# Patient Record
Sex: MALE | Race: WHITE | Employment: OTHER | URBAN - METROPOLITAN AREA
[De-identification: names, ages, dates, MRNs, and addresses within clinical notes are randomized per-mention and may not be internally consistent; named-entity substitution may affect disease eponyms.]

---

## 2017-02-17 ENCOUNTER — MYC MEDICAL ADVICE (OUTPATIENT)
Dept: PEDIATRICS | Facility: CLINIC | Age: 67
End: 2017-02-17

## 2017-02-17 NOTE — TELEPHONE ENCOUNTER
Per up-to-date: Simvastatin serum concentration may be increased when taken with grapefruit juice. Management: Avoid combination.  Patient going to [place] and wants to stop simvastatin to enjoy grapefruit. Ok to stop and restart when he gets back?  Please advise.    Irene Mcfarland RN  Message handled by Nurse Triage.

## 2017-02-21 ENCOUNTER — OFFICE VISIT (OUTPATIENT)
Dept: ORTHOPEDICS | Facility: CLINIC | Age: 67
End: 2017-02-21
Payer: COMMERCIAL

## 2017-02-21 ENCOUNTER — RADIANT APPOINTMENT (OUTPATIENT)
Dept: GENERAL RADIOLOGY | Facility: CLINIC | Age: 67
End: 2017-02-21
Attending: FAMILY MEDICINE
Payer: COMMERCIAL

## 2017-02-21 VITALS
BODY MASS INDEX: 33.05 KG/M2 | SYSTOLIC BLOOD PRESSURE: 144 MMHG | WEIGHT: 244 LBS | HEIGHT: 72 IN | DIASTOLIC BLOOD PRESSURE: 92 MMHG

## 2017-02-21 DIAGNOSIS — M25.561 ARTHRALGIA OF RIGHT LOWER LEG: ICD-10-CM

## 2017-02-21 DIAGNOSIS — M17.11 PRIMARY OSTEOARTHRITIS OF RIGHT KNEE: ICD-10-CM

## 2017-02-21 DIAGNOSIS — M25.561 ARTHRALGIA OF RIGHT LOWER LEG: Primary | ICD-10-CM

## 2017-02-21 PROCEDURE — 99214 OFFICE O/P EST MOD 30 MIN: CPT | Mod: 25 | Performed by: FAMILY MEDICINE

## 2017-02-21 PROCEDURE — 73562 X-RAY EXAM OF KNEE 3: CPT | Mod: RT | Performed by: FAMILY MEDICINE

## 2017-02-21 PROCEDURE — 20610 DRAIN/INJ JOINT/BURSA W/O US: CPT | Mod: RT | Performed by: FAMILY MEDICINE

## 2017-02-21 RX ORDER — METHYLPREDNISOLONE ACETATE 40 MG/ML
80 INJECTION, SUSPENSION INTRA-ARTICULAR; INTRALESIONAL; INTRAMUSCULAR; SOFT TISSUE ONCE
Qty: 2 ML | Refills: 0 | OUTPATIENT
Start: 2017-02-21 | End: 2017-02-21

## 2017-02-21 RX ORDER — LIDOCAINE HYDROCHLORIDE 10 MG/ML
3 INJECTION, SOLUTION INFILTRATION; PERINEURAL ONCE
Qty: 3 ML | Refills: 0 | OUTPATIENT
Start: 2017-02-21 | End: 2017-02-21

## 2017-02-21 NOTE — Clinical Note
Here is an update on your patient that was seen at [facility] in [place].   Please let us know if you have any questions.

## 2017-02-21 NOTE — MR AVS SNAPSHOT
After Visit Summary   2/21/2017    Dustin Lan    MRN: [id number]           Patient Information     Date Of Birth          1950        Visit Information        Provider Department      2/21/2017 2:20 PM Niranjan Villela MD [facility]        Today's Diagnoses     Arthralgia of right lower leg    -  1    Primary osteoarthritis of right knee          Care Instructions    Assessment:  1. Arthralgia of right lower leg    2. Primary osteoarthritis of right knee        Plan:  Rehab: Physical Therapy: [facility] - [phone]  Steroid injection of the right knee was performed today in clinic  Icing for the next 1-2 days may be helpful for pain. Injection may take 10-14 days to see the full effect.    Call if symptoms aren't improving, would proceed with surgical consult at that time         Follow-ups after your visit        Additional Services     ABI PT, HAND, AND CHIROPRACTIC REFERRAL       **This order will print in the [facility] Scheduling Office**    Physical Therapy, Hand Therapy and Chiropractic Care are available through:    *[facility]  *[facility]  *[facility]    Call one number to schedule at any of the above locations: [phone].    Your provider has referred you to: Physical Therapy at [facility] or [facility]    Indication/Reason for Referral: Knee Pain  Onset of Illness:   Therapy Orders: Evaluate and Treat  Special Programs: None  Special Request: None    Linh Melton      Additional Comments for the Therapist or Chiropractor: [place]    Please be aware that coverage of these services is subject to the terms and limitations of your health insurance plan.  Call member services at your health plan with any benefit or coverage questions.      Please bring the following to your appointment:    *Your personal calendar for scheduling future appointments  *Comfortable  clothing                  Follow-up notes from your care team     Return if symptoms worsen or fail to improve.      Who to contact     If you have questions or need follow up information about today's clinic visit or your schedule please contact [facility] directly at [phone].  Normal or non-critical lab and imaging results will be communicated to you by MyChart, letter or phone within 4 business days after the clinic has received the results. If you do not hear from us within 7 days, please contact the clinic through [employer] or phone. If you have a critical or abnormal lab result, we will notify you by phone as soon as possible.  Submit refill requests through POS on CLOUD or call your pharmacy and they will forward the refill request to us. Please allow 3 business days for your refill to be completed.          Additional Information About Your Visit        MyChart Information     POS on CLOUD gives you secure access to your electronic health record. If you see a primary care provider, you can also send messages to your care team and make appointments. If you have questions, please call your primary care clinic.  If you do not have a primary care provider, please call [phone] and they will assist you.        Care EveryWhere ID     This is your Care EveryWhere ID. This could be used by other organizations to access your [place] medical records  [id number]        Your Vitals Were     Height BMI (Body Mass Index)                6' (1.829 m) 33.09 kg/m2           Blood Pressure from Last 3 Encounters:   02/21/17 (!) 144/92   11/08/16 138/80   04/14/16 132/86    Weight from Last 3 Encounters:   02/21/17 244 lb (110.7 kg)   11/08/16 243 lb 7 oz (110.4 kg)   04/14/16 245 lb 1.6 oz (111.2 kg)              We Performed the Following     DRAIN/INJECT LARGE JOINT/BURSA     ABI PT, HAND, AND CHIROPRACTIC REFERRAL     METHYLPREDNISOLONE 40 MG INJ          Today's Medication  Changes          These changes are accurate as of: 2/21/17  4:19 PM.  If you have any questions, ask your nurse or doctor.               Start taking these medicines.        Dose/Directions    lidocaine 1 % injection   Used for:  Primary osteoarthritis of right knee   Started by:  Niranjan Villela MD        Dose:  3 mL   3 mLs by INTRA-ARTICULAR route once for 1 dose   Quantity:  3 mL   Refills:  0       methylPREDNISolone acetate 40 MG/ML injection   Commonly known as:  DEPO-MEDROL   Used for:  Primary osteoarthritis of right knee   Started by:  Niranjan Villela MD        Dose:  80 mg   2 mLs (80 mg) by INTRA-ARTICULAR route once for 1 dose   Quantity:  2 mL   Refills:  0            Where to get your medicines      Some of these will need a paper prescription and others can be bought over the counter.  Ask your nurse if you have questions.     You don't need a prescription for these medications     lidocaine 1 % injection    methylPREDNISolone acetate 40 MG/ML injection                Primary Care Provider Office Phone # Fax #    Alessandro Blanco [phone]       [address] [place] [address]        Thank you!     Thank you for choosing [facility]  for your care. Our goal is always to provide you with excellent care. Hearing back from our patients is one way we can continue to improve our services. Please take a few minutes to complete the written survey that you may receive in the mail after your visit with us. Thank you!             Your Updated Medication List - Protect others around you: Learn how to safely use, store and throw away your medicines at www.disposemymeds.org.          This list is accurate as of: 2/21/17  4:19 PM.  Always use your most recent med list.                   Brand Name Dispense Instructions for use    albuterol 108 (90 BASE) MCG/ACT Inhaler    PROAIR HFA/PROVENTIL  HFA/VENTOLIN HFA    1 Inhaler    Inhale 2 puffs into the lungs every 6 hours as needed for shortness of breath / dyspnea or wheezing       fluticasone-salmeterol 250-50 MCG/DOSE diskus inhaler    ADVAIR DISKUS    3 Inhaler    Inhale 1 puff into the lungs every 12 hours       glucosamine chondroitin 500 complex Caps          ibuprofen 200 MG tablet    ADVIL/MOTRIN    100 tablet    Take 1 tablet (200 mg) by mouth 2 times daily       lidocaine 1 % injection     3 mL    3 mLs by INTRA-ARTICULAR route once for 1 dose       methylPREDNISolone acetate 40 MG/ML injection    DEPO-MEDROL    2 mL    2 mLs (80 mg) by INTRA-ARTICULAR route once for 1 dose       Multi-vitamin Tabs tablet   Generic drug:  multivitamin, therapeutic with minerals      1 po qd       simvastatin 40 MG tablet    ZOCOR    90 tablet    Take 1 tablet (40 mg) by mouth At Bedtime

## 2017-02-21 NOTE — PATIENT INSTRUCTIONS
Assessment:  1. Arthralgia of right lower leg    2. Primary osteoarthritis of right knee        Plan:  Rehab: Physical Therapy: [facility] - [phone]  Steroid injection of the right knee was performed today in clinic  Icing for the next 1-2 days may be helpful for pain. Injection may take 10-14 days to see the full effect.    Call if symptoms aren't improving, would proceed with surgical consult at that time

## 2017-02-21 NOTE — PROGRESS NOTES
[facility]   Clinic Visit s Feb 21, 2017    PCP: Alessandro Blanco      Dustin is a 66 year old male who is seen as self referral for   Chief Complaint   Patient presents with     Musculoskeletal Problem     R knee pain       Injury: acute right knee pain started last night. He did exercise yesterday but didn't do anything unusual at the gym.     Location of Pain: right knee- posterior lateral  Duration of Pain: 1 day(s)  Rating of Pain at worst: 6/10  Rating of Pain Currently: 5/10  Pain is worse with: extending the right knee, walking, stairs  Pain is better with: keeping knee flexed  Treatment so far consists of:  Aleve, icing, knee brace  Associated symptoms: swelling, pinching or catching in the knee, unable to fully extend the knee  Prior History of related problems: right knee menisectomy 2008.    Seen here for LEFT knee djd 2 years ago, seen by DR Fitch, ortho surgeon, over 3 years ago for RIGHT knee.    Social History: retired     Past Medical History   Diagnosis Date     Elevated blood pressure reading without diagnosis of hypertension      Personal history of colonic polyps 2/2007     tubular adenoma, ileocecal valve, repeat 2010     Plantar fascial fibromatosis      Pure hypercholesterolemia        Patient Active Problem List    Diagnosis Date Noted     Advance Care Planning 08/25/2011     Priority: Medium     Advance Care Planning 3/22/2016: Patient returned call and states he was out of town on vacation and unable to return call. Informed of inability to include Health Care Goals in current ACD. Informed of choices of creating a new document if he would like them included or leaving as is. Health Care Goals and letter letter mailed to patient. He verbalized understanding. GREGORIA Gotti RN  Advance Care Planning 3/8/2016: Receipt of ACP document:  Received: Health Care Directive which was witnessed or notarized on 2/21/16. Health Care Goals pages 1-6 are attached but not  documented in ACD that they are attached.  Document not previously scanned. Validation form completed indicating short form Advance Directive is valid. Vallidation form sent to be scanned. Left message for patient to return call. (consent to communicate on file)Unable to attach goals due to lack of witnessing/notarizing. Code Status needs to be updated to reflect choices in most recent ACP document. Orders placed and sent to provider for review.  Confirmed/documented designated decision maker(s).  Added by Barbara Gotti  Advance Care Planning 2016: ACP Facilitation Session:    Dustin Lan presented for ACP Facilitation session at a group session. He was accompanied by spouse. Sergio Partida information provided and resources reviewed. He currently wishes to give additional consideration to ACP  He currently has the following questions or concerns about Advance Care Planning: none.  Confirmed/documented legally designated decision maker(s). Follow-up meeting: not needed/applicable. Added by Montse Lan RN Advance Care Planning Liaison with Sergio Partida  Discussed advance care planning with patient; information given to patient to review. 2011            Impaired fasting glucose 2010     Priority: Medium     HYPERLIPIDEMIA LDL GOAL <130 02/10/2010     Priority: Medium     History of colonic polyps 2007     Priority: Medium     tubular adenoma, ileocecal valve, repeat   Problem list name updated by automated process. Provider to review       Mild persistent asthma 04/10/2006     Priority: Medium     Contact dermatitis and other eczema, due to unspecified cause 04/10/2006     Priority: Medium       Family History   Problem Relation Age of Onset     C.A.D. Father      MI  66     CEREBROVASCULAR DISEASE Mother      Lipids Sister        Social History     Social History     Marital status:      Spouse name: N/A     Number of children: N/A     Years of education: N/A      Social History Main Topics     Smoking status: Never Smoker     Smokeless tobacco: Never Used     Alcohol use 0.0 - 1.8 oz/week     0 - 3 Standard drinks or equivalent per week       Past Surgical History   Procedure Laterality Date     Hc colonoscopy thru stoma, diagnostic  2/2007     ileocecal valve polyp, tubular adenoma, repeat in 3 yrs     Arthroscopy knee rt/lt  12/2008     right       Review of Systems   Musculoskeletal: Positive for joint pain.   All other systems reviewed and are negative.    Physical Exam   Musculoskeletal:        Right knee: Lateral joint line tenderness noted.        Left knee: Medial joint line tenderness noted.     BP (!) 144/92  Ht 6' (1.829 m)  Wt 244 lb (110.7 kg)  BMI 33.09 kg/m2  Constitutional:well-developed, well-nourished, and in no distress.   Cardiovascular: Intact distal pulses.    Neurological: alert. Gait Abnormal:   Antalgic gait  limping significantly  Skin: Skin is warm and dry.   Psychiatric: Mood and affect normal.   Respiratory: unlabored, speaks in full sentences  Lymph: no LAD, no lymphangitis    Left Knee Exam   Swelling: Mild  Effusion: No    Tenderness   The patient is experiencing tenderness in the medial joint line.    Range of Motion   Extension: Normal  Flexion:     120    Tests   Drawer:       Anterior - Negative     Posterior - n/t  Varus:  Negative  Valgus: Negative  Patellar Apprehension: No    Right Knee Exam   Swelling: Mild  Effusion: Yes    Tenderness   The patient is experiencing tenderness in the lateral joint line.    Range of Motion   Extension: 10  Flexion:     90    Tests   McMurrays:  Medial - Positive      Lateral - Negative  Drawer:       Anterior - Negative    Posterior - Negative  Varus:  Negative  Valgus: Negative  Patellar Apprehension: No    Comments:  Crepitus with range of motion          X-ray images Ordered and independently reviewed by me in the office today with the patient. X-ray shows:   Recent Results (from the past 48  hour(s))   XR Knee Right 1/2 Views    Narrative    Advanced arthritis in both knees, with near complete obliteration of the   medial joint space compartments bilaterally. The LEFT knee degeneration   has progressed significantly since 2014 where the RIGHT knee is minimally   advanced   XR Knee Bilateral 1/2 Views    Narrative    Advanced arthritis in both knees, with near complete obliteration of the   medial joint space compartments bilaterally. The LEFT knee degeneration   has progressed significantly since 2014 where the RIGHT knee is minimally   advanced.      ASSESSMENT/PLAN    ICD-10-CM    1. Arthralgia of right lower leg M25.561 XR Knee Bilateral 1/2 Views     XR Knee Right 1/2 Views   2. Primary osteoarthritis of right knee M17.11 DRAIN/INJECT LARGE JOINT/BURSA     METHYLPREDNISOLONE 40 MG INJ     methylPREDNISolone acetate (DEPO-MEDROL) 40 MG/ML injection     lidocaine 1 % injection     Acute flare of RIGHT knee djd, not previously evaluated here before.    Fairly advanced djd, nearing end stage. PT recommended due to loss of ROM, may be necessary prior to knee replacement as well.     The benefits, risks, indications for and alternatives to the procedure were discussed with the patient, including bleeding, infection, hyperglycemia, localized lipodystrophy and hypopigmentation. He elected to proceed, and informed consent was signed.    PROCEDURE:  JOINT INJECTION.         After a discussion of risks, benefits and side effects of procedure, informed patient consent was obtained, and form signed by patient and physician.       The right  knee was prepped with Chloroprep.    INJECTION:  Using 3 cc of 1% lidocaine mixed                           with 80 mg of DepoMedrol, the right knee joint was successfully injected                           without complication using a 22G needle with the patient lying supine and the injection administered using the superolateral or lateral patellar approach.  He tolerated  the procedure well with minimal discomfort. Bandaid applied. Instructed to monitor for increased warmth, redness, pain or swelling which might indicate an infection.

## 2017-02-28 ENCOUNTER — THERAPY VISIT (OUTPATIENT)
Dept: PHYSICAL THERAPY | Facility: CLINIC | Age: 67
End: 2017-02-28
Payer: COMMERCIAL

## 2017-02-28 DIAGNOSIS — M25.561 CHRONIC PAIN OF BOTH KNEES: Primary | ICD-10-CM

## 2017-02-28 DIAGNOSIS — G89.29 CHRONIC PAIN OF BOTH KNEES: Primary | ICD-10-CM

## 2017-02-28 DIAGNOSIS — M25.562 CHRONIC PAIN OF BOTH KNEES: Primary | ICD-10-CM

## 2017-02-28 PROCEDURE — 97161 PT EVAL LOW COMPLEX 20 MIN: CPT | Mod: GP | Performed by: PHYSICAL THERAPIST

## 2017-02-28 PROCEDURE — 97110 THERAPEUTIC EXERCISES: CPT | Mod: GP | Performed by: PHYSICAL THERAPIST

## 2017-02-28 ASSESSMENT — ACTIVITIES OF DAILY LIVING (ADL)
LIMPING: I HAVE THE SYMPTOM BUT IT DOES NOT AFFECT MY ACTIVITY
STAND: ACTIVITY IS MINIMALLY DIFFICULT
RISE FROM A CHAIR: ACTIVITY IS MINIMALLY DIFFICULT
KNEEL ON THE FRONT OF YOUR KNEE: ACTIVITY IS MINIMALLY DIFFICULT
KNEE_ACTIVITY_OF_DAILY_LIVING_SUM: 55
GO UP STAIRS: ACTIVITY IS MINIMALLY DIFFICULT
WEAKNESS: THE SYMPTOM AFFECTS MY ACTIVITY SLIGHTLY
KNEE_ACTIVITY_OF_DAILY_LIVING_SCORE: 78.57
GIVING WAY, BUCKLING OR SHIFTING OF KNEE: I HAVE THE SYMPTOM BUT IT DOES NOT AFFECT MY ACTIVITY
SQUAT: ACTIVITY IS SOMEWHAT DIFFICULT
GO DOWN STAIRS: ACTIVITY IS MINIMALLY DIFFICULT
SIT WITH YOUR KNEE BENT: ACTIVITY IS NOT DIFFICULT
STIFFNESS: I DO NOT HAVE THE SYMPTOM
HOW_WOULD_YOU_RATE_THE_OVERALL_FUNCTION_OF_YOUR_KNEE_DURING_YOUR_USUAL_DAILY_ACTIVITIES?: NEARLY NORMAL
AS_A_RESULT_OF_YOUR_KNEE_INJURY,_HOW_WOULD_YOU_RATE_YOUR_CURRENT_LEVEL_OF_DAILY_ACTIVITY?: NEARLY NORMAL
RAW_SCORE: 55
HOW_WOULD_YOU_RATE_THE_CURRENT_FUNCTION_OF_YOUR_KNEE_DURING_YOUR_USUAL_DAILY_ACTIVITIES_ON_A_SCALE_FROM_0_TO_100_WITH_100_BEING_YOUR_LEVEL_OF_KNEE_FUNCTION_PRIOR_TO_YOUR_INJURY_AND_0_BEING_THE_INABILITY_TO_PERFORM_ANY_OF_YOUR_USUAL_DAILY_ACTIVITIES?: 70
PAIN: THE SYMPTOM AFFECTS MY ACTIVITY SLIGHTLY
WALK: ACTIVITY IS NOT DIFFICULT
SWELLING: THE SYMPTOM AFFECTS MY ACTIVITY SLIGHTLY

## 2017-02-28 NOTE — PROGRESS NOTES
[facility] Initial Evaluation    Subjective:    Dustin Lan is a 66 year old male with a right knee condition.      This is a chronic condition  Pt has a long history of B knee issues, OA, and meniscectomy B several years ago.  Pt recently had a flare up of his R knee, over the past several months it has been progressively worsening, insidious onset starting roughly 1/1/2017.  Pt visited MD who gave him an injection which helped initially.  Pt is planning on scheduling a R TKA, but is hoping that PT will keep him out of surgery if possible.  .    Patient reports pain:  Lateral and posterior.    Pain is described as aching and is intermittent and reported as 5/10.  Associated symptoms:  Loss of motion/stiffness and loss of strength.   Symptoms are exacerbated by activity, weight bearing, standing, walking, ascending stairs and descending stairs and relieved by rest.  Since onset symptoms are gradually worsening.    Previous treatment includes physical therapy.    General health as reported by patient is good.  Pertinent medical history includes:  Asthma and overweight.      Current medications:  Anti-inflammatory.  Current occupation is Retired  .                                  Objective:    Standing Alignment:              Knee:  Genu varus R, genu varus L, genu recuvatum L and genu recurvatum R      Gait:    Gait Type:  Antalgic         Flexibility/Screens:       Lower Extremity:  Decreased left lower extremity flexibility:Adductors; Hip Flexors; Quadriceps; Hamstrings and Gastroc    Decreased right lower extremity flexibility:  Adductors; Hip Flexors; Quadriceps; Hamstrings and Gastroc                                                 Hip Evaluation    Hip Strength:    Flexion:   Left: 5-/5   Pain:  Right: 5-/5   Pain:                    Extension:  Left: 5-/5  Pain:Right: 5-/5    Pain:    Abduction:  Left: 4+/5     Pain:Right: 4+/5    Pain:                           Knee  Evaluation:  ROM:    AROM    Hyperextension:  Left:  0    Right: 0  Extension:  Left: -8    Right:  -14  Flexion: Left: 107    Right: 103        Strength:     Extension:  Left: 5-/5   Pain:      Right: 5-/5   Pain:  Flexion:  Left: 4+/5   Pain:      Right: 4+/5   Pain:    Quad Set Left: Good    Pain:   Quad Set Right: Good    Pain:  Ligament Testing:  Normal                Special Tests:   Left knee positive for the following special tests:  Patellar Compression  Right knee positive for the following tests:  Patellar Compression  Palpation:    Left knee tenderness present at:  Medial Joint Line and Lateral Joint Line  Right knee tenderness present at:  Medial Joint Line and Lateral Joint Line  Edema:  Not Assessed    Mobility Testing:      Patellofemoral Medial:  Left: hypomobile    Right: hypomobile  Patellofemoral Lateral:  Left: hypomobile    Right: hypomobile  Patellofemoral Superior:  Left: hypomobile    Right: hypomobile  Patellofemoral Inferior:  Left: hypomobile    Right: hypomobile        General     ROS    Assessment/Plan:      Patient is a 66 year old male with both sides knee complaints.    Patient has the following significant findings with corresponding treatment plan.                Diagnosis 1:  B knee OA, pain      Pain -  hot/cold therapy, manual therapy, self management, education and home program  Decreased ROM/flexibility - manual therapy and therapeutic exercise  Decreased joint mobility - manual therapy and therapeutic exercise  Decreased strength - therapeutic exercise and therapeutic activities  Impaired gait - gait training  Impaired muscle performance - neuro re-education  Decreased function - therapeutic activities    Therapy Evaluation Codes:   1) History comprised of:   Personal factors that impact the plan of care:      None.    Comorbidity factors that impact the plan of care are:      Asthma and Overweight.     Medications impacting care: Anti-inflammatory.  2) Examination of Body  Systems comprised of:   Body structures and functions that impact the plan of care:      Knee.   Activity limitations that impact the plan of care are:      Bending, Jumping, Lifting, Squatting/kneeling and Stairs.  3) Clinical presentation characteristics are:   Stable/Uncomplicated.  4) Decision-Making    Low complexity using standardized patient assessment instrument and/or measureable assessment of functional outcome.  Cumulative Therapy Evaluation is: Low complexity.    Previous and current functional limitations:  (See Goal Flow Sheet for this information)    Short term and Long term goals: (See Goal Flow Sheet for this information)     Communication ability:  Patient appears to be able to clearly communicate and understand verbal and written communication and follow directions correctly.  Treatment Explanation - The following has been discussed with the patient:   RX ordered/plan of care  Anticipated outcomes  Possible risks and side effects  This patient would benefit from PT intervention to resume normal activities.   Rehab potential is good.    Frequency:  1 X week, once daily  Duration:  for 6 weeks  Discharge Plan:  Achieve all LTG.  Independent in home treatment program.  Reach maximal therapeutic benefit.    Please refer to the daily flowsheet for treatment today, total treatment time and time spent performing 1:1 timed codes.

## 2017-03-22 ENCOUNTER — THERAPY VISIT (OUTPATIENT)
Dept: PHYSICAL THERAPY | Facility: CLINIC | Age: 67
End: 2017-03-22
Payer: COMMERCIAL

## 2017-03-22 DIAGNOSIS — G89.29 CHRONIC PAIN OF BOTH KNEES: ICD-10-CM

## 2017-03-22 DIAGNOSIS — M25.562 CHRONIC PAIN OF BOTH KNEES: ICD-10-CM

## 2017-03-22 DIAGNOSIS — M25.561 CHRONIC PAIN OF BOTH KNEES: ICD-10-CM

## 2017-03-22 PROCEDURE — 97110 THERAPEUTIC EXERCISES: CPT | Mod: GP | Performed by: PHYSICAL THERAPIST

## 2017-05-01 ENCOUNTER — OFFICE VISIT (OUTPATIENT)
Dept: PEDIATRICS | Facility: CLINIC | Age: 67
End: 2017-05-01
Payer: COMMERCIAL

## 2017-05-01 VITALS
HEART RATE: 80 BPM | HEIGHT: 72 IN | OXYGEN SATURATION: 98 % | WEIGHT: 243.06 LBS | BODY MASS INDEX: 32.92 KG/M2 | TEMPERATURE: 97.6 F | SYSTOLIC BLOOD PRESSURE: 132 MMHG | RESPIRATION RATE: 16 BRPM | DIASTOLIC BLOOD PRESSURE: 88 MMHG

## 2017-05-01 DIAGNOSIS — R06.02 SOB (SHORTNESS OF BREATH): Primary | ICD-10-CM

## 2017-05-01 LAB — NT-PROBNP SERPL-MCNC: 35 PG/ML (ref 0–125)

## 2017-05-01 PROCEDURE — 93000 ELECTROCARDIOGRAM COMPLETE: CPT | Performed by: INTERNAL MEDICINE

## 2017-05-01 PROCEDURE — 99214 OFFICE O/P EST MOD 30 MIN: CPT | Performed by: INTERNAL MEDICINE

## 2017-05-01 PROCEDURE — 36415 COLL VENOUS BLD VENIPUNCTURE: CPT | Performed by: INTERNAL MEDICINE

## 2017-05-01 PROCEDURE — 80048 BASIC METABOLIC PNL TOTAL CA: CPT | Performed by: INTERNAL MEDICINE

## 2017-05-01 PROCEDURE — 83880 ASSAY OF NATRIURETIC PEPTIDE: CPT | Performed by: INTERNAL MEDICINE

## 2017-05-01 NOTE — MR AVS SNAPSHOT
"              After Visit Summary   5/1/2017    Dustin Lan    MRN: [id number]           Patient Information     Date Of Birth          1950        Visit Information        Provider Department      5/1/2017 9:40 AM Alessandro Blanco MD [facility]        Today's Diagnoses     SOB (shortness of breath)    -  1      Care Instructions    For next 1 month, stay on advair twice daily.  THen may back down to daily.    Lab work downstairs today.  Directions:  As you walk through the first floor, you'll see (on the right) first the pharmacy, then some bathrooms, then the \"Lab and Imaging\" area. Give them your name at the window there and wait for them to call you.     Follow up if symptoms recur, in the absence of cats (and prolonged standing).    Alessandro Blanco MD  Internal Medicine and Pediatrics           Follow-ups after your visit        Who to contact     If you have questions or need follow up information about today's clinic visit or your schedule please contact [facility] directly at [phone].  Normal or non-critical lab and imaging results will be communicated to you by MyChart, letter or phone within 4 business days after the clinic has received the results. If you do not hear from us within 7 days, please contact the clinic through [employer] or phone. If you have a critical or abnormal lab result, we will notify you by phone as soon as possible.  Submit refill requests through [employer] or call your pharmacy and they will forward the refill request to us. Please allow 3 business days for your refill to be completed.          Additional Information About Your Visit        [employer] Information     [employer] gives you secure access to your electronic health record. If you see a primary care provider, you can also send messages to your care team and make appointments. If you have questions, please call your primary care clinic.  If you do not have a primary care provider, please call " [phone] and they will assist you.        Care EveryWhere ID     This is your Care EveryWhere ID. This could be used by other organizations to access your [facility] medical records  [id number]        Your Vitals Were     Pulse Temperature Respirations Height Pulse Oximetry BMI (Body Mass Index)    80 97.6  F (36.4  C) (Oral) 16 6' (1.829 m) 98% 32.97 kg/m2       Blood Pressure from Last 3 Encounters:   05/01/17 132/88   02/21/17 (!) 144/92   11/08/16 138/80    Weight from Last 3 Encounters:   05/01/17 243 lb 1 oz (110.3 kg)   02/21/17 244 lb (110.7 kg)   11/08/16 243 lb 7 oz (110.4 kg)              We Performed the Following     Basic metabolic panel  (Ca, Cl, CO2, Creat, Gluc, K, Na, BUN)     BNP-N terminal pro     EKG 12-lead complete w/read - Clinics        Primary Care Provider Office Phone # Fax #    Alessandro Blanco [phone]       [address] [place] [address]        Thank you!     Thank you for choosing [facility]  for your care. Our goal is always to provide you with excellent care. Hearing back from our patients is one way we can continue to improve our services. Please take a few minutes to complete the written survey that you may receive in the mail after your visit with us. Thank you!             Your Updated Medication List - Protect others around you: Learn how to safely use, store and throw away your medicines at www.disposemymeds.org.          This list is accurate as of: 5/1/17 10:35 AM.  Always use your most recent med list.                   Brand Name Dispense Instructions for use    albuterol 108 (90 BASE) MCG/ACT Inhaler    PROAIR HFA/PROVENTIL HFA/VENTOLIN HFA    1 Inhaler    Inhale 2 puffs into the lungs every 6 hours as needed for shortness of breath / dyspnea or wheezing       fluticasone-salmeterol 250-50 MCG/DOSE diskus inhaler    ADVAIR DISKUS    3 Inhaler    Inhale 1 puff into the lungs every 12 hours        glucosamine chondroitin 500 complex Caps          ibuprofen 200 MG tablet    ADVIL/MOTRIN    100 tablet    Take 1 tablet (200 mg) by mouth 2 times daily       Multi-vitamin Tabs tablet   Generic drug:  multivitamin, therapeutic with minerals      1 po qd       simvastatin 40 MG tablet    ZOCOR    90 tablet    Take 1 tablet (40 mg) by mouth At Bedtime

## 2017-05-01 NOTE — PATIENT INSTRUCTIONS
"For next 1 month, stay on advair twice daily.  THen may back down to daily.    Lab work downstairs today.  Directions:  As you walk through the first floor, you'll see (on the right) first the pharmacy, then some bathrooms, then the \"Lab and Imaging\" area. Give them your name at the window there and wait for them to call you.     Follow up if symptoms recur, in the absence of cats (and prolonged standing).    Alessandro Blanco MD  Internal Medicine and Pediatrics     "

## 2017-05-01 NOTE — NURSING NOTE
Chief Complaint   Patient presents with     Consult     High Blood Pressure Reading       Initial /66 (BP Location: Right arm, Patient Position: Chair, Cuff Size: Adult Large)  Pulse 80  Temp 97.6  F (36.4  C) (Oral)  Resp 16  Ht 6' (1.829 m)  Wt 243 lb 1 oz (110.3 kg)  SpO2 98%  BMI 32.97 kg/m2 Estimated body mass index is 32.97 kg/(m^2) as calculated from the following:    Height as of this encounter: 6' (1.829 m).    Weight as of this encounter: 243 lb 1 oz (110.3 kg).  Medication Reconciliation: stacie Kennedy CMA (AAMA) 5/1/2017 9:41 AM

## 2017-05-01 NOTE — PROGRESS NOTES
"  SUBJECTIVE:                                                    Dustin Lan is a 66 year old male who presents to clinic today for the following health issues:      Patient states had high blood pressure reading 150/104 on Saturday, did have some chest pressure and tightness at that time too. Patient states overall not feeling well for the past couple of days. Per patient, has been taking OTC allergy medications. Patient also states did have some bilateral leg swelling which has resolved since stopping OTC allergy medication.     Has \"not been breathing right\" for a few weeks.  Sister with cat, and patient has cat allergy.  Took a 4 hour tablet (allergy med).   Also tried a 24 hour allegra tablet.  Has noted that exposure to cat caused him to have bilateral calf \"hardness\".  He stopped the allegra and calves \"got back to normal.\"    Uses advair as well.  His shortness of breath has been worse, so increased to twice daily.      Current symptoms:  Leg hardness and swelling is gone.  Breathing is currently \"shallow\".  No wheezing, no coughing, but does feel tightness.      Problem list and histories reviewed & adjusted, as indicated.  Additional history: as documented    Patient Active Problem List   Diagnosis     Mild persistent asthma     Contact dermatitis and other eczema, due to unspecified cause     History of colonic polyps     HYPERLIPIDEMIA LDL GOAL <130     Impaired fasting glucose     Advance Care Planning     Chronic pain of both knees     Past Surgical History:   Procedure Laterality Date     ARTHROSCOPY KNEE RT/LT  12/2008    right     HC COLONOSCOPY THRU STOMA, DIAGNOSTIC  2/2007    ileocecal valve polyp, tubular adenoma, repeat in 3 yrs       Social History   Substance Use Topics     Smoking status: Never Smoker     Smokeless tobacco: Never Used     Alcohol use 0.0 - 1.8 oz/week     0 - 3 Standard drinks or equivalent per week     Family History   Problem Relation Age of Onset     C.A.D. Father  "     MI  66     CEREBROVASCULAR DISEASE Mother      Lipids Sister          Current Outpatient Prescriptions   Medication Sig Dispense Refill     fluticasone-salmeterol (ADVAIR DISKUS) 250-50 MCG/DOSE diskus inhaler Inhale 1 puff into the lungs every 12 hours 3 Inhaler 3     simvastatin (ZOCOR) 40 MG tablet Take 1 tablet (40 mg) by mouth At Bedtime 90 tablet 3     GLUCOSAMINE CHONDR 500 COMPLEX PO CAPS        MULTI-VITAMIN OR TABS 1 po qd       ibuprofen (ADVIL,MOTRIN) 200 MG tablet Take 1 tablet (200 mg) by mouth 2 times daily 100 tablet      albuterol (PROAIR HFA, PROVENTIL HFA, VENTOLIN HFA) 108 (90 BASE) MCG/ACT inhaler Inhale 2 puffs into the lungs every 6 hours as needed for shortness of breath / dyspnea or wheezing 1 Inhaler 2     Allergies   Allergen Reactions     Cats      No Known Allergies      BP Readings from Last 3 Encounters:   17 132/88   17 (!) 144/92   16 138/80    Wt Readings from Last 3 Encounters:   17 243 lb 1 oz (110.3 kg)   17 244 lb (110.7 kg)   16 243 lb 7 oz (110.4 kg)                  Labs reviewed in EPIC    Reviewed and updated as needed this visit by clinical staff       Reviewed and updated as needed this visit by Provider         ROS:  C: NEGATIVE for fever, chills, change in weight  E/M: NEGATIVE for ear, mouth and throat problems  R: NEGATIVE for significant cough or SOB  CV: NEGATIVE for chest pain, palpitations or peripheral edema    OBJECTIVE:                                                    /88  Pulse 80  Temp 97.6  F (36.4  C) (Oral)  Resp 16  Ht 6' (1.829 m)  Wt 243 lb 1 oz (110.3 kg)  SpO2 98%  BMI 32.97 kg/m2  Body mass index is 32.97 kg/(m^2).   GENERAL: healthy, alert, well nourished, well hydrated, no distress  HENT: ear canals- normal; TMs- normal; Nose- normal; Mouth- no ulcers, no lesions  NECK: no tenderness, no adenopathy, no asymmetry, no masses, no stiffness; thyroid- normal to palpation  RESP: lungs clear  to auscultation - no rales, no rhonchi, no wheezes  CV: regular rates and rhythm, normal S1 S2, no S3 or S4 and no murmur, no click or rub -  ABDOMEN: soft, no tenderness, no  hepatosplenomegaly, no masses, normal bowel sounds    Diagnostic test results:  Diagnostic Test Results:  EKG - negative     ASSESSMENT/PLAN:                                                    1. SOB (shortness of breath)  Symptoms are likely from poorly controlled asthma, owing to recent cat expsosure.  I do not believe that the allegra caused edema, or shortness of breath, but rather his prolonged standing at [employer], and cat exposure did. Will obtain BNP to ensure no ongoing cardiac issues or evidence of CHF.No edema today.   - EKG 12-lead complete w/read - Clinics  - BNP-N terminal pro  - Basic metabolic panel  (Ca, Cl, CO2, Creat, Gluc, K, Na, BUN)      See Patient Instructions    Alessandro Blanco MD  [facility] [place]

## 2017-05-02 LAB
ANION GAP SERPL CALCULATED.3IONS-SCNC: 7 MMOL/L (ref 3–14)
BUN SERPL-MCNC: 18 MG/DL (ref 7–30)
CALCIUM SERPL-MCNC: 8.1 MG/DL (ref 8.5–10.1)
CHLORIDE SERPL-SCNC: 102 MMOL/L (ref 94–109)
CO2 SERPL-SCNC: 27 MMOL/L (ref 20–32)
CREAT SERPL-MCNC: 0.93 MG/DL (ref 0.66–1.25)
GFR SERPL CREATININE-BSD FRML MDRD: 81 ML/MIN/1.7M2
GLUCOSE SERPL-MCNC: 81 MG/DL (ref 70–99)
POTASSIUM SERPL-SCNC: 4 MMOL/L (ref 3.4–5.3)
SODIUM SERPL-SCNC: 136 MMOL/L (ref 133–144)

## 2017-05-02 ASSESSMENT — ASTHMA QUESTIONNAIRES: ACT_TOTALSCORE: 21

## 2017-05-10 PROBLEM — G89.29 CHRONIC PAIN OF BOTH KNEES: Status: RESOLVED | Noted: 2017-02-28 | Resolved: 2017-05-10

## 2017-05-10 PROBLEM — M25.562 CHRONIC PAIN OF BOTH KNEES: Status: RESOLVED | Noted: 2017-02-28 | Resolved: 2017-05-10

## 2017-05-10 PROBLEM — M25.561 CHRONIC PAIN OF BOTH KNEES: Status: RESOLVED | Noted: 2017-02-28 | Resolved: 2017-05-10

## 2017-05-10 NOTE — PROGRESS NOTES
Patient seen two times for evaluation and treatment.  Patient did not return for further treatment and current status is unknown.  Due to short treatment duration no objective changes were made.  Please see initial evaluation for further information.

## 2017-06-21 ENCOUNTER — MYC MEDICAL ADVICE (OUTPATIENT)
Dept: PEDIATRICS | Facility: CLINIC | Age: 67
End: 2017-06-21

## 2017-06-21 ENCOUNTER — TRANSFERRED RECORDS (OUTPATIENT)
Dept: HEALTH INFORMATION MANAGEMENT | Facility: CLINIC | Age: 67
End: 2017-06-21

## 2017-06-21 NOTE — TELEPHONE ENCOUNTER
Patient c/o sinus pressure, teeth ache and low grade fever, eye mattering as well-appointment advised for sinusitis.  Advised home remedies in the meantime.  Irene Mcfarland RN  Message handled by Nurse Triage.

## 2017-07-13 DIAGNOSIS — E78.5 HYPERLIPIDEMIA LDL GOAL <130: ICD-10-CM

## 2017-07-13 RX ORDER — SIMVASTATIN 40 MG
40 TABLET ORAL AT BEDTIME
Qty: 90 TABLET | Refills: 0 | Status: SHIPPED | OUTPATIENT
Start: 2017-07-13 | End: 2017-11-17

## 2017-07-13 NOTE — TELEPHONE ENCOUNTER
Prescription approved per [facility] Refill Protocol.    Mitzy COLE RN, BSN, PHN  [place] [facility] RN

## 2017-07-13 NOTE — TELEPHONE ENCOUNTER
simvastatin (ZOCOR) 40 MG tablet     Last Written Prescription Date: 10/22/2015  Last Fill Quantity: 90, # refills: 3  Last Office Visit with G, P or [facility] prescribing provider: 5/1/2017       Lab Results   Component Value Date    CHOL 196 11/08/2016     Lab Results   Component Value Date    HDL 57 11/08/2016     Lab Results   Component Value Date     11/08/2016     Lab Results   Component Value Date    TRIG 163 11/08/2016     Lab Results   Component Value Date    CHOLHDLRATIO 3.0 10/22/2015

## 2017-11-17 ENCOUNTER — OFFICE VISIT (OUTPATIENT)
Dept: PEDIATRICS | Facility: CLINIC | Age: 67
End: 2017-11-17
Payer: COMMERCIAL

## 2017-11-17 VITALS
OXYGEN SATURATION: 98 % | DIASTOLIC BLOOD PRESSURE: 76 MMHG | TEMPERATURE: 97.9 F | HEART RATE: 73 BPM | HEIGHT: 72 IN | BODY MASS INDEX: 32.13 KG/M2 | SYSTOLIC BLOOD PRESSURE: 136 MMHG | WEIGHT: 237.2 LBS

## 2017-11-17 DIAGNOSIS — E78.5 HYPERLIPIDEMIA LDL GOAL <130: ICD-10-CM

## 2017-11-17 DIAGNOSIS — Z23 NEED FOR VACCINATION: ICD-10-CM

## 2017-11-17 DIAGNOSIS — Z23 NEED FOR PROPHYLACTIC VACCINATION AND INOCULATION AGAINST INFLUENZA: ICD-10-CM

## 2017-11-17 DIAGNOSIS — Z00.00 ENCOUNTER FOR ROUTINE ADULT HEALTH EXAMINATION WITHOUT ABNORMAL FINDINGS: Primary | ICD-10-CM

## 2017-11-17 DIAGNOSIS — J45.30 MILD PERSISTENT ASTHMA WITHOUT COMPLICATION: ICD-10-CM

## 2017-11-17 PROCEDURE — 99397 PER PM REEVAL EST PAT 65+ YR: CPT | Mod: 25 | Performed by: INTERNAL MEDICINE

## 2017-11-17 PROCEDURE — 80053 COMPREHEN METABOLIC PANEL: CPT | Performed by: INTERNAL MEDICINE

## 2017-11-17 PROCEDURE — 90662 IIV NO PRSV INCREASED AG IM: CPT | Performed by: INTERNAL MEDICINE

## 2017-11-17 PROCEDURE — 80061 LIPID PANEL: CPT | Performed by: INTERNAL MEDICINE

## 2017-11-17 PROCEDURE — 90472 IMMUNIZATION ADMIN EACH ADD: CPT | Performed by: INTERNAL MEDICINE

## 2017-11-17 PROCEDURE — 90471 IMMUNIZATION ADMIN: CPT | Performed by: INTERNAL MEDICINE

## 2017-11-17 PROCEDURE — 36415 COLL VENOUS BLD VENIPUNCTURE: CPT | Performed by: INTERNAL MEDICINE

## 2017-11-17 PROCEDURE — 90670 PCV13 VACCINE IM: CPT | Performed by: INTERNAL MEDICINE

## 2017-11-17 RX ORDER — SIMVASTATIN 40 MG
40 TABLET ORAL AT BEDTIME
Qty: 90 TABLET | Refills: 3 | Status: SHIPPED | OUTPATIENT
Start: 2017-11-17 | End: 2018-04-13

## 2017-11-17 ASSESSMENT — ANXIETY QUESTIONNAIRES
IF YOU CHECKED OFF ANY PROBLEMS ON THIS QUESTIONNAIRE, HOW DIFFICULT HAVE THESE PROBLEMS MADE IT FOR YOU TO DO YOUR WORK, TAKE CARE OF THINGS AT HOME, OR GET ALONG WITH OTHER PEOPLE: NOT DIFFICULT AT ALL
3. WORRYING TOO MUCH ABOUT DIFFERENT THINGS: NOT AT ALL
6. BECOMING EASILY ANNOYED OR IRRITABLE: NOT AT ALL
1. FEELING NERVOUS, ANXIOUS, OR ON EDGE: NOT AT ALL
5. BEING SO RESTLESS THAT IT IS HARD TO SIT STILL: NOT AT ALL
GAD7 TOTAL SCORE: 0
2. NOT BEING ABLE TO STOP OR CONTROL WORRYING: NOT AT ALL
7. FEELING AFRAID AS IF SOMETHING AWFUL MIGHT HAPPEN: NOT AT ALL

## 2017-11-17 ASSESSMENT — PATIENT HEALTH QUESTIONNAIRE - PHQ9
5. POOR APPETITE OR OVEREATING: NOT AT ALL
SUM OF ALL RESPONSES TO PHQ QUESTIONS 1-9: 1

## 2017-11-17 NOTE — NURSING NOTE
Chief Complaint   Patient presents with     Wellness Visit       Initial /76 (BP Location: Right arm, Patient Position: Chair, Cuff Size: Adult Regular)  Pulse 73  Temp 97.9  F (36.6  C) (Oral)  Ht 6' (1.829 m)  Wt 237 lb 3.2 oz (107.6 kg)  SpO2 98%  BMI 32.17 kg/m2 Estimated body mass index is 32.17 kg/(m^2) as calculated from the following:    Height as of this encounter: 6' (1.829 m).    Weight as of this encounter: 237 lb 3.2 oz (107.6 kg).  Medication Reconciliation: complete   Bruna Norris MA

## 2017-11-17 NOTE — LETTER
My Asthma Action Plan  Name: Dustin Lan   YOB: 1950  Date: 11/17/2017   My doctor: Alessandro Blanco MD   My clinic: [facility]        My Control Medicine: Fluticasone + salmeterol (Advair) -  Diskus 250/50 mcg Inhaler  My Rescue Medicine: Albuterol (Proair/Ventolin/Proventil) inhaler 108(90)  My Oral Steroid Medicine: None My Asthma Severity: mild persistent  Avoid your asthma triggers: patient is aware of triggers                GREEN ZONE   Good Control    I feel good    No cough or wheeze    Can work, sleep and play without asthma symptoms       Take your asthma control medicine every day.     1. If exercise triggers your asthma, take your rescue medication    15 minutes before exercise or sports, and    During exercise if you have asthma symptoms  2. Spacer to use with inhaler: If you have a spacer, make sure to use it with your inhaler             YELLOW ZONE Getting Worse  I have ANY of these:    I do not feel good    Cough or wheeze    Chest feels tight    Wake up at night   1. Keep taking your Green Zone medications  2. Start taking your rescue medicine:    every 20 minutes for up to 1 hour. Then every 4 hours for 24-48 hours.  3. If you stay in the Yellow Zone for more than 12-24 hours, contact your doctor.  4. If you do not return to the Green Zone in 12-24 hours or you get worse, start taking your oral steroid medicine if prescribed by your provider.           RED ZONE Medical Alert - Get Help  I have ANY of these:    I feel awful    Medicine is not helping    Breathing getting harder    Trouble walking or talking    Nose opens wide to breathe       1. Take your rescue medicine NOW  2. If your provider has prescribed an oral steroid medicine, start taking it NOW  3. Call your doctor NOW  4. If you are still in the Red Zone after 20 minutes and you have not reached your doctor:    Take your rescue medicine again and    Call 911 or go to the emergency room right away    See  your regular doctor within 2 weeks of an Emergency Room or Urgent Care visit for follow-up treatment.        Electronically signed by: Bruna Norris, November 17, 2017    Annual Reminders:  Meet with Asthma Educator,  Flu Shot in the Fall, consider Pneumonia Vaccination for patients with asthma (aged 19 and older).    Pharmacy:    [id number] - [place], [place] - [address]  AT [facility] - [place], [place] - [address]                    Asthma Triggers  How To Control Things That Make Your Asthma Worse    Triggers are things that make your asthma worse.  Look at the list below to help you find your triggers and what you can do about them.  You can help prevent asthma flare-ups by staying away from your triggers.      Trigger                                                          What you can do   Cigarette Smoke  Tobacco smoke can make asthma worse. Do not allow smoking in your home, car or around you.  Be sure no one smokes at a child s day care or school.  If you smoke, ask your health care provider for ways to help you quit.  Ask family members to quit too.  Ask your health care provider for a referral to Quit Plan to help you quit smoking, or call 0-207-057-PLAN.     Colds, Flu, Bronchitis  These are common triggers of asthma. Wash your hands often.  Don t touch your eyes, nose or mouth.  Get a flu shot every year.     Dust Mites  These are tiny bugs that live in cloth or carpet. They are too small to see. Wash sheets and blankets in hot water every week.   Encase pillows and mattress in dust mite proof covers.  Avoid having carpet if you can. If you have carpet, vacuum weekly.   Use a dust mask and HEPA vacuum.   Pollen and Outdoor Mold  Some people are allergic to trees, grass, or weed pollen, or molds. Try to keep your windows closed.  Limit time out doors when pollen count is high.   Ask you health care provider about  taking medicine during allergy season.     Animal Dander  Some people are allergic to skin flakes, urine or saliva from pets with fur or feathers. Keep pets with fur or feathers out of your home.    If you can t keep the pet outdoors, then keep the pet out of your bedroom.  Keep the bedroom door closed.  Keep pets off cloth furniture and away from stuffed toys.     Mice, Rats, and Cockroaches  Some people are allergic to the waste from these pests.   Cover food and garbage.  Clean up spills and food crumbs.  Store grease in the refrigerator.   Keep food out of the bedroom.   Indoor Mold  This can be a trigger if your home has high moisture. Fix leaking faucets, pipes, or other sources of water.   Clean moldy surfaces.  Dehumidify basement if it is damp and smelly.   Smoke, Strong Odors, and Sprays  These can reduce air quality. Stay away from strong odors and sprays, such as perfume, powder, hair spray, paints, smoke incense, paint, cleaning products, candles and new carpet.   Exercise or Sports  Some people with asthma have this trigger. Be active!  Ask your doctor about taking medicine before sports or exercise to prevent symptoms.    Warm up for 5-10 minutes before and after sports or exercise.     Other Triggers of Asthma  Cold air:  Cover your nose and mouth with a scarf.  Sometimes laughing or crying can be a trigger.  Some medicines and food can trigger asthma.

## 2017-11-17 NOTE — PROGRESS NOTES
Injectable Influenza Immunization Documentation    1.  Is the person to be vaccinated sick today?  Yes but not fever    2. Does the person to be vaccinated have an allergy to a component   of the vaccine?   No  Egg Allergy Algorithm Link    3. Has the person to be vaccinated ever had a serious reaction   to influenza vaccine in the past?   No    4. Has the person to be vaccinated ever had Guillain-Barré syndrome?   No    Form completed by Bruna Norris MA           Answers for HPI/ROS submitted by the patient on 11/14/2017   Annual Exam:  Getting at least 3 servings of Calcium per day:: Yes  Bi-annual eye exam:: Yes  Dental care twice a year:: Yes  Sleep apnea or symptoms of sleep apnea:: None  Diet:: Regular (no restrictions)  Frequency of exercise:: 2-3 days/week  Taking medications regularly:: Yes  Medication side effects:: Not applicable  Additional concerns today:: YES  PHQ-2 Score: 0  Duration of exercise:: 45-60 minutes

## 2017-11-17 NOTE — PATIENT INSTRUCTIONS
"Lab work downstairs today.  Directions:  As you walk through the first floor, you'll see (on the right) first the pharmacy, then some bathrooms, then the \"Lab and Imaging\" area. Give them your name at the window there and wait for them to call you.     2 shots today.    We could consider an MRI of neck if symptoms worsen; for now, consider trying a nightly before bed dose of Aleve (naproxen) 220 mg.    Colonoscopy next year.    Alessandro Blanco MD  Internal Medicine and Pediatrics   "

## 2017-11-17 NOTE — PROGRESS NOTES
SUBJECTIVE:   Dustin Lan is a 67 year old male who presents for Preventive Visit    Are you in the first 12 months of your Medicare coverage?     Physical   Annual:     Getting at least 3 servings of Calcium per day::  Yes    Bi-annual eye exam::  Yes    Dental care twice a year::  Yes    Sleep apnea or symptoms of sleep apnea::  None    Diet::  Regular (no restrictions)    Frequency of exercise::  2-3 days/week    Duration of exercise::  45-60 minutes    Taking medications regularly::  Yes    Medication side effects::  Not applicable    Additional concerns today::  YES      COGNITIVE SCREEN  1) Repeat 3 items (Banana, Sunrise, Chair)    2) Clock draw: NORMAL  3) 3 item recall: Recalls 3 objects  Results: 3 items recalled: COGNITIVE IMPAIRMENT LESS LIKELY    Mini-CogTM Copyright LISETTE Hernandez. Licensed by the author for use in [facility] [employer] Dep[employer]; reprinted with permission ([email or web address]). All rights reserved.          Reviewed and updated as needed this visit by clinical staff         Reviewed and updated as needed this visit by Provider      Social History   Substance Use Topics     Smoking status: Never Smoker     Smokeless tobacco: Never Used     Alcohol use 0.0 - 1.8 oz/week     0 - 3 Standard drinks or equivalent per week       The patient does not drink >3 drinks per day nor >7 drinks per week.          Other concerns: lost of voice, chest congestion, eye concern, foot concern and neck concern.      Today's PHQ-2 Score: PHQ-2 ( 1999 Pfizer) 11/14/2017   Q1: Little interest or pleasure in doing things 0   Q2: Feeling down, depressed or hopeless 0   PHQ-2 Score 0   Q1: Little interest or pleasure in doing things Not at all   Q2: Feeling down, depressed or hopeless Not at all   PHQ-2 Score 0       Do you feel safe in your environment - Yes    Has been ahving some bilateral hand tingling still, while asleep only.  Sleeps on side.  If on back, will find that his neck bends forward hands will turn numb.  2  weeks ago, worsened, but then would recur from time to time.      Uses an arm bike at health club; gets tingling with a lot of time.     Current providers sharing in care for this patient include:   Patient Care Team:  Alessandro Blanco MD as PCP - General (Internal Medicine)      Hearing impairment: No    Ability to successfully perform activities of daily living: Yes, no assistance needed     Fall risk:  Fallen 2 or more times in the past year?: No  Any fall with injury in the past year?: No      Home safety:  none identified      The following health maintenance items are reviewed in Epic and correct as of today:Health Maintenance   Topic Date Due     JUAN FRANCISCO QUESTIONNAIRE 1 YEAR  06/01/1970     PHQ-9 Q1YR  06/01/1970     FALL RISK ASSESSMENT  06/01/2017     PNEUMOCOCCAL (1 of 2 - PCV13) 06/01/2017     AORTIC ANEURYSM SCREENING (SYSTEM ASSIGNED)  06/01/2017     INFLUENZA VACCINE (SYSTEM ASSIGNED)  09/01/2017     ASTHMA CONTROL TEST Q6 MOS  11/01/2017     ASTHMA ACTION PLAN Q1 YR  11/08/2017     TETANUS Q10 YR  04/01/2018     COLONOSCOPY Q5 YR  11/12/2018     ADVANCE DIRECTIVE PLANNING Q5 YRS  03/01/2021     LIPID MONITORING Q5 YEARS  11/08/2021     HEPATITIS C SCREENING  Completed     Labs reviewed in EPIC  BP Readings from Last 3 Encounters:   11/17/17 136/76   05/01/17 132/88   02/21/17 (!) 144/92    Wt Readings from Last 3 Encounters:   11/17/17 237 lb 3.2 oz (107.6 kg)   05/01/17 243 lb 1 oz (110.3 kg)   02/21/17 244 lb (110.7 kg)                  Patient Active Problem List   Diagnosis     Mild persistent asthma     Contact dermatitis and other eczema, due to unspecified cause     History of colonic polyps     HYPERLIPIDEMIA LDL GOAL <130     Impaired fasting glucose     Advance Care Planning     Past Surgical History:   Procedure Laterality Date     ARTHROSCOPY KNEE RT/LT  12/2008    right     HC COLONOSCOPY THRU STOMA, DIAGNOSTIC  2/2007    ileocecal valve polyp, tubular adenoma, repeat in 3 yrs       Social  History   Substance Use Topics     Smoking status: Never Smoker     Smokeless tobacco: Never Used     Alcohol use 0.0 - 1.8 oz/week     Family History   Problem Relation Age of Onset     CEREBROVASCULAR DISEASE Mother      C.A.D. Father      MI  66     Lipids Sister          Current Outpatient Prescriptions   Medication Sig Dispense Refill     simvastatin (ZOCOR) 40 MG tablet Take 1 tablet (40 mg) by mouth At Bedtime 90 tablet 3     Naproxen Sodium (ALEVE PO)        fluticasone-salmeterol (ADVAIR DISKUS) 250-50 MCG/DOSE diskus inhaler Inhale 1 puff into the lungs every 12 hours 3 Inhaler 3     ibuprofen (ADVIL,MOTRIN) 200 MG tablet Take 1 tablet (200 mg) by mouth 2 times daily 100 tablet      GLUCOSAMINE CHONDR 500 COMPLEX PO CAPS        MULTI-VITAMIN OR TABS 1 po qd       [DISCONTINUED] simvastatin (ZOCOR) 40 MG tablet Take 1 tablet (40 mg) by mouth At Bedtime 90 tablet 0     albuterol (PROAIR HFA, PROVENTIL HFA, VENTOLIN HFA) 108 (90 BASE) MCG/ACT inhaler Inhale 2 puffs into the lungs every 6 hours as needed for shortness of breath / dyspnea or wheezing (Patient not taking: Reported on 2017) 1 Inhaler 2     Allergies   Allergen Reactions     Cats      No Known Allergies            Review of Systems  C: NEGATIVE for fever, chills, change in weight  I: NEGATIVE for worrisome rashes, moles or lesions  E: NEGATIVE for vision changes or irritation  E/M: NEGATIVE for ear, mouth and throat problems  R: NEGATIVE for significant cough or SOB  B: NEGATIVE for masses, tenderness or discharge  CV: NEGATIVE for chest pain, palpitations or peripheral edema  GI: NEGATIVE for nausea, abdominal pain, heartburn, or change in bowel habits  : NEGATIVE for frequency, dysuria, or hematuria  M: NEGATIVE for significant arthralgias or myalgia  N: NEGATIVE for weakness, dizziness or paresthesias  E: NEGATIVE for temperature intolerance, skin/hair changes  H: NEGATIVE for bleeding problems  P: NEGATIVE for changes in mood  or affect    OBJECTIVE:   There were no vitals taken for this visit. Estimated body mass index is 32.97 kg/(m^2) as calculated from the following:    Height as of 5/1/17: 6' (1.829 m).    Weight as of 5/1/17: 243 lb 1 oz (110.3 kg).  Physical Exam  GENERAL: healthy, alert and no distress  EYES: Eyes grossly normal to inspection, PERRL and conjunctivae and sclerae normal  HENT: ear canals and TM's normal, nose and mouth without ulcers or lesions  NECK: no adenopathy, no asymmetry, masses, or scars and thyroid normal to palpation  RESP: lungs clear to auscultation - no rales, rhonchi or wheezes  CV: regular rate and rhythm, normal S1 S2, no S3 or S4, no murmur, click or rub, no peripheral edema and peripheral pulses strong  ABDOMEN: soft, nontender, no hepatosplenomegaly, no masses and bowel sounds normal  MS: no gross musculoskeletal defects noted, no edema  SKIN: no suspicious lesions or rashes  NEURO: Normal strength and tone, mentation intact and speech normal  PSYCH: mentation appears normal, affect normal/bright    ASSESSMENT / PLAN:   1. Mild persistent asthma  Symptoms well controlled on daily advair.  No changes.  Asthma Control Test and Asthma Action Plan up to date.     2. Encounter for routine adult health examination without abnormal findings  Discussed diet, exercise, testicular self exam, blood pressure, cholesterol, and need for cancer surveillance at appropriate ages.   - Pneumococcal vaccine 13 valent PCV13 IM (Prevnar) [59707]  - ADMIN: Vaccine, Initial (65494)  - Lipid panel reflex to direct LDL Fasting  - Comprehensive metabolic panel    3. Hyperlipidemia LDL goal <130    - simvastatin (ZOCOR) 40 MG tablet; Take 1 tablet (40 mg) by mouth At Bedtime  Dispense: 90 tablet; Refill: 3    4. Need for prophylactic vaccination and inoculation against influenza    - FLU VACCINE, INCREASED ANTIGEN, PRESV FREE, AGE 65+ [45299]  - ADMIN INFLUENZA (For MEDICARE Patients ONLY) []  - Pneumococcal vaccine 13  valent PCV13 IM (Prevnar) [[id number]]    5. Need for vaccination    - ADMIN PNEUMOVAX VACCINE (For MEDICARE Patients ONLY) []    6.  Neck pain and hand numbness:  Occurs with sleep, likely positional; has been stable for years; call if wrosening and we can consider an MRI.       COUNSELING:  Reviewed preventive health counseling, as reflected in patient instructions       Regular exercise       Healthy diet/nutrition       Vision screening       Hearing screening       Colon cancer screening       Prostate cancer screening        Estimated body mass index is 32.97 kg/(m^2) as calculated from the following:    Height as of 5/1/17: 6' (1.829 m).    Weight as of 5/1/17: 243 lb 1 oz (110.3 kg).  Weight management plan: Patient was referred to their PCP to discuss a diet and exercise plan.   reports that he has never smoked. He has never used smokeless tobacco.        Appropriate preventive services were discussed with this patient, including applicable screening as appropriate for cardiovascular disease, diabetes, osteopenia/osteoporosis, and glaucoma.  As appropriate for age/gender, discussed screening for colorectal cancer, prostate cancer, breast cancer, and cervical cancer. Checklist reviewing preventive services available has been given to the patient.    Reviewed patients plan of care and provided an AVS. The Basic Care Plan (routine screening as documented in Health Maintenance) for Dustin meets the Care Plan requirement. This Care Plan has been established and reviewed with the Patient.    Counseling Resources:  ATP IV Guidelines  Pooled Cohorts Equation Calculator  Breast Cancer Risk Calculator  FRAX Risk Assessment  ICSI Preventive Guidelines  Dietary Guidelines for Americans, 2010  USDA's MyPlate  ASA Prophylaxis  Lung CA Screening    Alessandro Blanco MD  [facility] for HPI/ROS submitted by the patient on 11/14/2017   PHQ-2 Score: 0

## 2017-11-17 NOTE — MR AVS SNAPSHOT
"              After Visit Summary   11/17/2017    Dustin Lan    MRN: [id number]           Patient Information     Date Of Birth          1950        Visit Information        Provider Department      11/17/2017 8:20 AM [facility]        Today's Diagnoses     Encounter for routine adult health examination without abnormal findings    -  1    Mild persistent asthma        Hyperlipidemia LDL goal <130          Care Instructions    Lab work downstairs today.  Directions:  As you walk through the first floor, you'll see (on the right) first the pharmacy, then some bathrooms, then the \"Lab and Imaging\" area. Give them your name at the window there and wait for them to call you.     2 shots today.    We could consider an MRI of neck if symptoms worsen; for now, consider trying a nightly before bed dose of Aleve (naproxen) 220 mg.    Colonoscopy next year.    Alessandro Blanco MD  Internal Medicine and Pediatrics           Follow-ups after your visit        Who to contact     If you have questions or need follow up information about today's clinic visit or your schedule please contact [facility] directly at [phone].  Normal or non-critical lab and imaging results will be communicated to you by [employer], letter or phone within 4 business days after the clinic has received the results. If you do not hear from us within 7 days, please contact the clinic through [employer] or phone. If you have a critical or abnormal lab result, we will notify you by phone as soon as possible.  Submit refill requests through [employer] or call your pharmacy and they will forward the refill request to us. Please allow 3 business days for your refill to be completed.          Additional Information About Your Visit        [employer] Information     [employer] gives you secure access to your electronic health record. If you see a primary care provider, you can also send messages to your care team and make " appointments. If you have questions, please call your primary care clinic.  If you do not have a primary care provider, please call [phone] and they will assist you.        Care EveryWhere ID     This is your Care EveryWhere ID. This could be used by other organizations to access your [place] medical records  [id number]        Your Vitals Were     Pulse Temperature Height Pulse Oximetry BMI (Body Mass Index)       73 97.9  F (36.6  C) (Oral) 6' (1.829 m) 98% 32.17 kg/m2        Blood Pressure from Last 3 Encounters:   11/17/17 136/76   05/01/17 132/88   02/21/17 (!) 144/92    Weight from Last 3 Encounters:   11/17/17 237 lb 3.2 oz (107.6 kg)   05/01/17 243 lb 1 oz (110.3 kg)   02/21/17 244 lb (110.7 kg)              We Performed the Following     ADMIN: Vaccine, Initial (47580)     Comprehensive metabolic panel     Lipid panel reflex to direct LDL Fasting     Pneumococcal vaccine 13 valent PCV13 IM (Prevnar) [22334]          Where to get your medicines      These medications were sent to [facility] - [place], [place] - [address] [facility] [address]  [address] [facility] [address], [place] [address]     Phone:  [phone]     simvastatin 40 MG tablet          Primary Care Provider Office Phone # Fax #    Alessandro Blanco [phone]       [address] [place] [address]        Equal Access to Services     [facility]: Hadii leigha ku hadasho Soomaali, waaxda luqadaha, qaybta kaalmada joaquinyasofía, alice johnson . So [facility] [phone].    ATENCIÓN: Si habla adriana, tiene a forbes disposición servicios gratuitos de asistencia lingüística. Llame al [phone].    We comply with applicable federal civil rights laws and [place] laws. We do not discriminate on the basis of race, color, national origin, age, disability, sex, sexual orientation, or gender identity.            Thank you!     Thank you for choosing [facility] [place]  for your care.  Our goal is always to provide you with excellent care. Hearing back from our patients is one way we can continue to improve our services. Please take a few minutes to complete the written survey that you may receive in the mail after your visit with us. Thank you!             Your Updated Medication List - Protect others around you: Learn how to safely use, store and throw away your medicines at www.disposemymeds.org.          This list is accurate as of: 11/17/17  8:58 AM.  Always use your most recent med list.                   Brand Name Dispense Instructions for use Diagnosis    albuterol 108 (90 BASE) MCG/ACT Inhaler    PROAIR HFA/PROVENTIL HFA/VENTOLIN HFA    1 Inhaler    Inhale 2 puffs into the lungs every 6 hours as needed for shortness of breath / dyspnea or wheezing    Asthma, mild persistent, uncomplicated       fluticasone-salmeterol 250-50 MCG/DOSE diskus inhaler    ADVAIR DISKUS    3 Inhaler    Inhale 1 puff into the lungs every 12 hours    Mild persistent asthma without complication       glucosamine chondroitin 500 complex Caps           ibuprofen 200 MG tablet    ADVIL/MOTRIN    100 tablet    Take 1 tablet (200 mg) by mouth 2 times daily        Multi-vitamin Tabs tablet   Generic drug:  multivitamin, therapeutic with minerals      1 po qd        simvastatin 40 MG tablet    ZOCOR    90 tablet    Take 1 tablet (40 mg) by mouth At Bedtime    Hyperlipidemia LDL goal <130

## 2017-11-18 LAB
ALBUMIN SERPL-MCNC: 4 G/DL (ref 3.4–5)
ALP SERPL-CCNC: 84 U/L (ref 40–150)
ALT SERPL W P-5'-P-CCNC: 29 U/L (ref 0–70)
ANION GAP SERPL CALCULATED.3IONS-SCNC: 7 MMOL/L (ref 3–14)
AST SERPL W P-5'-P-CCNC: 25 U/L (ref 0–45)
BILIRUB SERPL-MCNC: 0.6 MG/DL (ref 0.2–1.3)
BUN SERPL-MCNC: 20 MG/DL (ref 7–30)
CALCIUM SERPL-MCNC: 8.8 MG/DL (ref 8.5–10.1)
CHLORIDE SERPL-SCNC: 107 MMOL/L (ref 94–109)
CHOLEST SERPL-MCNC: 177 MG/DL
CO2 SERPL-SCNC: 28 MMOL/L (ref 20–32)
CREAT SERPL-MCNC: 0.93 MG/DL (ref 0.66–1.25)
GFR SERPL CREATININE-BSD FRML MDRD: 81 ML/MIN/1.7M2
GLUCOSE SERPL-MCNC: 96 MG/DL (ref 70–99)
HDLC SERPL-MCNC: 76 MG/DL
LDLC SERPL CALC-MCNC: 77 MG/DL
NONHDLC SERPL-MCNC: 101 MG/DL
POTASSIUM SERPL-SCNC: 4.5 MMOL/L (ref 3.4–5.3)
PROT SERPL-MCNC: 6.5 G/DL (ref 6.8–8.8)
SODIUM SERPL-SCNC: 142 MMOL/L (ref 133–144)
TRIGL SERPL-MCNC: 122 MG/DL

## 2017-11-18 ASSESSMENT — ANXIETY QUESTIONNAIRES: GAD7 TOTAL SCORE: 0

## 2017-11-18 ASSESSMENT — ASTHMA QUESTIONNAIRES: ACT_TOTALSCORE: 25

## 2018-02-24 ENCOUNTER — OFFICE VISIT (OUTPATIENT)
Dept: URGENT CARE | Facility: URGENT CARE | Age: 68
End: 2018-02-24
Payer: COMMERCIAL

## 2018-02-24 VITALS
HEART RATE: 72 BPM | WEIGHT: 239 LBS | BODY MASS INDEX: 32.41 KG/M2 | TEMPERATURE: 97.7 F | DIASTOLIC BLOOD PRESSURE: 90 MMHG | RESPIRATION RATE: 12 BRPM | SYSTOLIC BLOOD PRESSURE: 144 MMHG | OXYGEN SATURATION: 98 %

## 2018-02-24 DIAGNOSIS — R03.0 ELEVATED BLOOD PRESSURE READING WITHOUT DIAGNOSIS OF HYPERTENSION: Primary | ICD-10-CM

## 2018-02-24 PROCEDURE — 99213 OFFICE O/P EST LOW 20 MIN: CPT | Performed by: PHYSICIAN ASSISTANT

## 2018-02-24 NOTE — MR AVS SNAPSHOT
After Visit Summary   2/24/2018    Dustin Lan    MRN: [id number]           Patient Information     Date Of Birth          1950        Visit Information        Provider Department      2/24/2018 10:55 AM Jordin Melvin PA-C [facility]        Today's Diagnoses     Elevated blood pressure reading without diagnosis of hypertension    -  1       Follow-ups after your visit        Your next 10 appointments already scheduled     Mar 12, 2018  2:00 PM CDT   Ech Stress Test with RHSTRESS   [facility]    [address]  [facility] [address]   [phone]           1. Please bring or wear a comfortable two-piece outfit and walking shoes. 2. Stop eating 3 hours before the test. You may drink water or juice. 3. Stop all caffeine 12 hours before the test. This includes coffee, tea, soda pop, chocolate and certain medicines (such as Anacin and Excederin). Also avoid decaf coffee and tea, as these contain small amounts of caffeine. 4. No alcohol, smoking or use of other tobacco products for 12 hours before the test. 5. Refer to your provider instructions to see if you need to stop any medications (such as beta-blockers or nitrates) for this test. 6. For patients with diabetes: - If you take insulin, call your diabetes care team. Ask if you should take a   dose the morning of your test. - If you take diabetes medicine by mouth, dont take it on the morning of your test. Bring it with you to take after the test. (If you have questions, call your diabetes care team) 7. When you arrive, please tell us if: - You have diabetes. - You have taken Viagra, Cialis or Levitra in the past 48 hours. 8. For any questions that cannot be answered, please contact the ordering physician              Future tests that were ordered for you today     Open Future Orders        Priority Expected Expires Ordered    Exercise Stress Echocardiogram  Routine  2/27/2019 2/27/2018            Who to contact     If you have questions or need follow up information about today's clinic visit or your schedule please contact [facility] directly at [phone].  Normal or non-critical lab and imaging results will be communicated to you by MyChart, letter or phone within 4 business days after the clinic has received the results. If you do not hear from us within 7 days, please contact the clinic through [employer] or phone. If you have a critical or abnormal lab result, we will notify you by phone as soon as possible.  Submit refill requests through [employer] or call your pharmacy and they will forward the refill request to us. Please allow 3 business days for your refill to be completed.          Additional Information About Your Visit        [employer] Information     [employer] gives you secure access to your electronic health record. If you see a primary care provider, you can also send messages to your care team and make appointments. If you have questions, please call your primary care clinic.  If you do not have a primary care provider, please call [phone] and they will assist you.        Care EveryWhere ID     This is your Care EveryWhere ID. This could be used by other organizations to access your [place] medical records  [id number]        Your Vitals Were     Pulse Temperature Respirations Pulse Oximetry BMI (Body Mass Index)       72 97.7  F (36.5  C) (Oral) 12 98% 32.41 kg/m2        Blood Pressure from Last 3 Encounters:   02/27/18 134/84   02/24/18 144/90   11/17/17 136/76    Weight from Last 3 Encounters:   02/27/18 239 lb (108.4 kg)   02/24/18 239 lb (108.4 kg)   11/17/17 237 lb 3.2 oz (107.6 kg)              Today, you had the following     No orders found for display       Primary Care Provider Office Phone # Fax #    Alessandro Blanco [phone] [address] [place] [address]        Equal Access to Services      ABRAHAM CORTES : Hadii aad ku joon Lynn, waaxda luqadaha, qaybta kaalmada adeclay, alice eulalio anniejuan nuñez litzyclara johnson . So [facility] [phone].    ATENCIÓN: Si habla español, tiene a forbes disposición servicios gratuitos de asistencia lingüística. Llame al [phone].    We comply with applicable federal civil rights laws and [place] laws. We do not discriminate on the basis of race, color, national origin, age, disability, sex, sexual orientation, or gender identity.            Thank you!     Thank you for choosing [facility]  for your care. Our goal is always to provide you with excellent care. Hearing back from our patients is one way we can continue to improve our services. Please take a few minutes to complete the written survey that you may receive in the mail after your visit with us. Thank you!             Your Updated Medication List - Protect others around you: Learn how to safely use, store and throw away your medicines at www.disposemymeds.org.          This list is accurate as of 2/24/18 11:59 PM.  Always use your most recent med list.                   Brand Name Dispense Instructions for use Diagnosis    ALEVE PO      Take by mouth 2 times daily (with meals)        fluticasone-salmeterol 250-50 MCG/DOSE diskus inhaler    ADVAIR DISKUS    3 Inhaler    Inhale 1 puff into the lungs every 12 hours    Mild persistent asthma without complication       glucosamine chondroitin 500 complex Caps           Multi-vitamin Tabs tablet   Generic drug:  multivitamin, therapeutic with minerals      1 po qd        simvastatin 40 MG tablet    ZOCOR    90 tablet    Take 1 tablet (40 mg) by mouth At Bedtime    Hyperlipidemia LDL goal <130

## 2018-02-24 NOTE — PROGRESS NOTES
"SUBJECTIVE:   Dustin Lan is a 67 year old male presenting with a chief complaint of racing heart.   Chief Complaint   Patient presents with     Urgent Care     Hypertension     Pt has not felt great for the past 3 weeks.  Felt like passing out x 1 about 3 weeks ago while driving driving down to [place].  Felt dehydrated and has felt crummy of and on recently.  Not feeling like himself.  Went to give blood today and his BP was higher than normal so he wanted to have it checked     Onset of symptoms was this morning, while driving to donate blood.  Felt like his heart was racing, but didn't feel, anxious.  Pt has donated blood several times in the past.  When the donation center employee took his JUSTIN it was high.   Nurse told him he should be seen.     Upon arrival patient feels comfortable.  BP and pulse are both WNL.  Pt does not feel palpitations.       Patient denies shortness of breath, chest pain, Dyspnea on exertion.    Pt states he really hasn't felt himself \"10% of the time\" for the last 3 weeks.      Review of Systems   Constitutional: Negative.    HENT: Negative.    Eyes: Negative.    Respiratory: Negative.    Cardiovascular: Positive for palpitations. Negative for chest pain, orthopnea, claudication, leg swelling and PND.   Gastrointestinal: Negative.    Genitourinary: Negative.    Musculoskeletal: Negative.    Skin: Negative.    Psychiatric/Behavioral: Negative.          Past Medical History:   Diagnosis Date     Elevated blood pressure reading without diagnosis of hypertension      Personal history of colonic polyps 2/2007    tubular adenoma, ileocecal valve, repeat 2010     Plantar fascial fibromatosis      Pure hypercholesterolemia      Current Outpatient Prescriptions   Medication Sig Dispense Refill     simvastatin (ZOCOR) 40 MG tablet Take 1 tablet (40 mg) by mouth At Bedtime 90 tablet 3     Naproxen Sodium (ALEVE PO)        fluticasone-salmeterol (ADVAIR DISKUS) 250-50 MCG/DOSE diskus inhaler " Inhale 1 puff into the lungs every 12 hours 3 Inhaler 3     ibuprofen (ADVIL,MOTRIN) 200 MG tablet Take 1 tablet (200 mg) by mouth 2 times daily 100 tablet      GLUCOSAMINE CHONDR 500 COMPLEX PO CAPS        MULTI-VITAMIN OR TABS 1 po qd       albuterol (PROAIR HFA, PROVENTIL HFA, VENTOLIN HFA) 108 (90 BASE) MCG/ACT inhaler Inhale 2 puffs into the lungs every 6 hours as needed for shortness of breath / dyspnea or wheezing (Patient not taking: Reported on 11/17/2017) 1 Inhaler 2     Social History   Substance Use Topics     Smoking status: Never Smoker     Smokeless tobacco: Never Used     Alcohol use 0.0 - 1.8 oz/week       OBJECTIVE  BP (!) 144/94 (BP Location: Right arm, Patient Position: Sitting, Cuff Size: Adult Regular)  Pulse 72  Temp 97.7  F (36.5  C) (Oral)  Resp 12  Wt 239 lb (108.4 kg)  SpO2 98%  BMI 32.41 kg/m2    Physical Exam   Constitutional: He is oriented to person, place, and time and well-developed, well-nourished, and in no distress. No distress.   HENT:   Head: Normocephalic and atraumatic.   Right Ear: External ear normal.   Left Ear: External ear normal.   Mouth/Throat: Oropharynx is clear and moist.   Eyes: Conjunctivae are normal. Right eye exhibits no discharge. Left eye exhibits no discharge. No scleral icterus.   Neck: Normal range of motion. Neck supple.   Cardiovascular: Normal rate, regular rhythm and normal heart sounds.    Pulmonary/Chest: Effort normal and breath sounds normal. No respiratory distress. He has no wheezes.   Abdominal: Soft. Bowel sounds are normal.   Neurological: He is alert and oriented to person, place, and time. No cranial nerve deficit. Gait normal.   Skin: Skin is warm and dry. He is not diaphoretic.   Psychiatric: Mood, memory, affect and judgment normal.       ASSESSMENT:  (R03.0) Elevated blood pressure reading without diagnosis of hypertension  (primary encounter diagnosis)  Comment: No red flag symptoms  Plan: Follow up with pcp this week  Red flags  and emergent follow up discussed, and understood by patient

## 2018-02-24 NOTE — NURSING NOTE
Chief Complaint   Patient presents with     Urgent Care     Hypertension     Pt has not felt great for the past 3 weeks.  Felt like passing out x 1 about 3 weeks ago while driving driving down to [place].  Felt dehydrated and has felt crummy of and on recently.  Not feeling like himself.  Went to give blood today and his BP was higher than normal so he wanted to have it checked     Initial BP (!) 144/94 (BP Location: Right arm, Patient Position: Sitting, Cuff Size: Adult Regular)  Pulse 72  Temp 97.7  F (36.5  C) (Oral)  Resp 12  Wt 239 lb (108.4 kg)  SpO2 98%  BMI 32.41 kg/m2 Estimated body mass index is 32.41 kg/(m^2) as calculated from the following:    Height as of 11/17/17: 6' (1.829 m).    Weight as of this encounter: 239 lb (108.4 kg)..  BP completed using cuff size: regular  Viridiana Mccloud R.N.

## 2018-02-27 ENCOUNTER — OFFICE VISIT (OUTPATIENT)
Dept: PEDIATRICS | Facility: CLINIC | Age: 68
End: 2018-02-27
Payer: COMMERCIAL

## 2018-02-27 VITALS
OXYGEN SATURATION: 96 % | TEMPERATURE: 98 F | HEIGHT: 72 IN | SYSTOLIC BLOOD PRESSURE: 134 MMHG | BODY MASS INDEX: 32.37 KG/M2 | WEIGHT: 239 LBS | DIASTOLIC BLOOD PRESSURE: 84 MMHG | HEART RATE: 78 BPM

## 2018-02-27 DIAGNOSIS — R00.2 PALPITATIONS: Primary | ICD-10-CM

## 2018-02-27 PROCEDURE — 99214 OFFICE O/P EST MOD 30 MIN: CPT | Performed by: INTERNAL MEDICINE

## 2018-02-27 PROCEDURE — 36415 COLL VENOUS BLD VENIPUNCTURE: CPT | Performed by: INTERNAL MEDICINE

## 2018-02-27 PROCEDURE — 80053 COMPREHEN METABOLIC PANEL: CPT | Performed by: INTERNAL MEDICINE

## 2018-02-27 PROCEDURE — 84443 ASSAY THYROID STIM HORMONE: CPT | Performed by: INTERNAL MEDICINE

## 2018-02-27 PROCEDURE — 93000 ELECTROCARDIOGRAM COMPLETE: CPT | Performed by: INTERNAL MEDICINE

## 2018-02-27 PROCEDURE — 84484 ASSAY OF TROPONIN QUANT: CPT | Performed by: INTERNAL MEDICINE

## 2018-02-27 NOTE — MR AVS SNAPSHOT
"              After Visit Summary   2/27/2018    Dustin Lan    MRN: [id number]           Patient Information     Date Of Birth          1950        Visit Information        Provider Department      2/27/2018 10:00 AM [facility]        Today's Diagnoses     Palpitations    -  1      Care Instructions    They will call you for a stress echocardiogram.      Your electrocardiogram looks good today.    Begin an over-the-counter baby aspirin.    No blood pressure meds necessary now.      Lab work downstairs today.  Directions:  As you walk through the first floor, you'll see (on the right) first the pharmacy, then some bathrooms, then the \"Lab and Imaging\" area. Give them your name at the window there and wait for them to call you.           Follow-ups after your visit        Follow-up notes from your care team     Return in about 6 months (around 8/27/2018) for Physical Exam.      Future tests that were ordered for you today     Open Future Orders        Priority Expected Expires Ordered    Exercise Stress Echocardiogram Routine  2/27/2019 2/27/2018            Who to contact     If you have questions or need follow up information about today's clinic visit or your schedule please contact [facility] directly at [phone].  Normal or non-critical lab and imaging results will be communicated to you by MyChart, letter or phone within 4 business days after the clinic has received the results. If you do not hear from us within 7 days, please contact the clinic through MyChart or phone. If you have a critical or abnormal lab result, we will notify you by phone as soon as possible.  Submit refill requests through [employer] or call your pharmacy and they will forward the refill request to us. Please allow 3 business days for your refill to be completed.          Additional Information About Your Visit        MyChart Information     [employer] gives you secure access to your " electronic health record. If you see a primary care provider, you can also send messages to your care team and make appointments. If you have questions, please call your primary care clinic.  If you do not have a primary care provider, please call [phone] and they will assist you.        Care EveryWhere ID     This is your Care EveryWhere ID. This could be used by other organizations to access your [facility] medical records  [id number]        Your Vitals Were     Pulse Temperature Height Pulse Oximetry BMI (Body Mass Index)       78 98  F (36.7  C) (Oral) 6' (1.829 m) 96% 32.41 kg/m2        Blood Pressure from Last 3 Encounters:   02/27/18 134/84   02/24/18 144/90   11/17/17 136/76    Weight from Last 3 Encounters:   02/27/18 239 lb (108.4 kg)   02/24/18 239 lb (108.4 kg)   11/17/17 237 lb 3.2 oz (107.6 kg)              We Performed the Following     Comprehensive metabolic panel     EKG 12-lead complete w/read - Clinics     Troponin I     TSH with free T4 reflex        Primary Care Provider Office Phone # Fax #    Alessandro Blanco [phone] [address] [place] [address]        Equal Access to Services     [facility]: Hadii aad ku hadasho Soomaali, waaxda luqadaha, qaybta kaalmada adeegyada, waxay idiin haydln joaquin vazquez lascarlett . So [facility] [phone].    ATENCIÓN: Si habla español, tiene a forbes disposición servicios gratuitos de asistencia lingüística. Llame al [phone].    We comply with applicable federal civil rights laws and [place] laws. We do not discriminate on the basis of race, color, national origin, age, disability, sex, sexual orientation, or gender identity.            Thank you!     Thank you for choosing [facility]  for your care. Our goal is always to provide you with excellent care. Hearing back from our patients is one way we can continue to improve our services. Please take a few minutes to complete the written survey that you may  receive in the mail after your visit with us. Thank you!             Your Updated Medication List - Protect others around you: Learn how to safely use, store and throw away your medicines at www.disposemymeds.org.          This list is accurate as of 2/27/18 10:36 AM.  Always use your most recent med list.                   Brand Name Dispense Instructions for use Diagnosis    ALEVE PO      Take by mouth 2 times daily (with meals)        fluticasone-salmeterol 250-50 MCG/DOSE diskus inhaler    ADVAIR DISKUS    3 Inhaler    Inhale 1 puff into the lungs every 12 hours    Mild persistent asthma without complication       glucosamine chondroitin 500 complex Caps           Multi-vitamin Tabs tablet   Generic drug:  multivitamin, therapeutic with minerals      1 po qd        simvastatin 40 MG tablet    ZOCOR    90 tablet    Take 1 tablet (40 mg) by mouth At Bedtime    Hyperlipidemia LDL goal <130

## 2018-02-27 NOTE — PROGRESS NOTES
"  SUBJECTIVE:   Dustin Lan is a 67 year old male who presents to clinic today for the following health issues:      ED/UC Followup:    Facility:  [facility]  Date of visit: 18  Reason for visit: Hypertension, \"didn't feel right\". Took a road trip a few weeks ago and felt nausea, dizziness and thought it was dehydration. Also, high BP reading when he tried to donate blood. Denies chest pain, but has a pressure in upper abdomen.   Current Status: Feeling the same       Mentioned above upper abdomen pressure started after eating lunch and felt like it got stuck. Happened a few weeks ago as well. Noticeable increase in belching as well.     Was on his way to give blood about 3 days ago; felt like heart was racing on the way there, and his blood pressure was 186/88.  Went to urgent care because of anxiety about this.  (Dad  age 67 of MI).     In early part of this month, went on a road trip; stopped in [place]; drank some coffee and ate a polish sausage--felt like it got stuck in his throat; then felt blurred vision and pulled off the road; broke out in a sweat, then felt better.  Felt like perhaps as dehydrated or like had some food stuck.    While in [place] prior to this, felt \"chronically dehydrated.\"  Did once while in [place] feel his heart skip a beat and \"reset.\"      Problem list and histories reviewed & adjusted, as indicated.  Additional history: as documented    Patient Active Problem List   Diagnosis     Contact dermatitis and other eczema, due to unspecified cause     History of colonic polyps     HYPERLIPIDEMIA LDL GOAL <130     Impaired fasting glucose     Advance Care Planning     Mild persistent asthma without complication     Past Surgical History:   Procedure Laterality Date     ARTHROSCOPY KNEE RT/LT  2008    right     HC COLONOSCOPY THRU STOMA, DIAGNOSTIC  2007    ileocecal valve polyp, tubular adenoma, repeat in 3 yrs       Social History   Substance Use Topics     Smoking " status: Never Smoker     Smokeless tobacco: Never Used     Alcohol use 0.0 - 1.8 oz/week     Family History   Problem Relation Age of Onset     CEREBROVASCULAR DISEASE Mother      C.A.D. Father      MI  66     Lipids Sister          Current Outpatient Prescriptions   Medication Sig Dispense Refill     simvastatin (ZOCOR) 40 MG tablet Take 1 tablet (40 mg) by mouth At Bedtime 90 tablet 3     Naproxen Sodium (ALEVE PO) Take by mouth 2 times daily (with meals)        fluticasone-salmeterol (ADVAIR DISKUS) 250-50 MCG/DOSE diskus inhaler Inhale 1 puff into the lungs every 12 hours 3 Inhaler 3     GLUCOSAMINE CHONDR 500 COMPLEX PO CAPS        MULTI-VITAMIN OR TABS 1 po qd       Allergies   Allergen Reactions     Cats      No Known Allergies      BP Readings from Last 3 Encounters:   18 134/84   18 144/90   17 136/76    Wt Readings from Last 3 Encounters:   18 239 lb (108.4 kg)   18 239 lb (108.4 kg)   17 237 lb 3.2 oz (107.6 kg)                  Labs reviewed in EPIC    Reviewed and updated as needed this visit by clinical staff       Reviewed and updated as needed this visit by Provider         ROS:  CONSTITUTIONAL: NEGATIVE for fever, chills, change in weight  ENT/MOUTH: NEGATIVE for ear, mouth and throat problems  RESP: NEGATIVE for significant cough or SOB  CV: NEGATIVE for chest pain, palpitations or peripheral edema    OBJECTIVE:                                                    /84  Pulse 78  Temp 98  F (36.7  C) (Oral)  Ht 6' (1.829 m)  Wt 239 lb (108.4 kg)  SpO2 96%  BMI 32.41 kg/m2  Body mass index is 32.41 kg/(m^2).   GENERAL: healthy, alert, well nourished, well hydrated, no distress  HENT: ear canals- normal; TMs- normal; Nose- normal; Mouth- no ulcers, no lesions  NECK: no tenderness, no adenopathy, no asymmetry, no masses, no stiffness; thyroid- normal to palpation  RESP: lungs clear to auscultation - no rales, no rhonchi, no wheezes  CV: regular  rates and rhythm, normal S1 S2, no S3 or S4 and no murmur, no click or rub -  ABDOMEN: soft, no tenderness, no  hepatosplenomegaly, no masses, normal bowel sounds    Diagnostic test results:  Diagnostic Test Results:  none      ASSESSMENT/PLAN:                                                    1. Palpitations  Symptoms while driving may have been vasovagal presyncope or palpitations, from food stuck in throat/caffeine/carbonation, resulting in low heart rate and dizziness.  electrocardiogram reassuring today.  Will proceed with stress echocardiogram.  Discussed cardiac disease prevenstion with blood pressure, cholesterol, blood sugar control.  Will begin baby aspirin.   - EKG 12-lead complete w/read - Clinics  - Exercise Stress Echocardiogram; Future    2.  Hypertension:  Currently within normal limits.  No treatment needed.    See Patient Instructions    Alessandro Blanco MD  [facility]

## 2018-02-27 NOTE — PATIENT INSTRUCTIONS
"They will call you for a stress echocardiogram.      Your electrocardiogram looks good today.    Begin an over-the-counter baby aspirin.    No blood pressure meds necessary now.      Lab work downstairs today.  Directions:  As you walk through the first floor, you'll see (on the right) first the pharmacy, then some bathrooms, then the \"Lab and Imaging\" area. Give them your name at the window there and wait for them to call you.   "

## 2018-02-28 LAB
ALBUMIN SERPL-MCNC: 3.9 G/DL (ref 3.4–5)
ALP SERPL-CCNC: 78 U/L (ref 40–150)
ALT SERPL W P-5'-P-CCNC: 18 U/L (ref 0–70)
ANION GAP SERPL CALCULATED.3IONS-SCNC: 5 MMOL/L (ref 3–14)
AST SERPL W P-5'-P-CCNC: 20 U/L (ref 0–45)
BILIRUB SERPL-MCNC: 0.5 MG/DL (ref 0.2–1.3)
BUN SERPL-MCNC: 18 MG/DL (ref 7–30)
CALCIUM SERPL-MCNC: 8.8 MG/DL (ref 8.5–10.1)
CHLORIDE SERPL-SCNC: 106 MMOL/L (ref 94–109)
CO2 SERPL-SCNC: 29 MMOL/L (ref 20–32)
CREAT SERPL-MCNC: 0.89 MG/DL (ref 0.66–1.25)
GFR SERPL CREATININE-BSD FRML MDRD: 85 ML/MIN/1.7M2
GLUCOSE SERPL-MCNC: 78 MG/DL (ref 70–99)
POTASSIUM SERPL-SCNC: 4.5 MMOL/L (ref 3.4–5.3)
PROT SERPL-MCNC: 6.4 G/DL (ref 6.8–8.8)
SODIUM SERPL-SCNC: 140 MMOL/L (ref 133–144)
TROPONIN I SERPL-MCNC: <0.015 UG/L (ref 0–0.04)
TSH SERPL DL<=0.005 MIU/L-ACNC: 1.32 MU/L (ref 0.4–4)

## 2018-02-28 ASSESSMENT — ENCOUNTER SYMPTOMS
ORTHOPNEA: 0
PALPITATIONS: 1
EYES NEGATIVE: 1
GASTROINTESTINAL NEGATIVE: 1
PSYCHIATRIC NEGATIVE: 1
RESPIRATORY NEGATIVE: 1
MUSCULOSKELETAL NEGATIVE: 1
CLAUDICATION: 0
CONSTITUTIONAL NEGATIVE: 1
PND: 0

## 2018-03-12 ENCOUNTER — HOSPITAL ENCOUNTER (OUTPATIENT)
Dept: CARDIOLOGY | Facility: CLINIC | Age: 68
Discharge: HOME | End: 2018-03-12
Attending: INTERNAL MEDICINE | Admitting: INTERNAL MEDICINE
Payer: MEDICARE

## 2018-03-12 DIAGNOSIS — R00.2 PALPITATIONS: ICD-10-CM

## 2018-03-12 PROCEDURE — 93350 STRESS TTE ONLY: CPT | Mod: 26 | Performed by: INTERNAL MEDICINE

## 2018-03-12 PROCEDURE — 93017 CV STRESS TEST TRACING ONLY: CPT

## 2018-03-12 PROCEDURE — 93018 CV STRESS TEST I&R ONLY: CPT | Performed by: INTERNAL MEDICINE

## 2018-03-12 PROCEDURE — 93321 DOPPLER ECHO F-UP/LMTD STD: CPT | Mod: 26 | Performed by: INTERNAL MEDICINE

## 2018-03-12 PROCEDURE — 93016 CV STRESS TEST SUPVJ ONLY: CPT | Performed by: INTERNAL MEDICINE

## 2018-03-12 PROCEDURE — 25500064 ZZH RX 255 OP 636: Performed by: INTERNAL MEDICINE

## 2018-03-12 PROCEDURE — 93325 DOPPLER ECHO COLOR FLOW MAPG: CPT | Mod: 26 | Performed by: INTERNAL MEDICINE

## 2018-03-12 RX ADMIN — SULFUR HEXAFLUORIDE 5 ML: KIT at 14:32

## 2018-03-12 NOTE — PROGRESS NOTES
Stress echo completed. Contrast lumason used for image enhancement. Bouts of SVT brought up to Dr. Carson prior to Pt leaving. Dr. Carson advised for Pt to set up EP consult. Pt opted to call tomorrow and was given phone numbers to schedule.

## 2018-03-13 ENCOUNTER — TELEPHONE (OUTPATIENT)
Dept: CARDIOLOGY | Facility: CLINIC | Age: 68
End: 2018-03-13

## 2018-03-13 NOTE — TELEPHONE ENCOUNTER
Call from patient, he is concerned about recommendation to see cardiologist. Reviewed with patient that SVT is not dangerous but can require some control if it becomes symptomatic. Patient has reached out to his PCP and will call back if he decides to f/u recommendation.

## 2018-03-19 ENCOUNTER — MYC MEDICAL ADVICE (OUTPATIENT)
Dept: PEDIATRICS | Facility: CLINIC | Age: 68
End: 2018-03-19

## 2018-03-19 DIAGNOSIS — R00.2 PALPITATIONS: Primary | ICD-10-CM

## 2018-03-22 ENCOUNTER — HOSPITAL ENCOUNTER (OUTPATIENT)
Dept: CARDIOLOGY | Facility: CLINIC | Age: 68
Discharge: HOME | End: 2018-03-22
Attending: INTERNAL MEDICINE | Admitting: INTERNAL MEDICINE
Payer: MEDICARE

## 2018-03-22 DIAGNOSIS — R00.2 PALPITATIONS: ICD-10-CM

## 2018-03-22 PROCEDURE — 93272 ECG/REVIEW INTERPRET ONLY: CPT | Performed by: INTERNAL MEDICINE

## 2018-03-22 PROCEDURE — 93270 REMOTE 30 DAY ECG REV/REPORT: CPT

## 2018-03-22 NOTE — LETTER
"               [facility]  [address] [address]                  [phone]   2018    Dustin Lan  [address]  [place] [address]      Dustin,     It looks like your cardiac monitor showed an episode of \"SVT\", where your heart raced fast for 25 beats.     Given your previous symptoms, this could be significant enough to consider seeing a cardiologist; I think I'd like you to follow up with our cardiologist here in [place]; you can call us at  to set up an appointment within about a month or so.  I would have you abstain from caffeine and significant alcohol as well.     Please let me know if you have any further concerns, otherwise we will plan on seeing you back at your next scheduled appointment.     Alessandro Blanco MD   Internal Medicine and Pediatrics       Results for orders placed or performed during the hospital encounter of 18   Cardiac Event Monitor - Peds/Adult    [facility]  [address]  [place] [facility] [place] [address]  [phone]  3/22/2018      Patient:  Dustin Lan  Chart: [id number]  :  1950  Age:  67 year old  Sex:  male       Procedure:  Event Monitor Placed: Please see scanned document for result once interpretation is completed.        Technician performing hook-up:  Keena Rawls       "

## 2018-04-07 DIAGNOSIS — J45.30 MILD PERSISTENT ASTHMA WITHOUT COMPLICATION: ICD-10-CM

## 2018-04-08 NOTE — TELEPHONE ENCOUNTER
"Requested Prescriptions   Pending Prescriptions Disp Refills     ADVAIR DISKUS 250-50 MCG/DOSE diskus inhaler [Pharmacy Med Name: ADVAIR DISKUS 250/50MCG]  Last Written Prescription Date:  11/8/2016  Last Fill Quantity: 3 inhaler,  # refills: 3   Last office visit: 2/27/2018 with prescribing provider:  Alessandro Blanco   Future Office Visit:      3     Sig: INHALE ONE PUFF INTO THE LUNGS EVERY 12 HOURS    Inhaled Steroids Protocol Passed    4/7/2018  8:34 PM       Passed - Patient is age 12 or older       Passed - Asthma control assessment score within normal limits in last 6 months    Please review ACT score.   ACT Total Scores 11/8/2016 5/1/2017 11/17/2017   ACT TOTAL SCORE - - -   ASTHMA ER VISITS - - -   ASTHMA HOSPITALIZATIONS - - -   ACT TOTAL SCORE (Goal Greater than or Equal to 20) 25 21 25   In the past 12 months, how many times did you visit the emergency room for your asthma without being admitted to the hospital? 0 0 0   In the past 12 months, how many times were you hospitalized overnight because of your asthma? 0 0 0              Passed - Recent (6 mo) or future (30 days) visit within the authorizing provider's specialty    Patient had office visit in the last 6 months or has a visit in the next 30 days with authorizing provider or within the authorizing provider's specialty.  See \"Patient Info\" tab in inbasket, or \"Choose Columns\" in Meds & Orders section of the refill encounter.              "

## 2018-04-09 DIAGNOSIS — I47.10 PAROXYSMAL SUPRAVENTRICULAR TACHYCARDIA (H): Primary | ICD-10-CM

## 2018-04-10 NOTE — TELEPHONE ENCOUNTER
Prescription approved per [facility] Refill Protocol.    Mitzy COLE RN, BSN, PHN  [place] [facility] RN

## 2018-04-13 ENCOUNTER — OFFICE VISIT (OUTPATIENT)
Dept: CARDIOLOGY | Facility: CLINIC | Age: 68
End: 2018-04-13
Attending: INTERNAL MEDICINE
Payer: COMMERCIAL

## 2018-04-13 VITALS
DIASTOLIC BLOOD PRESSURE: 82 MMHG | HEART RATE: 80 BPM | SYSTOLIC BLOOD PRESSURE: 144 MMHG | OXYGEN SATURATION: 96 % | WEIGHT: 241.6 LBS | BODY MASS INDEX: 32.72 KG/M2 | HEIGHT: 72 IN

## 2018-04-13 DIAGNOSIS — I10 BENIGN ESSENTIAL HYPERTENSION: ICD-10-CM

## 2018-04-13 DIAGNOSIS — I47.10 PAROXYSMAL SUPRAVENTRICULAR TACHYCARDIA (H): Primary | ICD-10-CM

## 2018-04-13 PROCEDURE — 99204 OFFICE O/P NEW MOD 45 MIN: CPT | Performed by: INTERNAL MEDICINE

## 2018-04-13 PROCEDURE — 93000 ELECTROCARDIOGRAM COMPLETE: CPT | Performed by: INTERNAL MEDICINE

## 2018-04-13 RX ORDER — ASPIRIN 81 MG/1
81 TABLET, CHEWABLE ORAL DAILY
Qty: 36 TABLET | Refills: 3 | Status: ON HOLD | COMMUNITY
Start: 2018-04-13 | End: 2019-05-09

## 2018-04-13 RX ORDER — DILTIAZEM HYDROCHLORIDE 120 MG/1
120 CAPSULE, EXTENDED RELEASE ORAL
Qty: 30 CAPSULE | Refills: 3 | Status: SHIPPED | OUTPATIENT
Start: 2018-04-13 | End: 2018-05-14

## 2018-04-13 NOTE — PROGRESS NOTES
Dictation reference number - [id number]    REFERRING PROVIDER:  Alessandro Blanco MD  [address] [place] [address] [place] [address]    PRIMARY CARE PROVIDER:  Alessandro Blanco  [address] [place] [address] [place] [address]            REVIEW OF SYSTEMS:  A comprehensive 10-point review of systems was completed and the pertinent positives are documented in the history of present illness.    Skin:  Negative     Eyes:  Positive for glasses  ENT:  Negative    Respiratory:  Negative    Cardiovascular:    Positive for;palpitations;dizziness  Gastroenterology: Negative    Genitourinary:  Negative    Musculoskeletal:  Positive for arthritis (knees only)  Neurologic:  Negative    Psychiatric:  Negative    Heme/Lymph/Imm:  Negative    Endocrine:  Negative      CURRENT MEDICATIONS:  Current Outpatient Prescriptions   Medication Sig Dispense Refill     SIMVASTATIN PO Take 20 mg by mouth At Bedtime       diltiazem (CARDIZEM SR) 120 MG CP12 12 hr SR capsule Take 120 capsules by mouth daily (with breakfast) 30 capsule 3     aspirin 81 MG chewable tablet Take 1 tablet (81 mg) by mouth daily 36 tablet 3     ADVAIR DISKUS 250-50 MCG/DOSE diskus inhaler INHALE ONE PUFF INTO THE LUNGS EVERY 12 HOURS 180 Inhaler 0     Naproxen Sodium (ALEVE PO) Take by mouth 2 times daily (with meals)        GLUCOSAMINE CHONDR 500 COMPLEX PO CAPS        MULTI-VITAMIN OR TABS 1 po qd           ALLERGIES:  Allergies   Allergen Reactions     Cats      No Known Allergies        PAST MEDICAL HISTORY:    Past Medical History:   Diagnosis Date     Elevated blood pressure reading without diagnosis of hypertension      Personal history of colonic polyps 2/2007    tubular adenoma, ileocecal valve, repeat 2010     Plantar fascial fibromatosis      Pure hypercholesterolemia        PAST SURGICAL HISTORY:    Past Surgical History:   Procedure Laterality Date     ARTHROSCOPY KNEE RT/LT  12/2008    right     HC COLONOSCOPY THRU STOMA, DIAGNOSTIC  2/2007    ileocecal valve polyp,  tubular adenoma, repeat in 3 yrs       FAMILY HISTORY:    Family History   Problem Relation Age of Onset     CEREBROVASCULAR DISEASE Mother 75      of a stroke     C.A.D. Father 67      suddenly when using the  at age 67. He was a smoker. On autopsy, told to have multiple heart attacks and scar tissue but he had never veronika a clinical heart attack.     Lipids Sister        SOCIAL HISTORY:    Social History     Social History     Marital status:      Spouse name: N/A     Number of children: 2     Years of education: N/A     Occupational History     Retired lumbar sales      Social History Main Topics     Smoking status: Never Smoker     Smokeless tobacco: Never Used     Alcohol use 0.0 - 1.8 oz/week      Comment: 5 - 7 beers a week     Drug use: No     Sexual activity: Yes     Partners: Female     Other Topics Concern     Parent/Sibling W/ Cabg, Mi Or Angioplasty Before 65f 55m? No     Social History Narrative       PHYSICAL EXAM:    Vitals: /82  Pulse 80  Ht 1.829 m (6')  Wt 109.6 kg (241 lb 9.6 oz)  SpO2 96%  BMI 32.77 kg/m2  Wt Readings from Last 5 Encounters:   18 109.6 kg (241 lb 9.6 oz)   18 108.4 kg (239 lb)   18 108.4 kg (239 lb)   17 107.6 kg (237 lb 3.2 oz)   17 110.3 kg (243 lb 1 oz)       Constitutional: Comfortable at rest. Cooperative, alert and oriented, well developed, well nourished.  Eyes: Pupils equal and round, conjunctivae and lids unremarkable, sclera white, no xanthalasma,   ENT: Satisfactory dentition.  No cyanosis or pallor.  Neck: Carotid pulses are full and equal bilaterally, no carotid bruit, no thyromegaly     Respiratory: Normal respiratory effort with symmetrical chest wall movements and no use of accessory muscles. Good air entry with normal breath sounds and no rales or wheeze.  Cardiovascular: Normal jugular venous pulse and pressure.  Normal carotid pulse character and volume.  No carotid bruit.  Apical impulse is  undisplaced and normal to palpation without parasternal heave or thrill.  Heart sounds are normal and regular. No audible murmur. No S3, S4 or friction rub.    GI: Soft, nontender, no hepatosplenomegaly, no masses, active bowel sounds.    Skin: No rash, erythema, ecchymosis.  Musculoskeletal: Normal muscle tone and strength. Normal gait. No spinal deformities.  Neuropsychiatric: Oriented to time place and person.  Affect normal.  No gross motor deficits.  Extremities: No edema. No clubbing or deformities.        Encounter Diagnoses   Name Primary?     Paroxysmal supraventricular tachycardia (H) Yes     Benign essential hypertension        Orders Placed This Encounter   Procedures     Follow-Up with Cardiologist     EKG 12-lead complete w/read - Clinics (to be scheduled)     EKG 12-lead complete w/read - Clinics       CC  REFERRING PROVIDER:  Alessandro Blanco MD  [address] [place] [address]

## 2018-04-13 NOTE — MR AVS SNAPSHOT
After Visit Summary   4/13/2018    Dustin Lan    MRN: [id number]           Patient Information     Date Of Birth          1950        Visit Information        Provider Department      4/13/2018 8:45 AM Iesha Kohler MD [facility]        Today's Diagnoses     Paroxysmal supraventricular tachycardia (H)    -  1    Benign essential hypertension          Care Instructions    1.  Start diltiazem  mg.  Take 1 tablet daily.    2.  Consider daily aspirin 81 mg.    3.  No additional testing.  Follow-up in my clinic in 3-4 weeks.    If you have any questions or concerns, please call my nurse Opal Phillips at [phone].            Follow-ups after your visit        Additional Services     Follow-Up with Cardiologist                 Future tests that were ordered for you today     Open Future Orders        Priority Expected Expires Ordered    EKG 12-lead complete w/read - Clinics (to be scheduled) Routine 5/13/2018 4/13/2019 4/13/2018    Follow-Up with Cardiologist Routine 5/13/2018 4/13/2019 4/13/2018            Who to contact     If you have questions or need follow up information about today's clinic visit or your schedule please contact [facility] directly at [phone].  Normal or non-critical lab and imaging results will be communicated to you by Float: [place], letter or phone within 4 business days after the clinic has received the results. If you do not hear from us within 7 days, please contact the clinic through Float: [place] or phone. If you have a critical or abnormal lab result, we will notify you by phone as soon as possible.  Submit refill requests through Partnered or call your pharmacy and they will forward the refill request to us. Please allow 3 business days for your refill to be completed.          Additional Information About Your Visit        Partnered Information     Partnered gives you secure access to  your electronic health record. If you see a primary care provider, you can also send messages to your care team and make appointments. If you have questions, please call your primary care clinic.  If you do not have a primary care provider, please call [phone] and they will assist you.        Care EveryWhere ID     This is your Care EveryWhere ID. This could be used by other organizations to access your [place] medical records  [id number]        Your Vitals Were     Pulse Height Pulse Oximetry BMI (Body Mass Index)          80 1.829 m (6') 96% 32.77 kg/m2         Blood Pressure from Last 3 Encounters:   04/13/18 144/82   02/27/18 134/84   02/24/18 144/90    Weight from Last 3 Encounters:   04/13/18 109.6 kg (241 lb 9.6 oz)   02/27/18 108.4 kg (239 lb)   02/24/18 108.4 kg (239 lb)              We Performed the Following     EKG 12-lead complete w/read - Clinics          Today's Medication Changes          These changes are accurate as of 4/13/18  9:51 AM.  If you have any questions, ask your nurse or doctor.               Start taking these medicines.        Dose/Directions    aspirin 81 MG chewable tablet   Used for:  Paroxysmal supraventricular tachycardia (H), Benign essential hypertension   Started by:  Iesha Kohler MD        Dose:  81 mg   Take 1 tablet (81 mg) by mouth daily   Quantity:  36 tablet   Refills:  3       diltiazem 120 MG Cp12 12 hr SR capsule   Commonly known as:  CARDIZEM SR   Used for:  Paroxysmal supraventricular tachycardia (H), Benign essential hypertension   Started by:  Iesha Kohler MD        Dose:  120 capsule   Take 120 capsules by mouth daily (with breakfast)   Quantity:  30 capsule   Refills:  3         These medicines have changed or have updated prescriptions.        Dose/Directions    SIMVASTATIN PO   This may have changed:  Another medication with the same name was removed. Continue taking this medication, and follow the directions you see here.   Changed  by:  Iesha Kohler MD        Dose:  20 mg   Take 20 mg by mouth At Bedtime   Refills:  0            Where to get your medicines      These medications were sent to [id number] - [place] - [address]  AT [facility] [address] [facility]  [address] [place] [address]     Phone:  [phone]     diltiazem 120 MG Cp12 12 hr SR capsule         Some of these will need a paper prescription and others can be bought over the counter.  Ask your nurse if you have questions.     You don't need a prescription for these medications     aspirin 81 MG chewable tablet                Primary Care Provider Office Phone # Fax #    Alessandro Blanco [phone] [address] [place] [address]        Equal Access to Services     [facility]: Hadii leigha bruce hadasho Soomaali, waaxda luqadaha, qaybta kaalmada adeegyada, alice tsai hayrom johnson . So [facility] [phone].    ATENCIÓN: Si habla español, tiene a forbes disposición servicios gratuitos de asistencia lingüística. Ll[facility] [phone].    We comply with applicable federal civil rights laws and [place] laws. We do not discriminate on the basis of race, color, national origin, age, disability, sex, sexual orientation, or gender identity.            Thank you!     Thank you for choosing [facility]  for your care. Our goal is always to provide you with excellent care. Hearing back from our patients is one way we can continue to improve our services. Please take a few minutes to complete the written survey that you may receive in the mail after your visit with us. Thank you!             Your Updated Medication List - Protect others around you: Learn how to safely use, store and throw away your medicines at www.disposemymeds.org.          This list is accurate as of 4/13/18  9:51 AM.  Always use your most recent med list.                   Brand Name Dispense  Instructions for use Diagnosis    ADVAIR DISKUS 250-50 MCG/DOSE diskus inhaler   Generic drug:  fluticasone-salmeterol     180 Inhaler    INHALE ONE PUFF INTO THE LUNGS EVERY 12 HOURS    Mild persistent asthma without complication       ALEVE PO      Take by mouth 2 times daily (with meals)        aspirin 81 MG chewable tablet     36 tablet    Take 1 tablet (81 mg) by mouth daily    Paroxysmal supraventricular tachycardia (H), Benign essential hypertension       diltiazem 120 MG Cp12 12 hr SR capsule    CARDIZEM SR    30 capsule    Take 120 capsules by mouth daily (with breakfast)    Paroxysmal supraventricular tachycardia (H), Benign essential hypertension       glucosamine chondroitin 500 complex Caps           Multi-vitamin Tabs tablet   Generic drug:  multivitamin, therapeutic with minerals      1 po qd        SIMVASTATIN PO      Take 20 mg by mouth At Bedtime

## 2018-04-13 NOTE — LETTER
4/13/2018    Alessandro Blanco MD  [address] [place] [address] [place] [address]    RE: Dustin Lan       Dear Colleague,    I had the pleasure of seeing Dustin Lan in the [facility].    Dictation reference number - [id number]    REFERRING PROVIDER:  Alessandro Blanco MD  [address] [place] [address] [place] [address]    PRIMARY CARE PROVIDER:  Alessandro Blanco  [address] [place] [address] [place] [address]            REVIEW OF SYSTEMS:  A comprehensive 10-point review of systems was completed and the pertinent positives are documented in the history of present illness.    Skin:  Negative     Eyes:  Positive for glasses  ENT:  Negative    Respiratory:  Negative    Cardiovascular:    Positive for;palpitations;dizziness  Gastroenterology: Negative    Genitourinary:  Negative    Musculoskeletal:  Positive for arthritis (knees only)  Neurologic:  Negative    Psychiatric:  Negative    Heme/Lymph/Imm:  Negative    Endocrine:  Negative      CURRENT MEDICATIONS:  Current Outpatient Prescriptions   Medication Sig Dispense Refill     SIMVASTATIN PO Take 20 mg by mouth At Bedtime       diltiazem (CARDIZEM SR) 120 MG CP12 12 hr SR capsule Take 120 capsules by mouth daily (with breakfast) 30 capsule 3     aspirin 81 MG chewable tablet Take 1 tablet (81 mg) by mouth daily 36 tablet 3     ADVAIR DISKUS 250-50 MCG/DOSE diskus inhaler INHALE ONE PUFF INTO THE LUNGS EVERY 12 HOURS 180 Inhaler 0     Naproxen Sodium (ALEVE PO) Take by mouth 2 times daily (with meals)        GLUCOSAMINE CHONDR 500 COMPLEX PO CAPS        MULTI-VITAMIN OR TABS 1 po qd           ALLERGIES:  Allergies   Allergen Reactions     Cats      No Known Allergies        PAST MEDICAL HISTORY:    Past Medical History:   Diagnosis Date     Elevated blood pressure reading without diagnosis of hypertension      Personal history of colonic polyps 2/2007    tubular adenoma, ileocecal valve, repeat 2010     Plantar fascial fibromatosis      Pure  hypercholesterolemia        PAST SURGICAL HISTORY:    Past Surgical History:   Procedure Laterality Date     ARTHROSCOPY KNEE RT/LT  2008    right     HC COLONOSCOPY THRU STOMA, DIAGNOSTIC  2007    ileocecal valve polyp, tubular adenoma, repeat in 3 yrs       FAMILY HISTORY:    Family History   Problem Relation Age of Onset     CEREBROVASCULAR DISEASE Mother 75      of a stroke     C.A.D. Father 67      suddenly when using the  at age 67. He was a smoker. On autopsy, told to have multiple heart attacks and scar tissue but he had never veronika a clinical heart attack.     Lipids Sister        SOCIAL HISTORY:    Social History     Social History     Marital status:      Spouse name: N/A     Number of children: 2     Years of education: N/A     Occupational History     Retired lumbar sales      Social History Main Topics     Smoking status: Never Smoker     Smokeless tobacco: Never Used     Alcohol use 0.0 - 1.8 oz/week      Comment: 5 - 7 beers a week     Drug use: No     Sexual activity: Yes     Partners: Female     Other Topics Concern     Parent/Sibling W/ Cabg, Mi Or Angioplasty Before 65f 55m? No     Social History Narrative       PHYSICAL EXAM:    Vitals: /82  Pulse 80  Ht 1.829 m (6')  Wt 109.6 kg (241 lb 9.6 oz)  SpO2 96%  BMI 32.77 kg/m2  Wt Readings from Last 5 Encounters:   18 109.6 kg (241 lb 9.6 oz)   18 108.4 kg (239 lb)   18 108.4 kg (239 lb)   17 107.6 kg (237 lb 3.2 oz)   17 110.3 kg (243 lb 1 oz)       Constitutional: Comfortable at rest. Cooperative, alert and oriented, well developed, well nourished.  Eyes: Pupils equal and round, conjunctivae and lids unremarkable, sclera white, no xanthalasma,   ENT: Satisfactory dentition.  No cyanosis or pallor.  Neck: Carotid pulses are full and equal bilaterally, no carotid bruit, no thyromegaly     Respiratory: Normal respiratory effort with symmetrical chest wall movements and no use of  accessory muscles. Good air entry with normal breath sounds and no rales or wheeze.  Cardiovascular: Normal jugular venous pulse and pressure.  Normal carotid pulse character and volume.  No carotid bruit.  Apical impulse is undisplaced and normal to palpation without parasternal heave or thrill.  Heart sounds are normal and regular. No audible murmur. No S3, S4 or friction rub.    GI: Soft, nontender, no hepatosplenomegaly, no masses, active bowel sounds.    Skin: No rash, erythema, ecchymosis.  Musculoskeletal: Normal muscle tone and strength. Normal gait. No spinal deformities.  Neuropsychiatric: Oriented to time place and person.  Affect normal.  No gross motor deficits.  Extremities: No edema. No clubbing or deformities.        Encounter Diagnoses   Name Primary?     Paroxysmal supraventricular tachycardia (H) Yes     Benign essential hypertension        Orders Placed This Encounter   Procedures     Follow-Up with Cardiologist     EKG 12-lead complete w/read - Clinics (to be scheduled)     EKG 12-lead complete w/read - Clinics       CC  REFERRING PROVIDER:  Alessandro Blanco MD  [address] [place] [address] [place] [address]                  Thank you for allowing me to participate in the care of your patient.      Sincerely,     Iesha Kohler MD     [facility]    cc:   Alessandro Blanco MD  [address] [place] [address] [place] [address]

## 2018-04-13 NOTE — LETTER
4/13/2018      Alessandro Blanco MD  [address] [place] [address]      RE: Dustin HUA Riky       Dear Colleague,    I had the pleasure of seeing Dustin Lan in the [facility].    Service Date: 04/13/2018      LOCATION:  [facility], [facility].   PRIMARY CARE AND REFERRING PROVIDER:  [place]rystal [place], [place] [address].      REASON FOR VISIT:   1.  Palpitations and paroxysmal SVT on recent heart monitor.      HISTORY OF PRESENT ILLNESS:    Mr. Lan is new to Cardiology.  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  He is  and lives with his wife.  Medical history is significant for mild hyperlipidemia for which he takes simvastatin 20 mg daily.        I am seeing him because of an episode of prolonged palpitations and dizziness which occurred while he was on the road driving to [place] on Super [place] weekend.  He states that he had unusually drunk 2 cups of coffee that day, not much oral intake and was feeling dehydrated and he had a large sausage sandwich at a stop in a gas station.  As he was driving, he suddenly started developing a prodrome of feeling lightheaded and things starting to go black associated with some palpitations.  He pulled over, and in a few minutes the symptoms resolved.  His wife drove the rest of the 60 miles to [place].  The rest of the day he felt quite tired but did not have any other symptoms, and the next morning he was back to baseline.  To have this evaluated,  Riky obtained a 7-day Zio monitor and on this he had a 25-beat run of narrow complex tachycardia on 03/17, during which was essentially asymptomatic.  Following this episode, he has started taking primary prevention aspirin.      The patient is very healthy at baseline.  He is not known to have a prior diagnosis of hypertension, but his blood pressure readings have been  borderline, not diabetic.      LABS - Total cholesterol 177, HDL 76, LDL 77, triglycerides 122, sodium 140, potassium 4.5, BUN 18, creatinine 0.8 with a GFR of 85, TSH 1.3.      ECG done today shows normal sinus rhythm at 75 BPM with normal cardiac intervals ( milliseconds, QTc 384 milliseconds).      EXERCISE ECHOCARDIOGRAM STRESS TEST showed normal biventricular size and systolic function, no inducible ischemia, no significant valve disease, but he did have a 10-beat run of SVT during which he was asymptomatic.  The aortic root was mildly dilated at 4.1 cm.  Peak heart rate achieved was 141 BPM and METs 9.0.  He did have resting hypertension with a mildly hypertensive response to exercise.      CURRENT MEDICATIONS:   1.  Simvastatin 20 mg daily.   2.  Aspirin 81 mg daily.   3.  Naprosyn sodium as needed for arthritic pains.   4.  Daily multivitamin.      ALLERGIES:  No known medication allergies.      Please see my attached note in Epic for a comprehensive review of systems, past medical, surgical, social and family history, detailed vital signs and physical exam.      PHYSICAL EXAMINATION:   VITAL SIGNS:  /82, pulse 80 per minute and regular.  Height 1.829 meters (6 feet), weight 109.6 kg (241 pounds), respiratory rate 16 per minute, sats 96% on room air.  BMI 32.7 kg/m2.   CARDIAC:  The patient's physical examination is unremarkable.  Specifically, normal heart sounds, no bruit, no murmur.   LUNGS:  Clear.   ABDOMEN:  Soft, nontender.   EXTREMITIES:  No lower extremity edema.      DIAGNOSES:   1.  Paroxysmal supraventricular tachycardia, brief episodes on heart monitor.   2.  New diagnosis of hypertension.   3.  Hyperlipidemia, optimally treated.      ASSESSMENT AND PLAN:   1.  Reassured patient that his heart monitor only showed a 25-beat run of tachycardia during which he was asymptomatic.  This is a very common finding and may well be incidental.  His episode of palpitations and dizziness with  driving sounds like a vasovagal presyncopal episode in the context of excess caffeine intake that was unusual for him and dehydration.  His stress test is very reassuring.   2.  Given that he has gradually progressive hypertension as well as a hypertensive response to exercise on a stress test, I would favor starting him on Cardizem 120 mg once daily.  This will address both the hypertension as well as any PSVT that he has.     3.  No additional testing.  Follow up in my clinic in 3-4 weeks.      It was my pleasure to visit with this gentleman.  I look forward to seeing him again.      cc:   Alessandro Blanco MD    [facility]    [address] [place]  [address]         LEE KOHLER MD          D: 2018   T: 2018   MT: TYLOR      Name:     LIYA GUTIERREZ   MRN:      [id number]        Account:      [id number]   :      1950           Service Date: 2018      Document: [id number]        Outpatient Encounter Prescriptions as of 2018   Medication Sig Dispense Refill     ADVAIR DISKUS 250-50 MCG/DOSE diskus inhaler INHALE ONE PUFF INTO THE LUNGS EVERY 12 HOURS 180 Inhaler 0     aspirin 81 MG chewable tablet Take 1 tablet (81 mg) by mouth daily 36 tablet 3     diltiazem (CARDIZEM SR) 120 MG CP12 12 hr SR capsule Take 120 capsules by mouth daily (with breakfast) 30 capsule 3     GLUCOSAMINE CHONDR 500 COMPLEX PO CAPS        MULTI-VITAMIN OR TABS 1 po qd       Naproxen Sodium (ALEVE PO) Take by mouth 2 times daily (with meals)        SIMVASTATIN PO Take 20 mg by mouth At Bedtime       [DISCONTINUED] simvastatin (ZOCOR) 40 MG tablet Take 1 tablet (40 mg) by mouth At Bedtime (Patient not taking: Reported on 2018) 90 tablet 3     No facility-administered encounter medications on file as of 2018.      Again, thank you for allowing me to participate in the care of your patient.      Sincerely,    Lee Kohler MD     [facility]  Care

## 2018-04-13 NOTE — PATIENT INSTRUCTIONS
1.  Start diltiazem  mg.  Take 1 tablet daily.    2.  Consider daily aspirin 81 mg.    3.  No additional testing.  Follow-up in my clinic in 3-4 weeks.    If you have any questions or concerns, please call my nurse Opal Phillips at [phone].

## 2018-04-14 NOTE — PROGRESS NOTES
Service Date: 04/13/2018      LOCATION:  [facility], [facility].   PRIMARY CARE AND REFERRING PROVIDER:  [place]. [place] [address].      REASON FOR VISIT:   1.  Palpitations and paroxysmal SVT on recent heart monitor.      HISTORY OF PRESENT ILLNESS:    Mr. Lan is new to Cardiology.  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  He is  and lives with his wife.  Medical history is significant for mild hyperlipidemia for which he takes simvastatin 20 mg daily.        I am seeing him because of an episode of prolonged palpitations and dizziness which occurred while he was on the road driving to [place] on [place] weekend.  He states that he had unusually drunk 2 cups of coffee that day, not much oral intake and was feeling dehydrated and he had a large sausage sandwich at a stop in a gas station.  As he was driving, he suddenly started developing a prodrome of feeling lightheaded and things starting to go black associated with some palpitations.  He pulled over, and in a few minutes the symptoms resolved.  His wife drove the rest of the 60 miles to [place].  The rest of the day he felt quite tired but did not have any other symptoms, and the next morning he was back to baseline.  To have this evaluated,  Riky obtained a 7-day Zio monitor and on this he had a 25-beat run of narrow complex tachycardia on 03/17, during which was essentially asymptomatic.  Following this episode, he has started taking primary prevention aspirin.      The patient is very healthy at baseline.  He is not known to have a prior diagnosis of hypertension, but his blood pressure readings have been borderline, not diabetic.      LABS - Total cholesterol 177, HDL 76, LDL 77, triglycerides 122, sodium 140, potassium 4.5, BUN 18, creatinine 0.8 with a GFR of 85, TSH 1.3.      ECG done today shows normal sinus rhythm at 75 BPM with  normal cardiac intervals ( milliseconds, QTc 384 milliseconds).      EXERCISE ECHOCARDIOGRAM STRESS TEST showed normal biventricular size and systolic function, no inducible ischemia, no significant valve disease, but he did have a 10-beat run of SVT during which he was asymptomatic.  The aortic root was mildly dilated at 4.1 cm.  Peak heart rate achieved was 141 BPM and METs 9.0.  He did have resting hypertension with a mildly hypertensive response to exercise.      CURRENT MEDICATIONS:   1.  Simvastatin 20 mg daily.   2.  Aspirin 81 mg daily.   3.  Naprosyn sodium as needed for arthritic pains.   4.  Daily multivitamin.      ALLERGIES:  No known medication allergies.      Please see my attached note in Epic for a comprehensive review of systems, past medical, surgical, social and family history, detailed vital signs and physical exam.      PHYSICAL EXAMINATION:   VITAL SIGNS:  /82, pulse 80 per minute and regular.  Height 1.829 meters (6 feet), weight 109.6 kg (241 pounds), respiratory rate 16 per minute, sats 96% on room air.  BMI 32.7 kg/m2.   CARDIAC:  The patient's physical examination is unremarkable.  Specifically, normal heart sounds, no bruit, no murmur.   LUNGS:  Clear.   ABDOMEN:  Soft, nontender.   EXTREMITIES:  No lower extremity edema.      DIAGNOSES:   1.  Paroxysmal supraventricular tachycardia, brief episodes on heart monitor.   2.  New diagnosis of hypertension.   3.  Hyperlipidemia, optimally treated.      ASSESSMENT AND PLAN:   1.  Reassured patient that his heart monitor only showed a 25-beat run of tachycardia during which he was asymptomatic.  This is a very common finding and may well be incidental.  His episode of palpitations and dizziness with driving sounds like a vasovagal presyncopal episode in the context of excess caffeine intake that was unusual for him and dehydration.  His stress test is very reassuring.   2.  Given that he has gradually progressive hypertension as  well as a hypertensive response to exercise on a stress test, I would favor starting him on Cardizem 120 mg once daily.  This will address both the hypertension as well as any PSVT that he has.     3.  No additional testing.  Follow up in my clinic in 3-4 weeks.      It was my pleasure to visit with this gentleman.  I look forward to seeing him again.      cc:   Alessandro Blanco MD    [address] ROSSY BURKETT MD             D: 2018   T: 2018   MT: TYLOR      Name:     LIYA GUTIERREZ   MRN:      [id number]        Account:      [id number]   :      1950           Service Date: 2018      Document: [id number]

## 2018-05-14 ENCOUNTER — OFFICE VISIT (OUTPATIENT)
Dept: CARDIOLOGY | Facility: CLINIC | Age: 68
End: 2018-05-14
Attending: INTERNAL MEDICINE
Payer: COMMERCIAL

## 2018-05-14 VITALS
WEIGHT: 242 LBS | OXYGEN SATURATION: 97 % | HEART RATE: 65 BPM | SYSTOLIC BLOOD PRESSURE: 132 MMHG | HEIGHT: 72 IN | DIASTOLIC BLOOD PRESSURE: 84 MMHG | BODY MASS INDEX: 32.78 KG/M2

## 2018-05-14 DIAGNOSIS — I10 BENIGN ESSENTIAL HYPERTENSION: ICD-10-CM

## 2018-05-14 DIAGNOSIS — I47.10 PAROXYSMAL SUPRAVENTRICULAR TACHYCARDIA (H): Primary | ICD-10-CM

## 2018-05-14 PROCEDURE — 99213 OFFICE O/P EST LOW 20 MIN: CPT | Performed by: INTERNAL MEDICINE

## 2018-05-14 RX ORDER — DILTIAZEM HYDROCHLORIDE 120 MG/1
120 CAPSULE, EXTENDED RELEASE ORAL DAILY
Qty: 100 CAPSULE | Refills: 3 | Status: SHIPPED | OUTPATIENT
Start: 2018-05-14 | End: 2018-11-19

## 2018-05-14 NOTE — LETTER
5/14/2018    Alessandro Blanco MD  [address] [place] [address]    RE: Dustin Lan       Dear Colleague,    I had the pleasure of seeing Dustin Lan in the [facility].    Service Date: 05/14/2018      PRIMARY CARE AND REFERRING PROVIDER:  Alessandro Blanco MD      REASON FOR VISIT:  Followup of hypertension and palpitations following initiation of diltiazem.      HISTORY OF PRESENT ILLNESS:  Mr. Lan is known to me from his initial visit on 04/13/2018 for a single episode of prolonged palpitations and dizziness, which occurred on Super Bowl weekend in the context of excess caffeine and sodium intake.  His 7 day Ziopatch had shown a 25 beat run of narrow-complex tachycardia, during which he was asymptomatic.  His exercise stress echo did not show any ischemia, but he had a 10 beat run of SVT in recovery, during which he was asymptomatic, and a mildly hypertensive response to exercise.      When I saw him, his BP was 144/82.  I started him on low-dose diltiazem 120 mg daily to address both the PSVT and mild hypertension.  He has been taking this without any side effects, particularly no lower extremity edema.  BP today is 132/84 mmHg.  Pulse is 65 BPM.  He has not had any more palpitations.  He is quite active.      CURRENT MEDICATIONS:   1.  Diltiazem  mg daily.   2.  Glucosamine chondroitin.     3.  Daily multivitamin.   4.  Simvastatin 20 mg daily.   5.  Aspirin 81 mg daily.   6.  Advair Diskus inhaler.      ALLERGIES:  No known medication allergies.      PHYSICAL EXAMINATION:   VITAL SIGNS:  /84, pulse 65 per minute and regular, height 1.829 m (6 feet), weight 109.8 kg (242 pounds), respiratory rate 14 per minute, sats 97% on room air, BMI 32.8 kg/m2.     CONSTITUTIONAL:  The patient is tall, well-built, asymptomatic at rest.  I did not do a detailed physical examination.  Specifically, there is no lower extremity edema after starting diltiazem.       DIAGNOSES:   1.  Mild hypertension in the context of daily NSAID use for joint pain.   2.  Asymptomatic paroxysmal SVT on Holter monitor.      ASSESSMENT/PLAN:   1.  I renewed the prescription for diltiazem.   2.  The patient is to take as many as 12 ibuprofen every day and has more recently switched to Aleve for his degenerative joint disease of the knee.  This is probably what is driving his mild hypertension.  I cautioned him against *** as well as due to renal side effects.   3.  Follow up with Cardiology as needed.      cc:   [place] [address]         LEE KOHLER MD             D: 2018   T: 2018   MT: kaley      Name:     LIYA GUTIERREZ   MRN:      [id number]        Account:      [id number]   :      1950           Service Date: 2018      Document: [id number]       Thank you for allowing me to participate in the care of your patient.    Sincerely,     Lee Kohler MD     [facility]

## 2018-05-14 NOTE — PROGRESS NOTES
Service Date: 05/14/2018      PRIMARY CARE AND REFERRING PROVIDER:  Alessandro Blanco MD      REASON FOR VISIT:  Followup of hypertension and palpitations following initiation of diltiazem.      HISTORY OF PRESENT ILLNESS:    Mr. Lan is known to me from his initial visit on 04/13/2018 for a single episode of prolonged palpitations and dizziness, which occurred on Super Bowl weekend in the context of excess caffeine and sodium intake.  His 7 day Ziopatch had shown a 25 beat run of narrow-complex tachycardia, during which he was asymptomatic.  His exercise stress echo did not show any ischemia, but he had a 10 beat run of SVT in recovery, during which he was asymptomatic, and a mildly hypertensive response to exercise.      When I saw him, his BP was 144/82.  I started him on low-dose diltiazem 120 mg daily to address both the PSVT and mild hypertension.  He has been taking this without any side effects, particularly no lower extremity edema.  BP today is 132/84 mmHg.  Pulse is 65 BPM.  He has not had any more palpitations.  He is quite active.      CURRENT MEDICATIONS:   1.  Diltiazem  mg daily.   2.  Glucosamine chondroitin.     3.  Daily multivitamin.   4.  Simvastatin 20 mg daily.   5.  Aspirin 81 mg daily.   6.  Advair Diskus inhaler.      ALLERGIES:  No known medication allergies.      PHYSICAL EXAMINATION:   VITAL SIGNS:  /84, pulse 65 per minute and regular, height 1.829 m (6 feet), weight 109.8 kg (242 pounds), respiratory rate 14 per minute, sats 97% on room air, BMI 32.8 kg/m2.     The patient is tall, well-built, asymptomatic at rest.  I did not do a detailed physical examination.  Specifically, there is no lower extremity edema after starting diltiazem.      DIAGNOSES:   1.  Mild hypertension in the context of daily NSAID use for joint pain.   2.  Asymptomatic paroxysmal SVT on Holter monitor.      ASSESSMENT/PLAN:   1.  I renewed the prescription for diltiazem.   2.  The patient used to take as  many as 12 ibuprofen every day and has more recently switched to Aleve for his degenerative joint disease of the knee.  This is probably what is driving his mild hypertension.  I cautioned him against daily NSAID use.   3.  Follow up with Cardiology as needed.      cc:   Alessandro Blanco MD   [address] [address]         [facility] ROSSY BURKETT MD             D: 2018   T: 2018   MT: kaley      Name:     LIYA GUTIERREZ   MRN:      [id number]        Account:      [id number]   :      1950           Service Date: 2018      Document: [id number]

## 2018-05-14 NOTE — LETTER
5/14/2018      Alessandro Blanco MD  [address] [place] [address]      RE: Dustin Lan       Dear Colleague,    I had the pleasure of seeing Dustin Lan in the [facility].    Service Date: 05/14/2018      PRIMARY CARE AND REFERRING PROVIDER:  Alessandro Blanco MD      REASON FOR VISIT:  Followup of hypertension and palpitations following initiation of diltiazem.      HISTORY OF PRESENT ILLNESS:    Mr. Lan is known to me from his initial visit on 04/13/2018 for a single episode of prolonged palpitations and dizziness, which occurred on Super Bowl weekend in the context of excess caffeine and sodium intake.  His 7 day Ziopatch had shown a 25 beat run of narrow-complex tachycardia, during which he was asymptomatic.  His exercise stress echo did not show any ischemia, but he had a 10 beat run of SVT in recovery, during which he was asymptomatic, and a mildly hypertensive response to exercise.      When I saw him, his BP was 144/82.  I started him on low-dose diltiazem 120 mg daily to address both the PSVT and mild hypertension.  He has been taking this without any side effects, particularly no lower extremity edema.  BP today is 132/84 mmHg.  Pulse is 65 BPM.  He has not had any more palpitations.  He is quite active.      CURRENT MEDICATIONS:   1.  Diltiazem  mg daily.   2.  Glucosamine chondroitin.     3.  Daily multivitamin.   4.  Simvastatin 20 mg daily.   5.  Aspirin 81 mg daily.   6.  Advair Diskus inhaler.      ALLERGIES:  No known medication allergies.      PHYSICAL EXAMINATION:   VITAL SIGNS:  /84, pulse 65 per minute and regular, height 1.829 m (6 feet), weight 109.8 kg (242 pounds), respiratory rate 14 per minute, sats 97% on room air, BMI 32.8 kg/m2.     The patient is tall, well-built, asymptomatic at rest.  I did not do a detailed physical examination.  Specifically, there is no lower extremity edema after starting diltiazem.      DIAGNOSES:    1.  Mild hypertension in the context of daily NSAID use for joint pain.   2.  Asymptomatic paroxysmal SVT on Holter monitor.      ASSESSMENT/PLAN:   1.  I renewed the prescription for diltiazem.   2.  The patient used to take as many as 12 ibuprofen every day and has more recently switched to Aleve for his degenerative joint disease of the knee.  This is probably what is driving his mild hypertension.  I cautioned him against daily NSAID use.   3.  Follow up with Cardiology as needed.      cc:   Alessandro Blanco MD   [address] [address]         LEE KOHLER MD             D: 2018   T: 2018   MT: kaley      Name:     LIYA GUTIERREZ   MRN:      [id number]        Account:      [id number]   :      1950           Service Date: 2018      Document: [id number]        Outpatient Encounter Prescriptions as of 2018   Medication Sig Dispense Refill     ADVAIR DISKUS 250-50 MCG/DOSE diskus inhaler INHALE ONE PUFF INTO THE LUNGS EVERY 12 HOURS 180 Inhaler 0     diltiazem (TIAZAC) 120 MG 24 hr ER beaded capsule Take 1 capsule (120 mg) by mouth daily 100 capsule 3     GLUCOSAMINE CHONDR 500 COMPLEX PO CAPS        MULTI-VITAMIN OR TABS 1 po qd       Naproxen Sodium (ALEVE PO) Take by mouth 2 times daily (with meals)        SIMVASTATIN PO Take 20 mg by mouth At Bedtime       aspirin 81 MG chewable tablet Take 1 tablet (81 mg) by mouth daily 36 tablet 3     [DISCONTINUED] diltiazem (CARDIZEM SR) 120 MG CP12 12 hr SR capsule Take 120 capsules by mouth daily (with breakfast) 30 capsule 3     No facility-administered encounter medications on file as of 2018.        Again, thank you for allowing me to participate in the care of your patient.      Sincerely,    Lee Kohler MD     [facility]

## 2018-05-14 NOTE — MR AVS SNAPSHOT
After Visit Summary   5/14/2018    Dustin Lan    MRN: [id number]           Patient Information     Date Of Birth          1950        Visit Information        Provider Department      5/14/2018 11:45 AM Iesha Kohler MD [facility]        Today's Diagnoses     Paroxysmal supraventricular tachycardia (H)    -  1    Benign essential hypertension          Care Instructions    Prescription for Cardizem renewed. Take one a day in the morning.    Follow up with me, as needed.    If you have any questions or concerns, please call my nurse Opal Phillips at [phone].                Follow-ups after your visit        Who to contact     If you have questions or need follow up information about today's clinic visit or your schedule please contact [facility] directly at [phone].  Normal or non-critical lab and imaging results will be communicated to you by [employer], letter or phone within 4 business days after the clinic has received the results. If you do not hear from us within 7 days, please contact the clinic through [employer] or phone. If you have a critical or abnormal lab result, we will notify you by phone as soon as possible.  Submit refill requests through [employer] or call your pharmacy and they will forward the refill request to us. Please allow 3 business days for your refill to be completed.          Additional Information About Your Visit        MyChart Information     [employer] gives you secure access to your electronic health record. If you see a primary care provider, you can also send messages to your care team and make appointments. If you have questions, please call your primary care clinic.  If you do not have a primary care provider, please call [phone] and they will assist you.        Care EveryWhere ID     This is your Care EveryWhere ID. This could be used by other organizations to access  "your [place] medical records  [id number]        Your Vitals Were     Pulse Height Pulse Oximetry BMI (Body Mass Index)          65 1.829 m (6' 0.01\") 97% 32.81 kg/m2         Blood Pressure from Last 3 Encounters:   05/14/18 132/84   04/13/18 144/82   02/27/18 134/84    Weight from Last 3 Encounters:   05/14/18 109.8 kg (242 lb)   04/13/18 109.6 kg (241 lb 9.6 oz)   02/27/18 108.4 kg (239 lb)              We Performed the Following     Follow-Up with Cardiologist          Today's Medication Changes          These changes are accurate as of 5/14/18 12:25 PM.  If you have any questions, ask your nurse or doctor.               Start taking these medicines.        Dose/Directions    diltiazem 120 MG 24 hr ER beaded capsule   Commonly known as:  TIAZAC   Used for:  Paroxysmal supraventricular tachycardia (H)   Started by:  Iesha Kohler MD        Dose:  120 mg   Take 1 capsule (120 mg) by mouth daily   Quantity:  100 capsule   Refills:  3         Stop taking these medicines if you haven't already. Please contact your care team if you have questions.     diltiazem 120 MG Cp12 12 hr SR capsule   Commonly known as:  CARDIZEM SR   Stopped by:  Iesha Kohler MD                Where to get your medicines      These medications were sent to [facility] - [place], [place] - [address]  [address] [place] [address]     Phone:  [phone]     diltiazem 120 MG 24 hr ER beaded capsule                Primary Care Provider Office Phone # Fax #    Alessandro Blanco [phone] [address] [place] [address]        Equal Access to Services     [facility] AH: Marissa Lynn, cuca ortega, qacadeta kaalmada luba, alice cao. So [facility] [phone].    ATENCIÓN: Si habla español, tiene a forbes disposición servicios gratuitos de asistencia lingüística. Llame al [phone].    We comply " with applicable federal civil rights laws and [place] laws. We do not discriminate on the basis of race, color, national origin, age, disability, sex, sexual orientation, or gender identity.            Thank you!     Thank you for choosing [facility]  for your care. Our goal is always to provide you with excellent care. Hearing back from our patients is one way we can continue to improve our services. Please take a few minutes to complete the written survey that you may receive in the mail after your visit with us. Thank you!             Your Updated Medication List - Protect others around you: Learn how to safely use, store and throw away your medicines at www.disposemymeds.org.          This list is accurate as of 5/14/18 12:25 PM.  Always use your most recent med list.                   Brand Name Dispense Instructions for use Diagnosis    ADVAIR DISKUS 250-50 MCG/DOSE diskus inhaler   Generic drug:  fluticasone-salmeterol     180 Inhaler    INHALE ONE PUFF INTO THE LUNGS EVERY 12 HOURS    Mild persistent asthma without complication       ALEVE PO      Take by mouth 2 times daily (with meals)        aspirin 81 MG chewable tablet     36 tablet    Take 1 tablet (81 mg) by mouth daily    Paroxysmal supraventricular tachycardia (H), Benign essential hypertension       diltiazem 120 MG 24 hr ER beaded capsule    TIAZAC    100 capsule    Take 1 capsule (120 mg) by mouth daily    Paroxysmal supraventricular tachycardia (H)       glucosamine chondroitin 500 complex Caps           Multi-vitamin Tabs tablet   Generic drug:  multivitamin, therapeutic with minerals      1 po qd        SIMVASTATIN PO      Take 20 mg by mouth At Bedtime

## 2018-05-14 NOTE — PATIENT INSTRUCTIONS
Prescription for Cardizem renewed. Take one a day in the morning.    Follow up with me, as needed.    If you have any questions or concerns, please call my nurse Opal Phillips at [phone].

## 2018-09-06 ENCOUNTER — TELEPHONE (OUTPATIENT)
Dept: PEDIATRICS | Facility: CLINIC | Age: 68
End: 2018-09-06

## 2018-09-06 NOTE — TELEPHONE ENCOUNTER
Panel Management Review      Patient has the following on his problem list:     Asthma review     ACT Total Scores 11/17/2017   ACT TOTAL SCORE -   ASTHMA ER VISITS -   ASTHMA HOSPITALIZATIONS -   ACT TOTAL SCORE (Goal Greater than or Equal to 20) 25   In the past 12 months, how many times did you visit the emergency room for your asthma without being admitted to the hospital? 0   In the past 12 months, how many times were you hospitalized overnight because of your asthma? 0      1. Is Asthma diagnosis on the Problem List? Yes    2. Is Asthma listed on Health Maintenance? Yes    3. Patient is due for:  ACT      Composite cancer screening  Chart review shows that this patient is due/due soon for the following None  Summary:    Patient is due/failing the following:   ACT    Action needed:   Patient needs to do ACT.    Type of outreach:    Copy of ACT mailed to patient, will reach out in 5 days.    Questions for provider review:    None                                                                                                                                    Lianne Logan MA   September 6, 2018,  12:15 PM

## 2018-09-10 NOTE — TELEPHONE ENCOUNTER
Pt completed ACT, routing refill request to provider.     Lianne Logan MA   September 10, 2018,  3:13 PM

## 2018-09-11 ASSESSMENT — ASTHMA QUESTIONNAIRES: ACT_TOTALSCORE: 25

## 2018-09-28 ENCOUNTER — OFFICE VISIT (OUTPATIENT)
Dept: PEDIATRICS | Facility: CLINIC | Age: 68
End: 2018-09-28
Payer: COMMERCIAL

## 2018-09-28 VITALS
SYSTOLIC BLOOD PRESSURE: 140 MMHG | TEMPERATURE: 97.4 F | WEIGHT: 238.9 LBS | OXYGEN SATURATION: 97 % | BODY MASS INDEX: 32.36 KG/M2 | HEART RATE: 94 BPM | DIASTOLIC BLOOD PRESSURE: 88 MMHG | HEIGHT: 72 IN

## 2018-09-28 DIAGNOSIS — J45.41 MODERATE PERSISTENT ASTHMA WITH EXACERBATION: ICD-10-CM

## 2018-09-28 DIAGNOSIS — J01.11 ACUTE RECURRENT FRONTAL SINUSITIS: ICD-10-CM

## 2018-09-28 DIAGNOSIS — J01.90 ACUTE SINUSITIS WITH SYMPTOMS > 10 DAYS: Primary | ICD-10-CM

## 2018-09-28 PROCEDURE — 99214 OFFICE O/P EST MOD 30 MIN: CPT | Performed by: PHYSICIAN ASSISTANT

## 2018-09-28 RX ORDER — PREDNISONE 20 MG/1
60 TABLET ORAL DAILY
Qty: 15 TABLET | Refills: 0 | Status: SHIPPED | OUTPATIENT
Start: 2018-09-28 | End: 2018-11-19

## 2018-09-28 NOTE — PROGRESS NOTES
SUBJECTIVE:   Dustin Lan is a 68 year old male who presents to clinic today for the following health issues:    Acute Illness   Acute illness concerns: sinus  Onset: x4 day    Fever: YES- 99    Chills/Sweats: YES- both    Headache (location?): YES- frontal    Sinus Pressure:YES- post-nasal drainage and facial pain    Conjunctivitis:  no    Ear Pain: no    Rhinorrhea: YES- green    Congestion: YES- nasal and chest    Sore Throat: YES- better     Cough: YES-productive of green sputum    Wheeze: YES    Decreased Appetite: no    Nausea: no    Vomiting: no    Diarrhea:  no    Dysuria/Freq.: no    Fatigue/Achiness: YES- achy     Sick/Strep Exposure: YES     Therapies Tried and outcome: dayquil, Nyquil, theraflu   History of asthma. Uses advair daily.   Leaving for [place] in three days.     ROS:  ROS otherwise negative    OBJECTIVE:                                                    /88 (BP Location: Right arm, Cuff Size: Adult Large)  Pulse 94  Temp 97.4  F (36.3  C) (Tympanic)  Ht 6' (1.829 m)  Wt 238 lb 14.4 oz (108.4 kg)  SpO2 97%  BMI 32.4 kg/m2  Body mass index is 32.4 kg/(m^2).   GENERAL:  Alert, no distress  HENT: ear canals- normal; TMs- normal; Nose- normal; Mouth- no ulcers, no lesions; right frontal sinus pain to palpation  NECK: no tenderness, no adenopathy  RESP: diminished breath sounds with rhonchi and wheezing  CV: regular rates and rhythm, normal S1 S2, no S3 or S4 and no murmur, no click or rub    Diagnostic test results:  No results found for this or any previous visit (from the past 24 hour(s)).     ASSESSMENT/PLAN:                                                    (J01.11) Acute recurrent frontal sinusitis  Comment: begin antibiotics in 2-3 days if symptoms persist.   Plan: amoxicillin-clavulanate (AUGMENTIN) 875-125 MG         per tablet            (J45.41) Moderate persistent asthma with exacerbation  Comment: increase advair to twice daily--if symptoms persist in 3 days, begin  oral prednisone.  Plan: predniSONE (DELTASONE) 20 MG tablet            Yvette Serrano PA-C  [facility]

## 2018-09-28 NOTE — PATIENT INSTRUCTIONS
Increase fluid intake  Rest  Increase advair to twice daily   Start prednisone (in AM with food) on Sun if symptoms stay the same  Begin antibiotics if symptoms persist (Sun/Mon)

## 2018-09-28 NOTE — MR AVS SNAPSHOT
After Visit Summary   9/28/2018    Dustin Lan    MRN: [id number]           Patient Information     Date Of Birth          1950        Visit Information        Provider Department      9/28/2018 9:30 AM Yvette Serrano PA-C [facility]        Today's Diagnoses     Acute sinusitis with symptoms > 10 days    -  1    Acute recurrent frontal sinusitis        Moderate persistent asthma with exacerbation          Care Instructions    Increase fluid intake  Rest  Increase advair to twice daily   Start prednisone (in AM with food) on Sun if symptoms stay the same  Begin antibiotics if symptoms persist (Sun/Mon)              Follow-ups after your visit        Who to contact     If you have questions or need follow up information about today's clinic visit or your schedule please contact [facility] directly at [phone].  Normal or non-critical lab and imaging results will be communicated to you by MyChart, letter or phone within 4 business days after the clinic has received the results. If you do not hear from us within 7 days, please contact the clinic through MyChart or phone. If you have a critical or abnormal lab result, we will notify you by phone as soon as possible.  Submit refill requests through [employer] or call your pharmacy and they will forward the refill request to us. Please allow 3 business days for your refill to be completed.          Additional Information About Your Visit        MyChart Information     [employer] gives you secure access to your electronic health record. If you see a primary care provider, you can also send messages to your care team and make appointments. If you have questions, please call your primary care clinic.  If you do not have a primary care provider, please call [phone] and they will assist you.        Care EveryWhere ID     This is your Care EveryWhere ID. This could be used by other organizations to access your  [facility] medical records  [id number]        Your Vitals Were     Pulse Temperature Height Pulse Oximetry BMI (Body Mass Index)       94 97.4  F (36.3  C) (Tympanic) 6' (1.829 m) 97% 32.4 kg/m2        Blood Pressure from Last 3 Encounters:   09/28/18 140/88   05/14/18 132/84   04/13/18 144/82    Weight from Last 3 Encounters:   09/28/18 238 lb 14.4 oz (108.4 kg)   05/14/18 242 lb (109.8 kg)   04/13/18 241 lb 9.6 oz (109.6 kg)              Today, you had the following     No orders found for display         Today's Medication Changes          These changes are accurate as of 9/28/18  9:47 AM.  If you have any questions, ask your nurse or doctor.               Start taking these medicines.        Dose/Directions    amoxicillin-clavulanate 875-125 MG per tablet   Commonly known as:  AUGMENTIN   Used for:  Acute recurrent frontal sinusitis   Started by:  Yvette Serrano PA-C        Dose:  1 tablet   Take 1 tablet by mouth 2 times daily   Quantity:  20 tablet   Refills:  0       predniSONE 20 MG tablet   Commonly known as:  DELTASONE   Used for:  Moderate persistent asthma with exacerbation   Started by:  Yvette Serrano PA-C        Dose:  60 mg   Take 3 tablets (60 mg) by mouth daily   Quantity:  15 tablet   Refills:  0            Where to get your medicines      These medications were sent to [id number] - [place] - [address] [facility]  AT [address] [place] [address]     Phone:  [phone]     amoxicillin-clavulanate 875-125 MG per tablet    predniSONE 20 MG tablet                Primary Care Provider Office Phone # Fax #    Alessandro Blanco [phone] [address] [place] [address]        Equal Access to Services     [facility] AH: Marissa Lynn, wajim luqadaha, qacadeta kaalice mendiola. [facility] [phone].    ATENCIÓN: Nadia ann  español, tiene a forbes disposición servicios gratuitos de asistencia lingüística. Sergio oglesby [phone].    We comply with applicable federal civil rights laws and [place] laws. We do not discriminate on the basis of race, color, national origin, age, disability, sex, sexual orientation, or gender identity.            Thank you!     Thank you for choosing [facility] [place]  for your care. Our goal is always to provide you with excellent care. Hearing back from our patients is one way we can continue to improve our services. Please take a few minutes to complete the written survey that you may receive in the mail after your visit with us. Thank you!             Your Updated Medication List - Protect others around you: Learn how to safely use, store and throw away your medicines at www.disposemymeds.org.          This list is accurate as of 9/28/18  9:47 AM.  Always use your most recent med list.                   Brand Name Dispense Instructions for use Diagnosis    ALEVE PO      Take by mouth 2 times daily (with meals)        amoxicillin-clavulanate 875-125 MG per tablet    AUGMENTIN    20 tablet    Take 1 tablet by mouth 2 times daily    Acute recurrent frontal sinusitis       aspirin 81 MG chewable tablet     36 tablet    Take 1 tablet (81 mg) by mouth daily    Paroxysmal supraventricular tachycardia (H), Benign essential hypertension       diltiazem 120 MG 24 hr ER beaded capsule    TIAZAC    100 capsule    Take 1 capsule (120 mg) by mouth daily    Paroxysmal supraventricular tachycardia (H)       fluticasone-salmeterol 250-50 MCG/DOSE diskus inhaler    ADVAIR DISKUS    3 Inhaler    Inhale 1 puff into the lungs 2 times daily    Mild persistent asthma without complication       glucosamine chondroitin 500 complex Caps           Multi-vitamin Tabs tablet   Generic drug:  multivitamin, therapeutic with minerals      1 po qd        predniSONE 20 MG tablet    DELTASONE    15 tablet    Take 3 tablets (60 mg) by  mouth daily    Moderate persistent asthma with exacerbation       SIMVASTATIN PO      Take 20 mg by mouth At Bedtime

## 2018-10-29 ENCOUNTER — MYC MEDICAL ADVICE (OUTPATIENT)
Dept: ORTHOPEDICS | Facility: CLINIC | Age: 68
End: 2018-10-29

## 2018-10-29 NOTE — TELEPHONE ENCOUNTER
Patient left voicemail asking to schedule an appointment with Dr. Villela.     Phone call to patient. He is considering knee replacement and has previously seen Dr. Fitch. He is not sure if he should schedule with Dr. Villela or Dr. Fitch. Suggested Dr. Ficth since he is an orthopedic surgeon and patient in agreement.   Appointment scheduled.     GREGORIA Gotti RN

## 2018-11-01 ENCOUNTER — OFFICE VISIT (OUTPATIENT)
Dept: ORTHOPEDICS | Facility: CLINIC | Age: 68
End: 2018-11-01
Payer: COMMERCIAL

## 2018-11-01 VITALS — SYSTOLIC BLOOD PRESSURE: 164 MMHG | DIASTOLIC BLOOD PRESSURE: 92 MMHG | BODY MASS INDEX: 32.28 KG/M2 | WEIGHT: 238 LBS

## 2018-11-01 DIAGNOSIS — M17.11 PRIMARY OSTEOARTHRITIS OF RIGHT KNEE: Primary | ICD-10-CM

## 2018-11-01 DIAGNOSIS — M17.12 PRIMARY OSTEOARTHRITIS OF ONE KNEE, LEFT: ICD-10-CM

## 2018-11-01 PROCEDURE — 20610 DRAIN/INJ JOINT/BURSA W/O US: CPT | Mod: 50 | Performed by: ORTHOPAEDIC SURGERY

## 2018-11-01 PROCEDURE — 99203 OFFICE O/P NEW LOW 30 MIN: CPT | Mod: 25 | Performed by: ORTHOPAEDIC SURGERY

## 2018-11-01 RX ADMIN — LIDOCAINE HYDROCHLORIDE 3 ML: 10 INJECTION, SOLUTION INFILTRATION; PERINEURAL at 11:03

## 2018-11-01 RX ADMIN — TESTOSTERONE CYPIONATE 1 ML: 200 INJECTION INTRAMUSCULAR at 11:03

## 2018-11-01 RX ADMIN — METHYLPREDNISOLONE ACETATE 40 MG: 40 INJECTION, SUSPENSION INTRA-ARTICULAR; INTRALESIONAL; INTRAMUSCULAR; SOFT TISSUE at 11:03

## 2018-11-01 NOTE — LETTER
"    11/1/2018         RE: Dustin Lan  [address] [place] [id number]        Dear Colleague,    Thank you for referring your patient, Dustin Lan, to the [facility]TH[facility]. Please see a copy of my visit note below.    HISTORY OF PRESENT ILLNESS:    Dustin Lan is a 68 year old male who is seen as self referral for bilateral knee pain. Patient has previously seen Dr. Villela regarding his knee pain, last office visit on 2/21/17.  Patient reports knee pain for 4-5 years, patient notes he had right knee arthroscopy in 2008 and the knee never felt the same.  Patient notes that the left knee is more painful than your right knee.     Present symptoms: Left knee: lateral joint line pain. Patient notes swelling in the left knee. Patient denies catching and locking, he does report snapping in the knee.  Patient denies buckling in the knees.  Patient notes weakness of the legs bilaterally due to knee pain. Current pain: 0/10, Pain with walking and activities: 7/10.  Right knee: genralized right knee pain. Pain feels \"binding\".  Constant swelling and stiffness. Limited ROM in the right knee, unable to flex greater than 90 degrees. Current pain level: 0/10, Worse pain: 1/10  Treatments tried to this point: Physical Therapy,  home exercises, cortisone injection: 2/21/18  Orthopedic PMH: right knee arthroscopy 2008, Laminectomy L4-5 1988, left knee arthroscopy 1994    Past Medical History:   Diagnosis Date     Elevated blood pressure reading without diagnosis of hypertension      Personal history of colonic polyps 2/2007    tubular adenoma, ileocecal valve, repeat 2010     Plantar fascial fibromatosis      Pure hypercholesterolemia        Past Surgical History:   Procedure Laterality Date     ARTHROSCOPY KNEE RT/LT  12/2008    right     HC COLONOSCOPY THRU STOMA, DIAGNOSTIC  2/2007    ileocecal valve polyp, tubular adenoma, repeat in 3 yrs       Family History   Problem Relation Age of " Onset     Cerebrovascular Disease Mother 75      of a stroke     C.A.D. Father 67      suddenly when using the  at age 67. He was a smoker. On autopsy, told to have multiple heart attacks and scar tissue but he had never veronika a clinical heart attack.     Lipids Sister        Social History     Social History     Marital status:      Spouse name: N/A     Number of children: 2     Years of education: N/A     Occupational History     Retired lumbar sales      Social History Main Topics     Smoking status: Never Smoker     Smokeless tobacco: Never Used     Alcohol use 0.0 - 1.8 oz/week      Comment: 5 - 7 beers a week     Drug use: No     Sexual activity: Yes     Partners: Female     Other Topics Concern     Parent/Sibling W/ Cabg, Mi Or Angioplasty Before 65f 55m? No     Social History Narrative       Current Outpatient Prescriptions   Medication Sig Dispense Refill     diltiazem (TIAZAC) 120 MG 24 hr ER beaded capsule Take 1 capsule (120 mg) by mouth daily 100 capsule 3     fluticasone-salmeterol (ADVAIR DISKUS) 250-50 MCG/DOSE diskus inhaler Inhale 1 puff into the lungs 2 times daily 3 Inhaler 1     GLUCOSAMINE CHONDR 500 COMPLEX PO CAPS        MULTI-VITAMIN OR TABS 1 po qd       Naproxen Sodium (ALEVE PO) Take by mouth 2 times daily (with meals)        SIMVASTATIN PO Take 20 mg by mouth At Bedtime       amoxicillin-clavulanate (AUGMENTIN) 875-125 MG per tablet Take 1 tablet by mouth 2 times daily (Patient not taking: Reported on 2018) 20 tablet 0     aspirin 81 MG chewable tablet Take 1 tablet (81 mg) by mouth daily (Patient not taking: Reported on 2018) 36 tablet 3     predniSONE (DELTASONE) 20 MG tablet Take 3 tablets (60 mg) by mouth daily (Patient not taking: Reported on 2018) 15 tablet 0       Allergies   Allergen Reactions     Cats      No Known Allergies        REVIEW OF SYSTEMS:  CONSTITUTIONAL:  NEGATIVE for fever, chills, change in weight  INTEGUMENTARY/SKIN:   NEGATIVE for worrisome rashes, moles or lesions  EYES:  NEGATIVE for vision changes or irritation  ENT/MOUTH:  NEGATIVE for ear, mouth and throat problems  RESP:  NEGATIVE for significant cough or SOB  BREAST:  NEGATIVE for masses, tenderness or discharge  CV:  NEGATIVE for chest pain, palpitations or peripheral edema  GI:  NEGATIVE for nausea, abdominal pain, heartburn, or change in bowel habits  :  Negative   MUSCULOSKELETAL:  See HPI above  NEURO:  NEGATIVE for weakness, dizziness or paresthesias  ENDOCRINE:  NEGATIVE for temperature intolerance, skin/hair changes  HEME/ALLERGY/IMMUNE:  NEGATIVE for bleeding problems  PSYCHIATRIC:  NEGATIVE for changes in mood or affect      PHYSICAL EXAM:  BP (!) 164/92 (BP Location: Right arm, Patient Position: Chair, Cuff Size: Adult Large)  Wt 238 lb (108 kg)  BMI 32.28 kg/m2  Body mass index is 32.28 kg/(m^2).   GENERAL APPEARANCE: healthy, alert and no distress   SKIN: no suspicious lesions or rashes  NEURO: Normal strength and tone, mentation intact and speech normal  VASCULAR: Good pulses, and capillary refill   LYMPH: no lymphadenopathy   PSYCH:  mentation appears normal and affect normal/bright    MSK:  A&OX3, NAD  Neck supple, no lymphadenopathy  The patient ambulates without an antalgic gait.  The patient is able to get on and off the exam table without difficulty.      Examination of the spine reveals a normal lordosis to the cervical and lumbar spine, and a normal kyphosis to the thoracic spine.  There is no clinical evidence of scoliosis.    The pelvis is clinically level.  Trendelenberg is negative.  The patient is non-tender to palpation over the greater trochanteric bursal region or piriformis fossa.  With the hip flexed to 90 degrees, internal and external rotation is 30/60 respectively,  with no pain .      KNEE: Examination of the bilateral knee reveals the lower extremity to have a Normal anatomic alignment.  There is no erythema, ecchymosis nor edema.   There is minimal effusion present clinically.  There is no significant warmth.  Range of motion is 0 to 120 degrees, without crepitus.  There is mild tenderness to palpation at the medial joint line.  Bre's is negative without crepitus. The knee is ligamentously stable. Patello-femoral grind test is positive.      The calves and thighs are symmetric, without atrophy and non-tender to palpation. Brock's sign is negative, bilaterally.   CMS is intact to the toes.        ASSESSMENT / PLAN: Primary osteoarthritis bilateral knees.  I offered him corticosteroid injections, which he accepted.    Large Joint Injection/Arthocentesis  Date/Time: 11/1/2018 11:03 AM  Performed by: NICOLAS CHOUDHURY  Authorized by: NICOLAS CHOUDHURY     Indications:  Pain and osteoarthritis  Needle Size:  25 G  Guidance: landmark guided    Approach:  Anterolateral  Location:  Knee  Laterality:  Bilateral  Site:  Bilateral knee  Medications (Right):  40 mg methylPREDNISolone acetate 40 MG/ML; 1 mL dexamethasone 120 MG/30ML; 3 mL lidocaine 1 %  Medications (Left):  40 mg methylPREDNISolone acetate 40 MG/ML; 1 mL dexamethasone 120 MG/30ML; 3 mL lidocaine 1 %  Outcome:  Tolerated well, no immediate complications  Procedure discussed: discussed risks, benefits, and alternatives    Consent Given by:  Patient  Timeout: timeout called immediately prior to procedure    Prep: patient was prepped and draped in usual sterile fashion              Imaging Interpretation:     2/21/17 XR reviewed.   Advanced arthritis in both knees, with near complete obliteration of the medial joint space compartments bilaterally. The LEFT knee degeneration has progressed significantly since 2014 where the RIGHT knee is minimally advanced.     Yadiel Choudhury MD  Department of Orthopedic Surgery        Again, thank you for allowing me to participate in the care of your patient.        Sincerely,        Nicolas Choudhury MD

## 2018-11-01 NOTE — PROGRESS NOTES
"HISTORY OF PRESENT ILLNESS:    Dustin Lan is a 68 year old male who is seen as self referral for bilateral knee pain. Patient has previously seen Dr. Villela regarding his knee pain, last office visit on 17.  Patient reports knee pain for 4-5 years, patient notes he had right knee arthroscopy in  and the knee never felt the same.  Patient notes that the left knee is more painful than your right knee.     Present symptoms: Left knee: lateral joint line pain. Patient notes swelling in the left knee. Patient denies catching and locking, he does report snapping in the knee.  Patient denies buckling in the knees.  Patient notes weakness of the legs bilaterally due to knee pain. Current pain: 0/10, Pain with walking and activities: 7/10.  Right knee: genralized right knee pain. Pain feels \"binding\".  Constant swelling and stiffness. Limited ROM in the right knee, unable to flex greater than 90 degrees. Current pain level: 0/10, Worse pain: 1/10  Treatments tried to this point: Physical Therapy,  home exercises, cortisone injection: 18  Orthopedic PMH: right knee arthroscopy , Laminectomy L4-5 , left knee arthroscopy     Past Medical History:   Diagnosis Date     Elevated blood pressure reading without diagnosis of hypertension      Personal history of colonic polyps 2007    tubular adenoma, ileocecal valve, repeat      Plantar fascial fibromatosis      Pure hypercholesterolemia        Past Surgical History:   Procedure Laterality Date     ARTHROSCOPY KNEE RT/LT  2008    right     HC COLONOSCOPY THRU STOMA, DIAGNOSTIC  2007    ileocecal valve polyp, tubular adenoma, repeat in 3 yrs       Family History   Problem Relation Age of Onset     Cerebrovascular Disease Mother 75      of a stroke     C.A.D. Father 67      suddenly when using the  at age 67. He was a smoker. On autopsy, told to have multiple heart attacks and scar tissue but he had never veronika a clinical " heart attack.     Lipids Sister        Social History     Social History     Marital status:      Spouse name: N/A     Number of children: 2     Years of education: N/A     Occupational History     Retired lumbar sales      Social History Main Topics     Smoking status: Never Smoker     Smokeless tobacco: Never Used     Alcohol use 0.0 - 1.8 oz/week      Comment: 5 - 7 beers a week     Drug use: No     Sexual activity: Yes     Partners: Female     Other Topics Concern     Parent/Sibling W/ Cabg, Mi Or Angioplasty Before 65f 55m? No     Social History Narrative       Current Outpatient Prescriptions   Medication Sig Dispense Refill     diltiazem (TIAZAC) 120 MG 24 hr ER beaded capsule Take 1 capsule (120 mg) by mouth daily 100 capsule 3     fluticasone-salmeterol (ADVAIR DISKUS) 250-50 MCG/DOSE diskus inhaler Inhale 1 puff into the lungs 2 times daily 3 Inhaler 1     GLUCOSAMINE CHONDR 500 COMPLEX PO CAPS        MULTI-VITAMIN OR TABS 1 po qd       Naproxen Sodium (ALEVE PO) Take by mouth 2 times daily (with meals)        SIMVASTATIN PO Take 20 mg by mouth At Bedtime       amoxicillin-clavulanate (AUGMENTIN) 875-125 MG per tablet Take 1 tablet by mouth 2 times daily (Patient not taking: Reported on 11/1/2018) 20 tablet 0     aspirin 81 MG chewable tablet Take 1 tablet (81 mg) by mouth daily (Patient not taking: Reported on 9/28/2018) 36 tablet 3     predniSONE (DELTASONE) 20 MG tablet Take 3 tablets (60 mg) by mouth daily (Patient not taking: Reported on 11/1/2018) 15 tablet 0       Allergies   Allergen Reactions     Cats      No Known Allergies        REVIEW OF SYSTEMS:  CONSTITUTIONAL:  NEGATIVE for fever, chills, change in weight  INTEGUMENTARY/SKIN:  NEGATIVE for worrisome rashes, moles or lesions  EYES:  NEGATIVE for vision changes or irritation  ENT/MOUTH:  NEGATIVE for ear, mouth and throat problems  RESP:  NEGATIVE for significant cough or SOB  BREAST:  NEGATIVE for masses, tenderness or discharge  CV:   NEGATIVE for chest pain, palpitations or peripheral edema  GI:  NEGATIVE for nausea, abdominal pain, heartburn, or change in bowel habits  :  Negative   MUSCULOSKELETAL:  See HPI above  NEURO:  NEGATIVE for weakness, dizziness or paresthesias  ENDOCRINE:  NEGATIVE for temperature intolerance, skin/hair changes  HEME/ALLERGY/IMMUNE:  NEGATIVE for bleeding problems  PSYCHIATRIC:  NEGATIVE for changes in mood or affect      PHYSICAL EXAM:  BP (!) 164/92 (BP Location: Right arm, Patient Position: Chair, Cuff Size: Adult Large)  Wt 238 lb (108 kg)  BMI 32.28 kg/m2  Body mass index is 32.28 kg/(m^2).   GENERAL APPEARANCE: healthy, alert and no distress   SKIN: no suspicious lesions or rashes  NEURO: Normal strength and tone, mentation intact and speech normal  VASCULAR: Good pulses, and capillary refill   LYMPH: no lymphadenopathy   PSYCH:  mentation appears normal and affect normal/bright    MSK:  A&OX3, NAD  Neck supple, no lymphadenopathy  The patient ambulates without an antalgic gait.  The patient is able to get on and off the exam table without difficulty.      Examination of the spine reveals a normal lordosis to the cervical and lumbar spine, and a normal kyphosis to the thoracic spine.  There is no clinical evidence of scoliosis.    The pelvis is clinically level.  Trendelenberg is negative.  The patient is non-tender to palpation over the greater trochanteric bursal region or piriformis fossa.  With the hip flexed to 90 degrees, internal and external rotation is 30/60 respectively,  with no pain .      KNEE: Examination of the bilateral knee reveals the lower extremity to have a Normal anatomic alignment.  There is no erythema, ecchymosis nor edema.  There is minimal effusion present clinically.  There is no significant warmth.  Range of motion is 0 to 120 degrees, without crepitus.  There is mild tenderness to palpation at the medial joint line.  Bre's is negative without crepitus. The knee is  ligamentously stable. Patello-femoral grind test is positive.      The calves and thighs are symmetric, without atrophy and non-tender to palpation. Brock's sign is negative, bilaterally.   CMS is intact to the toes.        ASSESSMENT / PLAN: Primary osteoarthritis bilateral knees.  I offered him corticosteroid injections, which he accepted.    Large Joint Injection/Arthocentesis  Date/Time: 11/1/2018 11:03 AM  Performed by: RAJENDRA CHOUDHURY  Authorized by: RAJENDRA CHOUDHURY     Indications:  Pain and osteoarthritis  Needle Size:  25 G  Guidance: landmark guided    Approach:  Anterolateral  Location:  Knee  Laterality:  Bilateral  Site:  Bilateral knee  Medications (Right):  40 mg methylPREDNISolone acetate 40 MG/ML; 1 mL dexamethasone 120 MG/30ML; 3 mL lidocaine 1 %  Medications (Left):  40 mg methylPREDNISolone acetate 40 MG/ML; 1 mL dexamethasone 120 MG/30ML; 3 mL lidocaine 1 %  Outcome:  Tolerated well, no immediate complications  Procedure discussed: discussed risks, benefits, and alternatives    Consent Given by:  Patient  Timeout: timeout called immediately prior to procedure    Prep: patient was prepped and draped in usual sterile fashion              Imaging Interpretation:     2/21/17 XR reviewed.   Advanced arthritis in both knees, with near complete obliteration of the medial joint space compartments bilaterally. The LEFT knee degeneration has progressed significantly since 2014 where the RIGHT knee is minimally advanced.     Yadiel Choudhury MD  Department of Orthopedic Surgery

## 2018-11-01 NOTE — MR AVS SNAPSHOT
After Visit Summary   11/1/2018    Dustin Lan    MRN: [id number]           Patient Information     Date Of Birth          1950        Visit Information        Provider Department      11/1/2018 10:30 AM Nicolas Fitch MD [facility]        Today's Diagnoses     Primary osteoarthritis of right knee    -  1    Primary osteoarthritis of one knee, left           Follow-ups after your visit        Who to contact     If you have questions or need follow up information about today's clinic visit or your schedule please contact [facility] directly at [phone].  Normal or non-critical lab and imaging results will be communicated to you by Sentrigohart, letter or phone within 4 business days after the clinic has received the results. If you do not hear from us within 7 days, please contact the clinic through [employer] or phone. If you have a critical or abnormal lab result, we will notify you by phone as soon as possible.  Submit refill requests through [employer] or call your pharmacy and they will forward the refill request to us. Please allow 3 business days for your refill to be completed.          Additional Information About Your Visit        MyChart Information     [employer] gives you secure access to your electronic health record. If you see a primary care provider, you can also send messages to your care team and make appointments. If you have questions, please call your primary care clinic.  If you do not have a primary care provider, please call [phone] and they will assist you.        Care EveryWhere ID     This is your Care EveryWhere ID. This could be used by other organizations to access your [facility] medical records  [id number]        Your Vitals Were     BMI (Body Mass Index)                   32.28 kg/m2            Blood Pressure from Last 3 Encounters:   11/01/18 (!) 164/92   09/28/18 140/88   05/14/18 132/84    Weight from Last  3 Encounters:   11/01/18 238 lb (108 kg)   09/28/18 238 lb 14.4 oz (108.4 kg)   05/14/18 242 lb (109.8 kg)              We Performed the Following     Large Joint Injection/Arthocentesis        Primary Care Provider Office Phone # Fax #    Alessandro Blanco [phone] [address] [place] [address]        Equal Access to Services     [facility]: Hadii aad ku hadasho Soomaali, waaxda luqadaha, qaybta kaalmada adeegyada, waxay idiin hayaan adeeg george lajaden . So [place] [phone].    ATENCIÓN: Si seth wright, tiene a forbes disposición servicios gratuitos de asistencia lingüística. Llame al [phone].    We comply with applicable federal civil rights laws and [place] laws. We do not discriminate on the basis of race, color, national origin, age, disability, sex, sexual orientation, or gender identity.            Thank you!     Thank you for choosing [facility]  for your care. Our goal is always to provide you with excellent care. Hearing back from our patients is one way we can continue to improve our services. Please take a few minutes to complete the written survey that you may receive in the mail after your visit with us. Thank you!             Your Updated Medication List - Protect others around you: Learn how to safely use, store and throw away your medicines at www.disposemymeds.org.          This list is accurate as of 11/1/18 11:59 PM.  Always use your most recent med list.                   Brand Name Dispense Instructions for use Diagnosis    ALEVE PO      Take by mouth 2 times daily (with meals)        amoxicillin-clavulanate 875-125 MG per tablet    AUGMENTIN    20 tablet    Take 1 tablet by mouth 2 times daily    Acute recurrent frontal sinusitis       aspirin 81 MG chewable tablet     36 tablet    Take 1 tablet (81 mg) by mouth daily    Paroxysmal supraventricular tachycardia (H), Benign essential hypertension       diltiazem 120 MG 24  hr ER beaded capsule    TIAZAC    100 capsule    Take 1 capsule (120 mg) by mouth daily    Paroxysmal supraventricular tachycardia (H)       fluticasone-salmeterol 250-50 MCG/DOSE diskus inhaler    ADVAIR DISKUS    3 Inhaler    Inhale 1 puff into the lungs 2 times daily    Mild persistent asthma without complication       glucosamine chondroitin 500 complex Caps           Multi-vitamin Tabs tablet   Generic drug:  multivitamin, therapeutic with minerals      1 po qd        predniSONE 20 MG tablet    DELTASONE    15 tablet    Take 3 tablets (60 mg) by mouth daily    Moderate persistent asthma with exacerbation       SIMVASTATIN PO      Take 20 mg by mouth At Bedtime

## 2018-11-02 ENCOUNTER — TELEPHONE (OUTPATIENT)
Dept: PEDIATRICS | Facility: CLINIC | Age: 68
End: 2018-11-02

## 2018-11-02 RX ORDER — LIDOCAINE HYDROCHLORIDE 10 MG/ML
3 INJECTION, SOLUTION INFILTRATION; PERINEURAL
Status: DISCONTINUED | OUTPATIENT
Start: 2018-11-01 | End: 2021-07-15

## 2018-11-02 RX ORDER — TESTOSTERONE CYPIONATE 200 MG/ML
1 INJECTION INTRAMUSCULAR
Status: DISCONTINUED | OUTPATIENT
Start: 2018-11-01 | End: 2021-07-15

## 2018-11-02 RX ORDER — METHYLPREDNISOLONE ACETATE 40 MG/ML
40 INJECTION, SUSPENSION INTRA-ARTICULAR; INTRALESIONAL; INTRAMUSCULAR; SOFT TISSUE
Status: DISCONTINUED | OUTPATIENT
Start: 2018-11-01 | End: 2021-07-15

## 2018-11-02 NOTE — TELEPHONE ENCOUNTER
"Pt states that he had two steroid injections in his knees yesterday.  Yesterday, his BP was elevated at his appt, which he relates it to \"white coat syndrome.\"  Today, he is experiencing a flushed feeling in his face and a mild headache.  He state that he went to [facility] and bought a BP machine.  His BP this am is 158/96.  Pt denies chest pain, pressure, tightness, heaviness, SOB/difficulty breathing, blurred vision, nausea/vomiting, drossiness, confusion,numbness/tingling in hands or feet, coughing up blood, nosebleeds, weakness, and dizziness/lightheadedness.  Informed pt to schedule appt for a BP f/u.  Informed pt that above symptoms could also possibly be related to the steroid injections.  Pt has an appt on 11/19/18 for a physical.  Pt will keep this appt with the understanding that if his symptoms worsen, or any of the above symptoms present, he be seen in the ER.  Pt is in agreement with plan.    Ines Alanis RN    "

## 2018-11-13 ENCOUNTER — MYC MEDICAL ADVICE (OUTPATIENT)
Dept: PEDIATRICS | Facility: CLINIC | Age: 68
End: 2018-11-13

## 2018-11-13 DIAGNOSIS — M25.562 ACUTE PAIN OF LEFT KNEE: Primary | ICD-10-CM

## 2018-11-13 NOTE — TELEPHONE ENCOUNTER
Patient is scheduled for 11/19.  Sent a list of items he wants to discuss at the appointment, please review.    Next 5 appointments (look out 90 days)     Nov 19, 2018  7:40 AM CST   PHYSICAL with Alessandro Blanco MD   [facility]    [address] [place] [id number] [phone]                Ok for physical therapy referral?  Pended, please sign if ok.    Irene Mcfarland RN  Message handled by Nurse Triage.

## 2018-11-19 ENCOUNTER — OFFICE VISIT (OUTPATIENT)
Dept: PEDIATRICS | Facility: CLINIC | Age: 68
End: 2018-11-19
Payer: COMMERCIAL

## 2018-11-19 VITALS
HEIGHT: 72 IN | OXYGEN SATURATION: 96 % | WEIGHT: 235 LBS | TEMPERATURE: 97.8 F | HEART RATE: 56 BPM | DIASTOLIC BLOOD PRESSURE: 84 MMHG | BODY MASS INDEX: 31.83 KG/M2 | SYSTOLIC BLOOD PRESSURE: 134 MMHG

## 2018-11-19 DIAGNOSIS — Z12.11 SCREEN FOR COLON CANCER: ICD-10-CM

## 2018-11-19 DIAGNOSIS — R73.01 IMPAIRED FASTING GLUCOSE: ICD-10-CM

## 2018-11-19 DIAGNOSIS — Z23 NEED FOR PROPHYLACTIC VACCINATION AGAINST STREPTOCOCCUS PNEUMONIAE (PNEUMOCOCCUS): ICD-10-CM

## 2018-11-19 DIAGNOSIS — I10 ESSENTIAL HYPERTENSION: ICD-10-CM

## 2018-11-19 DIAGNOSIS — E78.5 HYPERLIPIDEMIA LDL GOAL <130: Primary | ICD-10-CM

## 2018-11-19 DIAGNOSIS — Z00.00 MEDICARE ANNUAL WELLNESS VISIT, SUBSEQUENT: ICD-10-CM

## 2018-11-19 LAB
ALBUMIN SERPL-MCNC: 3.9 G/DL (ref 3.4–5)
ALP SERPL-CCNC: 80 U/L (ref 40–150)
ALT SERPL W P-5'-P-CCNC: 24 U/L (ref 0–70)
ANION GAP SERPL CALCULATED.3IONS-SCNC: 7 MMOL/L (ref 3–14)
AST SERPL W P-5'-P-CCNC: 26 U/L (ref 0–45)
BILIRUB SERPL-MCNC: 0.6 MG/DL (ref 0.2–1.3)
BUN SERPL-MCNC: 14 MG/DL (ref 7–30)
CALCIUM SERPL-MCNC: 9.1 MG/DL (ref 8.5–10.1)
CHLORIDE SERPL-SCNC: 105 MMOL/L (ref 94–109)
CHOLEST SERPL-MCNC: 186 MG/DL
CO2 SERPL-SCNC: 29 MMOL/L (ref 20–32)
CREAT SERPL-MCNC: 0.94 MG/DL (ref 0.66–1.25)
GFR SERPL CREATININE-BSD FRML MDRD: 80 ML/MIN/1.7M2
GLUCOSE SERPL-MCNC: 95 MG/DL (ref 70–99)
HDLC SERPL-MCNC: 64 MG/DL
LDLC SERPL CALC-MCNC: 103 MG/DL
NONHDLC SERPL-MCNC: 122 MG/DL
POTASSIUM SERPL-SCNC: 3.7 MMOL/L (ref 3.4–5.3)
PROT SERPL-MCNC: 6.8 G/DL (ref 6.8–8.8)
SODIUM SERPL-SCNC: 141 MMOL/L (ref 133–144)
TRIGL SERPL-MCNC: 96 MG/DL

## 2018-11-19 PROCEDURE — 80053 COMPREHEN METABOLIC PANEL: CPT | Performed by: INTERNAL MEDICINE

## 2018-11-19 PROCEDURE — 99213 OFFICE O/P EST LOW 20 MIN: CPT | Mod: 25 | Performed by: INTERNAL MEDICINE

## 2018-11-19 PROCEDURE — 80061 LIPID PANEL: CPT | Performed by: INTERNAL MEDICINE

## 2018-11-19 PROCEDURE — 36415 COLL VENOUS BLD VENIPUNCTURE: CPT | Performed by: INTERNAL MEDICINE

## 2018-11-19 PROCEDURE — 99397 PER PM REEVAL EST PAT 65+ YR: CPT | Performed by: INTERNAL MEDICINE

## 2018-11-19 RX ORDER — DILTIAZEM HYDROCHLORIDE 240 MG/1
240 CAPSULE, EXTENDED RELEASE ORAL DAILY
Qty: 30 CAPSULE | Refills: 1 | Status: SHIPPED | OUTPATIENT
Start: 2018-11-19 | End: 2019-01-25

## 2018-11-19 ASSESSMENT — ANXIETY QUESTIONNAIRES
6. BECOMING EASILY ANNOYED OR IRRITABLE: NOT AT ALL
5. BEING SO RESTLESS THAT IT IS HARD TO SIT STILL: NOT AT ALL
GAD7 TOTAL SCORE: 0
3. WORRYING TOO MUCH ABOUT DIFFERENT THINGS: NOT AT ALL
1. FEELING NERVOUS, ANXIOUS, OR ON EDGE: NOT AT ALL
7. FEELING AFRAID AS IF SOMETHING AWFUL MIGHT HAPPEN: NOT AT ALL
2. NOT BEING ABLE TO STOP OR CONTROL WORRYING: NOT AT ALL

## 2018-11-19 ASSESSMENT — ENCOUNTER SYMPTOMS
SORE THROAT: 0
PALPITATIONS: 0
NAUSEA: 0
SHORTNESS OF BREATH: 0
MYALGIAS: 1
HEADACHES: 0
EYE PAIN: 0
FEVER: 0
ARTHRALGIAS: 1
WEAKNESS: 0
FREQUENCY: 0
COUGH: 0
HEMATURIA: 0
JOINT SWELLING: 1
DYSURIA: 0
PARESTHESIAS: 0
DIARRHEA: 0
DIZZINESS: 0
ABDOMINAL PAIN: 0
HEMATOCHEZIA: 0
HEARTBURN: 0
CHILLS: 0
CONSTIPATION: 0
NERVOUS/ANXIOUS: 1

## 2018-11-19 ASSESSMENT — PATIENT HEALTH QUESTIONNAIRE - PHQ9
5. POOR APPETITE OR OVEREATING: NOT AT ALL
SUM OF ALL RESPONSES TO PHQ QUESTIONS 1-9: 0

## 2018-11-19 ASSESSMENT — ACTIVITIES OF DAILY LIVING (ADL): CURRENT_FUNCTION: NO ASSISTANCE NEEDED

## 2018-11-19 NOTE — MR AVS SNAPSHOT
"              After Visit Summary   11/19/2018    Dustin Lan    MRN: [id number]           Patient Information     Date Of Birth          1950        Visit Information        Provider Department      11/19/2018 7:40 AM Alessandro Blanco MD [facility] [place]        Today's Diagnoses     Hyperlipidemia LDL goal <130    -  1    Screen for colon cancer        Need for prophylactic vaccination against Streptococcus pneumoniae (pneumococcus)        Medicare annual wellness visit, subsequent        Impaired fasting glucose        Essential hypertension          Care Instructions      Get your shingles vaccine (\"Shingrix\") at our pharmacy downstairs (or at another pharmacy), sometime this year--even if you've already received the old \"Zostavax\" shingles vaccine.  The \"Shingrix\" has better immunity and lasts longer, and is cheaper if you get it directly at a pharmacy.  You should not need an appointment.     You will need a second Shingrix anywhere from 2-6 months later.    Lab work downstairs today.  Directions:  As you walk through the first floor, you'll see (on the right) first the pharmacy, then some bathrooms, then the \"Lab and Imaging\" area. Give them your name at the window there and wait for them to call you.     Increase your dilitazem to 240 mg daily.    Make a nurse only blood pressure recheck in 1 month -2 months.    Alessandro Blanco MD  Internal Medicine and Pediatrics      Preventive Health Recommendations:     See your health care provider every year to    Review health changes.     Discuss preventive care.      Review your medicines if your doctor has prescribed any.    Talk with your health care provider about whether you should have a test to screen for prostate cancer (PSA).    Every 3 years, have a diabetes test (fasting glucose). If you are at risk for diabetes, you should have this test more often.    Every 5 years, have a cholesterol test. Have this test more often if you are at risk for high " cholesterol or heart disease.     Every 10 years, have a colonoscopy. Or, have a yearly FIT test (stool test). These exams will check for colon cancer.    Talk to with your health care provider about screening for Abdominal Aortic Aneurysm if you have a family history of AAA or have a history of smoking.  Shots:     Get a flu shot each year.     Get a tetanus shot every 10 years.     Talk to your doctor about your pneumonia vaccines. There are now two you should receive - Pneumovax (PPSV 23) and Prevnar (PCV 13).    Talk to your pharmacist about a shingles vaccine.     Talk to your doctor about the hepatitis B vaccine.  Nutrition:     Eat at least 5 servings of fruits and vegetables each day.     Eat whole-grain bread, whole-wheat pasta and brown rice instead of white grains and rice.     Get adequate Calcium and Vitamin D.   Lifestyle    Exercise for at least 150 minutes a week (30 minutes a day, 5 days a week). This will help you control your weight and prevent disease.     Limit alcohol to one drink per day.     No smoking.     Wear sunscreen to prevent skin cancer.     See your dentist every six months for an exam and cleaning.     See your eye doctor every 1 to 2 years to screen for conditions such as glaucoma, macular degeneration and cataracts.    Personalized Prevention Plan  You are due for the preventive services outlined below.  Your care team is available to assist you in scheduling these services.  If you have already completed any of these items, please share that information with your care team to update in your medical record.    Health Maintenance Due   Topic Date Due     AORTIC ANEURYSM SCREENING (SYSTEM ASSIGNED)  06/01/2017     Colonoscopy - every 5 years  11/12/2018     Asthma Action Plan - yearly  11/17/2018     FALL RISK ASSESSMENT  11/17/2018     Pneumococcal Vaccine (2 of 2 - PPSV23) 11/17/2018     JUAN FRANCISCO QUESTIONNAIRE 1 YEAR  11/17/2018     Depression Assessment - yearly  11/17/2018              Follow-ups after your visit        Follow-up notes from your care team     Return in about 4 weeks (around 12/17/2018) for nurse only BP recheck. .      Who to contact     If you have questions or need follow up information about today's clinic visit or your schedule please contact [facility] directly at [phone].  Normal or non-critical lab and imaging results will be communicated to you by MyChart, letter or phone within 4 business days after the clinic has received the results. If you do not hear from us within 7 days, please contact the clinic through Multi[employer] or phone. If you have a critical or abnormal lab result, we will notify you by phone as soon as possible.  Submit refill requests through [employer] or call your pharmacy and they will forward the refill request to us. Please allow 3 business days for your refill to be completed.          Additional Information About Your Visit        MyChart Information     [employer] gives you secure access to your electronic health record. If you see a primary care provider, you can also send messages to your care team and make appointments. If you have questions, please call your primary care clinic.  If you do not have a primary care provider, please call [phone] and they will assist you.        Care EveryWhere ID     This is your Care EveryWhere ID. This could be used by other organizations to access your [place] medical records  [id number]        Your Vitals Were     Pulse Temperature Height Pulse Oximetry BMI (Body Mass Index)       56 97.8  F (36.6  C) (Oral) 6' (1.829 m) 96% 31.87 kg/m2        Blood Pressure from Last 3 Encounters:   11/19/18 134/84   11/01/18 (!) 164/92   09/28/18 140/88    Weight from Last 3 Encounters:   11/19/18 235 lb (106.6 kg)   11/01/18 238 lb (108 kg)   09/28/18 238 lb 14.4 oz (108.4 kg)              We Performed the Following     Comprehensive metabolic panel     Lipid panel reflex to direct LDL Fasting      OFFICE/OUTPT VISIT,EST,LEVL III          Today's Medication Changes          These changes are accurate as of 11/19/18  8:03 AM.  If you have any questions, ask your nurse or doctor.               These medicines have changed or have updated prescriptions.        Dose/Directions    diltiazem 240 MG 24 hr ER beaded capsule   Commonly known as:  TIAZAC   This may have changed:    - medication strength  - how much to take   Used for:  Essential hypertension   Changed by:  Alessandro Blanco MD        Dose:  240 mg   Take 1 capsule (240 mg) by mouth daily   Quantity:  30 capsule   Refills:  1            Where to get your medicines      These medications were sent to [facility] [place] - [place] - [address]   [address], [place] [address]     Phone:  [phone]     diltiazem 240 MG 24 hr ER beaded capsule                Primary Care Provider Office Phone # Fax #    Alessandro Blanco [phone] [address] [place] [address]        Equal Access to Services     [facility]: Hadii aad ku hadasho Soomaali, waaxda luqadaha, qaybta kaalmada adeegyada, waxay idiin hayaan adeeg kharaclara la'rom . So [facility] [phone].    ATENCIÓN: Si habla español, tiene a forbes disposición servicios gratuitos de asistencia lingüística. LlKindred Hospital Lima [phone].    We comply with applicable federal civil rights laws and [place] laws. We do not discriminate on the basis of race, color, national origin, age, disability, sex, sexual orientation, or gender identity.            Thank you!     Thank you for choosing [facility]]  for your care. Our goal is always to provide you with excellent care. Hearing back from our patients is one way we can continue to improve our services. Please take a few minutes to complete the written survey that you may receive in the mail after your visit with us. Thank you!             Your Updated Medication List - Protect others  around you: Learn how to safely use, store and throw away your medicines at www.disposemymeds.org.          This list is accurate as of 11/19/18  8:03 AM.  Always use your most recent med list.                   Brand Name Dispense Instructions for use Diagnosis    ALEVE PO      Take by mouth 2 times daily (with meals)        aspirin 81 MG chewable tablet     36 tablet    Take 1 tablet (81 mg) by mouth daily    Paroxysmal supraventricular tachycardia (H), Benign essential hypertension       diltiazem 240 MG 24 hr ER beaded capsule    TIAZAC    30 capsule    Take 1 capsule (240 mg) by mouth daily    Essential hypertension       fluticasone-salmeterol 250-50 MCG/DOSE diskus inhaler    ADVAIR DISKUS    3 Inhaler    Inhale 1 puff into the lungs 2 times daily    Mild persistent asthma without complication       glucosamine chondroitin 500 complex Caps           Multi-vitamin Tabs tablet   Generic drug:  multivitamin, therapeutic with minerals      1 po qd        SIMVASTATIN PO      Take 20 mg by mouth At Bedtime

## 2018-11-19 NOTE — PROGRESS NOTES
"SUBJECTIVE:   Dustin Lan is a 68 year old male who presents for Preventive Visit.    Are you in the first 12 months of your Medicare coverage?  No    Annual Wellness Visit     In general, how would you rate your overall health?  Good    Frequency of exercise:  2-3 days/week    Duration of exercise:  30-45 minutes    Do you usually eat at least 4 servings of fruit and vegetables a day, include whole grains    & fiber and avoid regularly eating high fat or \"junk\" foods?  No    Taking medications regularly:  Yes    Medication side effects:  None    Ability to successfully perform activities of daily living:  No assistance needed    Home Safety:  No safety concerns identified    Hearing Impairment:  No hearing concerns    In the past 6 months, have you been bothered by leaking of urine?  No    In general, how would you rate your overall mental or emotional health?  Excellent    PHQ-2 Total Score: 0    Additional concerns today:  No        Fall risk:  Fallen 2 or more times in the past year?: No  Any fall with injury in the past year?: No      COGNITIVE SCREEN  1) Repeat 3 items (Leader, Season, Table)    2) Clock draw: NORMAL  3) 3 item recall: Recalls 3 objects  Results: 3 items recalled: COGNITIVE IMPAIRMENT LESS LIKELY    Mini-CogTM Copyright S Mary. Licensed by the author for use in [facility]; reprinted with permission ([email or web address]). All rights reserved.          Reviewed and updated as needed this visit by clinical staff    Recent sinusitis requiring antibiotic and prednisone.    Some knee issues with recent walking. Recent knee injections, and now with physical therapy.      blood pressure found to be elevated after knee injections for steroids. Now checking blood pressure at home:  [id number] recently.    Tobacco  Allergies  Meds  Problems  Med Hx  Surg Hx  Fam Hx  Soc Hx          Reviewed and updated as needed this visit by Provider  Allergies  Meds  Problems      "   Social History   Substance Use Topics     Smoking status: Never Smoker     Smokeless tobacco: Never Used     Alcohol use 0.0 - 1.8 oz/week      Comment: 5 - 7 beers a week     Pt would like to discuss blood pressure. Also discuss steps toward knee replacement, left worse than right. PT.    Do you feel safe in your environment - Yes    Do you have a Health Care Directive?: Yes: Advance Directive has been received and scanned.    Current providers sharing in care for this patient include:   Patient Care Team:  Alessandro Blanco MD as PCP - General (Internal Medicine)    The following health maintenance items are reviewed in Epic and correct as of today:  Health Maintenance   Topic Date Due     AORTIC ANEURYSM SCREENING (SYSTEM ASSIGNED)  06/01/2017     COLONOSCOPY Q5 YR  11/12/2018     ASTHMA ACTION PLAN Q1 YR  11/17/2018     FALL RISK ASSESSMENT  11/17/2018     PNEUMOCOCCAL (2 of 2 - PPSV23) 11/17/2018     JUAN FRANCISCO QUESTIONNAIRE 1 YEAR  11/17/2018     PHQ-9 Q1YR  11/17/2018     ASTHMA CONTROL TEST Q6 MOS  03/06/2019     ADVANCE DIRECTIVE PLANNING Q5 YRS  03/01/2021     LIPID MONITORING Q5 YEARS  11/17/2022     TETANUS Q10 YR  09/14/2028     INFLUENZA VACCINE  Completed     HEPATITIS C SCREENING  Completed     Labs reviewed in EPIC  BP Readings from Last 3 Encounters:   11/19/18 134/84   11/01/18 (!) 164/92   09/28/18 140/88    Wt Readings from Last 3 Encounters:   11/19/18 235 lb (106.6 kg)   11/01/18 238 lb (108 kg)   09/28/18 238 lb 14.4 oz (108.4 kg)                  Patient Active Problem List   Diagnosis     Contact dermatitis and other eczema, due to unspecified cause     History of colonic polyps     HYPERLIPIDEMIA LDL GOAL <130     Impaired fasting glucose     Mild persistent asthma without complication     Paroxysmal supraventricular tachycardia (H)     Past Surgical History:   Procedure Laterality Date     ARTHROSCOPY KNEE RT/LT  12/2008    right     HC COLONOSCOPY THRU STOMA, DIAGNOSTIC  2/2007    ileocecal  valve polyp, tubular adenoma, repeat in 3 yrs       Social History   Substance Use Topics     Smoking status: Never Smoker     Smokeless tobacco: Never Used     Alcohol use 0.0 - 1.8 oz/week      Comment: 5 - 7 beers a week     Family History   Problem Relation Age of Onset     Cerebrovascular Disease Mother 75      of a stroke     C.A.D. Father 67      suddenly when using the  at age 67. He was a smoker. On autopsy, told to have multiple heart attacks and scar tissue but he had never veronika a clinical heart attack.     Lipids Sister          Current Outpatient Prescriptions   Medication Sig Dispense Refill     aspirin 81 MG chewable tablet Take 1 tablet (81 mg) by mouth daily 36 tablet 3     diltiazem (TIAZAC) 240 MG 24 hr ER beaded capsule Take 1 capsule (240 mg) by mouth daily 30 capsule 1     fluticasone-salmeterol (ADVAIR DISKUS) 250-50 MCG/DOSE diskus inhaler Inhale 1 puff into the lungs 2 times daily 3 Inhaler 1     GLUCOSAMINE CHONDR 500 COMPLEX PO CAPS        MULTI-VITAMIN OR TABS 1 po qd       Naproxen Sodium (ALEVE PO) Take by mouth 2 times daily (with meals)        SIMVASTATIN PO Take 20 mg by mouth At Bedtime       [DISCONTINUED] diltiazem (TIAZAC) 120 MG 24 hr ER beaded capsule Take 1 capsule (120 mg) by mouth daily 100 capsule 3     Allergies   Allergen Reactions     Cats            Review of Systems   Constitutional: Negative for chills and fever.   HENT: Negative for congestion, ear pain, hearing loss and sore throat.    Eyes: Negative for pain and visual disturbance.   Respiratory: Negative for cough and shortness of breath.    Cardiovascular: Negative for chest pain, palpitations and peripheral edema.   Gastrointestinal: Negative for abdominal pain, constipation, diarrhea, heartburn, hematochezia and nausea.   Genitourinary: Negative for discharge, dysuria, frequency, genital sores, hematuria, impotence and urgency.   Musculoskeletal: Positive for arthralgias, joint swelling and  myalgias.   Skin: Negative for rash.   Neurological: Negative for dizziness, weakness, headaches and paresthesias.   Psychiatric/Behavioral: Negative for mood changes. The patient is nervous/anxious.          OBJECTIVE:   /84 (BP Location: Right arm, Cuff Size: Adult Large)  Pulse 56  Temp 97.8  F (36.6  C) (Oral)  Ht 6' (1.829 m)  Wt 235 lb (106.6 kg)  SpO2 96%  BMI 31.87 kg/m2 Estimated body mass index is 31.87 kg/(m^2) as calculated from the following:    Height as of this encounter: 6' (1.829 m).    Weight as of this encounter: 235 lb (106.6 kg).  Physical Exam  GENERAL: healthy, alert and no distress  EYES: Eyes grossly normal to inspection, PERRL and conjunctivae and sclerae normal  HENT: ear canals and TM's normal, nose and mouth without ulcers or lesions  NECK: no adenopathy, no asymmetry, masses, or scars and thyroid normal to palpation  RESP: lungs clear to auscultation - no rales, rhonchi or wheezes  CV: regular rate and rhythm, normal S1 S2, no S3 or S4, no murmur, click or rub, no peripheral edema and peripheral pulses strong  ABDOMEN: soft, nontender, no hepatosplenomegaly, no masses and bowel sounds normal   (male): normal male genitalia without lesions or urethral discharge, no hernia  MS: no gross musculoskeletal defects noted, no edema  SKIN: no suspicious lesions or rashes  NEURO: Normal strength and tone, mentation intact and speech normal  PSYCH: mentation appears normal, affect normal/bright    Diagnostic Test Results:  none     ASSESSMENT / PLAN:   1. Screen for colon cancer      2. Need for prophylactic vaccination against Streptococcus pneumoniae (pneumococcus)      3. Medicare annual wellness visit, subsequent  Discussed diet, exercise, testicular self exam, blood pressure, cholesterol, and need for cancer surveillance at appropriate ages.     4. Hyperlipidemia LDL goal <130  Recheck today; continue statin.   - Lipid panel reflex to direct LDL Fasting    5. Impaired fasting  glucose      6. Essential hypertension  Increase calcium channel blocker to 240; follow up in 1 month for nurse visit; if still up would increase to 360 or add additional agent (HCTZ).   - Comprehensive metabolic panel  - diltiazem (TIAZAC) 240 MG 24 hr ER beaded capsule; Take 1 capsule (240 mg) by mouth daily  Dispense: 30 capsule; Refill: 1  - OFFICE/OUTPT VISIT,FATAB FLANAGAN III        COUNSELING:  Reviewed preventive health counseling, as reflected in patient instructions       Regular exercise       Healthy diet/nutrition       Vision screening       Hearing screening       Colon cancer screening       Prostate cancer screening    BP Readings from Last 1 Encounters:   11/19/18 134/84     Estimated body mass index is 31.87 kg/(m^2) as calculated from the following:    Height as of this encounter: 6' (1.829 m).    Weight as of this encounter: 235 lb (106.6 kg).      Weight management plan: Patient was referred to their PCP to discuss a diet and exercise plan.     reports that he has never smoked. He has never used smokeless tobacco.      Appropriate preventive services were discussed with this patient, including applicable screening as appropriate for cardiovascular disease, diabetes, osteopenia/osteoporosis, and glaucoma.  As appropriate for age/gender, discussed screening for colorectal cancer, prostate cancer, breast cancer, and cervical cancer. Checklist reviewing preventive services available has been given to the patient.    Reviewed patients plan of care and provided an AVS. The Basic Care Plan (routine screening as documented in Health Maintenance) for Dustin meets the Care Plan requirement. This Care Plan has been established and reviewed with the Patient.    Counseling Resources:  ATP IV Guidelines  Pooled Cohorts Equation Calculator  Breast Cancer Risk Calculator  FRAX Risk Assessment  ICSI Preventive Guidelines  Dietary Guidelines for Americans, 2010  USDA's MyPlate  ASA Prophylaxis  Lung CA  Screening    Alessandro Blanco MD  [facility] [place]

## 2018-11-19 NOTE — PATIENT INSTRUCTIONS
"  Get your shingles vaccine (\"Shingrix\") at our pharmacy downstairs (or at another pharmacy), sometime this year--even if you've already received the old \"Zostavax\" shingles vaccine.  The \"Shingrix\" has better immunity and lasts longer, and is cheaper if you get it directly at a pharmacy.  You should not need an appointment.     You will need a second Shingrix anywhere from 2-6 months later.    Lab work downstairs today.  Directions:  As you walk through the first floor, you'll see (on the right) first the pharmacy, then some bathrooms, then the \"Lab and Imaging\" area. Give them your name at the window there and wait for them to call you.     Increase your dilitazem to 240 mg daily.    Make a nurse only blood pressure recheck in 1 month -2 months.    Alessandro Blanco MD  Internal Medicine and Pediatrics      Preventive Health Recommendations:     See your health care provider every year to    Review health changes.     Discuss preventive care.      Review your medicines if your doctor has prescribed any.    Talk with your health care provider about whether you should have a test to screen for prostate cancer (PSA).    Every 3 years, have a diabetes test (fasting glucose). If you are at risk for diabetes, you should have this test more often.    Every 5 years, have a cholesterol test. Have this test more often if you are at risk for high cholesterol or heart disease.     Every 10 years, have a colonoscopy. Or, have a yearly FIT test (stool test). These exams will check for colon cancer.    Talk to with your health care provider about screening for Abdominal Aortic Aneurysm if you have a family history of AAA or have a history of smoking.  Shots:     Get a flu shot each year.     Get a tetanus shot every 10 years.     Talk to your doctor about your pneumonia vaccines. There are now two you should receive - Pneumovax (PPSV 23) and Prevnar (PCV 13).    Talk to your pharmacist about a shingles vaccine.     Talk to your doctor " about the hepatitis B vaccine.  Nutrition:     Eat at least 5 servings of fruits and vegetables each day.     Eat whole-grain bread, whole-wheat pasta and brown rice instead of white grains and rice.     Get adequate Calcium and Vitamin D.   Lifestyle    Exercise for at least 150 minutes a week (30 minutes a day, 5 days a week). This will help you control your weight and prevent disease.     Limit alcohol to one drink per day.     No smoking.     Wear sunscreen to prevent skin cancer.     See your dentist every six months for an exam and cleaning.     See your eye doctor every 1 to 2 years to screen for conditions such as glaucoma, macular degeneration and cataracts.    Personalized Prevention Plan  You are due for the preventive services outlined below.  Your care team is available to assist you in scheduling these services.  If you have already completed any of these items, please share that information with your care team to update in your medical record.    Health Maintenance Due   Topic Date Due     AORTIC ANEURYSM SCREENING (SYSTEM ASSIGNED)  06/01/2017     Colonoscopy - every 5 years  11/12/2018     Asthma Action Plan - yearly  11/17/2018     FALL RISK ASSESSMENT  11/17/2018     Pneumococcal Vaccine (2 of 2 - PPSV23) 11/17/2018     JUAN FRANCISCO QUESTIONNAIRE 1 YEAR  11/17/2018     Depression Assessment - yearly  11/17/2018

## 2018-11-19 NOTE — LETTER
My Asthma Action Plan  Name: Dustin Lan   YOB: 1950  Date: 11/19/2018   My doctor: Alessandro Blanco MD   My clinic: [facility]        My Control Medicine: Fluticasone + salmeterol (Advair) -  Diskus 250/50 mcg    My Rescue Medicine: None   My Asthma Severity: mild persistent  Avoid your asthma triggers:                GREEN ZONE   Good Control    I feel good    No cough or wheeze    Can work, sleep and play without asthma symptoms       Take your asthma control medicine every day.     1. If exercise triggers your asthma, take your rescue medication    15 minutes before exercise or sports, and    During exercise if you have asthma symptoms  2. Spacer to use with inhaler: If you have a spacer, make sure to use it with your inhaler             YELLOW ZONE Getting Worse  I have ANY of these:    I do not feel good    Cough or wheeze    Chest feels tight    Wake up at night   1. Keep taking your Green Zone medications  2. Start taking your rescue medicine:    every 20 minutes for up to 1 hour. Then every 4 hours for 24-48 hours.  3. If you stay in the Yellow Zone for more than 12-24 hours, contact your doctor.  4. If you do not return to the Green Zone in 12-24 hours or you get worse, start taking your oral steroid medicine if prescribed by your provider.           RED ZONE Medical Alert - Get Help  I have ANY of these:    I feel awful    Medicine is not helping    Breathing getting harder    Trouble walking or talking    Nose opens wide to breathe       1. Take your rescue medicine NOW  2. If your provider has prescribed an oral steroid medicine, start taking it NOW  3. Call your doctor NOW  4. If you are still in the Red Zone after 20 minutes and you have not reached your doctor:    Take your rescue medicine again and    Call 911 or go to the emergency room right away    See your regular doctor within 2 weeks of an Emergency Room or Urgent Care visit for follow-up treatment.          Annual  Reminders:  Meet with Asthma Educator,  Flu Shot in the Fall, consider Pneumonia Vaccination for patients with asthma (aged 19 and older).    Pharmacy:    [id number] - [place], [place] - [address]  AT [facility] - [place], [place] - [address]  [facility] - [place], [place] - [address]                      Asthma Triggers  How To Control Things That Make Your Asthma Worse    Triggers are things that make your asthma worse.  Look at the list below to help you find your triggers and what you can do about them.  You can help prevent asthma flare-ups by staying away from your triggers.      Trigger                                                          What you can do   Cigarette Smoke  Tobacco smoke can make asthma worse. Do not allow smoking in your home, car or around you.  Be sure no one smokes at a child s day care or school.  If you smoke, ask your health care provider for ways to help you quit.  Ask family members to quit too.  Ask your health care provider for a referral to Quit Plan to help you quit smoking, or call 4-016-009-PLAN.     Colds, Flu, Bronchitis  These are common triggers of asthma. Wash your hands often.  Don t touch your eyes, nose or mouth.  Get a flu shot every year.     Dust Mites  These are tiny bugs that live in cloth or carpet. They are too small to see. Wash sheets and blankets in hot water every week.   Encase pillows and mattress in dust mite proof covers.  Avoid having carpet if you can. If you have carpet, vacuum weekly.   Use a dust mask and HEPA vacuum.   Pollen and Outdoor Mold  Some people are allergic to trees, grass, or weed pollen, or molds. Try to keep your windows closed.  Limit time out doors when pollen count is high.   Ask you health care provider about taking medicine during allergy season.     Animal Dander  Some people are allergic to skin flakes, urine  or saliva from pets with fur or feathers. Keep pets with fur or feathers out of your home.    If you can t keep the pet outdoors, then keep the pet out of your bedroom.  Keep the bedroom door closed.  Keep pets off cloth furniture and away from stuffed toys.     Mice, Rats, and Cockroaches  Some people are allergic to the waste from these pests.   Cover food and garbage.  Clean up spills and food crumbs.  Store grease in the refrigerator.   Keep food out of the bedroom.   Indoor Mold  This can be a trigger if your home has high moisture. Fix leaking faucets, pipes, or other sources of water.   Clean moldy surfaces.  Dehumidify basement if it is damp and smelly.   Smoke, Strong Odors, and Sprays  These can reduce air quality. Stay away from strong odors and sprays, such as perfume, powder, hair spray, paints, smoke incense, paint, cleaning products, candles and new carpet.   Exercise or Sports  Some people with asthma have this trigger. Be active!  Ask your doctor about taking medicine before sports or exercise to prevent symptoms.    Warm up for 5-10 minutes before and after sports or exercise.     Other Triggers of Asthma  Cold air:  Cover your nose and mouth with a scarf.  Sometimes laughing or crying can be a trigger.  Some medicines and food can trigger asthma.

## 2018-11-20 ENCOUNTER — THERAPY VISIT (OUTPATIENT)
Dept: PHYSICAL THERAPY | Facility: CLINIC | Age: 68
End: 2018-11-20
Attending: INTERNAL MEDICINE
Payer: COMMERCIAL

## 2018-11-20 DIAGNOSIS — M25.562 ACUTE PAIN OF LEFT KNEE: ICD-10-CM

## 2018-11-20 PROCEDURE — 97110 THERAPEUTIC EXERCISES: CPT | Mod: GP | Performed by: PHYSICAL THERAPIST

## 2018-11-20 PROCEDURE — 97162 PT EVAL MOD COMPLEX 30 MIN: CPT | Mod: GP | Performed by: PHYSICAL THERAPIST

## 2018-11-20 ASSESSMENT — ACTIVITIES OF DAILY LIVING (ADL)
KNEE_ACTIVITY_OF_DAILY_LIVING_SCORE: 60
HOW_WOULD_YOU_RATE_THE_CURRENT_FUNCTION_OF_YOUR_KNEE_DURING_YOUR_USUAL_DAILY_ACTIVITIES_ON_A_SCALE_FROM_0_TO_100_WITH_100_BEING_YOUR_LEVEL_OF_KNEE_FUNCTION_PRIOR_TO_YOUR_INJURY_AND_0_BEING_THE_INABILITY_TO_PERFORM_ANY_OF_YOUR_USUAL_DAILY_ACTIVITIES?: 50
RISE FROM A CHAIR: ACTIVITY IS SOMEWHAT DIFFICULT
GO UP STAIRS: ACTIVITY IS MINIMALLY DIFFICULT
AS_A_RESULT_OF_YOUR_KNEE_INJURY,_HOW_WOULD_YOU_RATE_YOUR_CURRENT_LEVEL_OF_DAILY_ACTIVITY?: NEARLY NORMAL
SQUAT: ACTIVITY IS FAIRLY DIFFICULT
STIFFNESS: THE SYMPTOM AFFECTS MY ACTIVITY MODERATELY
WALK: ACTIVITY IS MINIMALLY DIFFICULT
KNEE_ACTIVITY_OF_DAILY_LIVING_SUM: 42
STAND: ACTIVITY IS SOMEWHAT DIFFICULT
KNEEL ON THE FRONT OF YOUR KNEE: ACTIVITY IS SOMEWHAT DIFFICULT
PAIN: THE SYMPTOM AFFECTS MY ACTIVITY MODERATELY
RAW_SCORE: 42
WEAKNESS: THE SYMPTOM AFFECTS MY ACTIVITY SLIGHTLY
LIMPING: I HAVE THE SYMPTOM BUT IT DOES NOT AFFECT MY ACTIVITY
SIT WITH YOUR KNEE BENT: ACTIVITY IS MINIMALLY DIFFICULT
GO DOWN STAIRS: ACTIVITY IS SOMEWHAT DIFFICULT
SWELLING: THE SYMPTOM AFFECTS MY ACTIVITY MODERATELY
HOW_WOULD_YOU_RATE_THE_OVERALL_FUNCTION_OF_YOUR_KNEE_DURING_YOUR_USUAL_DAILY_ACTIVITIES?: ABNORMAL
GIVING WAY, BUCKLING OR SHIFTING OF KNEE: THE SYMPTOM AFFECTS MY ACTIVITY SLIGHTLY

## 2018-11-20 ASSESSMENT — ANXIETY QUESTIONNAIRES: GAD7 TOTAL SCORE: 0

## 2018-11-20 NOTE — PROGRESS NOTES
[facility] Initial Evaluation  Subjective:  Patient is a 68 year old male presenting with rehab left knee hpi.   Dustin Lan is a 68 year old male with a left knee condition.      This is a chronic condition  Pt had B knee pain in Feb 2017, but came into PT and did really well.  However, after golf and a trip this summer, he noticed increased pain in his L>R knee, especially it would stiffen up if he didn't keep moving it.  Pt did a  trip in October of 2018, was averaging 10-12,000 steps in a day and felt really sore afterwards.    Pt had injections on 11/1/2018, helped some with the R knee, but not much with L knee.    Pt works out at the gym, does hamstring curls, leg press and some upper body.  .    Patient reports pain:  Anterior and medial.  Radiates to:  Ankle.  Pain is described as aching and is intermittent and reported as 6/10 and 1/10.  Associated symptoms:  Loss of strength, loss of motion/stiffness and buckling/giving out (clicking). Pain is worse in the P.M. and worse in the A.M..  Symptoms are exacerbated by ascending stairs, descending stairs, bending/squatting and kneeling and relieved by rest.  Since onset symptoms are gradually worsening.  Special tests:  X-ray.      General health as reported by patient is excellent.  Pertinent medical history includes:  Asthma and high blood pressure.      Current medications:  High blood pressure medication and pain medication.  Current occupation is retired.        Barriers include:  None as reported by the patient.    Red flags:  None as reported by the patient.                        Objective:    Gait:    Gait Type:  Antalgic     Deviations:  Knee:  Knee extension decr L and knee extension decr R                                                 Hip Evaluation    Hip Strength:      Extension:  Left: 4+/5  Pain:Right: 4+/5    Pain:    Abduction:  Left: 4+/5     Pain:Right: 4+/5    Pain:                           Knee  Evaluation:  ROM:            Strength:     Extension:  Left: 4+/5   Pain:      Right: 4+/5   Pain:  Flexion:  Left: 5-/5   Pain:      Right: 5-/5   Pain:    Quad Set Left: Good    Pain:   Quad Set Right: Good    Pain:  Ligament Testing:  Normal                Special Tests: Normal      Palpation:  Normal      Edema:    Circumference:      Joint Line:  Left:  Mild edema   Right:  Mild edema            General     ROS    Assessment/Plan:    Patient is a 68 year old male with both sides knee complaints.    Patient has the following significant findings with corresponding treatment plan.                Diagnosis 1:  L>R knee pain      Pain -  hot/cold therapy, manual therapy, self management, education and home program  Decreased ROM/flexibility - manual therapy and therapeutic exercise  Decreased strength - therapeutic exercise and therapeutic activities  Impaired gait - gait training  Impaired muscle performance - neuro re-education  Decreased function - therapeutic activities    Therapy Evaluation Codes:   1) History comprised of:   Personal factors that impact the plan of care:      Age, Past/current experiences and Time since onset of symptoms.    Comorbidity factors that impact the plan of care are:      Asthma and High blood pressure.     Medications impacting care: High blood pressure and Pain.  2) Examination of Body Systems comprised of:   Body structures and functions that impact the plan of care:      Knee.   Activity limitations that impact the plan of care are:      Lifting, Running, Sitting, Sports, Squatting/kneeling, Stairs and Walking.  3) Clinical presentation characteristics are:   Evolving/Changing.  4) Decision-Making    Moderate complexity using standardized patient assessment instrument and/or measureable assessment of functional outcome.  Cumulative Therapy Evaluation is: Moderate complexity.    Previous and current functional limitations:  (See Goal Flow Sheet for this information)    Short  term and Long term goals: (See Goal Flow Sheet for this information)     Communication ability:  Patient appears to be able to clearly communicate and understand verbal and written communication and follow directions correctly.  Treatment Explanation - The following has been discussed with the patient:   RX ordered/plan of care  Anticipated outcomes  Possible risks and side effects  This patient would benefit from PT intervention to resume normal activities.   Rehab potential is good.    Frequency:  1 X week, once daily  Duration:  for 6 weeks  Discharge Plan:  Achieve all LTG.  Independent in home treatment program.  Reach maximal therapeutic benefit.    Please refer to the daily flowsheet for treatment today, total treatment time and time spent performing 1:1 timed codes.

## 2018-12-05 ENCOUNTER — TRANSFERRED RECORDS (OUTPATIENT)
Dept: HEALTH INFORMATION MANAGEMENT | Facility: CLINIC | Age: 68
End: 2018-12-05

## 2019-01-07 ENCOUNTER — ALLIED HEALTH/NURSE VISIT (OUTPATIENT)
Dept: NURSING | Facility: CLINIC | Age: 69
End: 2019-01-07
Payer: COMMERCIAL

## 2019-01-07 VITALS — DIASTOLIC BLOOD PRESSURE: 86 MMHG | OXYGEN SATURATION: 98 % | HEART RATE: 73 BPM | SYSTOLIC BLOOD PRESSURE: 122 MMHG

## 2019-01-07 DIAGNOSIS — I47.10 PAROXYSMAL SUPRAVENTRICULAR TACHYCARDIA (H): Primary | ICD-10-CM

## 2019-01-07 PROCEDURE — 99207 ZZC NO CHARGE NURSE ONLY: CPT

## 2019-01-25 ENCOUNTER — MYC MEDICAL ADVICE (OUTPATIENT)
Dept: PEDIATRICS | Facility: CLINIC | Age: 69
End: 2019-01-25

## 2019-01-25 DIAGNOSIS — I10 ESSENTIAL HYPERTENSION: ICD-10-CM

## 2019-01-25 RX ORDER — DILTIAZEM HYDROCHLORIDE 240 MG/1
240 CAPSULE, EXTENDED RELEASE ORAL DAILY
Qty: 90 CAPSULE | Refills: 2 | Status: SHIPPED | OUTPATIENT
Start: 2019-01-25 | End: 2019-11-04

## 2019-01-25 RX ORDER — SIMVASTATIN 20 MG
20 TABLET ORAL AT BEDTIME
Qty: 90 TABLET | Refills: 3 | Status: SHIPPED | OUTPATIENT
Start: 2019-01-25 | End: 2019-12-13

## 2019-01-25 NOTE — TELEPHONE ENCOUNTER
BP Readings from Last 3 Encounters:   01/07/19 122/86   11/19/18 134/84   11/01/18 (!) 164/92     Potassium   Date Value Ref Range Status   11/19/2018 3.7 3.4 - 5.3 mmol/L Final     Creatinine   Date Value Ref Range Status   11/19/2018 0.94 0.66 - 1.25 mg/dL Final     Requested Prescriptions   Pending Prescriptions Disp Refills     diltiazem ER (TIAZAC) 240 MG 24 hr ER beaded capsule 90 capsule 1     Sig: Take 1 capsule (240 mg) by mouth daily    There is no refill protocol information for this order        simvastatin (ZOCOR) 20 MG tablet 90 tablet 3     Sig: Take 1 tablet (20 mg) by mouth At Bedtime    There is no refill protocol information for this order        Recent Labs   Lab Test 11/19/18  0811 11/17/17  0911  10/22/15  0833 09/29/14  0846   CHOL 186 177   < > 193 155   HDL 64 76   < > 65 54   * 77   < > 107 78   TRIG 96 122   < > 104 115   CHOLHDLRATIO  --   --   --  3.0 2.9    < > = values in this interval not displayed.     Routing refill request to provider for review/approval because:  Simvastatin was taken off the list by cardiology, but patient has been taking it-ok to fill?    Irene Mcfarland, JAMES  Message handled by Nurse Triage.

## 2019-02-04 ENCOUNTER — TRANSFERRED RECORDS (OUTPATIENT)
Dept: HEALTH INFORMATION MANAGEMENT | Facility: CLINIC | Age: 69
End: 2019-02-04

## 2019-02-19 ENCOUNTER — HOSPITAL ENCOUNTER (OUTPATIENT)
Dept: LAB | Facility: CLINIC | Age: 69
Discharge: HOME | End: 2019-02-19
Attending: ORTHOPAEDIC SURGERY | Admitting: ORTHOPAEDIC SURGERY
Payer: COMMERCIAL

## 2019-02-19 DIAGNOSIS — Z01.818 PRE-OPERATIVE EXAMINATION: Primary | ICD-10-CM

## 2019-02-19 DIAGNOSIS — Z01.818 PRE-OPERATIVE EXAMINATION: ICD-10-CM

## 2019-02-19 LAB — HGB BLD-MCNC: 13 G/DL (ref 13.3–17.7)

## 2019-02-19 PROCEDURE — 36415 COLL VENOUS BLD VENIPUNCTURE: CPT | Performed by: ORTHOPAEDIC SURGERY

## 2019-02-19 PROCEDURE — 85018 HEMOGLOBIN: CPT | Performed by: ORTHOPAEDIC SURGERY

## 2019-02-28 PROBLEM — M25.562 ACUTE PAIN OF LEFT KNEE: Status: RESOLVED | Noted: 2017-02-28 | Resolved: 2019-02-28

## 2019-03-03 ENCOUNTER — MYC MEDICAL ADVICE (OUTPATIENT)
Dept: PEDIATRICS | Facility: CLINIC | Age: 69
End: 2019-03-03

## 2019-03-03 DIAGNOSIS — J45.30 MILD PERSISTENT ASTHMA WITHOUT COMPLICATION: ICD-10-CM

## 2019-03-04 NOTE — TELEPHONE ENCOUNTER
Prescription approved per [facility] Refill Protocol.    Ines Alanis RN

## 2019-04-09 NOTE — PATIENT INSTRUCTIONS
Before Your Surgery      Call your surgeon if there is any change in your health. This includes signs of a cold or flu (such as a sore throat, runny nose, cough, rash or fever).    Do not smoke, drink alcohol or take over the counter medicine (unless your surgeon or primary care doctor tells you to) for the 24 hours before and after surgery.    If you take prescribed drugs: Follow your doctor s orders about which medicines to take and which to stop until after surgery.    Stop the aspirin about 10 days before surgery.   No Naproxen either.       Eating and drinking prior to surgery: follow the instructions from your surgeon    Take a shower or bath the night before surgery. Use the soap your surgeon gave you to gently clean your skin. If you do not have soap from your surgeon, use your regular soap. Do not shave or scrub the surgery site.  Wear clean pajamas and have clean sheets on your bed.

## 2019-04-09 NOTE — PROGRESS NOTES
[facility]  [address]  [address]  [place] [address]  [phone]  Dept: [phone]    PRE-OP EVALUATION:  Today's date: 2019    Dustin Lan (: 1950) presents for pre-operative evaluation assessment as requested by Dr. Rahat Durand.  He requires evaluation and anesthesia risk assessment prior to undergoing surgery/procedure for treatment of left Knee replacment .    Fax number for surgical facility: [facility]  Primary Physician: Alessandro Blanco  Type of Anesthesia Anticipated: General    Patient has a Health Care Directive or Living Will:  YES     Preop Questions 2019   Who is doing your surgery? rahat beal   What are you having done? knee replacement   Date of Surgery/Procedure: 2019   Facility or Hospital where procedure/surgery will be performed: [facility]   1.  Do you have a history of Heart attack, stroke, stent, coronary bypass surgery, or other heart surgery? No   2.  Do you ever have any pain or discomfort in your chest? No   3.  Do you have a history of  Heart Failure? No   4.   Are you troubled by shortness of breath when:  walking on a level surface, or up a slight hill, or at night? No   5.  Do you currently have a cold, bronchitis or other respiratory infection? YES - sinus cold x 10 days   6.  Do you have a cough, shortness of breath, or wheezing? YES - cough   7.  Do you sometimes get pains in the calves of your legs when you walk? No   8. Do you or anyone in your family have previous history of blood clots? No   9.  Do you or does anyone in your family have a serious bleeding problem such as prolonged bleeding following surgeries or cuts? No   10. Have you ever had problems with anemia or been told to take iron pills? No   11. Have you had any abnormal blood loss such as black, tarry or bloody stools? No   12. Have you ever had a blood transfusion? No   13. Have you or any of your relatives ever had problems with  anesthesia? No   14. Do you have sleep apnea, excessive snoring or daytime drowsiness? No   15. Do you have any prosthetic heart valves? No   16. Do you have prosthetic joints? No         HPI:     HPI related to upcoming procedure: left knee TKR.       See problem list for active medical problems.  Problems all longstanding and stable, except as noted/documented.  See ROS for pertinent symptoms related to these conditions.                                                                                                                                                          .  ASTHMA - Patient has a longstanding history of moderate-severe Asthma . Patient has been doing well overall noting NO SYMPTOMS and continues on medication regimen consisting of  advair and as needed albuterol without adverse reactions or side effects.                                                                                                                                               .  HYPERLIPIDEMIA - Patient has a long history of significant Hyperlipidemia requiring medication for treatment with recent good control. Patient reports no problems or side effects with the medication.                                                                                                                                                       .  HYPERTENSION - Patient has longstanding history of HTN , currently denies any symptoms referable to elevated blood pressure. Specifically denies chest pain, palpitations, dyspnea, orthopnea, PND or peripheral edema. Blood pressure readings have been in normal range. Current medication regimen is as listed below. Patient denies any side effects of medication.                                                                                                                                                                                          .    MEDICAL HISTORY:     Patient Active Problem List    Diagnosis  Date Noted     Paroxysmal supraventricular tachycardia (H) 04/09/2018     Priority: Medium     Mild persistent asthma without complication 11/17/2017     Priority: Medium     Impaired fasting glucose 06/02/2010     Priority: Medium     HYPERLIPIDEMIA LDL GOAL <130 02/10/2010     Priority: Medium     History of colonic polyps 02/01/2007     Priority: Medium     tubular adenoma, ileocecal valve, repeat 2010  Problem list name updated by automated process. Provider to review       Contact dermatitis and other eczema, due to unspecified cause 04/10/2006     Priority: Medium      Past Medical History:   Diagnosis Date     Elevated blood pressure reading without diagnosis of hypertension      Personal history of colonic polyps 2/2007    tubular adenoma, ileocecal valve, repeat 2010     Plantar fascial fibromatosis      Pure hypercholesterolemia      Past Surgical History:   Procedure Laterality Date     ARTHROSCOPY KNEE RT/LT  12/2008    right     HC COLONOSCOPY THRU STOMA, DIAGNOSTIC  2/2007    ileocecal valve polyp, tubular adenoma, repeat in 3 yrs     Current Outpatient Medications   Medication Sig Dispense Refill     aspirin 81 MG chewable tablet Take 1 tablet (81 mg) by mouth daily 36 tablet 3     diltiazem ER (TIAZAC) 240 MG 24 hr ER beaded capsule Take 1 capsule (240 mg) by mouth daily 90 capsule 2     fluticasone-salmeterol (ADVAIR DISKUS) 250-50 MCG/DOSE inhaler Inhale 1 puff into the lungs 2 times daily 3 Inhaler 1     GLUCOSAMINE CHONDR 500 COMPLEX PO CAPS        MULTI-VITAMIN OR TABS 1 po qd       Naproxen Sodium (ALEVE PO) Take by mouth 2 times daily (with meals)        simvastatin (ZOCOR) 20 MG tablet Take 1 tablet (20 mg) by mouth At Bedtime 90 tablet 3     OTC products: None, except as noted above    Allergies   Allergen Reactions     Cats       Latex Allergy: NO    Social History     Tobacco Use     Smoking status: Never Smoker     Smokeless tobacco: Never Used   Substance Use Topics     Alcohol use: Yes      Alcohol/week: 0.0 - 1.8 oz     Comment: 5 - 7 beers a week     History   Drug Use No       REVIEW OF SYSTEMS:   CONSTITUTIONAL: NEGATIVE for fever, chills, change in weight  INTEGUMENTARY/SKIN: NEGATIVE for worrisome rashes, moles or lesions  EYES: NEGATIVE for vision changes or irritation  ENT/MOUTH: NEGATIVE for ear, mouth and throat problems  RESP: NEGATIVE for significant cough or SOB  BREAST: NEGATIVE for masses, tenderness or discharge  CV: NEGATIVE for chest pain, palpitations or peripheral edema  GI: NEGATIVE for nausea, abdominal pain, heartburn, or change in bowel habits  : NEGATIVE for frequency, dysuria, or hematuria  MUSCULOSKELETAL: NEGATIVE for significant arthralgias or myalgia  NEURO: NEGATIVE for weakness, dizziness or paresthesias  ENDOCRINE: NEGATIVE for temperature intolerance, skin/hair changes  HEME: NEGATIVE for bleeding problems  PSYCHIATRIC: NEGATIVE for changes in mood or affect    EXAM:   /82 (BP Location: Right arm, Patient Position: Sitting, Cuff Size: Adult Large)   Pulse 80   Temp 97.9  F (36.6  C) (Oral)   Ht 1.829 m (6')   Wt 106 kg (233 lb 9.6 oz)   SpO2 97%   BMI 31.68 kg/m      GENERAL APPEARANCE: healthy, alert and no distress     EYES: EOMI,  PERRL     HENT: ear canals and TM's normal and nose and mouth without ulcers or lesions     NECK: no adenopathy, no asymmetry, masses, or scars and thyroid normal to palpation     RESP: lungs clear to auscultation - no rales, rhonchi or wheezes     CV: regular rates and rhythm, normal S1 S2, no S3 or S4 and no murmur, click or rub     ABDOMEN:  soft, nontender, no HSM or masses and bowel sounds normal     MS: extremities normal- no gross deformities noted, no evidence of inflammation in joints, FROM in all extremities.     SKIN: no suspicious lesions or rashes     NEURO: Normal strength and tone, sensory exam grossly normal, mentation intact and speech normal     PSYCH: mentation appears normal. and affect normal/bright      LYMPHATICS: No cervical adenopathy    DIAGNOSTICS:   EKG: appears normal, NSR, normal axis, normal intervals, no acute ST/T changes c/w ischemia, no LVH by voltage criteria, unchanged from previous tracings    Recent Labs   Lab Test 02/19/19  0915 11/19/18  0811 02/27/18  1040  08/25/11  0835   HGB 13.0*  --   --   --   --    NA  --  141 140   < > 144   POTASSIUM  --  3.7 4.5   < > 3.6   CR  --  0.94 0.89   < > 0.99   A1C  --   --   --   --  5.6    < > = values in this interval not displayed.        IMPRESSION:   Reason for surgery/procedure: knee osteoarthritis.   Diagnosis/reason for consult: preoperative evaluation     The proposed surgical procedure is considered INTERMEDIATE risk.    REVISED CARDIAC RISK INDEX  The patient has the following serious cardiovascular risks for perioperative complications such as (MI, PE, VFib and 3  AV Block):  No serious cardiac risks  INTERPRETATION: 0 risks: Class I (very low risk - 0.4% complication rate)    The patient has the following additional risks for perioperative complications:  No identified additional risks      ICD-10-CM    1. Preop general physical exam Z01.818 EKG 12-lead complete w/read - Clinics   2. Primary osteoarthritis of left knee M17.12    3. Hyperlipidemia LDL goal <130 E78.5    4. Mild persistent asthma without complication J45.30        RECOMMENDATIONS:     (Z01.818) Preop general physical exam  (primary encounter diagnosis)  Comment:   Plan: EKG 12-lead complete w/read - Clinics        Advised to stop all aspirin products and nonsteroidals (like ibuprofen or naproxen) for 10 days prior to procedure.  Advised follow surgical center's instructions re: arrival time and when to stop eating.     (M17.12) Primary osteoarthritis of left knee  Comment:   Plan: Surgical and post-op care per primary surgical service.      (E78.5) Hyperlipidemia LDL goal <130  Comment:   Plan: Continue statin.     (J45.30) Mild persistent asthma without complication  Comment:    Plan: Doing well on advair; contniue.          --Patient is to take all scheduled medications on the day of surgery EXCEPT for modifications listed below.    No aspirin or naproxen.     APPROVAL GIVEN to proceed with proposed procedure, without further diagnostic evaluation       Signed Electronically by: Alessandro Blanco MD    Copy of this evaluation report is provided to requesting physician.    [facility] Preop Guidelines    Revised Cardiac Risk Index

## 2019-04-11 ENCOUNTER — OFFICE VISIT (OUTPATIENT)
Dept: PEDIATRICS | Facility: CLINIC | Age: 69
End: 2019-04-11
Payer: COMMERCIAL

## 2019-04-11 VITALS
WEIGHT: 233.6 LBS | SYSTOLIC BLOOD PRESSURE: 136 MMHG | HEART RATE: 80 BPM | DIASTOLIC BLOOD PRESSURE: 82 MMHG | HEIGHT: 72 IN | BODY MASS INDEX: 31.64 KG/M2 | TEMPERATURE: 97.9 F | OXYGEN SATURATION: 97 %

## 2019-04-11 DIAGNOSIS — M17.12 PRIMARY OSTEOARTHRITIS OF LEFT KNEE: ICD-10-CM

## 2019-04-11 DIAGNOSIS — E78.5 HYPERLIPIDEMIA LDL GOAL <130: ICD-10-CM

## 2019-04-11 DIAGNOSIS — J45.30 MILD PERSISTENT ASTHMA WITHOUT COMPLICATION: ICD-10-CM

## 2019-04-11 DIAGNOSIS — Z01.818 PREOP GENERAL PHYSICAL EXAM: Primary | ICD-10-CM

## 2019-04-11 PROCEDURE — 93000 ELECTROCARDIOGRAM COMPLETE: CPT | Performed by: INTERNAL MEDICINE

## 2019-04-11 PROCEDURE — 99214 OFFICE O/P EST MOD 30 MIN: CPT | Performed by: INTERNAL MEDICINE

## 2019-04-11 ASSESSMENT — MIFFLIN-ST. JEOR: SCORE: 1877.6

## 2019-04-25 ENCOUNTER — HOSPITAL ENCOUNTER (OUTPATIENT)
Dept: LAB | Facility: CLINIC | Age: 69
Discharge: HOME | End: 2019-04-25
Attending: ORTHOPAEDIC SURGERY | Admitting: ORTHOPAEDIC SURGERY
Payer: COMMERCIAL

## 2019-04-25 DIAGNOSIS — Z01.812 PRE-OPERATIVE LABORATORY EXAMINATION: ICD-10-CM

## 2019-04-25 LAB
MRSA DNA SPEC QL NAA+PROBE: NEGATIVE
SPECIMEN SOURCE: NORMAL

## 2019-04-25 PROCEDURE — 87641 MR-STAPH DNA AMP PROBE: CPT | Performed by: ORTHOPAEDIC SURGERY

## 2019-04-25 PROCEDURE — 40000829 ZZHCL STATISTIC STAPH AUREUS SUSCEPT SCREEN PCR: Performed by: ORTHOPAEDIC SURGERY

## 2019-04-25 PROCEDURE — 87640 STAPH A DNA AMP PROBE: CPT | Performed by: ORTHOPAEDIC SURGERY

## 2019-04-25 PROCEDURE — 40000830 ZZHCL STATISTIC STAPH AUREUS METH RESIST SCREEN PCR: Performed by: ORTHOPAEDIC SURGERY

## 2019-04-29 ENCOUNTER — TELEPHONE (OUTPATIENT)
Dept: PEDIATRICS | Facility: CLINIC | Age: 69
End: 2019-04-29

## 2019-04-29 NOTE — TELEPHONE ENCOUNTER
Spoke with the pt.  Pt states that specialist is requesting lab work, prior to surgery.  Pt will  and get specific lab tests that are needed and give us a call back to place the orders.    Ines Alanis RN - Triage  [facility]

## 2019-04-29 NOTE — TELEPHONE ENCOUNTER
Reason for call:  Other   Patient called regarding (reason for call): lab orders  Additional comments: Patient states that surgeons office needs labs for surgery. Is  Unsure which labs. Please place any necessary orders. Then call patient to schedule.     Phone number to reach patient:  Home number on file [phone] (home)    Best Time:  any    Can we leave a detailed message on this number?  YES

## 2019-04-30 ENCOUNTER — TRANSFERRED RECORDS (OUTPATIENT)
Dept: HEALTH INFORMATION MANAGEMENT | Facility: CLINIC | Age: 69
End: 2019-04-30

## 2019-05-01 DIAGNOSIS — Z00.00 ROUTINE GENERAL MEDICAL EXAMINATION AT A HEALTH CARE FACILITY: Primary | ICD-10-CM

## 2019-05-01 LAB
ANION GAP SERPL CALCULATED.3IONS-SCNC: 5 MMOL/L (ref 3–14)
BUN SERPL-MCNC: 15 MG/DL (ref 7–30)
CALCIUM SERPL-MCNC: 8.9 MG/DL (ref 8.5–10.1)
CHLORIDE SERPL-SCNC: 106 MMOL/L (ref 94–109)
CO2 SERPL-SCNC: 30 MMOL/L (ref 20–32)
CREAT SERPL-MCNC: 0.97 MG/DL (ref 0.66–1.25)
GFR SERPL CREATININE-BSD FRML MDRD: 80 ML/MIN/{1.73_M2}
GLUCOSE SERPL-MCNC: 101 MG/DL (ref 70–99)
POTASSIUM SERPL-SCNC: 4.4 MMOL/L (ref 3.4–5.3)
SODIUM SERPL-SCNC: 141 MMOL/L (ref 133–144)

## 2019-05-01 PROCEDURE — 36415 COLL VENOUS BLD VENIPUNCTURE: CPT | Performed by: ORTHOPAEDIC SURGERY

## 2019-05-01 PROCEDURE — 80048 BASIC METABOLIC PNL TOTAL CA: CPT | Performed by: ORTHOPAEDIC SURGERY

## 2019-05-08 ENCOUNTER — ANESTHESIA (OUTPATIENT)
Dept: SURGERY | Facility: CLINIC | Age: 69
End: 2019-05-08
Payer: COMMERCIAL

## 2019-05-08 ENCOUNTER — APPOINTMENT (OUTPATIENT)
Dept: PHYSICAL THERAPY | Facility: CLINIC | Age: 69
End: 2019-05-08
Attending: ORTHOPAEDIC SURGERY
Payer: COMMERCIAL

## 2019-05-08 ENCOUNTER — HOSPITAL ENCOUNTER (OUTPATIENT)
Facility: CLINIC | Age: 69
LOS: 1 days | Discharge: HOME | End: 2019-05-09
Attending: ORTHOPAEDIC SURGERY | Admitting: ORTHOPAEDIC SURGERY
Payer: COMMERCIAL

## 2019-05-08 ENCOUNTER — APPOINTMENT (OUTPATIENT)
Dept: GENERAL RADIOLOGY | Facility: CLINIC | Age: 69
End: 2019-05-08
Attending: ORTHOPAEDIC SURGERY
Payer: COMMERCIAL

## 2019-05-08 ENCOUNTER — ANESTHESIA EVENT (OUTPATIENT)
Dept: SURGERY | Facility: CLINIC | Age: 69
End: 2019-05-08
Payer: COMMERCIAL

## 2019-05-08 DIAGNOSIS — Z96.652 STATUS POST TOTAL KNEE REPLACEMENT, LEFT: Primary | ICD-10-CM

## 2019-05-08 LAB
CREAT SERPL-MCNC: 1.01 MG/DL (ref 0.66–1.25)
GFR SERPL CREATININE-BSD FRML MDRD: 77 ML/MIN/{1.73_M2}
HGB BLD-MCNC: 13.6 G/DL (ref 13.3–17.7)

## 2019-05-08 PROCEDURE — 99207 ZZC CDG-CODE CATEGORY CHANGED: CPT | Performed by: NURSE PRACTITIONER

## 2019-05-08 PROCEDURE — 36415 COLL VENOUS BLD VENIPUNCTURE: CPT | Performed by: PHYSICIAN ASSISTANT

## 2019-05-08 PROCEDURE — 36000063 ZZH SURGERY LEVEL 4 EA 15 ADDTL MIN: Performed by: ORTHOPAEDIC SURGERY

## 2019-05-08 PROCEDURE — 40000170 ZZH STATISTIC PRE-PROCEDURE ASSESSMENT II: Performed by: ORTHOPAEDIC SURGERY

## 2019-05-08 PROCEDURE — 37000008 ZZH ANESTHESIA TECHNICAL FEE, 1ST 30 MIN: Performed by: ORTHOPAEDIC SURGERY

## 2019-05-08 PROCEDURE — 85018 HEMOGLOBIN: CPT | Performed by: PHYSICIAN ASSISTANT

## 2019-05-08 PROCEDURE — 97530 THERAPEUTIC ACTIVITIES: CPT | Mod: GP

## 2019-05-08 PROCEDURE — 25000125 ZZHC RX 250: Performed by: ORTHOPAEDIC SURGERY

## 2019-05-08 PROCEDURE — 25800025 ZZH RX 258: Performed by: ORTHOPAEDIC SURGERY

## 2019-05-08 PROCEDURE — 25000128 H RX IP 250 OP 636: Performed by: NURSE ANESTHETIST, CERTIFIED REGISTERED

## 2019-05-08 PROCEDURE — 25800030 ZZH RX IP 258 OP 636: Performed by: ORTHOPAEDIC SURGERY

## 2019-05-08 PROCEDURE — 25000128 H RX IP 250 OP 636: Performed by: ANESTHESIOLOGY

## 2019-05-08 PROCEDURE — 25000125 ZZHC RX 250: Performed by: ANESTHESIOLOGY

## 2019-05-08 PROCEDURE — 25800030 ZZH RX IP 258 OP 636: Performed by: ANESTHESIOLOGY

## 2019-05-08 PROCEDURE — 40000986 XR KNEE PORT LT 1/2 VW

## 2019-05-08 PROCEDURE — 36000093 ZZH SURGERY LEVEL 4 1ST 30 MIN: Performed by: ORTHOPAEDIC SURGERY

## 2019-05-08 PROCEDURE — 25000125 ZZHC RX 250: Performed by: NURSE ANESTHETIST, CERTIFIED REGISTERED

## 2019-05-08 PROCEDURE — 25000125 ZZHC RX 250: Performed by: PHYSICIAN ASSISTANT

## 2019-05-08 PROCEDURE — C1776 JOINT DEVICE (IMPLANTABLE): HCPCS | Performed by: ORTHOPAEDIC SURGERY

## 2019-05-08 PROCEDURE — 37000009 ZZH ANESTHESIA TECHNICAL FEE, EACH ADDTL 15 MIN: Performed by: ORTHOPAEDIC SURGERY

## 2019-05-08 PROCEDURE — 25000132 ZZH RX MED GY IP 250 OP 250 PS 637: Performed by: ORTHOPAEDIC SURGERY

## 2019-05-08 PROCEDURE — 25000132 ZZH RX MED GY IP 250 OP 250 PS 637: Performed by: PHYSICIAN ASSISTANT

## 2019-05-08 PROCEDURE — 71000013 ZZH RECOVERY PHASE 1 LEVEL 1 EA ADDTL HR: Performed by: ORTHOPAEDIC SURGERY

## 2019-05-08 PROCEDURE — 27810169 ZZH OR IMPLANT GENERAL: Performed by: ORTHOPAEDIC SURGERY

## 2019-05-08 PROCEDURE — 25000132 ZZH RX MED GY IP 250 OP 250 PS 637: Performed by: NURSE PRACTITIONER

## 2019-05-08 PROCEDURE — 25000128 H RX IP 250 OP 636: Performed by: ORTHOPAEDIC SURGERY

## 2019-05-08 PROCEDURE — 97161 PT EVAL LOW COMPLEX 20 MIN: CPT | Mod: GP

## 2019-05-08 PROCEDURE — 99218 ZZC INITIAL OBSERVATION CARE,LEVL I: CPT | Performed by: NURSE PRACTITIONER

## 2019-05-08 PROCEDURE — 82565 ASSAY OF CREATININE: CPT | Performed by: PHYSICIAN ASSISTANT

## 2019-05-08 PROCEDURE — 27210794 ZZH OR GENERAL SUPPLY STERILE: Performed by: ORTHOPAEDIC SURGERY

## 2019-05-08 PROCEDURE — 71000012 ZZH RECOVERY PHASE 1 LEVEL 1 FIRST HR: Performed by: ORTHOPAEDIC SURGERY

## 2019-05-08 PROCEDURE — 25000128 H RX IP 250 OP 636: Performed by: PHYSICIAN ASSISTANT

## 2019-05-08 PROCEDURE — 97116 GAIT TRAINING THERAPY: CPT | Mod: GP,59

## 2019-05-08 PROCEDURE — 25800030 ZZH RX IP 258 OP 636: Performed by: PHYSICIAN ASSISTANT

## 2019-05-08 DEVICE — IMP PEG DISTAL FEMORAL STRK 5575-X-000: Type: IMPLANTABLE DEVICE | Site: KNEE | Status: FUNCTIONAL

## 2019-05-08 DEVICE — IMP BONE CEMENT SIMPLEX W/GENTAMICIN 6195-1-001: Type: IMPLANTABLE DEVICE | Site: KNEE | Status: FUNCTIONAL

## 2019-05-08 DEVICE — IMPLANTABLE DEVICE: Type: IMPLANTABLE DEVICE | Site: KNEE | Status: FUNCTIONAL

## 2019-05-08 DEVICE — IMP INSERT TIBIAL HOWM TRI SIZE 6 11MM 5532-G-611: Type: IMPLANTABLE DEVICE | Site: KNEE | Status: FUNCTIONAL

## 2019-05-08 DEVICE — IMP COMP FEM STRK TRIATHLN PS LT 6 5515-F-601: Type: IMPLANTABLE DEVICE | Site: KNEE | Status: FUNCTIONAL

## 2019-05-08 DEVICE — BONE CEMENT RADIOPAQUE SIMPLEX HV FULL DOSE 6194-1-001: Type: IMPLANTABLE DEVICE | Site: KNEE | Status: FUNCTIONAL

## 2019-05-08 RX ORDER — FENTANYL CITRATE 50 UG/ML
50-100 INJECTION, SOLUTION INTRAMUSCULAR; INTRAVENOUS
Status: COMPLETED | OUTPATIENT
Start: 2019-05-08 | End: 2019-05-08

## 2019-05-08 RX ORDER — NALOXONE HYDROCHLORIDE 0.4 MG/ML
.1-.4 INJECTION, SOLUTION INTRAMUSCULAR; INTRAVENOUS; SUBCUTANEOUS
Status: DISCONTINUED | OUTPATIENT
Start: 2019-05-08 | End: 2019-05-09 | Stop reason: HOSPADM

## 2019-05-08 RX ORDER — ACETAMINOPHEN 325 MG/1
975 TABLET ORAL EVERY 8 HOURS
Status: DISCONTINUED | OUTPATIENT
Start: 2019-05-08 | End: 2019-05-09 | Stop reason: HOSPADM

## 2019-05-08 RX ORDER — ACETAMINOPHEN 500 MG
1000 TABLET ORAL ONCE
Status: COMPLETED | OUTPATIENT
Start: 2019-05-08 | End: 2019-05-08

## 2019-05-08 RX ORDER — PROPOFOL 10 MG/ML
INJECTION, EMULSION INTRAVENOUS CONTINUOUS PRN
Status: DISCONTINUED | OUTPATIENT
Start: 2019-05-08 | End: 2019-05-08

## 2019-05-08 RX ORDER — PROCHLORPERAZINE MALEATE 5 MG
5 TABLET ORAL EVERY 6 HOURS PRN
Status: DISCONTINUED | OUTPATIENT
Start: 2019-05-08 | End: 2019-05-09 | Stop reason: HOSPADM

## 2019-05-08 RX ORDER — KETOROLAC TROMETHAMINE 30 MG/ML
INJECTION, SOLUTION INTRAMUSCULAR; INTRAVENOUS PRN
Status: DISCONTINUED | OUTPATIENT
Start: 2019-05-08 | End: 2019-05-08 | Stop reason: HOSPADM

## 2019-05-08 RX ORDER — HYDROMORPHONE HYDROCHLORIDE 1 MG/ML
.3-.5 INJECTION, SOLUTION INTRAMUSCULAR; INTRAVENOUS; SUBCUTANEOUS EVERY 5 MIN PRN
Status: DISCONTINUED | OUTPATIENT
Start: 2019-05-08 | End: 2019-05-08 | Stop reason: HOSPADM

## 2019-05-08 RX ORDER — AMOXICILLIN 250 MG
2 CAPSULE ORAL 2 TIMES DAILY
Status: DISCONTINUED | OUTPATIENT
Start: 2019-05-08 | End: 2019-05-09 | Stop reason: HOSPADM

## 2019-05-08 RX ORDER — VANCOMYCIN HYDROCHLORIDE 1 G/20ML
INJECTION, POWDER, LYOPHILIZED, FOR SOLUTION INTRAVENOUS PRN
Status: DISCONTINUED | OUTPATIENT
Start: 2019-05-08 | End: 2019-05-08 | Stop reason: HOSPADM

## 2019-05-08 RX ORDER — ACETAMINOPHEN 325 MG/1
650 TABLET ORAL EVERY 4 HOURS PRN
Status: DISCONTINUED | OUTPATIENT
Start: 2019-05-11 | End: 2019-05-09 | Stop reason: HOSPADM

## 2019-05-08 RX ORDER — LIDOCAINE 40 MG/G
CREAM TOPICAL
Status: DISCONTINUED | OUTPATIENT
Start: 2019-05-08 | End: 2019-05-09 | Stop reason: HOSPADM

## 2019-05-08 RX ORDER — KETOROLAC TROMETHAMINE 15 MG/ML
15 INJECTION, SOLUTION INTRAMUSCULAR; INTRAVENOUS EVERY 6 HOURS
Status: COMPLETED | OUTPATIENT
Start: 2019-05-08 | End: 2019-05-09

## 2019-05-08 RX ORDER — DEXTROSE MONOHYDRATE, SODIUM CHLORIDE, AND POTASSIUM CHLORIDE 50; 1.49; 4.5 G/1000ML; G/1000ML; G/1000ML
INJECTION, SOLUTION INTRAVENOUS CONTINUOUS
Status: DISCONTINUED | OUTPATIENT
Start: 2019-05-08 | End: 2019-05-09 | Stop reason: HOSPADM

## 2019-05-08 RX ORDER — SIMVASTATIN 20 MG
20 TABLET ORAL AT BEDTIME
Status: DISCONTINUED | OUTPATIENT
Start: 2019-05-08 | End: 2019-05-09 | Stop reason: HOSPADM

## 2019-05-08 RX ORDER — NAPROXEN SODIUM 220 MG
220 TABLET ORAL 2 TIMES DAILY
Status: ON HOLD | COMMUNITY
End: 2019-05-09

## 2019-05-08 RX ORDER — ONDANSETRON 2 MG/ML
4 INJECTION INTRAMUSCULAR; INTRAVENOUS EVERY 6 HOURS PRN
Status: DISCONTINUED | OUTPATIENT
Start: 2019-05-08 | End: 2019-05-09 | Stop reason: HOSPADM

## 2019-05-08 RX ORDER — ONDANSETRON 2 MG/ML
INJECTION INTRAMUSCULAR; INTRAVENOUS PRN
Status: DISCONTINUED | OUTPATIENT
Start: 2019-05-08 | End: 2019-05-08

## 2019-05-08 RX ORDER — BUPIVACAINE HYDROCHLORIDE 7.5 MG/ML
INJECTION, SOLUTION INTRASPINAL PRN
Status: DISCONTINUED | OUTPATIENT
Start: 2019-05-08 | End: 2019-05-08

## 2019-05-08 RX ORDER — MULTIVITAMIN,THERAPEUTIC
1 TABLET ORAL DAILY
COMMUNITY

## 2019-05-08 RX ORDER — CELECOXIB 200 MG/1
400 CAPSULE ORAL ONCE
Status: COMPLETED | OUTPATIENT
Start: 2019-05-08 | End: 2019-05-08

## 2019-05-08 RX ORDER — AMOXICILLIN 250 MG
1 CAPSULE ORAL 2 TIMES DAILY
Status: DISCONTINUED | OUTPATIENT
Start: 2019-05-08 | End: 2019-05-09 | Stop reason: HOSPADM

## 2019-05-08 RX ORDER — LIDOCAINE HYDROCHLORIDE 20 MG/ML
INJECTION, SOLUTION INFILTRATION; PERINEURAL PRN
Status: DISCONTINUED | OUTPATIENT
Start: 2019-05-08 | End: 2019-05-08

## 2019-05-08 RX ORDER — SODIUM PHOSPHATE,MONO-DIBASIC 19G-7G/118
1 ENEMA (ML) RECTAL 2 TIMES DAILY
COMMUNITY

## 2019-05-08 RX ORDER — HYDROXYZINE HYDROCHLORIDE 10 MG/1
10 TABLET, FILM COATED ORAL EVERY 6 HOURS PRN
Status: DISCONTINUED | OUTPATIENT
Start: 2019-05-08 | End: 2019-05-09 | Stop reason: HOSPADM

## 2019-05-08 RX ORDER — MEPERIDINE HYDROCHLORIDE 25 MG/ML
12.5 INJECTION INTRAMUSCULAR; INTRAVENOUS; SUBCUTANEOUS EVERY 5 MIN PRN
Status: DISCONTINUED | OUTPATIENT
Start: 2019-05-08 | End: 2019-05-08 | Stop reason: HOSPADM

## 2019-05-08 RX ORDER — CEFAZOLIN SODIUM 2 G/100ML
2 INJECTION, SOLUTION INTRAVENOUS
Status: COMPLETED | OUTPATIENT
Start: 2019-05-08 | End: 2019-05-08

## 2019-05-08 RX ORDER — ACETAMINOPHEN 500 MG
500 TABLET ORAL DAILY PRN
Status: ON HOLD | COMMUNITY
End: 2019-05-09

## 2019-05-08 RX ORDER — ONDANSETRON 4 MG/1
4 TABLET, ORALLY DISINTEGRATING ORAL EVERY 30 MIN PRN
Status: DISCONTINUED | OUTPATIENT
Start: 2019-05-08 | End: 2019-05-08 | Stop reason: HOSPADM

## 2019-05-08 RX ORDER — METHYLPREDNISOLONE ACETATE 80 MG/ML
INJECTION, SUSPENSION INTRA-ARTICULAR; INTRALESIONAL; INTRAMUSCULAR; SOFT TISSUE PRN
Status: DISCONTINUED | OUTPATIENT
Start: 2019-05-08 | End: 2019-05-08 | Stop reason: HOSPADM

## 2019-05-08 RX ORDER — CEFAZOLIN SODIUM 1 G/3ML
1 INJECTION, POWDER, FOR SOLUTION INTRAMUSCULAR; INTRAVENOUS SEE ADMIN INSTRUCTIONS
Status: DISCONTINUED | OUTPATIENT
Start: 2019-05-08 | End: 2019-05-08 | Stop reason: HOSPADM

## 2019-05-08 RX ORDER — FENTANYL CITRATE 50 UG/ML
INJECTION, SOLUTION INTRAMUSCULAR; INTRAVENOUS PRN
Status: DISCONTINUED | OUTPATIENT
Start: 2019-05-08 | End: 2019-05-08

## 2019-05-08 RX ORDER — DILTIAZEM HYDROCHLORIDE 240 MG/1
240 CAPSULE, EXTENDED RELEASE ORAL EVERY EVENING
Status: DISCONTINUED | OUTPATIENT
Start: 2019-05-08 | End: 2019-05-09 | Stop reason: HOSPADM

## 2019-05-08 RX ORDER — ONDANSETRON 2 MG/ML
4 INJECTION INTRAMUSCULAR; INTRAVENOUS EVERY 30 MIN PRN
Status: DISCONTINUED | OUTPATIENT
Start: 2019-05-08 | End: 2019-05-08 | Stop reason: HOSPADM

## 2019-05-08 RX ORDER — SODIUM CHLORIDE, SODIUM LACTATE, POTASSIUM CHLORIDE, CALCIUM CHLORIDE 600; 310; 30; 20 MG/100ML; MG/100ML; MG/100ML; MG/100ML
INJECTION, SOLUTION INTRAVENOUS CONTINUOUS
Status: DISCONTINUED | OUTPATIENT
Start: 2019-05-08 | End: 2019-05-08 | Stop reason: HOSPADM

## 2019-05-08 RX ORDER — NALOXONE HYDROCHLORIDE 0.4 MG/ML
.1-.4 INJECTION, SOLUTION INTRAMUSCULAR; INTRAVENOUS; SUBCUTANEOUS
Status: DISCONTINUED | OUTPATIENT
Start: 2019-05-08 | End: 2019-05-08

## 2019-05-08 RX ORDER — FENTANYL CITRATE 50 UG/ML
25-50 INJECTION, SOLUTION INTRAMUSCULAR; INTRAVENOUS
Status: DISCONTINUED | OUTPATIENT
Start: 2019-05-08 | End: 2019-05-08 | Stop reason: HOSPADM

## 2019-05-08 RX ORDER — HYDROMORPHONE HYDROCHLORIDE 1 MG/ML
.3-.5 INJECTION, SOLUTION INTRAMUSCULAR; INTRAVENOUS; SUBCUTANEOUS
Status: DISCONTINUED | OUTPATIENT
Start: 2019-05-08 | End: 2019-05-09 | Stop reason: HOSPADM

## 2019-05-08 RX ORDER — OXYCODONE HYDROCHLORIDE 5 MG/1
5-10 TABLET ORAL
Status: DISCONTINUED | OUTPATIENT
Start: 2019-05-08 | End: 2019-05-09 | Stop reason: HOSPADM

## 2019-05-08 RX ORDER — MAGNESIUM HYDROXIDE 1200 MG/15ML
LIQUID ORAL PRN
Status: DISCONTINUED | OUTPATIENT
Start: 2019-05-08 | End: 2019-05-08 | Stop reason: HOSPADM

## 2019-05-08 RX ORDER — CEFAZOLIN SODIUM 2 G/100ML
2 INJECTION, SOLUTION INTRAVENOUS EVERY 8 HOURS
Status: COMPLETED | OUTPATIENT
Start: 2019-05-08 | End: 2019-05-09

## 2019-05-08 RX ORDER — ALBUTEROL SULFATE 0.83 MG/ML
2.5 SOLUTION RESPIRATORY (INHALATION) EVERY 4 HOURS PRN
Status: DISCONTINUED | OUTPATIENT
Start: 2019-05-08 | End: 2019-05-08 | Stop reason: HOSPADM

## 2019-05-08 RX ORDER — EPHEDRINE SULFATE 50 MG/ML
INJECTION, SOLUTION INTRAMUSCULAR; INTRAVENOUS; SUBCUTANEOUS PRN
Status: DISCONTINUED | OUTPATIENT
Start: 2019-05-08 | End: 2019-05-08

## 2019-05-08 RX ORDER — BUPIVACAINE HYDROCHLORIDE 5 MG/ML
INJECTION, SOLUTION PERINEURAL PRN
Status: DISCONTINUED | OUTPATIENT
Start: 2019-05-08 | End: 2019-05-08 | Stop reason: HOSPADM

## 2019-05-08 RX ORDER — TEMAZEPAM 7.5 MG/1
7.5 CAPSULE ORAL
Status: DISCONTINUED | OUTPATIENT
Start: 2019-05-09 | End: 2019-05-09 | Stop reason: HOSPADM

## 2019-05-08 RX ORDER — PREGABALIN 150 MG/1
150 CAPSULE ORAL DAILY
Status: COMPLETED | OUTPATIENT
Start: 2019-05-08 | End: 2019-05-08

## 2019-05-08 RX ORDER — ONDANSETRON 4 MG/1
4 TABLET, ORALLY DISINTEGRATING ORAL EVERY 6 HOURS PRN
Status: DISCONTINUED | OUTPATIENT
Start: 2019-05-08 | End: 2019-05-09 | Stop reason: HOSPADM

## 2019-05-08 RX ADMIN — BUPIVACAINE HYDROCHLORIDE 20 ML GIVEN: 5 INJECTION, SOLUTION EPIDURAL; INTRACAUDAL; PERINEURAL at 07:01

## 2019-05-08 RX ADMIN — ACETAMINOPHEN 975 MG: 325 TABLET, FILM COATED ORAL at 13:02

## 2019-05-08 RX ADMIN — SODIUM CHLORIDE, POTASSIUM CHLORIDE, SODIUM LACTATE AND CALCIUM CHLORIDE: 600; 310; 30; 20 INJECTION, SOLUTION INTRAVENOUS at 09:04

## 2019-05-08 RX ADMIN — KETOROLAC TROMETHAMINE 15 MG: 15 INJECTION, SOLUTION INTRAMUSCULAR; INTRAVENOUS at 23:38

## 2019-05-08 RX ADMIN — ACETAMINOPHEN 975 MG: 325 TABLET, FILM COATED ORAL at 21:08

## 2019-05-08 RX ADMIN — KETOROLAC TROMETHAMINE 15 MG: 15 INJECTION, SOLUTION INTRAMUSCULAR; INTRAVENOUS at 17:57

## 2019-05-08 RX ADMIN — SIMVASTATIN 20 MG: 20 TABLET, FILM COATED ORAL at 21:08

## 2019-05-08 RX ADMIN — ACETAMINOPHEN 1000 MG: 500 TABLET, FILM COATED ORAL at 06:11

## 2019-05-08 RX ADMIN — MIDAZOLAM HYDROCHLORIDE 1.5 MG: 1 INJECTION, SOLUTION INTRAMUSCULAR; INTRAVENOUS at 07:10

## 2019-05-08 RX ADMIN — SODIUM CHLORIDE 1 G: 9 INJECTION, SOLUTION INTRAVENOUS at 10:04

## 2019-05-08 RX ADMIN — POTASSIUM CHLORIDE, DEXTROSE MONOHYDRATE AND SODIUM CHLORIDE: 150; 5; 450 INJECTION, SOLUTION INTRAVENOUS at 13:02

## 2019-05-08 RX ADMIN — LIDOCAINE HYDROCHLORIDE 0.5 ML: 10 INJECTION, SOLUTION EPIDURAL; INFILTRATION; INTRACAUDAL; PERINEURAL at 06:12

## 2019-05-08 RX ADMIN — Medication 10 MG: at 08:11

## 2019-05-08 RX ADMIN — CEFAZOLIN SODIUM 2 G: 2 INJECTION, SOLUTION INTRAVENOUS at 17:57

## 2019-05-08 RX ADMIN — CEFAZOLIN 2 G: 1 INJECTION, POWDER, FOR SOLUTION INTRAMUSCULAR; INTRAVENOUS at 07:42

## 2019-05-08 RX ADMIN — FENTANYL CITRATE 75 MCG: 50 INJECTION, SOLUTION INTRAMUSCULAR; INTRAVENOUS at 07:10

## 2019-05-08 RX ADMIN — SODIUM CHLORIDE, POTASSIUM CHLORIDE, SODIUM LACTATE AND CALCIUM CHLORIDE: 600; 310; 30; 20 INJECTION, SOLUTION INTRAVENOUS at 08:01

## 2019-05-08 RX ADMIN — SENNOSIDES AND DOCUSATE SODIUM 1 TABLET: 8.6; 5 TABLET ORAL at 13:02

## 2019-05-08 RX ADMIN — PREGABALIN 150 MG: 150 CAPSULE ORAL at 06:11

## 2019-05-08 RX ADMIN — OXYCODONE HYDROCHLORIDE 5 MG: 5 TABLET ORAL at 15:07

## 2019-05-08 RX ADMIN — CEFAZOLIN SODIUM 2 G: 2 INJECTION, SOLUTION INTRAVENOUS at 06:12

## 2019-05-08 RX ADMIN — SODIUM CHLORIDE, POTASSIUM CHLORIDE, SODIUM LACTATE AND CALCIUM CHLORIDE: 600; 310; 30; 20 INJECTION, SOLUTION INTRAVENOUS at 06:12

## 2019-05-08 RX ADMIN — OXYCODONE HYDROCHLORIDE 5 MG: 5 TABLET ORAL at 21:12

## 2019-05-08 RX ADMIN — CELECOXIB 400 MG: 200 CAPSULE ORAL at 06:11

## 2019-05-08 RX ADMIN — SENNOSIDES AND DOCUSATE SODIUM 1 TABLET: 8.6; 5 TABLET ORAL at 21:08

## 2019-05-08 RX ADMIN — LIDOCAINE HYDROCHLORIDE 80 MG: 20 INJECTION, SOLUTION INFILTRATION; PERINEURAL at 07:45

## 2019-05-08 RX ADMIN — FENTANYL CITRATE 50 MCG: 50 INJECTION, SOLUTION INTRAMUSCULAR; INTRAVENOUS at 07:35

## 2019-05-08 RX ADMIN — DILTIAZEM HYDROCHLORIDE 240 MG: 240 CAPSULE, EXTENDED RELEASE ORAL at 20:06

## 2019-05-08 RX ADMIN — CEFAZOLIN 1 G: 1 INJECTION, POWDER, FOR SOLUTION INTRAMUSCULAR; INTRAVENOUS at 09:42

## 2019-05-08 RX ADMIN — SODIUM CHLORIDE, POTASSIUM CHLORIDE, SODIUM LACTATE AND CALCIUM CHLORIDE: 600; 310; 30; 20 INJECTION, SOLUTION INTRAVENOUS at 07:30

## 2019-05-08 RX ADMIN — BUPIVACAINE HYDROCHLORIDE IN DEXTROSE 12 MG: 7.5 INJECTION, SOLUTION SUBARACHNOID at 07:40

## 2019-05-08 RX ADMIN — Medication 10 MG: at 07:45

## 2019-05-08 RX ADMIN — Medication 10 MG: at 08:17

## 2019-05-08 RX ADMIN — Medication 10 MG: at 07:51

## 2019-05-08 RX ADMIN — PROPOFOL 100 MCG/KG/MIN: 10 INJECTION, EMULSION INTRAVENOUS at 07:46

## 2019-05-08 RX ADMIN — ONDANSETRON 4 MG: 2 INJECTION INTRAMUSCULAR; INTRAVENOUS at 10:04

## 2019-05-08 RX ADMIN — Medication 0.5 MG: at 11:23

## 2019-05-08 RX ADMIN — Medication 5 MG: at 08:06

## 2019-05-08 RX ADMIN — MIDAZOLAM 2 MG: 1 INJECTION INTRAMUSCULAR; INTRAVENOUS at 07:35

## 2019-05-08 RX ADMIN — SODIUM CHLORIDE 1 G: 9 INJECTION, SOLUTION INTRAVENOUS at 07:50

## 2019-05-08 ASSESSMENT — LIFESTYLE VARIABLES: TOBACCO_USE: 0

## 2019-05-08 ASSESSMENT — MIFFLIN-ST. JEOR: SCORE: 1870.35

## 2019-05-08 ASSESSMENT — ENCOUNTER SYMPTOMS: ORTHOPNEA: 0

## 2019-05-08 NOTE — PLAN OF CARE
Discharge Planner PT   Patient plan for discharge: Home with spouse's assist  Current status: PT eval completed, treatment initiated. Pt is a 68 yr old male admitted for L TKA POD 0. Pt lives with spouse in a split level home with 3 SHARMAINE without rail support. Pt has 8 steps to access bed and bathroom with 1 rail support. Pt owns a std cane and RW at home.   Currently pt is at CGA with supine<>sit, CGA with STS tranfers and gait x 160 ft using RW and CGA. AAROM: 4-85 deg.   Barriers to return to prior living situation: Steps to enter, steps to access bed and bathroom.  Recommendations for discharge: Home with assist of family and OP PT.   Rationale for recommendations: Pt will need family assist with stair management for safety.        Entered by: Katie Guerra 05/08/2019 4:54 PM

## 2019-05-08 NOTE — ANESTHESIA PREPROCEDURE EVALUATION
Anesthesia Pre-Procedure Evaluation    Patient: Dustin Lan   MRN: [id number] : 1950          Preoperative Diagnosis: djd    Procedure(s):  ARTHROPLASTY LEFT KNEE (NATHALIE)^    Past Medical History:   Diagnosis Date     Elevated blood pressure reading without diagnosis of hypertension      Personal history of colonic polyps 2007    tubular adenoma, ileocecal valve, repeat      Plantar fascial fibromatosis      Pure hypercholesterolemia      Past Surgical History:   Procedure Laterality Date     ARTHROSCOPY KNEE RT/LT  2008    right     HC COLONOSCOPY THRU STOMA, DIAGNOSTIC  2007    ileocecal valve polyp, tubular adenoma, repeat in 3 yrs       Anesthesia Evaluation     . Pt has had prior anesthetic.     No history of anesthetic complications          ROS/MED HX    ENT/Pulmonary:     (+)asthma , . .   (-) tobacco use and sleep apnea   Neurologic:       Cardiovascular: Comment: H/o SVT    (+) Dyslipidemia, hypertension----. : . . . :. .      (-) GODINEZ, orthopnea/PND and syncope   METS/Exercise Tolerance:  >4 METS   Hematologic:         Musculoskeletal: Comment: H/o back surgery , L4-5 free frag disk removal   (+) arthritis,  -       GI/Hepatic:        (-) GERD   Renal/Genitourinary:         Endo: Comment: IFG    (+) Obesity, .      Psychiatric:         Infectious Disease:         Malignancy:         Other: Comment: eczema                          Physical Exam      Airway   Mallampati: II  TM distance: >3 FB  Neck ROM: full    Dental   (+) caps    Cardiovascular   Rhythm and rate: regular      Pulmonary    breath sounds clear to auscultation            Lab Results   Component Value Date    HGB 13.6 2019     2019    POTASSIUM 4.4 2019    CHLORIDE 106 2019    CO2 30 2019    BUN 15 2019    CR 1.01 2019     (H) 2019    BRICE 8.9 2019    ALBUMIN 3.9 2018    PROTTOTAL 6.8 2018    ALT 24 2018    AST 26 2018     ALKPHOS 80 11/19/2018    BILITOTAL 0.6 11/19/2018    TSH 1.32 02/27/2018       Preop Vitals  BP Readings from Last 3 Encounters:   05/08/19 119/83   04/11/19 136/82   01/07/19 122/86    Pulse Readings from Last 3 Encounters:   05/08/19 66   04/11/19 80   01/07/19 73      Resp Readings from Last 3 Encounters:   05/08/19 16   02/24/18 12   05/01/17 16    SpO2 Readings from Last 3 Encounters:   05/08/19 96%   04/11/19 97%   01/07/19 98%      Temp Readings from Last 1 Encounters:   05/08/19 35.9  C (96.6  F) (Temporal)    Ht Readings from Last 1 Encounters:   05/08/19 1.829 m (6')      Wt Readings from Last 1 Encounters:   05/08/19 105.2 kg (232 lb)    Estimated body mass index is 31.46 kg/m  as calculated from the following:    Height as of this encounter: 1.829 m (6').    Weight as of this encounter: 105.2 kg (232 lb).     Allergies   Allergen Reactions     Cats      Social History     Tobacco Use     Smoking status: Never Smoker     Smokeless tobacco: Never Used   Substance Use Topics     Alcohol use: Yes     Alcohol/week: 0.0 - 1.8 oz     Comment: 5 - 7 beers a week     Prior to Admission medications    Medication Sig Start Date End Date Taking? Authorizing Provider   acetaminophen (TYLENOL) 500 MG tablet Take 500 mg by mouth daily as needed for mild pain   Yes Reported, Patient   Ascorbic Acid (VITAMIN C PO) Take 1 tablet by mouth every morning   Yes Reported, Patient   aspirin 81 MG chewable tablet Take 1 tablet (81 mg) by mouth daily 4/13/18  Yes Iesha Kohler MD   diltiazem ER (TIAZAC) 240 MG 24 hr ER beaded capsule Take 1 capsule (240 mg) by mouth daily 1/25/19  Yes Alessandro Blanco MD   fluticasone-salmeterol (ADVAIR DISKUS) 250-50 MCG/DOSE inhaler Inhale 1 puff into the lungs 2 times daily  Patient taking differently: Inhale 1 puff into the lungs At Bedtime  3/4/19  Yes Alessandro Blanco MD   glucosamine-chondroitin 500-400 MG CAPS per capsule Take 1 capsule by mouth 2 times daily   Yes Reported, Patient    multivitamin, therapeutic (THERA-VIT) TABS tablet Take 1 tablet by mouth daily   Yes Reported, Patient   naproxen sodium (ANAPROX) 220 MG tablet Take 220 mg by mouth 2 times daily   Yes Reported, Patient   simvastatin (ZOCOR) 20 MG tablet Take 1 tablet (20 mg) by mouth At Bedtime 1/25/19 1/25/20 Yes Alessandro Blanco MD     Current Facility-Administered Medications Ordered in Epic   Medication Dose Route Frequency Last Rate Last Dose     ceFAZolin (ANCEF) 1 g vial to attach to  ml bag for ADULT or 50 ml bag for PEDS  1 g Intravenous See Admin Instructions         lactated ringers infusion   Intravenous Continuous 25 mL/hr at 05/08/19 0612       lidocaine 1 % 0.1-1 mL  0.1-1 mL Other Q1H PRN   0.5 mL at 05/08/19 0612     sodium chloride (PF) 0.9% PF flush 3 mL  3 mL Intracatheter Q8H         tranexamic acid (CYKLOKAPRON) 1 g in sodium chloride 0.9 % 60 mL bolus  1 g Intravenous Once         tranexamic acid (CYKLOKAPRON) 1 g in sodium chloride 0.9 % 60 mL bolus  1 g Intravenous Once         No current [facility]-ordered outpatient medications on file.       lactated ringers 25 mL/hr at 05/08/19 0612     Recent Labs   Lab Test 05/08/19  0606 05/01/19  0816 11/19/18  0811   NA  --  141 141   POTASSIUM  --  4.4 3.7   CHLORIDE  --  106 105   CO2  --  30 29   ANIONGAP  --  5 7   GLC  --  101* 95   BUN  --  15 14   CR 1.01 0.97 0.94   BRICE  --  8.9 9.1     Recent Labs   Lab Test 05/08/19  0606 02/19/19  0915   HGB 13.6 13.0*     No results for input(s): ABO, RH in the last 74502 hours.  Recent Labs   Lab Test 02/27/18  1040   TROPI <0.015     No results for input(s): PH, PCO2, PO2, HCO3 in the last 77367 hours.  No results for input(s): HCG in the last 19384 hours.  No results found for this or any previous visit (from the past 744 hour(s)).    RECENT LABS:       Anesthesia Plan      History & Physical Review  History and physical reviewed and following examination; no interval change.    ASA Status:  2 .        Plan for  Spinal and Periph. Nerve Block for postop pain   PONV prophylaxis:  Ondansetron (or other 5HT-3)       Postoperative Care      Consents  Anesthetic plan, risks, benefits and alternatives discussed with:  Patient..                 Bindu Gillette MD

## 2019-05-08 NOTE — ANESTHESIA POSTPROCEDURE EVALUATION
Patient: Dustin Lan    Procedure(s):  ARTHROPLASTY LEFT KNEE  Inject steroid right knee    Diagnosis:djd  Diagnosis Additional Information: No value filed.    Anesthesia Type:  Spinal, Periph. Nerve Block for postop pain    Note:  Anesthesia Post Evaluation    Patient location during evaluation: PACU  Patient participation: Able to fully participate in evaluation  Level of consciousness: awake  Pain management: adequate  Airway patency: patent  Cardiovascular status: acceptable  Respiratory status: acceptable  Hydration status: acceptable  PONV: controlled     Anesthetic complications: None          Last vitals:  Vitals:    05/08/19 1304 05/08/19 1358 05/08/19 1508   BP: 133/84 122/80 120/77   Pulse:      Resp: 14 14 14   Temp: 36.7  C (98  F) 36.4  C (97.6  F) 36.4  C (97.6  F)   SpO2: 96% 97% 98%         Electronically Signed By: Bindu Gillette MD  May 8, 2019  3:42 PM

## 2019-05-08 NOTE — ANESTHESIA PROCEDURE NOTES
Peripheral nerve/Neuraxial procedure note : Femoral and Adductor canal  Pre-Procedure  Performed by Bindu Gillette MD  Location: pre-op      Pre-Anesthestic Checklist: patient identified, site marked, risks and benefits discussed, informed consent, monitors and equipment checked, pre-op evaluation, at physician/surgeon's request and post-op pain management    Timeout  Correct Patient: Yes   Correct Procedure: Yes   Correct Site: Yes   Correct Laterality: Yes     Site Marked: Yes   .   Procedure Documentation    .    Procedure:  left  Femoral and Adductor canal.  Local skin infiltrated with 5 mL of 1% lidocaine.     Ultrasound used to identify targeted nerve, plexus, or vascular marker and placed a needle adjacent to it., Ultrasound was used to visualize the spread of the anesthetic in close proximity to the above stated nerve. A permanent image is entered into the patient's record.  Patient Prep;mask, sterile gloves, chlorhexidine gluconate and isopropyl alcohol, patient draped.  .  Needle: short bevel Needle Gauge: 21.  Needle Length (millimeters) 90  Insertion Method: Single Shot.       Assessment/Narrative  Paresthesias: No.  .  The placement was negative for: blood aspirated, painful injection and site bleeding.  Bolus given via needle..   Secured via.   Complications: none. Comments:  Real time US used to identify nerve, needle and observe injection of local anesthetic around nerve.  Low pressure. Talking during PNB, no issues.  No nerve swelling.  No complications.  US image documented.

## 2019-05-08 NOTE — PLAN OF CARE
POD 0 from a L knee arthroplasty. A&Ox4. CMS - LLE 4-5/5. Bowel sounds +x4, - flatus, tolerating regular diet. VSS on 1L O2. Ace wrap to L knee CDI. Up with A1; ambulating in hallway. C/o mild pain, decreased with ice & oxycodone. Plan for discharge home tomorrow.

## 2019-05-08 NOTE — PROGRESS NOTES
05/08/19 1600   Quick Adds   Type of Visit Initial PT Evaluation   Living Environment   Lives With spouse   Living Arrangements house   Home Accessibility stairs to enter home;stairs within home   Number of Stairs, Main Entrance 3   Stair Railings, Main Entrance none   Number of Stairs, Within Home, Primary 8   Stair Railings, Within Home, Primary railing on right side (ascending)   Transportation Anticipated family or friend will provide   Living Environment Comment Pt lives with spouse in a split level home with 3 SHARMAINE without rails and bed/bathroom on the top floor with 8 steps to manage.    Self-Care   Usual Activity Tolerance good   Current Activity Tolerance moderate   Regular Exercise No   Equipment Currently Used at Home none   Activity/Exercise/Self-Care Comment Pt owns a std walker, RW and std cane. Pt was ind with ADL's   Functional Level Prior   Ambulation 0-->independent   Transferring 0-->independent   Toileting 0-->independent   Bathing 0-->independent   Communication 0-->understands/communicates without difficulty   Swallowing 0-->swallows foods/liquids without difficulty   Cognition 0 - no cognition issues reported   Fall history within last six months no   Which of the above functional risks had a recent onset or change? none   Prior Functional Level Comment Pt was ind with ambulation.   General Information   Onset of Illness/Injury or Date of Surgery - Date 05/08/19   Referring Physician Miguel Buckley MD   Patient/Family Goals Statement Go home   Pertinent History of Current Problem (include personal factors and/or comorbidities that impact the POC) Pt is a 68 yr old male admitted for L TKA, POD 0   Precautions/Limitations fall precautions   Weight-Bearing Status - LLE weight-bearing as tolerated   Weight-Bearing Status - RLE full weight-bearing   General Observations Cooperative   General Info Comments Activity: up in chair, ambualte with assist.    Cognitive Status Examination    Orientation orientation to person, place and time   Level of Consciousness alert   Follows Commands and Answers Questions 100% of the time   Personal Safety and Judgment intact   Memory intact   Pain Assessment   Patient Currently in Pain Yes, see Vital Sign flowsheet  (1/10 at rest and 3/10 with ambualtion.)   Range of Motion (ROM)   ROM Comment See daily doc for L knee ROM, WNL in all other LE joints   Strength   Strength Comments L knee: NT, otherwise 4/5   Bed Mobility   Bed Mobility Comments Supine<>sit: [facility]   Transfer Skills   Transfer Comments STS at [facility]   Gait   Gait Comments 20 ft using RW and [facility]   Balance   Balance Comments sitting: independent   Sensory Examination   Sensory Perception no deficits were identified   Coordination   Coordination no deficits were identified   Muscle Tone   Muscle Tone no deficits were identified   Modality Interventions   Planned Modality Interventions Cryotherapy   General Therapy Interventions   Planned Therapy Interventions balance training;bed mobility training;gait training;ROM;strengthening;stretching;transfer training;risk factor education;home program guidelines;progressive activity/exercise   Clinical Impression   Criteria for Skilled Therapeutic Intervention yes, treatment indicated   PT Diagnosis impaired gait   Influenced by the following impairments decreased strength, decreased ROM and activity toelrance   Functional limitations due to impairments assistance needed with mobility   Clinical Presentation Stable/Uncomplicated   Clinical Presentation Rationale current fucntional presentation   Clinical Decision Making (Complexity) Low complexity   Therapy Frequency` 2 times/day   Predicted Duration of Therapy Intervention (days/wks) 3   Anticipated Discharge Disposition Home with Assist;Home with Outpatient Therapy   Risk & Benefits of therapy have been explained Yes   Patient, Family & other staff in agreement with plan of care Yes   Clinical Impression Comments  "Pt presents with decreased ROM, strength and activity tolerance limiting fucntional mobility.Pt will benefit from continued skilled PT to acheive PLOF.    [facility] AM-PAC  \"6 Clicks\" V.2 Basic Mobility Inpatient Short Form   1. Turning from your back to your side while in a flat bed without using bedrails? 3 - A Little   2. Moving from lying on your back to sitting on the side of a flat bed without using bedrails? 3 - A Little   3. Moving to and from a bed to a chair (including a wheelchair)? 3 - A Little   4. Standing up from a chair using your arms (e.g., wheelchair, or bedside chair)? 3 - A Little   5. To walk in hospital room? 3 - A Little   6. Climbing 3-5 steps with a railing? 3 - A Little   Basic Mobility Raw Score (Score out of 24.Lower scores equate to lower levels of function) 18   Total Evaluation Time   Total Evaluation Time (Minutes) 15     "

## 2019-05-08 NOTE — ANESTHESIA CARE TRANSFER NOTE
Patient: Dustin Lan    Procedure(s):  ARTHROPLASTY LEFT KNEE  Inject steroid right knee    Diagnosis: djd  Diagnosis Additional Information: No value filed.    Anesthesia Type:   Spinal, Periph. Nerve Block for postop pain     Note:  Airway :Room Air  Patient transferred to:PACU  Handoff Report: Identifed the Patient, Identified the Reponsible Provider, Reviewed the pertinent medical history, Discussed the surgical course, Reviewed Intra-OP anesthesia mangement and issues during anesthesia, Set expectations for post-procedure period and Allowed opportunity for questions and acknowledgement of understanding      Vitals: (Last set prior to Anesthesia Care Transfer)    CRNA VITALS  5/8/2019 1014 - 5/8/2019 1052      5/8/2019             Resp Rate (set):  10                Electronically Signed By: DONY Currie CRNA  May 8, 2019  10:52 AM

## 2019-05-08 NOTE — CONSULTS
[facility]  Consult Note - Hospitalist Service     Date of Admission:  5/8/2019  Consult Requested by: Dr. Brice  Reason for Consult: Medical Management    Assessment & Plan   Dustin Lan is a 68 year old male admitted on 5/8/2019. He is status post left total knee arthroplasty.  Patient has a past medical history of moderate-severe asthma, hyperlipidemia, hypertension and PSVT.    S/P left total knee arthroplasty  Right knee cortisone injection   Patient is postop day 0 from the above surgery with Dr. Brice.  Surgery completed under spinal anesthesia, no surgical complications noted,  cc.  --Defer pain management, disposition, therapies, DVT and antibiotic prophylaxis to primary service    PSVT, hypertension, hyperlipidemia  [PTA regimen diltiazem]  BP fairly well controlled in the outpatient setting on diltiazem.  Patient had an isolated episode of PSVT in 4/2018 on his ZIO Patch.  He underwent a stress echo which did not show any issues with ischemia but did have a 10 beat run of SVT during recovery time no recent issues since initiation of diltiazem.  Continues on statin, no reported side effects of medication. Denies recent CP, exertional symptoms, dizziness, palpitations.   --Restart diltiazem with parameters    Asthma  [PTA regimen Advair and albuterol]  Long-standing history of moderate/severe asthma.  Patient states overall he has been doing well without any significant recent flareups.  He did have an episode of bronchitis approximately 10 days preoperatively.  He uses Advair daily and has as needed albuterol.  --Restart PTA regimen    The patient's care was discussed with the Attending Physician, Dr. Nilse Justice, Bedside Nurse, Patient and Patient's Family.    DONY Lucas CNP  [facility]    At this time, the hospitalist service will sign off, please do not hesitate to re-consult us for further medical issues that arise during the patient's  stay.   ______________________________________________________________________    Chief Complaint   Elective right total knee arthroplasty     History is obtained from the patient    History of Present Illness   Dustin Lan is a 68 year old male who presents for elective right total knee arthroplasty.  Patient has a past medical history of monitor severe asthma, hypertension, hyperlipidemia and isolated episode of PSVT.  Patient is postop day 0, no surgical complications noted surgery completed under spinal anesthesia,  cc.    I evaluated the patient in the postoperative setting, pain is well controlled, denies any nausea/vomiting. He denies any recent fever/chills, urinary symptoms, constipation/diarrhea, skin changes, dizziness/lightheadedness, CP, exertional symptoms, dyspnea.     Review of Systems   The 10 point Review of Systems is negative other than noted in the HPI or here.     Past Medical History    I have reviewed this patient's medical history and updated it with pertinent information if needed.   Past Medical History:   Diagnosis Date     Elevated blood pressure reading without diagnosis of hypertension      Personal history of colonic polyps 2/2007    tubular adenoma, ileocecal valve, repeat 2010     Plantar fascial fibromatosis      Pure hypercholesterolemia        Past Surgical History   I have reviewed this patient's surgical history and updated it with pertinent information if needed.  Past Surgical History:   Procedure Laterality Date     ARTHROSCOPY KNEE RT/LT  12/2008    right     HC COLONOSCOPY THRU STOMA, DIAGNOSTIC  2/2007    ileocecal valve polyp, tubular adenoma, repeat in 3 yrs       Social History   I have reviewed this patient's social history and updated it with pertinent information if needed.  Social History     Tobacco Use     Smoking status: Never Smoker     Smokeless tobacco: Never Used   Substance Use Topics     Alcohol use: Yes     Alcohol/week: 0.0 - 1.8 oz      Comment: 5 - 7 beers a week     Drug use: No       Family History   I have reviewed this patient's family history and updated it with pertinent information if needed.   Family History   Problem Relation Age of Onset     Cerebrovascular Disease Mother 75         of a stroke     C.A.D. Father 67         suddenly when using the  at age 67. He was a smoker. On autopsy, told to have multiple heart attacks and scar tissue but he had never veronika a clinical heart attack.     Lipids Sister        Medications   I have reviewed this patient's current medications    Allergies   Allergies   Allergen Reactions     Cats        Physical Exam   Vital Signs: Temp: 97.5  F (36.4  C) Temp src: Oral BP: (!) 140/92 Pulse: 58 Heart Rate: 62 Resp: 10 SpO2: 93 % O2 Device: None (Room air) Oxygen Delivery: 2 LPM  Weight: 232 lbs 0 oz    Constitutional: awake, alert, cooperative, no apparent distress, and appears stated age  Eyes: pupils equal, round and reactive to light, extra ocular muscles intact, sclera clear, conjunctiva normal  ENT: normocepalic, without obvious abnormality  Respiratory: No increased work of breathing, good air exchange, clear to auscultation bilaterally, no crackles or wheezing  Cardiovascular: Normal apical impulse, regular rate and rhythm, normal S1 and S2, no S3 or S4, and no murmur noted  GI: soft, non-distended and non-tender  Skin: normal skin color, texture, turgor. Left knee incision is covered, dressing is clean dry and intact. CMS intact   Neuropsychiatric: General: normal, calm and normal eye contact  Level of consciousness: alert / normal  Affect: normal    Data   Results for orders placed or performed during the hospital encounter of 19 (from the past 24 hour(s))   Hemoglobin   Result Value Ref Range    Hemoglobin 13.6 13.3 - 17.7 g/dL   Creatinine   Result Value Ref Range    Creatinine 1.01 0.66 - 1.25 mg/dL    GFR Estimate 77 >60 mL/min/[1.73_m2]    GFR Estimate If Black 89 >60  mL/min/[1.73_m2]   XR Knee Port Left 1/2 Views    Narrative    XR KNEE PORT LT 1/2 VW 5/8/2019 11:22 AM    COMPARISON: 2/21/2017    HISTORY: Arthroplasty.      Impression    IMPRESSION: LEFT total knee arthroplasty. Hardware appears intact. No  fractures are seen.    GISELA SAINI MD

## 2019-05-08 NOTE — PROGRESS NOTES
Admission medication history interview status for the 5/8/2019  admission is complete. See EPIC admission navigator for prior to admission medications     Medication history source reliability:Moderate    Medication history interview source(s):Patient    Medication history resources (including written lists, pill bottles, clinic record):WRITTEN LIST    Primary pharmacy: [facility]    Additional medication history information not noted on PTA med list :    BROUGHT OWN SUPPLY:  ADVAIR INHALER    Time spent in this activity: 50 MINUTES    Prior to Admission medications    Medication Sig Last Dose Taking? Auth Provider   acetaminophen (TYLENOL) 500 MG tablet Take 500 mg by mouth daily as needed for mild pain MORE THAN A MONTH at PRN Yes Reported, Patient   Ascorbic Acid (VITAMIN C PO) Take 1 tablet by mouth every morning 4/30/2019 at AM Yes Reported, Patient   aspirin 81 MG chewable tablet Take 1 tablet (81 mg) by mouth daily 4/20/2019 at PM Yes Iesha Kohler MD   diltiazem ER (TIAZAC) 240 MG 24 hr ER beaded capsule Take 1 capsule (240 mg) by mouth daily 5/7/2019 at PM Yes Alessandro Blanco MD   fluticasone-salmeterol (ADVAIR DISKUS) 250-50 MCG/DOSE inhaler Inhale 1 puff into the lungs 2 times daily  Patient taking differently: Inhale 1 puff into the lungs At Bedtime  5/7/2019 at PM Yes Alessandro Blanco MD   glucosamine-chondroitin 500-400 MG CAPS per capsule Take 1 capsule by mouth 2 times daily 4/29/2019 at AM/PM Yes Reported, Patient   multivitamin, therapeutic (THERA-VIT) TABS tablet Take 1 tablet by mouth daily 5/7/2019 at AM Yes Reported, Patient   naproxen sodium (ANAPROX) 220 MG tablet Take 220 mg by mouth 2 times daily 4/29/2019 at 0830 Yes Reported, Patient   simvastatin (ZOCOR) 20 MG tablet Take 1 tablet (20 mg) by mouth At Bedtime 5/7/2019 at PM Yes Alessandro Blanco MD

## 2019-05-08 NOTE — ANESTHESIA PROCEDURE NOTES
Peripheral nerve/Neuraxial procedure note : intrathecal  Pre-Procedure  Performed by Bindu Gillette MD  Location: OR      Pre-Anesthestic Checklist: patient identified, risks and benefits discussed, informed consent and pre-op evaluation    Timeout  Correct Patient: Yes   Correct Procedure: Yes   Correct Site: Yes     Correct Position: Yes     .   Procedure Documentation    .    Procedure:    Intrathecal.  Insertion Site:L3-4  (midline approach)      Patient Prep;mask, sterile gloves, povidone-iodine 7.5% surgical scrub, patient draped.  .  Needle: Catalina-Chavez Spinal Needle (gauge): 24  Spinal/LP Needle Length (inches): 3.5 # of attempts: 1 and # of redirects:  Introducer used .       Assessment/Narrative  Paresthesias: No.  .  .  clear CSF fluid removed . Comments:  X 1 into SAB.  No blood or complications.

## 2019-05-08 NOTE — PROGRESS NOTES
[facility]      OUTPATIENT PHYSICAL THERAPY EVALUATION  PLAN OF TREATMENT FOR OUTPATIENT REHABILITATION  (COMPLETE FOR INITIAL CLAIMS ONLY)  Patient's Last Name, First Name, M.I.  YOB: 1950  Dustin Lan                        Provider's Name  [facility] Medical Record No.  [id number]                               Onset Date:  05/08/19   Start of Care Date:  05/08/19      Type:     _X_PT   ___OT   ___SLP Medical Diagnosis:  total knee replacement                        PT Diagnosis:  impaired gait   Visits from SOC:  1   _________________________________________________________________________________  Plan of Treatment/Functional Goals    Planned Interventions: Cryotherapy,    balance training, bed mobility training, gait training, ROM, strengthening, stretching, transfer training, risk factor education, home program guidelines, progressive activity/exercise,       Goals: See Physical Therapy Goals on Care Plan in [facility] electronic health record.    Therapy Frequency: 2 times/day  Predicted Duration of Therapy Intervention: 3  _________________________________________________________________________________    I CERTIFY THE NEED FOR THESE SERVICES FURNISHED UNDER        THIS PLAN OF TREATMENT AND WHILE UNDER MY CARE .             Physician Signature               Date    X_____________________________________________________                      Certification date from: 05/08/19, Certification date to: 05/10/19    Referring Physician: Miguel Buckley MD            Initial Assessment        See Physical Therapy evaluation dated 05/08/19 in Epic electronic health record.

## 2019-05-08 NOTE — BRIEF OP NOTE
[facility]    Brief Operative Note    Pre-operative diagnosis: BILATERAL knee OA  Post-operative diagnosis same  Procedure: Procedure(s): LEFT TOTAL KNEE ARTHROPLASTY; RIGHT KNEE CORTISONE INJECTION  Surgeon(s) and Role:     * Miguel Buckley MD - Primary     * Isabel Mesa PA-C - Assisting  Anesthesia: spinal   Estimated blood loss: 100 ml  Drains:  None  Specimens: None  Findings:  Advanced OA  Complications: None    Plan: DC home POD1 w/family assist.  DVT prophylaxis w/ASA 325mg QD x6wks.

## 2019-05-09 ENCOUNTER — APPOINTMENT (OUTPATIENT)
Dept: OCCUPATIONAL THERAPY | Facility: CLINIC | Age: 69
End: 2019-05-09
Attending: ORTHOPAEDIC SURGERY
Payer: COMMERCIAL

## 2019-05-09 ENCOUNTER — APPOINTMENT (OUTPATIENT)
Dept: PHYSICAL THERAPY | Facility: CLINIC | Age: 69
End: 2019-05-09
Attending: ORTHOPAEDIC SURGERY
Payer: COMMERCIAL

## 2019-05-09 VITALS
SYSTOLIC BLOOD PRESSURE: 100 MMHG | OXYGEN SATURATION: 93 % | WEIGHT: 232 LBS | BODY MASS INDEX: 31.42 KG/M2 | RESPIRATION RATE: 16 BRPM | DIASTOLIC BLOOD PRESSURE: 62 MMHG | HEART RATE: 58 BPM | HEIGHT: 72 IN | TEMPERATURE: 97.7 F

## 2019-05-09 LAB
GLUCOSE SERPL-MCNC: 99 MG/DL (ref 70–99)
HGB BLD-MCNC: 10.5 G/DL (ref 13.3–17.7)

## 2019-05-09 PROCEDURE — 85018 HEMOGLOBIN: CPT | Performed by: ORTHOPAEDIC SURGERY

## 2019-05-09 PROCEDURE — 97165 OT EVAL LOW COMPLEX 30 MIN: CPT | Mod: GO | Performed by: OCCUPATIONAL THERAPIST

## 2019-05-09 PROCEDURE — 25000128 H RX IP 250 OP 636: Performed by: ORTHOPAEDIC SURGERY

## 2019-05-09 PROCEDURE — 97116 GAIT TRAINING THERAPY: CPT | Mod: GP

## 2019-05-09 PROCEDURE — 25000132 ZZH RX MED GY IP 250 OP 250 PS 637: Performed by: ORTHOPAEDIC SURGERY

## 2019-05-09 PROCEDURE — 97110 THERAPEUTIC EXERCISES: CPT | Mod: GP

## 2019-05-09 PROCEDURE — 36415 COLL VENOUS BLD VENIPUNCTURE: CPT | Performed by: ORTHOPAEDIC SURGERY

## 2019-05-09 PROCEDURE — 97535 SELF CARE MNGMENT TRAINING: CPT | Mod: GO | Performed by: OCCUPATIONAL THERAPIST

## 2019-05-09 PROCEDURE — 82947 ASSAY GLUCOSE BLOOD QUANT: CPT | Performed by: ORTHOPAEDIC SURGERY

## 2019-05-09 RX ORDER — OXYCODONE HYDROCHLORIDE 5 MG/1
TABLET ORAL
Qty: 40 TABLET | Refills: 0 | Status: SHIPPED | OUTPATIENT
Start: 2019-05-09 | End: 2019-12-13

## 2019-05-09 RX ORDER — ACETAMINOPHEN 500 MG
1000 TABLET ORAL EVERY 6 HOURS PRN
Qty: 100 TABLET | Refills: 0 | Status: SHIPPED | OUTPATIENT
Start: 2019-05-09 | End: 2021-06-22

## 2019-05-09 RX ORDER — AMOXICILLIN 250 MG
2 CAPSULE ORAL 2 TIMES DAILY PRN
Qty: 60 TABLET | Refills: 0 | Status: SHIPPED | OUTPATIENT
Start: 2019-05-09 | End: 2021-06-22

## 2019-05-09 RX ORDER — KETOROLAC TROMETHAMINE 10 MG/1
10 TABLET, FILM COATED ORAL EVERY 6 HOURS
Qty: 6 TABLET | Refills: 0 | Status: SHIPPED | OUTPATIENT
Start: 2019-05-09 | End: 2019-05-11

## 2019-05-09 RX ADMIN — KETOROLAC TROMETHAMINE 15 MG: 15 INJECTION, SOLUTION INTRAMUSCULAR; INTRAVENOUS at 12:47

## 2019-05-09 RX ADMIN — SENNOSIDES AND DOCUSATE SODIUM 2 TABLET: 8.6; 5 TABLET ORAL at 08:27

## 2019-05-09 RX ADMIN — ASPIRIN 325 MG: 325 TABLET, DELAYED RELEASE ORAL at 08:28

## 2019-05-09 RX ADMIN — OXYCODONE HYDROCHLORIDE 5 MG: 5 TABLET ORAL at 08:28

## 2019-05-09 RX ADMIN — ACETAMINOPHEN 975 MG: 325 TABLET, FILM COATED ORAL at 05:33

## 2019-05-09 RX ADMIN — KETOROLAC TROMETHAMINE 15 MG: 15 INJECTION, SOLUTION INTRAMUSCULAR; INTRAVENOUS at 05:33

## 2019-05-09 RX ADMIN — ACETAMINOPHEN 975 MG: 325 TABLET, FILM COATED ORAL at 12:47

## 2019-05-09 RX ADMIN — CEFAZOLIN SODIUM 2 G: 2 INJECTION, SOLUTION INTRAVENOUS at 01:52

## 2019-05-09 RX ADMIN — OXYCODONE HYDROCHLORIDE 5 MG: 5 TABLET ORAL at 12:54

## 2019-05-09 NOTE — PLAN OF CARE
Discharge Planner PT   Patient plan for discharge: Home with spouse's assist.   Current status: Supine<>sit: SBA. STS at SBA. Gait x 300 ft using RW and SBA. Pt participated in supine LE there ex with PT's guidance. AAROM: 3-80 deg.  Barriers to return to prior living situation: Steps to manage at home, otherwise none anticipated.   Recommendations for discharge: Home with spouse's assist as needed and OP PT.   Rationale for recommendations: Pt will need spouse's assistance on stairs to maintain safety.        Entered by: Katie Guerra 05/09/2019 1:07 PM

## 2019-05-09 NOTE — PROGRESS NOTES
05/09/19 1300   Quick Adds   Type of Visit Initial Occupational Therapy Evaluation   Living Environment   Lives With spouse   Living Arrangements house   Number of Stairs, Main Entrance 3   Number of Stairs, Within Home, Primary 8   Living Environment Comment Pt lives with spouse in a split level home with 3 SHARMAINE without rails and bed/bathroom on the top floor with 8 steps to manage. Pt. has comfort height toilet, vanity support;walk-in shower   Self-Care   Usual Activity Tolerance good   Current Activity Tolerance moderate   Regular Exercise Yes   Activity/Exercise/Self-Care Comment Pt owns a std walker, RW and std cane. Pt was ind with ADL's   Functional Level   Ambulation 0-->independent   Transferring 0-->independent   Toileting 0-->independent   Bathing 0-->independent   Dressing 0-->independent   Eating 0-->independent   Communication 0-->understands/communicates without difficulty   Swallowing 0-->swallows foods/liquids without difficulty   Cognition 0 - no cognition issues reported   Fall history within last six months no   Which of the above functional risks had a recent onset or change? ambulation;transferring;toileting;bathing;dressing   Prior Functional Level Comment Pt. indep. w/I/ADL's PTA.   General Information   Onset of Illness/Injury or Date of Surgery - Date 05/08/19   Referring Physician Dr. Buckley   Patient/Family Goals Statement Plans to DC home today   Additional Occupational Profile Info/Pertinent History of Current Problem Pt. underwent a L TKA   Precautions/Limitations fall precautions   Weight-Bearing Status - LLE weight-bearing as tolerated   General Observations Pt. reports no pain at rest;spouse present, will be able to assist at home   Cognitive Status Examination   Orientation orientation to person, place and time   Level of Consciousness alert   Follows Commands (Cognition) WNL   Cognitive Comment intact   Visual Perception   Visual Perception Comments wears glasses--no vision  problems noted/reported   Sensory Examination   Sensory Comments no numbness/tingling reported   Pain Assessment   Patient Currently in Pain   (minimal--knee)   Range of Motion (ROM)   ROM Comment BUE WNL   Strength   Strength Comments BUE WNL   Hand Strength   Hand Strength Comments WNL   Mobility   Bed Mobility Comments indep./supervision supine-sit-supine   Transfer Skill: Sit to Stand   Level of [place]: Sit/Stand stand-by assist   Assistive Device for Transfer: Sit/Stand rolling walker   Toilet Transfer   Toilet Transfer Comments standard toilet/vanity support   Tub/Shower Transfer   Tub/Shower Transfer Comments walk-in shower   Balance   Balance Comments good balance/SBA for room mobility using WW   Lower Body Dressing   Level of [place]: Dress Lower Body stand-by assist   Instrumental Activities of Daily Living (IADL)   Previous Responsibilities   (spouse to assist w/ I/ADL's)   Activities of Daily Living Analysis   Impairments Contributing to Impaired Activities of Daily Living balance impaired;flexibility decreased;pain;postural control impaired;strength decreased  (decreased LE strength/ROM)   General Therapy Interventions   Planned Therapy Interventions ADL retraining   Clinical Impression   Criteria for Skilled Therapeutic Interventions Met yes, treatment indicated   OT Diagnosis Decline in ADL performance   Influenced by the following impairments pain, decreased flexibility, impaired balance, decreased LE strength/ROM   Assessment of Occupational Performance 3-5 Performance Deficits   Identified Performance Deficits Currently below baseline w/ dressing,toileting, bathing, fx. transfer/mobility + IADl's   Clinical Decision Making (Complexity) Low complexity   Therapy Frequency   (Eval. + 1 treatment session)   Predicted Duration of Therapy Intervention (days/wks)   (Eval. + 1 treatment session)   Anticipated Equipment Needs at Discharge raised toilet seat;shower chair   Anticipated Discharge  "Disposition Home;Home with Assist   Risks and Benefits of Treatment have been explained. Yes   Patient, Family & other staff in agreement with plan of care Yes   [facility] TM \"6 Clicks\"   2016, Trustees of [facility], under license to [employer].  All rights reserved.   6 Clicks Short Forms Daily Activity Inpatient Short Form   [facility] AM-PAC  \"6 Clicks\" Daily Activity Inpatient Short Form   1. Putting on and taking off regular lower body clothing? 3 - A Little   2. Bathing (including washing, rinsing, drying)? 3 - A Little   3. Toileting, which includes using toilet, bedpan or urinal? 3 - A Little   4. Putting on and taking off regular upper body clothing? 4 - None   5. Taking care of personal grooming such as brushing teeth? 4 - None   6. Eating meals? 4 - None   Daily Activity Raw Score (Score out of 24.Lower scores equate to lower levels of function) 21   Total Evaluation Time   Total Evaluation Time (Minutes) 9     "

## 2019-05-09 NOTE — PLAN OF CARE
POD 1 from a L knee arthroplasty. A&Ox4. CMS - LLE 4-5/5. Bowel sounds +x4, + flatus, tolerating regular diet. VSS/soft BP. Ace wrap to L knee CDI. Up with SBA, GB, W; ambulating in hallway. C/o mild pain, decreased with ice & oxycodone. Voiding adequately. Discharged home with wife assist today at 1520. AVS reviewed with pt and wife and questions encouraged/answered. AVS, belongings, and meds sent home with pt.

## 2019-05-09 NOTE — PLAN OF CARE
Discharge Planner OT     OT:Evaluation/treatment completed. Pt. underwent a L TKA. Pt.resides w/ spouse, who will be able to assist at discharge. Pt. typically indep. w/ I/ADL's. Pt. reports comfort-height toilets, walk-in shower. Pt. is able to borrow a RTS if necessary.  Patient plan for discharge: Home, spouse assist  Current status: Pt. able to demo. bed mobility, supine-sit-supine=indep.;Pt. able to come sit-stand w/ supervision only/indep.;pt demo. room mobility using WW w/SBA, moving well;pt. completed toilet transfer w/ initial CGA, able to demo. W/ supervision w/RTS(has comfort-height toilets at home, also can borrow a RTS if necessary); Ed. In safe shower transfer techs.--able to demo. W/ CGA, spouse to assist at home as needed;Ed. In comp. techs. ror LE dressing--pt. able to john shorts/pants=indep.;demo.flexibility to doff/john/socks w/ LE's up on bed, declined to trial socks-/shoes, declined AE--prefers spouse to assist. Ed. In home/WW safety techs.--verbalized understanding. Pt. doing well, has met goals.  Barriers to return to prior living situation: None  Recommendations for discharge: Home w/assist  Rationale for recommendations: Pt. doing very well, has met goals, safe to discharge home w/assist.  Occupational Therapy Discharge Summary    Reason for therapy discharge:    All goals and outcomes met, no further needs identified.    Progress towards therapy goal(s). See goals on Care Plan in [facility] electronic health record for goal details.  Goals met    Therapy recommendation(s):    No further therapy is recommended.           Entered by: Sulma Boggs 05/09/2019 3:51 PM

## 2019-05-09 NOTE — PLAN OF CARE
A&O. VSS. CMS intact. Left knee CDI. PRN oxy for pain and scheduled medications. Assist 1 with GB and walker. Regular diet. Hall pulled, due to void.

## 2019-05-10 NOTE — PROGRESS NOTES
[facility]      OUTPATIENT PHYSICAL THERAPY EVALUATION  PLAN OF TREATMENT FOR OUTPATIENT REHABILITATION  (COMPLETE FOR INITIAL CLAIMS ONLY)  Patient's Last Name, First Name, M.I.  YOB: 1950  Dustin Lan                        Provider's Name  [facility] Medical Record No.  [id number]                               Onset Date:  05/08/19   Start of Care Date:  05/08/19      Type:     _X_PT   ___OT   ___SLP Medical Diagnosis:  total knee replacement                        PT Diagnosis:  impaired gait   Visits from SOC:  1   _________________________________________________________________________________  Plan of Treatment/Functional Goals    Planned Interventions: Cryotherapy,    balance training, bed mobility training, gait training, ROM, strengthening, stretching, transfer training, risk factor education, home program guidelines, progressive activity/exercise,       Goals: See Physical Therapy Goals on Care Plan in [facility] electronic health record.    Therapy Frequency: 2 times/day  Predicted Duration of Therapy Intervention: 3  _________________________________________________________________________________    I CERTIFY THE NEED FOR THESE SERVICES FURNISHED UNDER        THIS PLAN OF TREATMENT AND WHILE UNDER MY CARE     (Physician co-signature of this document indicates review and certification of the therapy plan).                Certification date from: 05/08/19, Certification date to: 05/10/19    Referring Physician: Miguel Buckley MD            Initial Assessment        See Physical Therapy evaluation dated 05/08/19 in Epic electronic health record.

## 2019-05-13 ENCOUNTER — THERAPY VISIT (OUTPATIENT)
Dept: PHYSICAL THERAPY | Facility: CLINIC | Age: 69
End: 2019-05-13
Payer: COMMERCIAL

## 2019-05-13 DIAGNOSIS — Z47.1 AFTERCARE FOLLOWING JOINT REPLACEMENT: ICD-10-CM

## 2019-05-13 DIAGNOSIS — M25.562 LEFT KNEE PAIN: Primary | ICD-10-CM

## 2019-05-13 PROCEDURE — 97010 HOT OR COLD PACKS THERAPY: CPT | Mod: GP | Performed by: PHYSICAL THERAPIST

## 2019-05-13 PROCEDURE — 97110 THERAPEUTIC EXERCISES: CPT | Mod: GP | Performed by: PHYSICAL THERAPIST

## 2019-05-13 PROCEDURE — 97161 PT EVAL LOW COMPLEX 20 MIN: CPT | Mod: GP | Performed by: PHYSICAL THERAPIST

## 2019-05-13 ASSESSMENT — ACTIVITIES OF DAILY LIVING (ADL)
AS_A_RESULT_OF_YOUR_KNEE_INJURY,_HOW_WOULD_YOU_RATE_YOUR_CURRENT_LEVEL_OF_DAILY_ACTIVITY?: SEVERELY ABNORMAL
GO DOWN STAIRS: ACTIVITY IS VERY DIFFICULT
KNEE_ACTIVITY_OF_DAILY_LIVING_SUM: 9
KNEEL ON THE FRONT OF YOUR KNEE: I AM UNABLE TO DO THE ACTIVITY
GO UP STAIRS: ACTIVITY IS VERY DIFFICULT
KNEE_ACTIVITY_OF_DAILY_LIVING_SCORE: 12.86
HOW_WOULD_YOU_RATE_THE_OVERALL_FUNCTION_OF_YOUR_KNEE_DURING_YOUR_USUAL_DAILY_ACTIVITIES?: ABNORMAL
RAW_SCORE: 9
HOW_WOULD_YOU_RATE_THE_CURRENT_FUNCTION_OF_YOUR_KNEE_DURING_YOUR_USUAL_DAILY_ACTIVITIES_ON_A_SCALE_FROM_0_TO_100_WITH_100_BEING_YOUR_LEVEL_OF_KNEE_FUNCTION_PRIOR_TO_YOUR_INJURY_AND_0_BEING_THE_INABILITY_TO_PERFORM_ANY_OF_YOUR_USUAL_DAILY_ACTIVITIES?: 30
LIMPING: THE SYMPTOM PREVENTS ME FROM ALL DAILY ACTIVITIES
SWELLING: THE SYMPTOM PREVENTS ME FROM ALL DAILY ACTIVITIES
GIVING WAY, BUCKLING OR SHIFTING OF KNEE: THE SYMPTOM AFFECTS MY ACTIVITY MODERATELY
WALK: ACTIVITY IS VERY DIFFICULT
STAND: ACTIVITY IS FAIRLY DIFFICULT
WEAKNESS: THE SYMPTOM PREVENTS ME FROM ALL DAILY ACTIVITIES
STIFFNESS: THE SYMPTOM PREVENTS ME FROM ALL DAILY ACTIVITIES
PAIN: THE SYMPTOM PREVENTS ME FROM ALL DAILY ACTIVITIES
SQUAT: I AM UNABLE TO DO THE ACTIVITY
SIT WITH YOUR KNEE BENT: ACTIVITY IS VERY DIFFICULT
RISE FROM A CHAIR: ACTIVITY IS VERY DIFFICULT

## 2019-05-13 NOTE — PROGRESS NOTES
[facility] Initial Evaluation  Subjective:  The history is provided by the patient. No  was used.   Dustin Lan is a 68 year old male with a left knee condition.  Condition occurred with:  Degenerative joint disease.    This is a new condition  S/P TKR on 5/8/2019.  No complications.  Patient c/o difficulty walking, going up/down stairs, and with transitional movements.  Right knee was injected on 5/8/2019.    Patient reports pain:  Anterior.    Pain is described as aching and is constant and reported as 3/10.  Associated symptoms:  Loss of strength, loss of motion/stiffness and edema. Pain is the same all the time.  Symptoms are exacerbated by ascending stairs, descending stairs, activity, bending/squatting, walking, standing and transfers and relieved by rest, ice and analgesics.  Since onset symptoms are gradually improving.        General health as reported by patient is good.  Pertinent medical history includes:  Asthma, overweight and high blood pressure.  Medical allergies: no.  Other surgeries include:  Orthopedic surgery.  Current medications:  Anti-inflammatory, high blood pressure medication and pain medication.  Current occupation is Retired.        Barriers include:  None as reported by the patient.    Red flags:  None as reported by the patient.                        Objective:    Gait:    Gait Type:  Antalgic   Assistive Devices:  Walker                                                        Knee Evaluation:  ROM:    AROM      Extension: Left: 20    Right:   Flexion: Left: 65   Right:  PROM      Extension: Left: 5   Right:   Flexion: Left: 70   Right:       Strength:     Extension:  Left: 3+/5   Pain:        Flexion:  Left: 4-/5   Pain:        Quad Set Left: Fair    Pain:           Edema:  Edema of the knee: 4/5 Edema into leg and thigh.            General     ROS    Assessment/Plan:    Patient is a 68 year old male with left side knee complaints.     Patient has the following significant findings with corresponding treatment plan.                Diagnosis 1:  S/P TKR  Pain -  hot/cold therapy, self management, education and home program  Decreased ROM/flexibility - therapeutic exercise, therapeutic activity and home program  Decreased strength - therapeutic exercise, therapeutic activities and home program  Edema - cold therapy and self management/home program  Impaired gait - gait training and home program  Impaired muscle performance - neuro re-education and home program  Decreased function - therapeutic activities and home program    Therapy Evaluation Codes:   1) History comprised of:   Personal factors that impact the plan of care:      None.    Comorbidity factors that impact the plan of care are:      Asthma, High blood pressure and Overweight.     Medications impacting care: Anti-inflammatory, High blood pressure and Pain.  2) Examination of Body Systems comprised of:   Body structures and functions that impact the plan of care:      Knee.   Activity limitations that impact the plan of care are:      Bathing, Bending, Cooking, Driving, Dressing, Lifting, Squatting/kneeling, Stairs, Standing, Walking and Transitional movements.  3) Clinical presentation characteristics are:   Stable/Uncomplicated.  4) Decision-Making    Low complexity using standardized patient assessment instrument and/or measureable assessment of functional outcome.  Cumulative Therapy Evaluation is: Low complexity.    Previous and current functional limitations:  (See Goal Flow Sheet for this information)    Short term and Long term goals: (See Goal Flow Sheet for this information)     Communication ability:  Patient appears to be able to clearly communicate and understand verbal and written communication and follow directions correctly.  Treatment Explanation - The following has been discussed with the patient:   RX ordered/plan of care  Anticipated outcomes  Possible risks and side  effects  This patient would benefit from PT intervention to resume normal activities.   Rehab potential is good.    Frequency:  2 X week, once daily  Duration:  for 8 weeks  Discharge Plan:  Achieve all LTG.  Independent in home treatment program.  Reach maximal therapeutic benefit.    Please refer to the daily flowsheet for treatment today, total treatment time and time spent performing 1:1 timed codes.

## 2019-05-13 NOTE — LETTER
[facility]  [address]  [address]  [place] [address]  [phone]    May 14, 2019    Re: Dustin Lan   :   1950  MRN:  [id number]   REFERRING PHYSICIAN:   Miguel Buckley  [facility]  Date of Initial Evaluation:  19  Visits: 1 Rxs Used: 1  Reason for Referral:     Aftercare following joint replacement  Left knee pain    EVALUATION SUMMARY    [facility] Initial Evaluation    Subjective:  The history is provided by the patient. No  was used.   Dustin Lan is a 68 year old male with a left knee condition.  Condition occurred with:  Degenerative joint disease.    This is a new condition  S/P TKR on 2019.  No complications.  Patient c/o difficulty walking, going up/down stairs, and with transitional movements.  Right knee was injected on 2019.    Patient reports pain:  Anterior.    Pain is described as aching and is constant and reported as 3/10.  Associated symptoms:  Loss of strength, loss of motion/stiffness and edema. Pain is the same all the time.  Symptoms are exacerbated by ascending stairs, descending stairs, activity, bending/squatting, walking, standing and transfers and relieved by rest, ice and analgesics.  Since onset symptoms are gradually improving.        General health as reported by patient is good.  Pertinent medical history includes:  Asthma, overweight and high blood pressure.  Medical allergies: no.  Other surgeries include:  Orthopedic surgery.  Current medications:  Anti-inflammatory, high blood pressure medication and pain medication.  Current occupation is Retired.      Barriers include:  None as reported by the patient.  Red flags:  None as reported by the patient.  Objective:  Gait:    Gait Type:  Antalgic   Assistive Devices:  Walker  Knee Evaluation:  ROM:    AROM  Extension: Left: 20    Right:   Flexion: Left: 65   Right:  PROM  Extension: Left: 5    Right:   Flexion: Left: 70   Right:   Strength:   Extension:  Left: 3+/5   Pain:     Re: Dustin Lan   :   1950  MRN:  [id number]        Flexion:  Left: 4-/5   Pain:        Quad Set Left: Fair    Pain:   Edema:  Edema of the knee: 4/5 Edema into leg and thigh.  Assessment/Plan:    Patient is a 68 year old male with left side knee complaints.    Patient has the following significant findings with corresponding treatment plan.                Diagnosis 1:  S/P TKR  Pain -  hot/cold therapy, self management, education and home program  Decreased ROM/flexibility - therapeutic exercise, therapeutic activity and home program  Decreased strength - therapeutic exercise, therapeutic activities and home program  Edema - cold therapy and self management/home program  Impaired gait - gait training and home program  Impaired muscle performance - neuro re-education and home program  Decreased function - therapeutic activities and home program  Therapy Evaluation Codes:   1) History comprised of:   Personal factors that impact the plan of care:      None.    Comorbidity factors that impact the plan of care are:      Asthma, High blood pressure and Overweight.     Medications impacting care: Anti-inflammatory, High blood pressure and Pain.  2) Examination of Body Systems comprised of:   Body structures and functions that impact the plan of care:      Knee.   Activity limitations that impact the plan of care are:      Bathing, Bending, Cooking, Driving, Dressing, Lifting, Squatting/kneeling, Stairs, Standing, Walking and Transitional movements.  3) Clinical presentation characteristics are:   Stable/Uncomplicated.  4) Decision-Making    Low complexity using standardized patient assessment instrument and/or measureable assessment of functional outcome.  Cumulative Therapy Evaluation is: Low complexity.  Previous and current functional limitations:  (See Goal Flow Sheet for this information)    Short term and Long term  goals: (See Goal Flow Sheet for this information)   Communication ability:  Patient appears to be able to clearly communicate and understand verbal and written communication and follow directions correctly.  Treatment Explanation - The following has been discussed with the patient:   RX ordered/plan of care  Anticipated outcomes  Possible risks and side effects  This patient would benefit from PT intervention to resume normal activities.   Rehab potential is good.  Frequency:  2 X week, once daily  Duration:  for 8 weeks  Discharge Plan:  Achieve all LTG.  Independent in home treatment program.  Reach maximal therapeutic benefit.    Re: Dustin Lan   :   1950  MRN:  [id number]     Thank you for your referral.    INQUIRIES  Therapist: Dameon Solomon, PT   [facility] [address]  [address] [place] [address]  Phone: [phone]  Fax: [phone]

## 2019-05-14 NOTE — PROGRESS NOTES
[facility]      OUTPATIENT OCCUPATIONAL THERAPY  EVALUATION  PLAN OF TREATMENT FOR OUTPATIENT REHABILITATION  (COMPLETE FOR INITIAL CLAIMS ONLY)  Patient's Last Name, First Name, M.I.  YOB: 1950  Dustin Lan                          Provider's Name  [facility] Medical Record No.  [id number]                               Onset Date:  05/08/19   Start of Care Date:     5-9-2019   Type:     ___PT   _X_OT   ___SLP Medical Diagnosis:                L TKA           OT Diagnosis:  Decline in ADL performance   Visits from SOC:  1   _________________________________________________________________________________  Plan of Treatment/Functional Goals    Planned Interventions: ADL retraining       Goals: See Occupational Therapy Goals on Care Plan in [facility] electronic health record.    Therapy Frequency: (Eval. + 1 treatment session)  Predicted Duration of Therapy Intervention: (Eval. + 1 treatment session)  _________________________________________________________________________________    I CERTIFY THE NEED FOR THESE SERVICES FURNISHED UNDER        THIS PLAN OF TREATMENT AND WHILE UNDER MY CARE     (Physician co-signature of this document indicates review and certification of the therapy plan).                    ,      Referring Physician: Dr. Buckley            Initial Assessment        See Occupational Therapy evaluation dated   in Epic electronic health record.

## 2019-05-16 ENCOUNTER — THERAPY VISIT (OUTPATIENT)
Dept: PHYSICAL THERAPY | Facility: CLINIC | Age: 69
End: 2019-05-16
Payer: COMMERCIAL

## 2019-05-16 DIAGNOSIS — Z47.1 AFTERCARE FOLLOWING JOINT REPLACEMENT: ICD-10-CM

## 2019-05-16 DIAGNOSIS — M25.562 LEFT KNEE PAIN: ICD-10-CM

## 2019-05-16 PROCEDURE — 97110 THERAPEUTIC EXERCISES: CPT | Mod: GP | Performed by: PHYSICAL THERAPIST

## 2019-05-16 PROCEDURE — 97112 NEUROMUSCULAR REEDUCATION: CPT | Mod: GP | Performed by: PHYSICAL THERAPIST

## 2019-05-16 NOTE — PROGRESS NOTES
[facility]      OUTPATIENT PHYSICAL THERAPY EVALUATION  PLAN OF TREATMENT FOR OUTPATIENT REHABILITATION  (COMPLETE FOR INITIAL CLAIMS ONLY)  Patient's Last Name, First Name, M.I.  YOB: 1950  Dustin Lan                        Provider's Name  [facility] Medical Record No.  [id number]                               Onset Date:  05/08/19   Start of Care Date:  05/08/19      Type:     _X_PT   ___OT   ___SLP Medical Diagnosis:  total knee replacement                        PT Diagnosis:  impaired gait   Visits from SOC:  1   _________________________________________________________________________________  Plan of Treatment/Functional Goals    Planned Interventions: Cryotherapy,    balance training, bed mobility training, gait training, ROM, strengthening, stretching, transfer training, risk factor education, home program guidelines, progressive activity/exercise,       Goals: See Physical Therapy Goals on Care Plan in [employer] electronic health record.    Therapy Frequency: 2 times/day  Predicted Duration of Therapy Intervention: 3  _________________________________________________________________________________    I CERTIFY THE NEED FOR THESE SERVICES FURNISHED UNDER        THIS PLAN OF TREATMENT AND WHILE UNDER MY CARE     (Physician co-signature of this document indicates review and certification of the therapy plan).                Certification date from: 05/08/19, Certification date to: 05/10/19    Referring Physician: Miguel Buckley MD            Initial Assessment        See Physical Therapy evaluation dated 05/08/19 in Epic electronic health record.

## 2019-05-19 NOTE — OP NOTE
Operative Note    Dustin Lan MRN# [id number]   YOB: 1950 Age: 68 year old   Date of Procedure: 5/8/2019    1st Assistant: Isabel Mesa PA-C    PREOPERATIVE DIAGNOSIS: Left knee osteoarthritis, failure to respond to conservative management.     POSTOPERATIVE DIAGNOSIS: Left knee osteoarthritis, failure to respond to conservative management.     PROCEDURE: Left total knee arthroplasty, Brenda Triathalon components, posterior stabilized.  Tourniquet-less technique.     DESCRIPTION OF PROCEDURE: Dustin Lan was brought to the operating room. After satisfactory anesthesia, the left lower extremity was prepped and draped in the usual sterile fashion. The patient received 1 gram of Ancef preoperatively.     Straight anterior incision was made. Dissection was carried down to the extensor mechanism. Medial arthrotomy was made. Advanced OA was noted. Patella was exposed, measured and resected to accept a size 40mm, asymmetric patella. The knee was then flexed up. Intramedullary guide was placed at 5 degrees as per the preoperative plan. The distal femoral cut was made followed by anteroposterior cuts and chamfer cuts. Box cut was made for the femoral component as well. Femur sized to be a size 6. Attention was then directed to the tibia. Tibial cut was made perpendicular to the long axis of the tibia in both AP and lateral planes. The tibia sized to be a size 6. Soft tissue balancing was performed. Trial reduction was performed and with a 11-mm PS insert, there was excellent soft tissue balancing, patellar tracking, alignment as well as motion. Trial components were removed. Tibial baseplate was prepared for size 6 baseplate. All 3 components were cemented in place without difficulty. Excess cement was removed. The real 11-mm PS insert was impacted in place and then the wound was closed in sequential layers including interrupted 0 Vicryl as well as a running 0 Stratafix suture in the extensor  mechanism, interrupted 2-0 Vicryl, running 2-0 Stratafix, and 3-0 subcuticular monocryl. Dermabond Prino dressing was applied. Sterile dressing was applied. The patient left the operating room in satisfactory condition. Patient received 1 gm of tranexamic acid pre-op and at closure, a 3 minute dilute betadine soak was performed prior to closure, and one gram of vancomycin powder was placed deep and superficial prior to closure.    KIMMIE CRANE MD

## 2019-05-20 ENCOUNTER — THERAPY VISIT (OUTPATIENT)
Dept: PHYSICAL THERAPY | Facility: CLINIC | Age: 69
End: 2019-05-20
Payer: COMMERCIAL

## 2019-05-20 DIAGNOSIS — M25.562 LEFT KNEE PAIN: ICD-10-CM

## 2019-05-20 DIAGNOSIS — Z47.1 AFTERCARE FOLLOWING JOINT REPLACEMENT: ICD-10-CM

## 2019-05-20 PROCEDURE — 97010 HOT OR COLD PACKS THERAPY: CPT | Mod: GP | Performed by: PHYSICAL THERAPIST

## 2019-05-20 PROCEDURE — 97112 NEUROMUSCULAR REEDUCATION: CPT | Mod: GP | Performed by: PHYSICAL THERAPIST

## 2019-05-20 PROCEDURE — 97110 THERAPEUTIC EXERCISES: CPT | Mod: GP | Performed by: PHYSICAL THERAPIST

## 2019-05-23 ENCOUNTER — THERAPY VISIT (OUTPATIENT)
Dept: PHYSICAL THERAPY | Facility: CLINIC | Age: 69
End: 2019-05-23
Payer: COMMERCIAL

## 2019-05-23 DIAGNOSIS — Z47.1 AFTERCARE FOLLOWING JOINT REPLACEMENT: ICD-10-CM

## 2019-05-23 DIAGNOSIS — M25.562 LEFT KNEE PAIN: ICD-10-CM

## 2019-05-23 PROCEDURE — 97010 HOT OR COLD PACKS THERAPY: CPT | Mod: GP | Performed by: PHYSICAL THERAPIST

## 2019-05-23 PROCEDURE — 97110 THERAPEUTIC EXERCISES: CPT | Mod: GP | Performed by: PHYSICAL THERAPIST

## 2019-05-23 PROCEDURE — 97112 NEUROMUSCULAR REEDUCATION: CPT | Mod: GP | Performed by: PHYSICAL THERAPIST

## 2019-05-28 ENCOUNTER — THERAPY VISIT (OUTPATIENT)
Dept: PHYSICAL THERAPY | Facility: CLINIC | Age: 69
End: 2019-05-28
Payer: COMMERCIAL

## 2019-05-28 DIAGNOSIS — M25.562 LEFT KNEE PAIN: ICD-10-CM

## 2019-05-28 DIAGNOSIS — Z47.1 AFTERCARE FOLLOWING JOINT REPLACEMENT: ICD-10-CM

## 2019-05-28 PROCEDURE — 97112 NEUROMUSCULAR REEDUCATION: CPT | Mod: GP | Performed by: PHYSICAL THERAPIST

## 2019-05-28 PROCEDURE — 97110 THERAPEUTIC EXERCISES: CPT | Mod: GP | Performed by: PHYSICAL THERAPIST

## 2019-05-29 ENCOUNTER — TRANSFERRED RECORDS (OUTPATIENT)
Dept: HEALTH INFORMATION MANAGEMENT | Facility: CLINIC | Age: 69
End: 2019-05-29

## 2019-05-30 ENCOUNTER — THERAPY VISIT (OUTPATIENT)
Dept: PHYSICAL THERAPY | Facility: CLINIC | Age: 69
End: 2019-05-30
Payer: COMMERCIAL

## 2019-05-30 DIAGNOSIS — Z47.1 AFTERCARE FOLLOWING JOINT REPLACEMENT: ICD-10-CM

## 2019-05-30 DIAGNOSIS — M25.562 LEFT KNEE PAIN: ICD-10-CM

## 2019-05-30 PROCEDURE — 97110 THERAPEUTIC EXERCISES: CPT | Mod: GP | Performed by: PHYSICAL THERAPIST

## 2019-05-30 PROCEDURE — 97010 HOT OR COLD PACKS THERAPY: CPT | Mod: GP | Performed by: PHYSICAL THERAPIST

## 2019-06-03 ENCOUNTER — THERAPY VISIT (OUTPATIENT)
Dept: PHYSICAL THERAPY | Facility: CLINIC | Age: 69
End: 2019-06-03
Payer: COMMERCIAL

## 2019-06-03 DIAGNOSIS — Z47.1 AFTERCARE FOLLOWING JOINT REPLACEMENT: ICD-10-CM

## 2019-06-03 DIAGNOSIS — M25.562 LEFT KNEE PAIN: ICD-10-CM

## 2019-06-03 PROCEDURE — 97010 HOT OR COLD PACKS THERAPY: CPT | Mod: GP | Performed by: PHYSICAL THERAPIST

## 2019-06-03 PROCEDURE — 97112 NEUROMUSCULAR REEDUCATION: CPT | Mod: GP | Performed by: PHYSICAL THERAPIST

## 2019-06-03 PROCEDURE — 97110 THERAPEUTIC EXERCISES: CPT | Mod: GP | Performed by: PHYSICAL THERAPIST

## 2019-06-06 ENCOUNTER — THERAPY VISIT (OUTPATIENT)
Dept: PHYSICAL THERAPY | Facility: CLINIC | Age: 69
End: 2019-06-06
Payer: COMMERCIAL

## 2019-06-06 DIAGNOSIS — Z47.1 AFTERCARE FOLLOWING JOINT REPLACEMENT: ICD-10-CM

## 2019-06-06 DIAGNOSIS — M25.562 LEFT KNEE PAIN: ICD-10-CM

## 2019-06-06 PROCEDURE — 97110 THERAPEUTIC EXERCISES: CPT | Mod: GP | Performed by: PHYSICAL THERAPIST

## 2019-06-06 PROCEDURE — 97112 NEUROMUSCULAR REEDUCATION: CPT | Mod: GP | Performed by: PHYSICAL THERAPIST

## 2019-06-11 ENCOUNTER — THERAPY VISIT (OUTPATIENT)
Dept: PHYSICAL THERAPY | Facility: CLINIC | Age: 69
End: 2019-06-11
Payer: COMMERCIAL

## 2019-06-11 DIAGNOSIS — Z47.1 AFTERCARE FOLLOWING JOINT REPLACEMENT: ICD-10-CM

## 2019-06-11 DIAGNOSIS — M25.562 LEFT KNEE PAIN: ICD-10-CM

## 2019-06-11 PROCEDURE — 97112 NEUROMUSCULAR REEDUCATION: CPT | Mod: GP | Performed by: PHYSICAL THERAPIST

## 2019-06-11 PROCEDURE — 97110 THERAPEUTIC EXERCISES: CPT | Mod: GP | Performed by: PHYSICAL THERAPIST

## 2019-06-14 ENCOUNTER — THERAPY VISIT (OUTPATIENT)
Dept: PHYSICAL THERAPY | Facility: CLINIC | Age: 69
End: 2019-06-14
Payer: COMMERCIAL

## 2019-06-14 DIAGNOSIS — M25.562 LEFT KNEE PAIN: ICD-10-CM

## 2019-06-14 DIAGNOSIS — Z47.1 AFTERCARE FOLLOWING JOINT REPLACEMENT: ICD-10-CM

## 2019-06-14 PROCEDURE — 97112 NEUROMUSCULAR REEDUCATION: CPT | Mod: GP | Performed by: PHYSICAL THERAPIST

## 2019-06-14 PROCEDURE — 97110 THERAPEUTIC EXERCISES: CPT | Mod: GP | Performed by: PHYSICAL THERAPIST

## 2019-06-18 ENCOUNTER — THERAPY VISIT (OUTPATIENT)
Dept: PHYSICAL THERAPY | Facility: CLINIC | Age: 69
End: 2019-06-18
Payer: COMMERCIAL

## 2019-06-18 DIAGNOSIS — Z47.1 AFTERCARE FOLLOWING JOINT REPLACEMENT: ICD-10-CM

## 2019-06-18 DIAGNOSIS — M25.562 LEFT KNEE PAIN: ICD-10-CM

## 2019-06-18 PROCEDURE — 97110 THERAPEUTIC EXERCISES: CPT | Mod: GP | Performed by: PHYSICAL THERAPIST

## 2019-06-18 PROCEDURE — 97112 NEUROMUSCULAR REEDUCATION: CPT | Mod: GP | Performed by: PHYSICAL THERAPIST

## 2019-06-25 ENCOUNTER — THERAPY VISIT (OUTPATIENT)
Dept: PHYSICAL THERAPY | Facility: CLINIC | Age: 69
End: 2019-06-25
Payer: COMMERCIAL

## 2019-06-25 DIAGNOSIS — M25.562 LEFT KNEE PAIN: ICD-10-CM

## 2019-06-25 DIAGNOSIS — Z47.1 AFTERCARE FOLLOWING JOINT REPLACEMENT: ICD-10-CM

## 2019-06-25 PROCEDURE — 97112 NEUROMUSCULAR REEDUCATION: CPT | Mod: GP | Performed by: PHYSICAL THERAPIST

## 2019-06-25 PROCEDURE — 97110 THERAPEUTIC EXERCISES: CPT | Mod: GP | Performed by: PHYSICAL THERAPIST

## 2019-06-25 NOTE — PROGRESS NOTES
Subjective:  HPI                    Objective:  System    Physical Exam    General     ROS    Assessment/Plan:    PROGRESS  REPORT      SUBJECTIVE  Pt was really sore a few days ago, just overdid it a bit a few days in a row.  Pt has since recovred and is feeling normal, but had some concern initially.  Overall pt is improving.  Current pain level is 3/10  .     Changes in function:  Yes (See Goal flowsheet attached for changes in current functional level)  Adverse reaction to treatment or activity: None    OBJECTIVE  L knee PROM flex 105, AROM flex 98.  L quad set fair/good.     ASSESSMENT/PLAN  Updated problem list and treatment plan: Diagnosis 1:  S/p L TKA      Pain -  hot/cold therapy, manual therapy, self management, education and home program  Decreased ROM/flexibility - manual therapy and therapeutic exercise  Decreased strength - therapeutic exercise and therapeutic activities  Impaired gait - gait training  Impaired muscle performance - neuro re-education  Decreased function - therapeutic activities  STG/LTGs have been met or progress has been made towards goals:  Yes (See Goal flow sheet completed today.)  Assessment of Progress: The patient's condition is improving.  Self Management Plans:  Patient has been instructed in a home treatment program.  Patient  has been instructed in self management of symptoms.  I have re-evaluated this patient and find that the nature, scope, duration and intensity of the therapy is appropriate for the medical condition of the patient.  Dustin continues to require the following intervention to meet STG and LTG's:  PT    Recommendations:  This patient would benefit from continued therapy.     Frequency:  1 X week, once daily  Duration:  for 8 weeks    Please refer to the daily flowsheet for treatment today, total treatment time and time spent performing 1:1 timed codes.

## 2019-07-02 ENCOUNTER — TRANSFERRED RECORDS (OUTPATIENT)
Dept: HEALTH INFORMATION MANAGEMENT | Facility: CLINIC | Age: 69
End: 2019-07-02

## 2019-07-03 ENCOUNTER — THERAPY VISIT (OUTPATIENT)
Dept: PHYSICAL THERAPY | Facility: CLINIC | Age: 69
End: 2019-07-03
Payer: COMMERCIAL

## 2019-07-03 DIAGNOSIS — M25.562 LEFT KNEE PAIN: ICD-10-CM

## 2019-07-03 DIAGNOSIS — Z47.1 AFTERCARE FOLLOWING JOINT REPLACEMENT: ICD-10-CM

## 2019-07-03 PROCEDURE — 97110 THERAPEUTIC EXERCISES: CPT | Mod: GP | Performed by: PHYSICAL THERAPIST

## 2019-07-03 PROCEDURE — 97112 NEUROMUSCULAR REEDUCATION: CPT | Mod: GP | Performed by: PHYSICAL THERAPIST

## 2019-07-09 ENCOUNTER — THERAPY VISIT (OUTPATIENT)
Dept: PHYSICAL THERAPY | Facility: CLINIC | Age: 69
End: 2019-07-09
Payer: COMMERCIAL

## 2019-07-09 DIAGNOSIS — Z47.1 AFTERCARE FOLLOWING JOINT REPLACEMENT: ICD-10-CM

## 2019-07-09 DIAGNOSIS — M25.562 LEFT KNEE PAIN: ICD-10-CM

## 2019-07-09 PROCEDURE — 97112 NEUROMUSCULAR REEDUCATION: CPT | Mod: GP | Performed by: PHYSICAL THERAPIST

## 2019-07-09 PROCEDURE — 97110 THERAPEUTIC EXERCISES: CPT | Mod: GP | Performed by: PHYSICAL THERAPIST

## 2019-08-02 ENCOUNTER — THERAPY VISIT (OUTPATIENT)
Dept: PHYSICAL THERAPY | Facility: CLINIC | Age: 69
End: 2019-08-02
Payer: COMMERCIAL

## 2019-08-02 DIAGNOSIS — M25.562 LEFT KNEE PAIN: ICD-10-CM

## 2019-08-02 DIAGNOSIS — Z47.1 AFTERCARE FOLLOWING JOINT REPLACEMENT: ICD-10-CM

## 2019-08-02 PROCEDURE — 97112 NEUROMUSCULAR REEDUCATION: CPT | Mod: GP | Performed by: PHYSICAL THERAPIST

## 2019-08-02 PROCEDURE — 97110 THERAPEUTIC EXERCISES: CPT | Mod: GP | Performed by: PHYSICAL THERAPIST

## 2019-08-02 ASSESSMENT — ACTIVITIES OF DAILY LIVING (ADL)
SIT WITH YOUR KNEE BENT: ACTIVITY IS NOT DIFFICULT
GO DOWN STAIRS: ACTIVITY IS MINIMALLY DIFFICULT
SWELLING: I HAVE THE SYMPTOM BUT IT DOES NOT AFFECT MY ACTIVITY
KNEEL ON THE FRONT OF YOUR KNEE: ACTIVITY IS MINIMALLY DIFFICULT
KNEE_ACTIVITY_OF_DAILY_LIVING_SCORE: 87.14
AS_A_RESULT_OF_YOUR_KNEE_INJURY,_HOW_WOULD_YOU_RATE_YOUR_CURRENT_LEVEL_OF_DAILY_ACTIVITY?: NEARLY NORMAL
STAND: ACTIVITY IS MINIMALLY DIFFICULT
STIFFNESS: I HAVE THE SYMPTOM BUT IT DOES NOT AFFECT MY ACTIVITY
GIVING WAY, BUCKLING OR SHIFTING OF KNEE: I DO NOT HAVE THE SYMPTOM
WEAKNESS: I HAVE THE SYMPTOM BUT IT DOES NOT AFFECT MY ACTIVITY
GO UP STAIRS: ACTIVITY IS MINIMALLY DIFFICULT
HOW_WOULD_YOU_RATE_THE_OVERALL_FUNCTION_OF_YOUR_KNEE_DURING_YOUR_USUAL_DAILY_ACTIVITIES?: NEARLY NORMAL
HOW_WOULD_YOU_RATE_THE_CURRENT_FUNCTION_OF_YOUR_KNEE_DURING_YOUR_USUAL_DAILY_ACTIVITIES_ON_A_SCALE_FROM_0_TO_100_WITH_100_BEING_YOUR_LEVEL_OF_KNEE_FUNCTION_PRIOR_TO_YOUR_INJURY_AND_0_BEING_THE_INABILITY_TO_PERFORM_ANY_OF_YOUR_USUAL_DAILY_ACTIVITIES?: 90
KNEE_ACTIVITY_OF_DAILY_LIVING_SUM: 61
PAIN: I DO NOT HAVE THE SYMPTOM
WALK: ACTIVITY IS NOT DIFFICULT
LIMPING: I HAVE THE SYMPTOM BUT IT DOES NOT AFFECT MY ACTIVITY
SQUAT: ACTIVITY IS MINIMALLY DIFFICULT
RISE FROM A CHAIR: ACTIVITY IS NOT DIFFICULT
RAW_SCORE: 61

## 2019-08-02 NOTE — LETTER
"[facility]  [address]  [address]  [place] [address]  [phone]    2019    Re: Dustin Lan   :   1950  MRN:  [id number]   REFERRING PHYSICIAN:   Miguel Buckley  [facility]  Date of Initial Evaluation:  19  Visits: 15 Rxs Used: 15  Reason for Referral:     Aftercare following joint replacement  Left knee pain  EVALUATION SUMMARY  Discharge Note    Progress reporting period is from last progress note on 19 to Aug 2, 2019.    Dustin failed to follow up and current status is unknown.  Please see information below for last relevant information on current status.  Patient seen for 15 visits.  SUBJECTIVE  Subjective changes noted by patient:  Pt is doing really well, can still overdo it from time to time   .  Current pain level is  .     Previous pain level was   .   Changes in function:  Yes (See Goal flowsheet attached for changes in current functional level)  Adverse reaction to treatment or activity: None  OBJECTIVE  Changes noted in objective findings: L knee PROM flex 120, L knee AROM flex 115.  L quad set good, L hip abd 4+/5.  L ITB tender to palpation.  Pt is able to step up 6\" and down without hand support   ASSESSMENT/PLAN  Diagnosis: L TKR   STG/LTGs have been met or progress has been made towards goals:  Yes, please see goal flowsheet for most current information  Assessment of Progress: current status is unknown.    Last current status:     Self Management Plans:  HEP  I have re-evaluated this patient and find that the nature, scope, duration and intensity of the therapy is appropriate for the medical condition of the patient.  Dustin continues to require the following intervention to meet STG and LTG's:  HEP.  Recommendations:  Discharge with current home program.  Patient to follow up with MD as needed.    Thank you for your referral.    INQUIRIES  Therapist: DENNIS MENDEZ, PT   [facility]  [address]  [address]  [place] [address]  Phone: [phone]  Fax: [phone]

## 2019-08-02 NOTE — PROGRESS NOTES
"Discharge Note    Progress reporting period is from last progress note on 08/02/19 to Aug 2, 2019.    Dustin failed to follow up and current status is unknown.  Please see information below for last relevant information on current status.  Patient seen for 15 visits.    SUBJECTIVE  Subjective changes noted by patient:  Pt is doing really well, can still overdo it from time to time   .  Current pain level is  .     Previous pain level was   .   Changes in function:  Yes (See Goal flowsheet attached for changes in current functional level)  Adverse reaction to treatment or activity: None    OBJECTIVE  Changes noted in objective findings: L knee PROM flex 120, L knee AROM flex 115.  L quad set good, L hip abd 4+/5.  L ITB tender to palpation.  Pt is able to step up 6\" and down without hand support     ASSESSMENT/PLAN  Diagnosis: L TKR   STG/LTGs have been met or progress has been made towards goals:  Yes, please see goal flowsheet for most current information  Assessment of Progress: current status is unknown.    Last current status:     Self Management Plans:  HEP  I have re-evaluated this patient and find that the nature, scope, duration and intensity of the therapy is appropriate for the medical condition of the patient.  Dustin continues to require the following intervention to meet STG and LTG's:  HEP.    Recommendations:  Discharge with current home program.  Patient to follow up with MD as needed.    Please refer to the daily flowsheet for treatment today, total treatment time and time spent performing 1:1 timed codes.        "

## 2019-08-19 ENCOUNTER — TRANSFERRED RECORDS (OUTPATIENT)
Dept: HEALTH INFORMATION MANAGEMENT | Facility: CLINIC | Age: 69
End: 2019-08-19

## 2019-09-23 ENCOUNTER — TRANSFERRED RECORDS (OUTPATIENT)
Dept: HEALTH INFORMATION MANAGEMENT | Facility: CLINIC | Age: 69
End: 2019-09-23

## 2019-10-15 ENCOUNTER — TRANSFERRED RECORDS (OUTPATIENT)
Dept: HEALTH INFORMATION MANAGEMENT | Facility: CLINIC | Age: 69
End: 2019-10-15

## 2019-10-28 ENCOUNTER — HEALTH MAINTENANCE LETTER (OUTPATIENT)
Age: 69
End: 2019-10-28

## 2019-11-04 DIAGNOSIS — I10 ESSENTIAL HYPERTENSION: ICD-10-CM

## 2019-11-04 RX ORDER — DILTIAZEM HYDROCHLORIDE 240 MG/1
240 CAPSULE, EXTENDED RELEASE ORAL DAILY
Qty: 90 CAPSULE | Refills: 0 | Status: SHIPPED | OUTPATIENT
Start: 2019-11-04 | End: 2019-12-13

## 2019-11-04 NOTE — TELEPHONE ENCOUNTER
Prescription approved per [facility] Refill Protocol.  Irene Mcfarland RN  Message handled by Nurse Triage.

## 2019-11-04 NOTE — TELEPHONE ENCOUNTER
"Requested Prescriptions   Pending Prescriptions Disp Refills     diltiazem ER (TIAZAC) 240 MG 24 hr ER beaded capsule [Pharmacy Med Name: DILTIAZEM 24HR  MG CAP]    Last Written Prescription Date:  1/25/2019  Last Fill Quantity: 90,  # refills: 2   Last office visit: 4/11/2019 with prescribing provider:  Alessandro Blanco     Future Office Visit:   Next 5 appointments (look out 90 days)    Dec 13, 2019 10:05 AM CST  Adult Preventative Visit with Alessandro Blanco MD,  EXAM ROOM 41  [facility] [address] [place] [address]  [address]  [facility] [id number] [phone]          90 capsule 2     Sig: TAKE 1 CAPSULE (240 MG) BY MOUTH DAILY       Calcium Channel Blockers Protocol  Passed - 11/4/2019  2:18 AM        Passed - Blood pressure under 140/90 in past 12 months     BP Readings from Last 3 Encounters:   05/09/19 100/62   04/11/19 136/82   01/07/19 122/86                 Passed - Normal ALT in past 12 months     Recent Labs   Lab Test 11/19/18  0811   ALT 24             Passed - Recent (12 mo) or future (30 days) visit within the authorizing provider's specialty     Patient has had an office visit with the authorizing provider or a provider within the authorizing providers department within the previous 12 mos or has a future within next 30 days. See \"Patient Info\" tab in inbasket, or \"Choose Columns\" in Meds & Orders section of the refill encounter.              Passed - Medication is active on med list        Passed - Patient is age 18 or older        Passed - Normal serum creatinine on file in past 12 months     Recent Labs   Lab Test 05/08/19  0606   CR 1.01               "

## 2019-12-13 ENCOUNTER — OFFICE VISIT (OUTPATIENT)
Dept: PEDIATRICS | Facility: CLINIC | Age: 69
End: 2019-12-13
Payer: COMMERCIAL

## 2019-12-13 VITALS
OXYGEN SATURATION: 97 % | DIASTOLIC BLOOD PRESSURE: 82 MMHG | HEIGHT: 72 IN | HEART RATE: 78 BPM | SYSTOLIC BLOOD PRESSURE: 126 MMHG | TEMPERATURE: 97.7 F | WEIGHT: 236.9 LBS | BODY MASS INDEX: 32.09 KG/M2

## 2019-12-13 DIAGNOSIS — Z00.00 ENCOUNTER FOR MEDICARE ANNUAL WELLNESS EXAM: Primary | ICD-10-CM

## 2019-12-13 DIAGNOSIS — I10 ESSENTIAL HYPERTENSION: ICD-10-CM

## 2019-12-13 DIAGNOSIS — E78.00 HYPERCHOLESTEREMIA: ICD-10-CM

## 2019-12-13 DIAGNOSIS — J45.30 MILD PERSISTENT ASTHMA WITHOUT COMPLICATION: ICD-10-CM

## 2019-12-13 LAB
ALBUMIN SERPL-MCNC: 3.8 G/DL (ref 3.4–5)
ALP SERPL-CCNC: 92 U/L (ref 40–150)
ALT SERPL W P-5'-P-CCNC: 25 U/L (ref 0–70)
ANION GAP SERPL CALCULATED.3IONS-SCNC: 3 MMOL/L (ref 3–14)
AST SERPL W P-5'-P-CCNC: 21 U/L (ref 0–45)
BILIRUB SERPL-MCNC: 0.5 MG/DL (ref 0.2–1.3)
BUN SERPL-MCNC: 12 MG/DL (ref 7–30)
CALCIUM SERPL-MCNC: 8.9 MG/DL (ref 8.5–10.1)
CHLORIDE SERPL-SCNC: 108 MMOL/L (ref 94–109)
CHOLEST SERPL-MCNC: 214 MG/DL
CO2 SERPL-SCNC: 28 MMOL/L (ref 20–32)
CREAT SERPL-MCNC: 0.87 MG/DL (ref 0.66–1.25)
GFR SERPL CREATININE-BSD FRML MDRD: 89 ML/MIN/{1.73_M2}
GLUCOSE SERPL-MCNC: 90 MG/DL (ref 70–99)
HDLC SERPL-MCNC: 59 MG/DL
LDLC SERPL CALC-MCNC: 131 MG/DL
NONHDLC SERPL-MCNC: 155 MG/DL
POTASSIUM SERPL-SCNC: 4.1 MMOL/L (ref 3.4–5.3)
PROT SERPL-MCNC: 6.6 G/DL (ref 6.8–8.8)
SODIUM SERPL-SCNC: 139 MMOL/L (ref 133–144)
TRIGL SERPL-MCNC: 119 MG/DL

## 2019-12-13 PROCEDURE — G0009 ADMIN PNEUMOCOCCAL VACCINE: HCPCS | Performed by: INTERNAL MEDICINE

## 2019-12-13 PROCEDURE — 36415 COLL VENOUS BLD VENIPUNCTURE: CPT | Performed by: INTERNAL MEDICINE

## 2019-12-13 PROCEDURE — 90732 PPSV23 VACC 2 YRS+ SUBQ/IM: CPT | Performed by: INTERNAL MEDICINE

## 2019-12-13 PROCEDURE — 80061 LIPID PANEL: CPT | Performed by: INTERNAL MEDICINE

## 2019-12-13 PROCEDURE — 80053 COMPREHEN METABOLIC PANEL: CPT | Performed by: INTERNAL MEDICINE

## 2019-12-13 PROCEDURE — 99397 PER PM REEVAL EST PAT 65+ YR: CPT | Mod: 25 | Performed by: INTERNAL MEDICINE

## 2019-12-13 RX ORDER — DILTIAZEM HYDROCHLORIDE 240 MG/1
240 CAPSULE, EXTENDED RELEASE ORAL DAILY
Qty: 90 CAPSULE | Refills: 3 | Status: SHIPPED | OUTPATIENT
Start: 2019-12-13 | End: 2020-12-18

## 2019-12-13 RX ORDER — ATORVASTATIN CALCIUM 10 MG/1
10 TABLET, FILM COATED ORAL DAILY
Qty: 90 TABLET | Refills: 3 | Status: SHIPPED | OUTPATIENT
Start: 2019-12-13 | End: 2020-11-30

## 2019-12-13 ASSESSMENT — ENCOUNTER SYMPTOMS
PALPITATIONS: 0
WEAKNESS: 0
CHILLS: 0
PARESTHESIAS: 0
NERVOUS/ANXIOUS: 0
MYALGIAS: 1
DIARRHEA: 0
JOINT SWELLING: 1
CONSTIPATION: 0
FREQUENCY: 0
DYSURIA: 0
SHORTNESS OF BREATH: 0
HEMATOCHEZIA: 0
EYE PAIN: 0
HEMATURIA: 0
FEVER: 0
HEARTBURN: 0
SORE THROAT: 0
ABDOMINAL PAIN: 0
NAUSEA: 0
DIZZINESS: 0
HEADACHES: 0
COUGH: 0
ARTHRALGIAS: 1

## 2019-12-13 ASSESSMENT — MIFFLIN-ST. JEOR: SCORE: 1887.57

## 2019-12-13 ASSESSMENT — ACTIVITIES OF DAILY LIVING (ADL): CURRENT_FUNCTION: NO ASSISTANCE NEEDED

## 2019-12-13 NOTE — PROGRESS NOTES
"SUBJECTIVE:   Dustin Lan is a 69 year old male who presents for Preventive Visit.  Are you in the first 12 months of your Medicare coverage?  No    Healthy Habits:     In general, how would you rate your overall health?  Good    Frequency of exercise:  2-3 days/week    Duration of exercise:  30-45 minutes    Do you usually eat at least 4 servings of fruit and vegetables a day, include whole grains    & fiber and avoid regularly eating high fat or \"junk\" foods?  Yes    Taking medications regularly:  Yes    Barriers to taking medications:  None    Medication side effects:  None    Ability to successfully perform activities of daily living:  No assistance needed    Home Safety:  No safety concerns identified    Hearing Impairment:  No hearing concerns    In the past 6 months, have you been bothered by leaking of urine?  No    In general, how would you rate your overall mental or emotional health?  Excellent      PHQ-2 Total Score: 0    Additional concerns today:  No       Do you feel safe in your environment? Yes    Have you ever done Advance Care Planning? (For example, a Health Directive, POLST, or a discussion with a medical provider or your loved ones about your wishes): Yes, advance care planning is on file.     Fall risk  Fallen 2 or more times in the past year?: No  Any fall with injury in the past year?: No      Cognitive Screening   1) Repeat 3 items (Leader, Season, Table)    2) Clock draw: NORMAL  3) 3 item recall: Recalls 3 objects  Results: 3 items recalled: COGNITIVE IMPAIRMENT LESS LIKELY    Mini-CogTM Copyright LISETTE Hernandez. Licensed by the author for use in [facility]; reprinted with permission (pamela@.[facility]). All rights reserved.      Do you have sleep apnea, excessive snoring or daytime drowsiness?: no    Reviewed and updated as needed this visit by clinical staff         Reviewed and updated as needed this visit by Provider        Social History     Tobacco Use     Smoking status: " Never Smoker     Smokeless tobacco: Never Used   Substance Use Topics     Alcohol use: Yes     Alcohol/week: 0.0 - 3.0 standard drinks     Comment: 5 - 7 beers a week     If you drink alcohol do you typically have >3 drinks per day or >7 drinks per week? No    Alcohol Use 12/13/2019   Prescreen: >3 drinks/day or >7 drinks/week? No   Prescreen: >3 drinks/day or >7 drinks/week? -         Current providers sharing in care for this patient include:   Patient Care Team:  Alessandro Blanco MD as PCP - General (Internal Medicine)  Alessandro Blanco MD as Assigned PCP    The following health maintenance items are reviewed in Epic and correct as of today:  Health Maintenance   Topic Date Due     ANNUAL REVIEW OF HM ORDERS  1950     AORTIC ANEURYSM SCREENING (SYSTEM ASSIGNED)  06/01/2017     PNEUMOCOCCAL IMMUNIZATION 65+ LOW/MEDIUM RISK (2 of 2 - PPSV23) 11/17/2018     PHQ-2  01/01/2019     ASTHMA CONTROL TEST  03/06/2019     FALL RISK ASSESSMENT  11/19/2019     MEDICARE ANNUAL WELLNESS VISIT  11/19/2019     ASTHMA ACTION PLAN  11/19/2019     LIPID  11/19/2023     ADVANCE CARE PLANNING  11/19/2023     COLONOSCOPY  12/05/2023     DTAP/TDAP/TD IMMUNIZATION (4 - Td) 09/14/2028     HEPATITIS C SCREENING  Completed     INFLUENZA VACCINE  Completed     ZOSTER IMMUNIZATION  Completed     IPV IMMUNIZATION  Aged Out     MENINGITIS IMMUNIZATION  Aged Out     Lab work is in process  Labs reviewed in EPIC  BP Readings from Last 3 Encounters:   12/13/19 126/82   05/09/19 100/62   04/11/19 136/82    Wt Readings from Last 3 Encounters:   12/13/19 107.5 kg (236 lb 14.4 oz)   05/08/19 105.2 kg (232 lb)   04/11/19 106 kg (233 lb 9.6 oz)                  Patient Active Problem List   Diagnosis     Contact dermatitis and other eczema, due to unspecified cause     History of colonic polyps     HYPERLIPIDEMIA LDL GOAL <130     Impaired fasting glucose     Right knee pain     Mild persistent asthma without complication     Paroxysmal  supraventricular tachycardia (H)     Status post total knee replacement, left     Past Surgical History:   Procedure Laterality Date     ARTHROPLASTY KNEE Left 2019    Procedure: ARTHROPLASTY LEFT KNEE;  Surgeon: Miguel Buckley MD;  Location: [facility] OR     ARTHROSCOPY KNEE RT/LT  2008    right     HC COLONOSCOPY THRU STOMA, DIAGNOSTIC  2007    ileocecal valve polyp, tubular adenoma, repeat in 3 yrs     INJECT STEROID (LOCATION) Right 2019    Procedure: Inject steroid right knee;  Surgeon: Miguel Buckley MD;  Location: [facility] OR       Social History     Tobacco Use     Smoking status: Never Smoker     Smokeless tobacco: Never Used   Substance Use Topics     Alcohol use: Yes     Alcohol/week: 0.0 - 3.0 standard drinks     Comment: 5 - 7 beers a week     Family History   Problem Relation Age of Onset     Cerebrovascular Disease Mother 75         of a stroke     C.A.D. Father 67         suddenly when using the  at age 67. He was a smoker. On autopsy, told to have multiple heart attacks and scar tissue but he had never veronika a clinical heart attack.     Lipids Sister          Current Outpatient Medications   Medication Sig Dispense Refill     acetaminophen (TYLENOL) 500 MG tablet Take 2 tablets (1,000 mg) by mouth every 6 hours as needed for pain 100 tablet 0     Ascorbic Acid (VITAMIN C PO) Take 1 tablet by mouth every morning       diltiazem ER (TIAZAC) 240 MG 24 hr ER beaded capsule TAKE 1 CAPSULE (240 MG) BY MOUTH DAILY 90 capsule 0     fluticasone-salmeterol (ADVAIR DISKUS) 250-50 MCG/DOSE inhaler Inhale 1 puff into the lungs 2 times daily (Patient taking differently: Inhale 1 puff into the lungs At Bedtime ) 3 Inhaler 1     glucosamine-chondroitin 500-400 MG CAPS per capsule Take 1 capsule by mouth 2 times daily       multivitamin, therapeutic (THERA-VIT) TABS tablet Take 1 tablet by mouth daily       simvastatin (ZOCOR) 20 MG tablet Take 1 tablet (20 mg) by mouth At Bedtime 90 tablet 3      senna-docusate (SENOKOT-S/PERICOLACE) 8.6-50 MG tablet Take 2 tablets by mouth 2 times daily as needed for constipation 60 tablet 0     Allergies   Allergen Reactions     Cats          Review of Systems   Constitutional: Negative for chills and fever.   HENT: Negative for congestion, ear pain, hearing loss and sore throat.    Eyes: Negative for pain and visual disturbance.   Respiratory: Negative for cough and shortness of breath.    Cardiovascular: Negative for chest pain, palpitations and peripheral edema.   Gastrointestinal: Negative for abdominal pain, constipation, diarrhea, heartburn, hematochezia and nausea.   Genitourinary: Negative for discharge, dysuria, frequency, genital sores, hematuria, impotence and urgency.   Musculoskeletal: Positive for arthralgias, joint swelling and myalgias.   Skin: Negative for rash.   Neurological: Negative for dizziness, weakness, headaches and paresthesias.   Psychiatric/Behavioral: Negative for mood changes. The patient is not nervous/anxious.      CONSTITUTIONAL: NEGATIVE for fever, chills, change in weight  INTEGUMENTARY/SKIN: NEGATIVE for worrisome rashes, moles or lesions  EYES: NEGATIVE for vision changes or irritation  ENT/MOUTH: NEGATIVE for ear, mouth and throat problems  RESP: NEGATIVE for significant cough or SOB  CV: NEGATIVE for chest pain, palpitations or peripheral edema  GI: NEGATIVE for nausea, abdominal pain, heartburn, or change in bowel habits  : NEGATIVE for frequency, dysuria, or hematuria  MUSCULOSKELETAL: NEGATIVE for significant arthralgias or myalgia  NEURO: NEGATIVE for weakness, dizziness or paresthesias  ENDOCRINE: NEGATIVE for temperature intolerance, skin/hair changes  HEME: NEGATIVE for bleeding problems  PSYCHIATRIC: NEGATIVE for changes in mood or affect    OBJECTIVE:   There were no vitals taken for this visit. Estimated body mass index is 31.46 kg/m  as calculated from the following:    Height as of 5/8/19: 1.829 m (6').    Weight as  of 5/8/19: 105.2 kg (232 lb).  Physical Exam  GENERAL: healthy, alert and no distress  EYES: Eyes grossly normal to inspection, PERRL and conjunctivae and sclerae normal  HENT: ear canals and TM's normal, nose and mouth without ulcers or lesions  NECK: no adenopathy, no asymmetry, masses, or scars and thyroid normal to palpation  RESP: lungs clear to auscultation - no rales, rhonchi or wheezes  CV: regular rate and rhythm, normal S1 S2, no S3 or S4, no murmur, click or rub, no peripheral edema and peripheral pulses strong  ABDOMEN: soft, nontender, no hepatosplenomegaly, no masses and bowel sounds normal   (male): normal male genitalia without lesions or urethral discharge, no hernia  MS: no gross musculoskeletal defects noted, no edema  SKIN: no suspicious lesions or rashes  NEURO: Normal strength and tone, mentation intact and speech normal  PSYCH: mentation appears normal, affect normal/bright    Diagnostic Test Results:  Labs reviewed in Epic    ASSESSMENT / PLAN:   1. Encounter for Medicare annual wellness exam  Discussed diet, exercise, testicular self exam, blood pressure, cholesterol, and need for cancer surveillance at appropriate ages.   - Lipid panel reflex to direct LDL Fasting  - Comprehensive metabolic panel    2. Essential hypertension  Continue calcium channel blocker; no significant atrial tachycardia episodes.   - diltiazem ER (TIAZAC) 240 MG 24 hr ER beaded capsule; Take 1 capsule (240 mg) by mouth daily  Dispense: 90 capsule; Refill: 3    3. Mild persistent asthma without complication  Continue with advair.   - fluticasone-salmeterol (ADVAIR DISKUS) 250-50 MCG/DOSE inhaler; Inhale 1 puff into the lungs At Bedtime  Dispense: 3 Inhaler; Refill: 3    4. Hypercholesteremia    - atorvastatin (LIPITOR) 10 MG tablet; Take 1 tablet (10 mg) by mouth daily  Dispense: 90 tablet; Refill: 3    COUNSELING:  Reviewed preventive health counseling, as reflected in patient instructions       Regular exercise        Healthy diet/nutrition       Vision screening       Hearing screening       Colon cancer screening       Prostate cancer screening    Estimated body mass index is 31.46 kg/m  as calculated from the following:    Height as of 5/8/19: 1.829 m (6').    Weight as of 5/8/19: 105.2 kg (232 lb).         reports that he has never smoked. He has never used smokeless tobacco.      Appropriate preventive services were discussed with this patient, including applicable screening as appropriate for cardiovascular disease, diabetes, osteopenia/osteoporosis, and glaucoma.  As appropriate for age/gender, discussed screening for colorectal cancer, prostate cancer, breast cancer, and cervical cancer. Checklist reviewing preventive services available has been given to the patient.    Reviewed patients plan of care and provided an AVS. The Basic Care Plan (routine screening as documented in Health Maintenance) for Dustin meets the Care Plan requirement. This Care Plan has been established and reviewed with the Patient.    Counseling Resources:  ATP IV Guidelines  Pooled Cohorts Equation Calculator  Breast Cancer Risk Calculator  FRAX Risk Assessment  ICSI Preventive Guidelines  Dietary Guidelines for Americans, 2010  USDA's MyPlate  ASA Prophylaxis  Lung CA Screening    Alessandro Blanco MD  [facility]    Identified Health Risks:

## 2019-12-13 NOTE — PATIENT INSTRUCTIONS
"Pneumovax today.    Colonoscopy in 2023.    Lab work downstairs today.  Directions:  As you walk through the first floor, you'll see (on the right) first the pharmacy, then some bathrooms, then the \"Lab and Imaging\" area. Give them your name at the window there and wait for them to call you.     Patient Education   Personalized Prevention Plan  You are due for the preventive services outlined below.  Your care team is available to assist you in scheduling these services.  If you have already completed any of these items, please share that information with your care team to update in your medical record.  Health Maintenance Due   Topic Date Due     ANNUAL REVIEW OF HM ORDERS  1950     AORTIC ANEURYSM SCREENING (SYSTEM ASSIGNED)  06/01/2017     Pneumococcal Vaccine (2 of 2 - PPSV23) 11/17/2018     Asthma Control Test  03/06/2019     FALL RISK ASSESSMENT  11/19/2019     Annual Wellness Visit  11/19/2019     Asthma Action Plan - yearly  11/19/2019        "

## 2020-01-19 DIAGNOSIS — J45.30 MILD PERSISTENT ASTHMA WITHOUT COMPLICATION: ICD-10-CM

## 2020-01-20 NOTE — TELEPHONE ENCOUNTER
Routing refill request to provider for review/approval because:  ACT is overdue so RN cannot refill per protocol      Carlito Sofia RN, BSN, PHN

## 2020-01-20 NOTE — TELEPHONE ENCOUNTER
"Requested Prescriptions   Pending Prescriptions Disp Refills     ADVAIR DISKUS 250-50 MCG/DOSE inhaler [Pharmacy Med Name: ADVAIR 250-50 DISKUS]  1     Sig: TAKE 1 PUFF BY MOUTH TWICE A DAY       Inhaled Steroids Protocol Failed - 1/20/2020  1:42 PM        Failed - Asthma control assessment score within normal limits in last 6 months     Please review ACT score.           Passed - Patient is age 12 or older        Passed - Medication is active on med list        Passed - Recent (6 mo) or future (30 days) visit within the authorizing provider's specialty     Patient had office visit in the last 6 months or has a visit in the next 30 days with authorizing provider or within the authorizing provider's specialty.  See \"Patient Info\" tab in inbasket, or \"Choose Columns\" in Meds & Orders section of the refill encounter.            ACT Total Scores 5/1/2017 11/17/2017 9/10/2018   ACT TOTAL SCORE - - -   ASTHMA ER VISITS - - -   ASTHMA HOSPITALIZATIONS - - -   ACT TOTAL SCORE (Goal Greater than or Equal to 20) 21 25 25   In the past 12 months, how many times did you visit the emergency room for your asthma without being admitted to the hospital? 0 0 0   In the past 12 months, how many times were you hospitalized overnight because of your asthma? 0 0 0       "

## 2020-01-20 NOTE — TELEPHONE ENCOUNTER
Please call and perform ACT over the phone.  They should have been given a paper Asthma Control Test at their last visit.    If ACT score is below 20, please recommend that patient schedule a visit (If refuses an office visit, may schedule a phone visit.)    Alessandro Blanco MD  Internal Medicine and Pediatrics

## 2020-01-23 ASSESSMENT — ASTHMA QUESTIONNAIRES: ACT_TOTALSCORE: 25

## 2020-02-04 ENCOUNTER — TRANSFERRED RECORDS (OUTPATIENT)
Dept: HEALTH INFORMATION MANAGEMENT | Facility: CLINIC | Age: 70
End: 2020-02-04

## 2020-02-19 DIAGNOSIS — J45.30 MILD PERSISTENT ASTHMA WITHOUT COMPLICATION: ICD-10-CM

## 2020-02-19 NOTE — TELEPHONE ENCOUNTER
Pharmacy comments:  SCRIPT CLARIFICATION:  ADVAIR IS USUALLY DOSED TWICE DAILY AND HE HAS PREVIOUSLY ANTOINETTE ON TWICE DAILY DOSING.  HAS THIS CHANGED OR AN ERROR? CAN YOU CLARIFY PLEASE?

## 2020-11-17 NOTE — PROGRESS NOTES
Dustin Lan is a 68 year old patient who comes in today for a Blood Pressure check.  Initial BP: 1st /86 P 71                   2nd /86 P 73   Blood Pressure Cuff was Adult Large   Disposition: results routed to provider       airway patent/breath sounds equal/clear to auscultation bilaterally

## 2020-11-28 DIAGNOSIS — E78.00 HYPERCHOLESTEREMIA: ICD-10-CM

## 2020-11-30 RX ORDER — ATORVASTATIN CALCIUM 10 MG/1
TABLET, FILM COATED ORAL
Qty: 90 TABLET | Refills: 0 | Status: SHIPPED | OUTPATIENT
Start: 2020-11-30 | End: 2020-12-18

## 2020-11-30 NOTE — TELEPHONE ENCOUNTER
Medication is being filled for 1 time refill only due to:  due for visit and labs     Please call patient and assist in scheduling visit    Kapil GUY RN, BSN

## 2020-11-30 NOTE — TELEPHONE ENCOUNTER
Called and LVm for patient. Rx refilled but needs to schedule visit with PCP.  Levi Quiroz, CMA

## 2020-12-01 ENCOUNTER — MYC REFILL (OUTPATIENT)
Dept: PEDIATRICS | Facility: CLINIC | Age: 70
End: 2020-12-01

## 2020-12-01 DIAGNOSIS — E78.00 HYPERCHOLESTEREMIA: ICD-10-CM

## 2020-12-01 RX ORDER — ATORVASTATIN CALCIUM 10 MG/1
10 TABLET, FILM COATED ORAL DAILY
Qty: 90 TABLET | Refills: 0 | Status: CANCELLED | OUTPATIENT
Start: 2020-12-01

## 2020-12-18 ENCOUNTER — VIRTUAL VISIT (OUTPATIENT)
Dept: PEDIATRICS | Facility: CLINIC | Age: 70
End: 2020-12-18
Payer: COMMERCIAL

## 2020-12-18 DIAGNOSIS — E78.00 HYPERCHOLESTEREMIA: ICD-10-CM

## 2020-12-18 DIAGNOSIS — Z13.220 SCREENING CHOLESTEROL LEVEL: Primary | ICD-10-CM

## 2020-12-18 DIAGNOSIS — I10 ESSENTIAL HYPERTENSION: ICD-10-CM

## 2020-12-18 DIAGNOSIS — J45.30 MILD PERSISTENT ASTHMA WITHOUT COMPLICATION: ICD-10-CM

## 2020-12-18 PROCEDURE — G0438 PPPS, INITIAL VISIT: HCPCS | Mod: 95 | Performed by: INTERNAL MEDICINE

## 2020-12-18 RX ORDER — ATORVASTATIN CALCIUM 10 MG/1
10 TABLET, FILM COATED ORAL DAILY
Qty: 90 TABLET | Refills: 3 | Status: SHIPPED | OUTPATIENT
Start: 2020-12-18 | End: 2022-01-03

## 2020-12-18 RX ORDER — DILTIAZEM HYDROCHLORIDE 240 MG/1
240 CAPSULE, EXTENDED RELEASE ORAL DAILY
Qty: 90 CAPSULE | Refills: 3 | Status: SHIPPED | OUTPATIENT
Start: 2020-12-18 | End: 2022-01-03

## 2020-12-18 ASSESSMENT — ASTHMA QUESTIONNAIRES
QUESTION_4 LAST FOUR WEEKS HOW OFTEN HAVE YOU USED YOUR RESCUE INHALER OR NEBULIZER MEDICATION (SUCH AS ALBUTEROL): NOT AT ALL
QUESTION_5 LAST FOUR WEEKS HOW WOULD YOU RATE YOUR ASTHMA CONTROL: COMPLETELY CONTROLLED
QUESTION_3 LAST FOUR WEEKS HOW OFTEN DID YOUR ASTHMA SYMPTOMS (WHEEZING, COUGHING, SHORTNESS OF BREATH, CHEST TIGHTNESS OR PAIN) WAKE YOU UP AT NIGHT OR EARLIER THAN USUAL IN THE MORNING: NOT AT ALL
QUESTION_2 LAST FOUR WEEKS HOW OFTEN HAVE YOU HAD SHORTNESS OF BREATH: NOT AT ALL
QUESTION_1 LAST FOUR WEEKS HOW MUCH OF THE TIME DID YOUR ASTHMA KEEP YOU FROM GETTING AS MUCH DONE AT WORK, SCHOOL OR AT HOME: NONE OF THE TIME
ACT_TOTALSCORE: 25

## 2020-12-18 NOTE — PATIENT INSTRUCTIONS
It was good talking with you earlier today.  Here's a summary of what we discussed:    1)  Set up fasting labs within a month or so.    2)  On same day, let's have you stop into pharmacy for a blood pressure recheck.    3) No changes in meds for now.    Alessandro Blanco MD  Internal Medicine and Pediatrics

## 2020-12-18 NOTE — PROGRESS NOTES
"  Dustin Lan is a 70 year old male who is being evaluated via a billable video visit.      The patient has been notified of following:     \"This video visit will be conducted via a call between you and your physician/provider. We have found that certain health care needs can be provided without the need for an in-person physical exam.  This service lets us provide the care you need with a video conversation.  If a prescription is necessary we can send it directly to your pharmacy.  If lab work is needed we can place an order for that and you can then stop by our lab to have the test done at a later time.    Video visits are billed at different rates depending on your insurance coverage.  Please reach out to your insurance provider with any questions.    If during the course of the call the physician/provider feels a video visit is not appropriate, you will not be charged for this service.\"    Patient has given verbal consent for Video visit? Yes  How would you like to obtain your AVS? MyChart  If you are dropped from the video visit, the video invite should be resent to: Text to cell phone: [phone]  Will anyone else be joining your video visit? No    Subjective     Dustin Lan is a 70 year old male who presents today via video visit for the following health issues:    HPI     Hyperlipidemia Follow-Up      Are you regularly taking any medication or supplement to lower your cholesterol?   Yes- atorvastatin    Are you having muscle aches or other side effects that you think could be caused by your cholesterol lowering medication?  No    Asthma Follow-Up    Was ACT completed today?    Yes    ACT Total Scores 12/18/2020   ACT TOTAL SCORE -   ASTHMA ER VISITS -   ASTHMA HOSPITALIZATIONS -   ACT TOTAL SCORE (Goal Greater than or Equal to 20) 25   In the past 12 months, how many times did you visit the emergency room for your asthma without being admitted to the hospital? 0   In the past 12 months, how many " "times were you hospitalized overnight because of your asthma? 0     Had some rib pain after doing overhead work.  Soreness has resolved now.  Used incentive spirometry.          How many days per week do you miss taking your asthma controller medication?  I do not have an asthma controller medication    Please describe any recent triggers for your asthma: animal dander    Have you had any Emergency Room Visits, Urgent Care Visits, or Hospital Admissions since your last office visit?  No        Annual Wellness Visit  {  Patient has been advised of split billing requirements and indicates understanding: Yes     Are you in the first 12 months of your Medicare Part B coverage?  No    Physical Health:    In general, how would you rate your overall physical health? good    Outside of work, how many days during the week do you exercise?4-5 days/week    Outside of work, approximately how many minutes a day do you exercise?30-45 minutes    If you drink alcohol do you typically have >3 drinks per day or >7 drinks per week? No    Do you usually eat at least 4 servings of fruit and vegetables a day, include whole grains & fiber and avoid regularly eating high fat or \"junk\" foods? Yes    Do you have any problems taking medications regularly? No    Do you have any side effects from medications? none    Needs assistance for the following daily activities: no assistance needed    Which of the following safety concerns are present in your home?  none identified     Hearing impairment: No    In the past 6 months, have you been bothered by leaking of urine? no    There were no vitals taken for this visit.  Weight: Provided by patient  Height: Provided by patient  BMI: Based on patient-provided information  Blood Pressure: Provided by patient    Mental Health:    In general, how would you rate your overall mental or emotional health? excellent  PHQ-2 Score: 0    Do you feel safe in your environment? Yes    Have you ever done Advance " Care Planning? (For example, a Health Directive, POLST, or a discussion with a medical provider or your loved ones about your wishes)? Yes, advance care planning is on file.    Fall risk:  Fallen 2 or more times in the past year?: No  Any fall with injury in the past year?: No    Cognitive Screenin) Repeat 3 items (Leader, Season, Table)    2) Clock draw: NORMAL  3) 3 item recall: Recalls 3 objects  Results: NORMAL clock, 1-2 items recalled: COGNITIVE IMPAIRMENT LESS LIKELY    Mini-CogTM Copyright S Mary. Licensed by the author for use in [employer]; reprinted with permission (pamela@[facility]). All rights reserved.          Current providers sharing in care for this patient include:   Patient Care Team:  Alessandro Blanco MD as PCP - General (Internal Medicine)  Alessandro Blanco MD as Assigned PCP    Patient has been advised of split billing requirements and indicates understanding: Yes    Video Start Time: 3:00PM        Review of Systems   CONSTITUTIONAL: NEGATIVE for fever, chills, change in weight  INTEGUMENTARY/SKIN: NEGATIVE for worrisome rashes, moles or lesions  EYES: NEGATIVE for vision changes or irritation  ENT/MOUTH: NEGATIVE for ear, mouth and throat problems  RESP: NEGATIVE for significant cough or SOB  BREAST: NEGATIVE for masses, tenderness or discharge  CV: NEGATIVE for chest pain, palpitations or peripheral edema  GI: NEGATIVE for nausea, abdominal pain, heartburn, or change in bowel habits  : NEGATIVE for frequency, dysuria, or hematuria  MUSCULOSKELETAL: NEGATIVE for significant arthralgias or myalgia  NEURO: NEGATIVE for weakness, dizziness or paresthesias  ENDOCRINE: NEGATIVE for temperature intolerance, skin/hair changes  HEME: NEGATIVE for bleeding problems  PSYCHIATRIC: NEGATIVE for changes in mood or affect      Objective    Vitals - Patient Reported  Systolic (Patient Reported): 136  Diastolic (Patient Reported): 87  Weight (Patient Reported): 106.1 kg (234 lb)  Height  "(Patient Reported): 177.8 cm (5' 10\")  BMI (Based on Pt Reported Ht/Wt): 33.58      Vitals:  No vitals were obtained today due to virtual visit.    Physical Exam     GENERAL: Healthy, alert and no distress  EYES: Eyes grossly normal to inspection.  No discharge or erythema, or obvious scleral/conjunctival abnormalities.  RESP: No audible wheeze, cough, or visible cyanosis.  No visible retractions or increased work of breathing.    SKIN: Visible skin clear. No significant rash, abnormal pigmentation or lesions.  NEURO: Cranial nerves grossly intact.  Mentation and speech appropriate for age.  PSYCH: Mentation appears normal, affect normal/bright, judgement and insight intact, normal speech and appearance well-groomed.          Assessment & Plan     Screening cholesterol level  Due for labs.  Refilled for 1 year.   - Lipid panel reflex to direct LDL Fasting; Future  - Comprehensive metabolic panel (BMP + Alb, Alk Phos, ALT, AST, Total. Bili, TP); Future    Hypercholesteremia  Set up fasting labs.   - atorvastatin (LIPITOR) 10 MG tablet; Take 1 tablet (10 mg) by mouth daily    Essential hypertension  At goal.  Stop in pharmacy during lab draw.   - diltiazem ER (TIAZAC) 240 MG 24 hr ER beaded capsule; Take 1 capsule (240 mg) by mouth daily    Mild persistent asthma without complication  Refilled.   - fluticasone-salmeterol (ADVAIR DISKUS) 250-50 MCG/DOSE inhaler; Inhale 1 puff into the lungs 2 times daily        Patient Instructions   It was good talking with you earlier today.  Here's a summary of what we discussed:    1)  Set up fasting labs within a month or so.    2)  On same day, let's have you stop into pharmacy for a blood pressure recheck.    3) No changes in meds for now.    Alessandro Blanco MD  Internal Medicine and Pediatrics         No follow-ups on file.    Alessandro Blanco MD  [facility]      Video-Visit Details    Type of service:  Video Visit    Video End Time:3:20    Originating Location " (pt. Location): Home    Distant Location (provider location):  [facility] [place]     Platform used for Video Visit: [employer]

## 2020-12-19 ASSESSMENT — ASTHMA QUESTIONNAIRES: ACT_TOTALSCORE: 25

## 2021-01-22 DIAGNOSIS — Z13.220 SCREENING CHOLESTEROL LEVEL: ICD-10-CM

## 2021-01-22 LAB
ALBUMIN SERPL-MCNC: 4.1 G/DL (ref 3.4–5)
ALP SERPL-CCNC: 85 U/L (ref 40–150)
ALT SERPL W P-5'-P-CCNC: 25 U/L (ref 0–70)
ANION GAP SERPL CALCULATED.3IONS-SCNC: 4 MMOL/L (ref 3–14)
AST SERPL W P-5'-P-CCNC: 22 U/L (ref 0–45)
BILIRUB SERPL-MCNC: 0.6 MG/DL (ref 0.2–1.3)
BUN SERPL-MCNC: 13 MG/DL (ref 7–30)
CALCIUM SERPL-MCNC: 8.6 MG/DL (ref 8.5–10.1)
CHLORIDE SERPL-SCNC: 108 MMOL/L (ref 94–109)
CHOLEST SERPL-MCNC: 162 MG/DL
CO2 SERPL-SCNC: 29 MMOL/L (ref 20–32)
CREAT SERPL-MCNC: 1 MG/DL (ref 0.66–1.25)
GFR SERPL CREATININE-BSD FRML MDRD: 77 ML/MIN/{1.73_M2}
GLUCOSE SERPL-MCNC: 96 MG/DL (ref 70–99)
HDLC SERPL-MCNC: 58 MG/DL
LDLC SERPL CALC-MCNC: 84 MG/DL
NONHDLC SERPL-MCNC: 104 MG/DL
POTASSIUM SERPL-SCNC: 3.9 MMOL/L (ref 3.4–5.3)
PROT SERPL-MCNC: 6.4 G/DL (ref 6.8–8.8)
SODIUM SERPL-SCNC: 141 MMOL/L (ref 133–144)
TRIGL SERPL-MCNC: 98 MG/DL

## 2021-01-22 PROCEDURE — 80061 LIPID PANEL: CPT | Performed by: INTERNAL MEDICINE

## 2021-01-22 PROCEDURE — 36415 COLL VENOUS BLD VENIPUNCTURE: CPT | Performed by: INTERNAL MEDICINE

## 2021-01-22 PROCEDURE — 80053 COMPREHEN METABOLIC PANEL: CPT | Performed by: INTERNAL MEDICINE

## 2021-02-02 ENCOUNTER — TRANSFERRED RECORDS (OUTPATIENT)
Dept: HEALTH INFORMATION MANAGEMENT | Facility: CLINIC | Age: 71
End: 2021-02-02

## 2021-03-31 ENCOUNTER — ALLIED HEALTH/NURSE VISIT (OUTPATIENT)
Dept: PEDIATRICS | Facility: CLINIC | Age: 71
End: 2021-03-31
Payer: COMMERCIAL

## 2021-03-31 ENCOUNTER — MYC MEDICAL ADVICE (OUTPATIENT)
Dept: PEDIATRICS | Facility: CLINIC | Age: 71
End: 2021-03-31

## 2021-03-31 VITALS — SYSTOLIC BLOOD PRESSURE: 126 MMHG | DIASTOLIC BLOOD PRESSURE: 74 MMHG

## 2021-03-31 DIAGNOSIS — I10 BENIGN ESSENTIAL HYPERTENSION: Primary | ICD-10-CM

## 2021-03-31 PROCEDURE — 99207 PR NO CHARGE NURSE ONLY: CPT

## 2021-03-31 NOTE — PROGRESS NOTES
Dustin Lan is a 70 year old patient who comes in today for a Blood Pressure check.  Initial BP:  BP (!) 142/90 (BP Location: Right arm, Patient Position: Chair, Cuff Size: Adult Large)      Data Unavailable  Disposition: results routed to provider

## 2021-03-31 NOTE — TELEPHONE ENCOUNTER
I spoke to patient; nurse only BP check scheduled for today, 03/31/21.    TINA LEE MA on 3/31/2021 at 10:35 AM

## 2021-03-31 NOTE — TELEPHONE ENCOUNTER
MA/TC-  Please assist pt in scheduling BP check. Thank you! Torri Damian RN on 3/31/2021 at 10:23 AM

## 2021-04-13 ENCOUNTER — TRANSFERRED RECORDS (OUTPATIENT)
Dept: HEALTH INFORMATION MANAGEMENT | Facility: CLINIC | Age: 71
End: 2021-04-13

## 2021-04-15 ENCOUNTER — MEDICAL CORRESPONDENCE (OUTPATIENT)
Dept: HEALTH INFORMATION MANAGEMENT | Facility: CLINIC | Age: 71
End: 2021-04-15

## 2021-06-07 DIAGNOSIS — Z11.59 ENCOUNTER FOR SCREENING FOR OTHER VIRAL DISEASES: ICD-10-CM

## 2021-06-10 ENCOUNTER — MYC MEDICAL ADVICE (OUTPATIENT)
Dept: PEDIATRICS | Facility: CLINIC | Age: 71
End: 2021-06-10

## 2021-06-22 ENCOUNTER — TELEPHONE (OUTPATIENT)
Dept: PEDIATRICS | Facility: CLINIC | Age: 71
End: 2021-06-22

## 2021-06-22 ENCOUNTER — OFFICE VISIT (OUTPATIENT)
Dept: PEDIATRICS | Facility: CLINIC | Age: 71
End: 2021-06-22
Payer: COMMERCIAL

## 2021-06-22 VITALS
SYSTOLIC BLOOD PRESSURE: 128 MMHG | WEIGHT: 235.5 LBS | BODY MASS INDEX: 31.94 KG/M2 | TEMPERATURE: 96.8 F | DIASTOLIC BLOOD PRESSURE: 88 MMHG | HEART RATE: 78 BPM | OXYGEN SATURATION: 97 %

## 2021-06-22 DIAGNOSIS — Z01.818 PREOP GENERAL PHYSICAL EXAM: Primary | ICD-10-CM

## 2021-06-22 DIAGNOSIS — I47.10 SVT (SUPRAVENTRICULAR TACHYCARDIA) (H): ICD-10-CM

## 2021-06-22 DIAGNOSIS — E78.5 HYPERLIPIDEMIA LDL GOAL <130: ICD-10-CM

## 2021-06-22 DIAGNOSIS — M17.11 PRIMARY OSTEOARTHRITIS OF RIGHT KNEE: ICD-10-CM

## 2021-06-22 DIAGNOSIS — J45.30 MILD PERSISTENT ASTHMA WITHOUT COMPLICATION: ICD-10-CM

## 2021-06-22 DIAGNOSIS — Z96.651 STATUS POST TOTAL RIGHT KNEE REPLACEMENT: Primary | ICD-10-CM

## 2021-06-22 LAB
BASOPHILS # BLD AUTO: 0 10E9/L (ref 0–0.2)
BASOPHILS NFR BLD AUTO: 0.5 %
DIFFERENTIAL METHOD BLD: NORMAL
EOSINOPHIL # BLD AUTO: 0.2 10E9/L (ref 0–0.7)
EOSINOPHIL NFR BLD AUTO: 2.1 %
ERYTHROCYTE [DISTWIDTH] IN BLOOD BY AUTOMATED COUNT: 13.1 % (ref 10–15)
HCT VFR BLD AUTO: 45 % (ref 40–53)
HGB BLD-MCNC: 14.7 G/DL (ref 13.3–17.7)
LYMPHOCYTES # BLD AUTO: 2.1 10E9/L (ref 0.8–5.3)
LYMPHOCYTES NFR BLD AUTO: 28.1 %
MCH RBC QN AUTO: 30.8 PG (ref 26.5–33)
MCHC RBC AUTO-ENTMCNC: 32.7 G/DL (ref 31.5–36.5)
MCV RBC AUTO: 94 FL (ref 78–100)
MONOCYTES # BLD AUTO: 0.6 10E9/L (ref 0–1.3)
MONOCYTES NFR BLD AUTO: 8 %
NEUTROPHILS # BLD AUTO: 4.6 10E9/L (ref 1.6–8.3)
NEUTROPHILS NFR BLD AUTO: 61.3 %
PLATELET # BLD AUTO: 178 10E9/L (ref 150–450)
RBC # BLD AUTO: 4.78 10E12/L (ref 4.4–5.9)
WBC # BLD AUTO: 7.5 10E9/L (ref 4–11)

## 2021-06-22 PROCEDURE — 36415 COLL VENOUS BLD VENIPUNCTURE: CPT | Performed by: INTERNAL MEDICINE

## 2021-06-22 PROCEDURE — 93000 ELECTROCARDIOGRAM COMPLETE: CPT | Performed by: INTERNAL MEDICINE

## 2021-06-22 PROCEDURE — 85025 COMPLETE CBC W/AUTO DIFF WBC: CPT | Performed by: INTERNAL MEDICINE

## 2021-06-22 PROCEDURE — 99214 OFFICE O/P EST MOD 30 MIN: CPT | Performed by: INTERNAL MEDICINE

## 2021-06-22 NOTE — H&P (VIEW-ONLY)
[facility]  [address]  [address]  [place] [address]  Phone: [phone]  Fax: [phone]  Primary Provider: Alessandro Lagunas  Pre-op Performing Provider: ALESSANDRO LAGUNAS      PREOPERATIVE EVALUATION:  Today's date: 6/22/2021    Dustin Lan is a 71 year old male who presents for a preoperative evaluation.    Surgical Information:  Surgery/Procedure: R Knee Arthoplasty   Surgery Location: [facility]   Surgeon: Dr. Buckley  Surgery Date: 07/15/21  Time of Surgery: 7:30am  Where patient plans to recover: At home with family  Fax number for surgical facility: Note does not need to be faxed, will be available electronically in Epic.    Type of Anesthesia Anticipated: General    Assessment & Plan     The proposed surgical procedure is considered LOW risk.    Preop general physical exam  Advised to stop all aspirin products and nonsteroidals (like ibuprofen or naproxen) for 10 days prior to procedure.  Advised follow surgical center's instructions re: arrival time and when to stop eating.   - EKG 12-lead complete w/read - Clinics  - CBC with platelets and differential    Hyperlipidemia LDL goal <130  Continue statin.     Mild persistent asthma without complication  Continue with advair.     SVT (supraventricular tachycardia) (H)  Asx.  No symptoms since 2018.  Continue calcium channel blocker.     Primary osteoarthritis of right knee  Surgical and post-op care per primary surgical service.                     RECOMMENDATION:  APPROVAL GIVEN to proceed with proposed procedure, without further diagnostic evaluation.                      Subjective     HPI related to upcoming procedure:     Preop Questions 6/16/2021   1. Have you ever had a heart attack or stroke? No   2. Have you ever had surgery on your heart or blood vessels, such as a stent placement, a coronary artery bypass, or surgery on an artery in your head, neck, heart, or legs? No   3. Do you have chest  pain with activity? No   4. Do you have a history of  heart failure? No   5. Do you currently have a cold, bronchitis or symptoms of other infection? No   6. Do you have a cough, shortness of breath, or wheezing? No   7. Do you or anyone in your family have previous history of blood clots? No   8. Do you or does anyone in your family have a serious bleeding problem such as prolonged bleeding following surgeries or cuts? No   9. Have you ever had problems with anemia or been told to take iron pills? No   10. Have you had any abnormal blood loss such as black, tarry or bloody stools? No   11. Have you ever had a blood transfusion? No   12. Are you willing to have a blood transfusion if it is medically needed before, during, or after your surgery? Yes   13. Have you or any of your relatives ever had problems with anesthesia? No   14. Do you have sleep apnea, excessive snoring or daytime drowsiness? No   15. Do you have any artifical heart valves or other implanted medical devices like a pacemaker, defibrillator, or continuous glucose monitor? No   16. Do you have artificial joints? YES - left knee   17. Are you allergic to latex? No     Health Care Directive:  Patient has a Health Care Directive on file    Preoperative Review of :   reviewed - no record of controlled substances prescribed.      Status of Chronic Conditions:  See problem list for active medical problems.  Problems all longstanding and stable, except as noted/documented.  See ROS for pertinent symptoms related to these conditions.      Review of Systems  Constitutional, neuro, ENT, endocrine, pulmonary, cardiac, gastrointestinal, genitourinary, musculoskeletal, integument and psychiatric systems are negative, except as otherwise noted.    Patient Active Problem List    Diagnosis Date Noted     Status post total knee replacement, left 05/08/2019     Priority: Medium     Paroxysmal supraventricular tachycardia (H) 04/09/2018     Priority: Medium      Mild persistent asthma without complication 11/17/2017     Priority: Medium     Right knee pain 02/28/2017     Priority: Medium     Impaired fasting glucose 06/02/2010     Priority: Medium     HYPERLIPIDEMIA LDL GOAL <130 02/10/2010     Priority: Medium     History of colonic polyps 02/01/2007     Priority: Medium     tubular adenoma, ileocecal valve, repeat 2010  Problem list name updated by automated process. Provider to review       Contact dermatitis and other eczema, due to unspecified cause 04/10/2006     Priority: Medium      Past Medical History:   Diagnosis Date     Elevated blood pressure reading without diagnosis of hypertension      Personal history of colonic polyps 2/2007    tubular adenoma, ileocecal valve, repeat 2010     Plantar fascial fibromatosis      Pure hypercholesterolemia      Past Surgical History:   Procedure Laterality Date     ARTHROPLASTY KNEE Left 5/8/2019    Procedure: ARTHROPLASTY LEFT KNEE;  Surgeon: Miguel Buckley MD;  Location:  OR     ARTHROSCOPY KNEE RT/LT  12/2008    right     HC COLONOSCOPY THRU STOMA, DIAGNOSTIC  2/2007    ileocecal valve polyp, tubular adenoma, repeat in 3 yrs     INJECT STEROID (LOCATION) Right 5/8/2019    Procedure: Inject steroid right knee;  Surgeon: Miguel Buckley MD;  Location:  OR     Current Outpatient Medications   Medication Sig Dispense Refill     Ascorbic Acid (VITAMIN C PO) Take 1 tablet by mouth every morning       atorvastatin (LIPITOR) 10 MG tablet Take 1 tablet (10 mg) by mouth daily 90 tablet 3     diltiazem ER (TIAZAC) 240 MG 24 hr ER beaded capsule Take 1 capsule (240 mg) by mouth daily 90 capsule 3     fluticasone-salmeterol (ADVAIR DISKUS) 250-50 MCG/DOSE inhaler Inhale 1 puff into the lungs 2 times daily 3 Inhaler 3     glucosamine-chondroitin 500-400 MG CAPS per capsule Take 1 capsule by mouth 2 times daily       multivitamin, therapeutic (THERA-VIT) TABS tablet Take 1 tablet by mouth daily       senna-docusate  (SENOKOT-S/PERICOLACE) 8.6-50 MG tablet Take 2 tablets by mouth 2 times daily as needed for constipation 60 tablet 0     acetaminophen (TYLENOL) 500 MG tablet Take 2 tablets (1,000 mg) by mouth every 6 hours as needed for pain 100 tablet 0       Allergies   Allergen Reactions     Cats         Social History     Tobacco Use     Smoking status: Never Smoker     Smokeless tobacco: Never Used   Substance Use Topics     Alcohol use: Yes     Alcohol/week: 0.0 - 3.0 standard drinks     Comment: 5 - 7 beers a week     Family History   Problem Relation Age of Onset     Cerebrovascular Disease Mother 75         of a stroke     C.A.D. Father 67         suddenly when using the  at age 67. He was a smoker. On autopsy, told to have multiple heart attacks and scar tissue but he had never veronika a clinical heart attack.     Lipids Sister      History   Drug Use No         Objective     BP (!) 140/86 (BP Location: Right arm, Patient Position: Sitting, Cuff Size: Adult Large)   Pulse 78   Temp 96.8  F (36  C) (Temporal)   Wt 106.8 kg (235 lb 8 oz)   SpO2 97%   BMI 31.94 kg/m      Physical Exam    GENERAL APPEARANCE: healthy, alert and no distress     EYES: EOMI,  PERRL     HENT: ear canals and TM's normal and nose and mouth without ulcers or lesions     NECK: no adenopathy, no asymmetry, masses, or scars and thyroid normal to palpation     RESP: lungs clear to auscultation - no rales, rhonchi or wheezes     CV: regular rates and rhythm, normal S1 S2, no S3 or S4 and no murmur, click or rub     ABDOMEN:  soft, nontender, no HSM or masses and bowel sounds normal     MS: extremities normal- no gross deformities noted, no evidence of inflammation in joints, FROM in all extremities.     SKIN: no suspicious lesions or rashes     NEURO: Normal strength and tone, sensory exam grossly normal, mentation intact and speech normal     PSYCH: mentation appears normal. and affect normal/bright     LYMPHATICS: No cervical  adenopathy    Recent Labs   Lab Test 01/22/21  0827 12/13/19  1042    139   POTASSIUM 3.9 4.1   CR 1.00 0.87        Diagnostics:  Labs pending at this time.  Results will be reviewed when available.   EKG: appears normal, NSR, normal axis, normal intervals, no acute ST/T changes c/w ischemia, no LVH by voltage criteria, unchanged from previous tracings    Revised Cardiac Risk Index (RCRI):  The patient has the following serious cardiovascular risks for perioperative complications:   - No serious cardiac risks = 0 points     RCRI Interpretation: 0 points: Class I (very low risk - 0.4% complication rate)           Signed Electronically by: Alessandro Blanco MD  Copy of this evaluation report is provided to requesting physician.

## 2021-06-22 NOTE — PROGRESS NOTES
[facility]  [address]  [address]  [place] [address]  Phone: [phone]  Fax: [phone]  Primary Provider: Alessandro Lagunas  Pre-op Performing Provider: ALESSANDRO LAGUNAS      PREOPERATIVE EVALUATION:  Today's date: 6/22/2021    Dustin Lan is a 71 year old male who presents for a preoperative evaluation.    Surgical Information:  Surgery/Procedure: R Knee Arthoplasty   Surgery Location: [facility]   Surgeon: Dr. Buckley  Surgery Date: 07/15/21  Time of Surgery: 7:30am  Where patient plans to recover: At home with family  Fax number for surgical facility: Note does not need to be faxed, will be available electronically in Epic.    Type of Anesthesia Anticipated: General    Assessment & Plan     The proposed surgical procedure is considered LOW risk.    Preop general physical exam  Advised to stop all aspirin products and nonsteroidals (like ibuprofen or naproxen) for 10 days prior to procedure.  Advised follow surgical center's instructions re: arrival time and when to stop eating.   - EKG 12-lead complete w/read - Clinics  - CBC with platelets and differential    Hyperlipidemia LDL goal <130  Continue statin.     Mild persistent asthma without complication  Continue with advair.     SVT (supraventricular tachycardia) (H)  Asx.  No symptoms since 2018.  Continue calcium channel blocker.     Primary osteoarthritis of right knee  Surgical and post-op care per primary surgical service.                     RECOMMENDATION:  APPROVAL GIVEN to proceed with proposed procedure, without further diagnostic evaluation.                      Subjective     HPI related to upcoming procedure:     Preop Questions 6/16/2021   1. Have you ever had a heart attack or stroke? No   2. Have you ever had surgery on your heart or blood vessels, such as a stent placement, a coronary artery bypass, or surgery on an artery in your head, neck, heart, or legs? No   3. Do you have chest  pain with activity? No   4. Do you have a history of  heart failure? No   5. Do you currently have a cold, bronchitis or symptoms of other infection? No   6. Do you have a cough, shortness of breath, or wheezing? No   7. Do you or anyone in your family have previous history of blood clots? No   8. Do you or does anyone in your family have a serious bleeding problem such as prolonged bleeding following surgeries or cuts? No   9. Have you ever had problems with anemia or been told to take iron pills? No   10. Have you had any abnormal blood loss such as black, tarry or bloody stools? No   11. Have you ever had a blood transfusion? No   12. Are you willing to have a blood transfusion if it is medically needed before, during, or after your surgery? Yes   13. Have you or any of your relatives ever had problems with anesthesia? No   14. Do you have sleep apnea, excessive snoring or daytime drowsiness? No   15. Do you have any artifical heart valves or other implanted medical devices like a pacemaker, defibrillator, or continuous glucose monitor? No   16. Do you have artificial joints? YES - left knee   17. Are you allergic to latex? No     Health Care Directive:  Patient has a Health Care Directive on file    Preoperative Review of :   reviewed - no record of controlled substances prescribed.      Status of Chronic Conditions:  See problem list for active medical problems.  Problems all longstanding and stable, except as noted/documented.  See ROS for pertinent symptoms related to these conditions.      Review of Systems  Constitutional, neuro, ENT, endocrine, pulmonary, cardiac, gastrointestinal, genitourinary, musculoskeletal, integument and psychiatric systems are negative, except as otherwise noted.    Patient Active Problem List    Diagnosis Date Noted     Status post total knee replacement, left 05/08/2019     Priority: Medium     Paroxysmal supraventricular tachycardia (H) 04/09/2018     Priority: Medium      Mild persistent asthma without complication 11/17/2017     Priority: Medium     Right knee pain 02/28/2017     Priority: Medium     Impaired fasting glucose 06/02/2010     Priority: Medium     HYPERLIPIDEMIA LDL GOAL <130 02/10/2010     Priority: Medium     History of colonic polyps 02/01/2007     Priority: Medium     tubular adenoma, ileocecal valve, repeat 2010  Problem list name updated by automated process. Provider to review       Contact dermatitis and other eczema, due to unspecified cause 04/10/2006     Priority: Medium      Past Medical History:   Diagnosis Date     Elevated blood pressure reading without diagnosis of hypertension      Personal history of colonic polyps 2/2007    tubular adenoma, ileocecal valve, repeat 2010     Plantar fascial fibromatosis      Pure hypercholesterolemia      Past Surgical History:   Procedure Laterality Date     ARTHROPLASTY KNEE Left 5/8/2019    Procedure: ARTHROPLASTY LEFT KNEE;  Surgeon: Miguel Buckley MD;  Location:  OR     ARTHROSCOPY KNEE RT/LT  12/2008    right     HC COLONOSCOPY THRU STOMA, DIAGNOSTIC  2/2007    ileocecal valve polyp, tubular adenoma, repeat in 3 yrs     INJECT STEROID (LOCATION) Right 5/8/2019    Procedure: Inject steroid right knee;  Surgeon: Miguel Buckley MD;  Location:  OR     Current Outpatient Medications   Medication Sig Dispense Refill     Ascorbic Acid (VITAMIN C PO) Take 1 tablet by mouth every morning       atorvastatin (LIPITOR) 10 MG tablet Take 1 tablet (10 mg) by mouth daily 90 tablet 3     diltiazem ER (TIAZAC) 240 MG 24 hr ER beaded capsule Take 1 capsule (240 mg) by mouth daily 90 capsule 3     fluticasone-salmeterol (ADVAIR DISKUS) 250-50 MCG/DOSE inhaler Inhale 1 puff into the lungs 2 times daily 3 Inhaler 3     glucosamine-chondroitin 500-400 MG CAPS per capsule Take 1 capsule by mouth 2 times daily       multivitamin, therapeutic (THERA-VIT) TABS tablet Take 1 tablet by mouth daily       senna-docusate  (SENOKOT-S/PERICOLACE) 8.6-50 MG tablet Take 2 tablets by mouth 2 times daily as needed for constipation 60 tablet 0     acetaminophen (TYLENOL) 500 MG tablet Take 2 tablets (1,000 mg) by mouth every 6 hours as needed for pain 100 tablet 0       Allergies   Allergen Reactions     Cats         Social History     Tobacco Use     Smoking status: Never Smoker     Smokeless tobacco: Never Used   Substance Use Topics     Alcohol use: Yes     Alcohol/week: 0.0 - 3.0 standard drinks     Comment: 5 - 7 beers a week     Family History   Problem Relation Age of Onset     Cerebrovascular Disease Mother 75         of a stroke     C.A.D. Father 67         suddenly when using the  at age 67. He was a smoker. On autopsy, told to have multiple heart attacks and scar tissue but he had never veronika a clinical heart attack.     Lipids Sister      History   Drug Use No         Objective     BP (!) 140/86 (BP Location: Right arm, Patient Position: Sitting, Cuff Size: Adult Large)   Pulse 78   Temp 96.8  F (36  C) (Temporal)   Wt 106.8 kg (235 lb 8 oz)   SpO2 97%   BMI 31.94 kg/m      Physical Exam    GENERAL APPEARANCE: healthy, alert and no distress     EYES: EOMI,  PERRL     HENT: ear canals and TM's normal and nose and mouth without ulcers or lesions     NECK: no adenopathy, no asymmetry, masses, or scars and thyroid normal to palpation     RESP: lungs clear to auscultation - no rales, rhonchi or wheezes     CV: regular rates and rhythm, normal S1 S2, no S3 or S4 and no murmur, click or rub     ABDOMEN:  soft, nontender, no HSM or masses and bowel sounds normal     MS: extremities normal- no gross deformities noted, no evidence of inflammation in joints, FROM in all extremities.     SKIN: no suspicious lesions or rashes     NEURO: Normal strength and tone, sensory exam grossly normal, mentation intact and speech normal     PSYCH: mentation appears normal. and affect normal/bright     LYMPHATICS: No cervical  adenopathy    Recent Labs   Lab Test 01/22/21  0827 12/13/19  1042    139   POTASSIUM 3.9 4.1   CR 1.00 0.87        Diagnostics:  Labs pending at this time.  Results will be reviewed when available.   EKG: appears normal, NSR, normal axis, normal intervals, no acute ST/T changes c/w ischemia, no LVH by voltage criteria, unchanged from previous tracings    Revised Cardiac Risk Index (RCRI):  The patient has the following serious cardiovascular risks for perioperative complications:   - No serious cardiac risks = 0 points     RCRI Interpretation: 0 points: Class I (very low risk - 0.4% complication rate)           Signed Electronically by: Alessandro Blanco MD  Copy of this evaluation report is provided to requesting physician.

## 2021-06-22 NOTE — TELEPHONE ENCOUNTER
Patient inquiring about order being placed for PT at [place] before and after Knee Replacement surgery.     Levi Quiroz, CMA

## 2021-06-22 NOTE — PATIENT INSTRUCTIONS
Preparing for Your Surgery  Getting started  A nurse will call you to review your health history and instructions. They will give you an arrival time based on your scheduled surgery time.  Please be ready to share the following:    Your doctor's clinic name and phone number    Your medical, surgical and anesthesia history    A list of allergies and sensitivities    A list of medicines, including herbal treatments and over-the-counter drugs    Whether the patient has a legal guardian (ask how to send us the papers in advance)  If you have a child who's having surgery, please ask for a copy of Preparing for Your Child's Surgery.    Preparing for surgery    Within 30 days of surgery: Have a pre-op exam (sometimes called an H&P, or History and Physical). This can be done at a clinic or pre-operative center.  ? If you're having a , you may not need this exam. Talk to your care team    At your pre-op exam, talk to your care team about all medicines you take. If you need to stop any medicines before surgery, ask when to start taking them again.  ? We do this for your safety. Many medicines can make you bleed too much during surgery. Some change how well surgery (anesthesia) drugs work.    Call your insurance company to let them know you're having surgery. (If you don't have insurance, call [phone].)    Call your clinic if there's any change in your health. This includes signs of a cold or flu (sore throat, runny nose, cough, rash, fever). It also includes a scrape or scratch near the surgery site.    If you have questions on the day of surgery, call your hospital or surgery center.  Eating and drinking guidelines  For your safety: Unless your surgeon tells you otherwise, follow the guidelines below.    Eat and drink as usual until 8 hours before surgery. After that, no food or milk.    Drink clear liquids until 2 hours before surgery. These are liquids you can see through, like water, Gatorade and Propel  Water. You may also have black coffee and tea (no cream or milk).    Nothing by mouth within 2 hours of surgery. This includes gum, candy and breath mints.    If you drink, stop drinking alcohol the night before surgery.    If your care team tells you to take medicine on the morning of surgery, it's okay to take it with a sip of water.  Preventing infection    Shower or bathe the night before and morning of your surgery. Follow the instructions your clinic gave you. (If no instructions, use regular soap.)    Don't shave or clip hair near your surgery site. We'll remove the hair if needed.    Don't smoke or vape the morning of surgery. You may chew nicotine gum up to 2 hours before surgery. A nicotine patch is okay.  ? Note: Some surgeries require you to completely quit smoking and nicotine. Check with your surgeon.    Your care team will make every effort to keep you safe from infection. We will:  ? Clean our hands often with soap and water (or an alcohol-based hand rub).  ? Clean the skin at your surgery site with a special soap that kills germs.  ? Give you a special gown to keep you warm. (Cold raises the risk of infection.)  ? Wear special hair covers, masks, gowns and gloves during surgery.  ? Give antibiotic medicine, if prescribed. Not all surgeries need antibiotics.  What to bring on the day of surgery    Photo ID and insurance card    Copy of your health care directive, if you have one    Glasses and hearing aides (bring cases)  ? You can't wear contacts during surgery    Inhaler and eye drops, if you use them (tell us about these when you arrive)    CPAP machine or breathing device, if you use them    A few personal items, if spending the night    If you have . . .  ? A pacemaker or ICD (cardiac defibrillator): Bring the ID card.  ? An implanted stimulator: Bring the remote control.  ? A legal guardian: Bring a copy of the certified (court-stamped) guardianship papers.  Please remove any jewelry, including  "body piercings. Leave jewelry and other valuables at home.  If you're going home the day of surgery  Important: If you don't follow the rules below, we must cancel your surgery.     Arrange for someone to drive you home after surgery. You may not drive, take a taxi or take public transportation by yourself (unless you'll have local anesthesia only).    Arrange for a responsible adult to stay with you overnight. If you don't, we may keep you in the hospital overnight, and you may need to pay the costs yourself.  Advised to stop all aspirin products and nonsteroidals (like ibuprofen or naproxen) for 10 days prior to procedure.  Advised follow surgical center's instructions re: arrival time and when to stop eating.     What to do with your medicines before the surgery:    1)  No changes in your meds; may take all of your meds as before, including the day of the surgery.    2)  They will discuss COVID testing before surgery.    3)  No nonsteroidals (like ibuprofen or aspirin or naproxen) for 7 days before surgery.    4)  Lab work today downstairs in the lab.      If you are going downstairs:  Directions:  As you walk through the first floor, you'll see (on the right) first the pharmacy, then some bathrooms, then the \"Lab and Imaging\" area. Give them your name at the window there and wait for them to call you.      "

## 2021-07-12 DIAGNOSIS — Z11.59 ENCOUNTER FOR SCREENING FOR OTHER VIRAL DISEASES: ICD-10-CM

## 2021-07-12 PROCEDURE — U0005 INFEC AGEN DETEC AMPLI PROBE: HCPCS

## 2021-07-12 PROCEDURE — U0003 INFECTIOUS AGENT DETECTION BY NUCLEIC ACID (DNA OR RNA); SEVERE ACUTE RESPIRATORY SYNDROME CORONAVIRUS 2 (SARS-COV-2) (CORONAVIRUS DISEASE [COVID-19]), AMPLIFIED PROBE TECHNIQUE, MAKING USE OF HIGH THROUGHPUT TECHNOLOGIES AS DESCRIBED BY CMS-2020-01-R: HCPCS

## 2021-07-13 LAB — SARS-COV-2 RNA RESP QL NAA+PROBE: NEGATIVE

## 2021-07-13 RX ORDER — AMOXICILLIN 500 MG/1
TABLET, FILM COATED ORAL
COMMUNITY
Start: 2021-03-09 | End: 2023-09-11

## 2021-07-14 ENCOUNTER — ANESTHESIA EVENT (OUTPATIENT)
Dept: SURGERY | Facility: CLINIC | Age: 71
End: 2021-07-14
Payer: COMMERCIAL

## 2021-07-14 RX ORDER — ACETAMINOPHEN 500 MG
500-1000 TABLET ORAL EVERY 6 HOURS PRN
Status: ON HOLD | COMMUNITY
End: 2021-07-16

## 2021-07-14 NOTE — PROGRESS NOTES
PTA medications updated by Medication Scribe prior to surgery via phone call with patient (last doses completed by Nurse)     Medication history sources: Patient, Surescripts, H&P and Patient's home med list  In the past week, patient estimated taking medication this percent of the time: Greater than 90%  Adherence assessment: N/A Not Observed    Significant changes made to the medication list:  Patient reports no longer taking the following meds (med scribe removed from PTA med list): Senna-Docusate      Additional medication history information:   Patient brought own home meds: Advair Diskus Inhaler    Medication reconciliation completed by provider prior to medication history? No    Time spent in this activity: 25 minutes    The information provided in this note is only as accurate as the sources available at the time of update(s)      Prior to Admission medications    Medication Sig Last Dose Taking? Auth Provider   acetaminophen (TYLENOL) 500 MG tablet Take 500-1,000 mg by mouth every 6 hours as needed for mild pain 7/6/2021 at PRN Yes Reported, Patient   amoxicillin (AMOXIL) 500 MG tablet TAKE 4 TABLETS BY MOUTH 1 HOUR BEFORE DENTAL APPOINTMENT MAY 2021 Yes Reported, Patient   Ascorbic Acid (VITAMIN C PO) Take 1 tablet by mouth every morning MORE THAN A WEEK Yes Reported, Patient   atorvastatin (LIPITOR) 10 MG tablet Take 1 tablet (10 mg) by mouth daily 7/14/2021 at PM Yes Alessandro Blanco MD   diltiazem ER (TIAZAC) 240 MG 24 hr ER beaded capsule Take 1 capsule (240 mg) by mouth daily 7/14/2021 at 1200 Yes Alessandro Blanco MD   fluticasone-salmeterol (ADVAIR DISKUS) 250-50 MCG/DOSE inhaler Inhale 1 puff into the lungs 2 times daily 7/14/2021 at PM Yes Alessandro Blanco MD   glucosamine-chondroitin 500-400 MG CAPS per capsule Take 1 capsule by mouth 2 times daily MORE THAN A WEEK Yes Reported, Patient   multivitamin, therapeutic (THERA-VIT) TABS tablet Take 1 tablet by mouth daily 7/13/2021 at PM Yes Reported, Patient        David Gay, CPhT  [facility]

## 2021-07-15 ENCOUNTER — ANESTHESIA (OUTPATIENT)
Dept: SURGERY | Facility: CLINIC | Age: 71
End: 2021-07-15
Payer: COMMERCIAL

## 2021-07-15 ENCOUNTER — APPOINTMENT (OUTPATIENT)
Dept: GENERAL RADIOLOGY | Facility: CLINIC | Age: 71
End: 2021-07-15
Attending: ORTHOPAEDIC SURGERY
Payer: COMMERCIAL

## 2021-07-15 ENCOUNTER — APPOINTMENT (OUTPATIENT)
Dept: PHYSICAL THERAPY | Facility: CLINIC | Age: 71
End: 2021-07-15
Attending: ORTHOPAEDIC SURGERY
Payer: COMMERCIAL

## 2021-07-15 ENCOUNTER — HOSPITAL ENCOUNTER (OUTPATIENT)
Facility: CLINIC | Age: 71
Discharge: HOME | End: 2021-07-16
Attending: ORTHOPAEDIC SURGERY | Admitting: ORTHOPAEDIC SURGERY
Payer: COMMERCIAL

## 2021-07-15 DIAGNOSIS — Z47.1 AFTERCARE FOLLOWING RIGHT KNEE JOINT REPLACEMENT SURGERY: Primary | ICD-10-CM

## 2021-07-15 DIAGNOSIS — Z96.651 AFTERCARE FOLLOWING RIGHT KNEE JOINT REPLACEMENT SURGERY: Primary | ICD-10-CM

## 2021-07-15 LAB
CREAT SERPL-MCNC: 0.93 MG/DL (ref 0.66–1.25)
GFR SERPL CREATININE-BSD FRML MDRD: 83 ML/MIN/1.73M2
HGB BLD-MCNC: 14.1 G/DL (ref 13.3–17.7)

## 2021-07-15 PROCEDURE — 272N000001 HC OR GENERAL SUPPLY STERILE: Performed by: ORTHOPAEDIC SURGERY

## 2021-07-15 PROCEDURE — 250N000013 HC RX MED GY IP 250 OP 250 PS 637: Performed by: PHYSICIAN ASSISTANT

## 2021-07-15 PROCEDURE — 99214 OFFICE O/P EST MOD 30 MIN: CPT | Performed by: PHYSICIAN ASSISTANT

## 2021-07-15 PROCEDURE — 250N000011 HC RX IP 250 OP 636: Performed by: ANESTHESIOLOGY

## 2021-07-15 PROCEDURE — 97116 GAIT TRAINING THERAPY: CPT | Mod: GP

## 2021-07-15 PROCEDURE — 258N000003 HC RX IP 258 OP 636: Performed by: NURSE ANESTHETIST, CERTIFIED REGISTERED

## 2021-07-15 PROCEDURE — 85018 HEMOGLOBIN: CPT | Performed by: PHYSICIAN ASSISTANT

## 2021-07-15 PROCEDURE — 258N000003 HC RX IP 258 OP 636: Performed by: PHYSICIAN ASSISTANT

## 2021-07-15 PROCEDURE — 360N000077 HC SURGERY LEVEL 4, PER MIN: Performed by: ORTHOPAEDIC SURGERY

## 2021-07-15 PROCEDURE — 97110 THERAPEUTIC EXERCISES: CPT | Mod: GP

## 2021-07-15 PROCEDURE — 370N000017 HC ANESTHESIA TECHNICAL FEE, PER MIN: Performed by: ORTHOPAEDIC SURGERY

## 2021-07-15 PROCEDURE — 250N000013 HC RX MED GY IP 250 OP 250 PS 637: Performed by: ORTHOPAEDIC SURGERY

## 2021-07-15 PROCEDURE — 97161 PT EVAL LOW COMPLEX 20 MIN: CPT | Mod: GP

## 2021-07-15 PROCEDURE — 278N000051 HC OR IMPLANT GENERAL: Performed by: ORTHOPAEDIC SURGERY

## 2021-07-15 PROCEDURE — 999N000141 HC STATISTIC PRE-PROCEDURE NURSING ASSESSMENT: Performed by: ORTHOPAEDIC SURGERY

## 2021-07-15 PROCEDURE — 258N000001 HC RX 258: Performed by: ORTHOPAEDIC SURGERY

## 2021-07-15 PROCEDURE — 250N000011 HC RX IP 250 OP 636: Performed by: NURSE ANESTHETIST, CERTIFIED REGISTERED

## 2021-07-15 PROCEDURE — 250N000009 HC RX 250: Performed by: ANESTHESIOLOGY

## 2021-07-15 PROCEDURE — 710N000009 HC RECOVERY PHASE 1, LEVEL 1, PER MIN: Performed by: ORTHOPAEDIC SURGERY

## 2021-07-15 PROCEDURE — 250N000009 HC RX 250: Performed by: ORTHOPAEDIC SURGERY

## 2021-07-15 PROCEDURE — 250N000011 HC RX IP 250 OP 636: Performed by: ORTHOPAEDIC SURGERY

## 2021-07-15 PROCEDURE — 250N000011 HC RX IP 250 OP 636: Performed by: PHYSICIAN ASSISTANT

## 2021-07-15 PROCEDURE — 36415 COLL VENOUS BLD VENIPUNCTURE: CPT | Performed by: PHYSICIAN ASSISTANT

## 2021-07-15 PROCEDURE — C1776 JOINT DEVICE (IMPLANTABLE): HCPCS | Performed by: ORTHOPAEDIC SURGERY

## 2021-07-15 PROCEDURE — 250N000009 HC RX 250: Performed by: NURSE ANESTHETIST, CERTIFIED REGISTERED

## 2021-07-15 PROCEDURE — 99207 PR CDG-CODE CATEGORY CHANGED: CPT | Performed by: PHYSICIAN ASSISTANT

## 2021-07-15 PROCEDURE — 999N000063 XR KNEE PORT RIGHT 1/2 VIEWS: Mod: RT

## 2021-07-15 PROCEDURE — 82565 ASSAY OF CREATININE: CPT | Performed by: PHYSICIAN ASSISTANT

## 2021-07-15 PROCEDURE — 250N000009 HC RX 250: Performed by: PHYSICIAN ASSISTANT

## 2021-07-15 PROCEDURE — 258N000003 HC RX IP 258 OP 636: Performed by: ANESTHESIOLOGY

## 2021-07-15 DEVICE — TIBIAL BEARING INSERT - PS
Type: IMPLANTABLE DEVICE | Site: KNEE | Status: FUNCTIONAL
Brand: TRIATHLON

## 2021-07-15 DEVICE — BONE CEMENT RADIOPAQUE SIMPLEX HV FULL DOSE 6194-1-001: Type: IMPLANTABLE DEVICE | Site: KNEE | Status: FUNCTIONAL

## 2021-07-15 DEVICE — FEMORAL DISTAL FIXATION PEG
Type: IMPLANTABLE DEVICE | Site: KNEE | Status: FUNCTIONAL
Brand: TRIATHLON

## 2021-07-15 DEVICE — UNIVERSAL TIBIAL BASEPLATE
Type: IMPLANTABLE DEVICE | Site: KNEE | Status: FUNCTIONAL
Brand: TRIATHLON

## 2021-07-15 DEVICE — POSTERIOR STABILIZED FEMORAL
Type: IMPLANTABLE DEVICE | Site: KNEE | Status: FUNCTIONAL
Brand: TRIATHLON

## 2021-07-15 DEVICE — PATELLA
Type: IMPLANTABLE DEVICE | Site: KNEE | Status: FUNCTIONAL
Brand: TRIATHLON

## 2021-07-15 RX ORDER — METHOCARBAMOL 500 MG/1
500 TABLET, FILM COATED ORAL EVERY 6 HOURS PRN
Status: DISCONTINUED | OUTPATIENT
Start: 2021-07-15 | End: 2021-07-16 | Stop reason: HOSPADM

## 2021-07-15 RX ORDER — DIPHENHYDRAMINE HCL 12.5MG/5ML
12.5 LIQUID (ML) ORAL EVERY 6 HOURS PRN
Status: DISCONTINUED | OUTPATIENT
Start: 2021-07-15 | End: 2021-07-16 | Stop reason: HOSPADM

## 2021-07-15 RX ORDER — NALOXONE HYDROCHLORIDE 0.4 MG/ML
0.4 INJECTION, SOLUTION INTRAMUSCULAR; INTRAVENOUS; SUBCUTANEOUS
Status: DISCONTINUED | OUTPATIENT
Start: 2021-07-15 | End: 2021-07-16 | Stop reason: HOSPADM

## 2021-07-15 RX ORDER — SODIUM CHLORIDE, SODIUM LACTATE, POTASSIUM CHLORIDE, CALCIUM CHLORIDE 600; 310; 30; 20 MG/100ML; MG/100ML; MG/100ML; MG/100ML
INJECTION, SOLUTION INTRAVENOUS CONTINUOUS
Status: DISCONTINUED | OUTPATIENT
Start: 2021-07-15 | End: 2021-07-16 | Stop reason: HOSPADM

## 2021-07-15 RX ORDER — NALOXONE HYDROCHLORIDE 0.4 MG/ML
0.2 INJECTION, SOLUTION INTRAMUSCULAR; INTRAVENOUS; SUBCUTANEOUS
Status: DISCONTINUED | OUTPATIENT
Start: 2021-07-15 | End: 2021-07-16 | Stop reason: HOSPADM

## 2021-07-15 RX ORDER — PROPOFOL 10 MG/ML
INJECTION, EMULSION INTRAVENOUS CONTINUOUS PRN
Status: DISCONTINUED | OUTPATIENT
Start: 2021-07-15 | End: 2021-07-15

## 2021-07-15 RX ORDER — HYDROMORPHONE HYDROCHLORIDE 1 MG/ML
0.5 INJECTION, SOLUTION INTRAMUSCULAR; INTRAVENOUS; SUBCUTANEOUS EVERY 5 MIN PRN
Status: DISCONTINUED | OUTPATIENT
Start: 2021-07-15 | End: 2021-07-15 | Stop reason: HOSPADM

## 2021-07-15 RX ORDER — ONDANSETRON 2 MG/ML
INJECTION INTRAMUSCULAR; INTRAVENOUS PRN
Status: DISCONTINUED | OUTPATIENT
Start: 2021-07-15 | End: 2021-07-15

## 2021-07-15 RX ORDER — FENTANYL CITRATE 50 UG/ML
50 INJECTION, SOLUTION INTRAMUSCULAR; INTRAVENOUS EVERY 5 MIN PRN
Status: DISCONTINUED | OUTPATIENT
Start: 2021-07-15 | End: 2021-07-15 | Stop reason: HOSPADM

## 2021-07-15 RX ORDER — LABETALOL HYDROCHLORIDE 5 MG/ML
15 INJECTION, SOLUTION INTRAVENOUS
Status: DISCONTINUED | OUTPATIENT
Start: 2021-07-15 | End: 2021-07-15 | Stop reason: HOSPADM

## 2021-07-15 RX ORDER — FENTANYL CITRATE 50 UG/ML
INJECTION, SOLUTION INTRAMUSCULAR; INTRAVENOUS PRN
Status: DISCONTINUED | OUTPATIENT
Start: 2021-07-15 | End: 2021-07-15

## 2021-07-15 RX ORDER — ACETAMINOPHEN 325 MG/1
975 TABLET ORAL EVERY 8 HOURS
Status: DISCONTINUED | OUTPATIENT
Start: 2021-07-15 | End: 2021-07-16 | Stop reason: HOSPADM

## 2021-07-15 RX ORDER — ONDANSETRON 4 MG/1
4 TABLET, ORALLY DISINTEGRATING ORAL EVERY 30 MIN PRN
Status: DISCONTINUED | OUTPATIENT
Start: 2021-07-15 | End: 2021-07-15 | Stop reason: HOSPADM

## 2021-07-15 RX ORDER — HYDROMORPHONE HCL IN WATER/PF 6 MG/30 ML
0.2 PATIENT CONTROLLED ANALGESIA SYRINGE INTRAVENOUS
Status: DISCONTINUED | OUTPATIENT
Start: 2021-07-15 | End: 2021-07-16 | Stop reason: HOSPADM

## 2021-07-15 RX ORDER — ACETAMINOPHEN 325 MG/1
975 TABLET ORAL 4 TIMES DAILY
Qty: 100 TABLET | Refills: 0 | Status: SHIPPED | OUTPATIENT
Start: 2021-07-15 | End: 2022-04-27

## 2021-07-15 RX ORDER — ACETAMINOPHEN 325 MG/1
650 TABLET ORAL EVERY 4 HOURS PRN
Status: DISCONTINUED | OUTPATIENT
Start: 2021-07-18 | End: 2021-07-16 | Stop reason: HOSPADM

## 2021-07-15 RX ORDER — OXYCODONE HYDROCHLORIDE 5 MG/1
5 TABLET ORAL EVERY 4 HOURS PRN
Status: DISCONTINUED | OUTPATIENT
Start: 2021-07-15 | End: 2021-07-16 | Stop reason: HOSPADM

## 2021-07-15 RX ORDER — HYDROXYZINE HYDROCHLORIDE 10 MG/1
10 TABLET, FILM COATED ORAL EVERY 6 HOURS PRN
Status: DISCONTINUED | OUTPATIENT
Start: 2021-07-15 | End: 2021-07-16 | Stop reason: HOSPADM

## 2021-07-15 RX ORDER — SODIUM CHLORIDE, SODIUM LACTATE, POTASSIUM CHLORIDE, CALCIUM CHLORIDE 600; 310; 30; 20 MG/100ML; MG/100ML; MG/100ML; MG/100ML
INJECTION, SOLUTION INTRAVENOUS CONTINUOUS
Status: DISCONTINUED | OUTPATIENT
Start: 2021-07-15 | End: 2021-07-15 | Stop reason: HOSPADM

## 2021-07-15 RX ORDER — FENTANYL CITRATE 50 UG/ML
50 INJECTION, SOLUTION INTRAMUSCULAR; INTRAVENOUS
Status: DISCONTINUED | OUTPATIENT
Start: 2021-07-15 | End: 2021-07-15 | Stop reason: HOSPADM

## 2021-07-15 RX ORDER — DEXAMETHASONE SODIUM PHOSPHATE 4 MG/ML
INJECTION, SOLUTION INTRA-ARTICULAR; INTRALESIONAL; INTRAMUSCULAR; INTRAVENOUS; SOFT TISSUE PRN
Status: DISCONTINUED | OUTPATIENT
Start: 2021-07-15 | End: 2021-07-15

## 2021-07-15 RX ORDER — KETOROLAC TROMETHAMINE 10 MG/1
10 TABLET, FILM COATED ORAL EVERY 8 HOURS
Qty: 6 TABLET | Refills: 0 | Status: SHIPPED | OUTPATIENT
Start: 2021-07-15 | End: 2021-07-17

## 2021-07-15 RX ORDER — CEFAZOLIN SODIUM 2 G/100ML
2 INJECTION, SOLUTION INTRAVENOUS SEE ADMIN INSTRUCTIONS
Status: DISCONTINUED | OUTPATIENT
Start: 2021-07-15 | End: 2021-07-15 | Stop reason: HOSPADM

## 2021-07-15 RX ORDER — DILTIAZEM HYDROCHLORIDE 240 MG/1
240 CAPSULE, EXTENDED RELEASE ORAL DAILY
Status: DISCONTINUED | OUTPATIENT
Start: 2021-07-15 | End: 2021-07-15

## 2021-07-15 RX ORDER — POLYETHYLENE GLYCOL 3350 17 G/17G
17 POWDER, FOR SOLUTION ORAL DAILY
Status: DISCONTINUED | OUTPATIENT
Start: 2021-07-16 | End: 2021-07-16 | Stop reason: HOSPADM

## 2021-07-15 RX ORDER — ONDANSETRON 2 MG/ML
4 INJECTION INTRAMUSCULAR; INTRAVENOUS
Status: DISCONTINUED | OUTPATIENT
Start: 2021-07-15 | End: 2021-07-15 | Stop reason: HOSPADM

## 2021-07-15 RX ORDER — HYDROMORPHONE HCL IN WATER/PF 6 MG/30 ML
0.4 PATIENT CONTROLLED ANALGESIA SYRINGE INTRAVENOUS
Status: DISCONTINUED | OUTPATIENT
Start: 2021-07-15 | End: 2021-07-16 | Stop reason: HOSPADM

## 2021-07-15 RX ORDER — PROPOFOL 10 MG/ML
INJECTION, EMULSION INTRAVENOUS PRN
Status: DISCONTINUED | OUTPATIENT
Start: 2021-07-15 | End: 2021-07-15

## 2021-07-15 RX ORDER — ONDANSETRON 4 MG/1
4 TABLET, ORALLY DISINTEGRATING ORAL EVERY 6 HOURS PRN
Status: DISCONTINUED | OUTPATIENT
Start: 2021-07-15 | End: 2021-07-16 | Stop reason: HOSPADM

## 2021-07-15 RX ORDER — ONDANSETRON 2 MG/ML
4 INJECTION INTRAMUSCULAR; INTRAVENOUS EVERY 30 MIN PRN
Status: DISCONTINUED | OUTPATIENT
Start: 2021-07-15 | End: 2021-07-15 | Stop reason: HOSPADM

## 2021-07-15 RX ORDER — AMOXICILLIN 250 MG
1-2 CAPSULE ORAL 2 TIMES DAILY
Qty: 30 TABLET | Refills: 0 | Status: SHIPPED | OUTPATIENT
Start: 2021-07-15 | End: 2022-04-27

## 2021-07-15 RX ORDER — CELECOXIB 200 MG/1
200 CAPSULE ORAL DAILY
Qty: 30 CAPSULE | Refills: 0 | Status: SHIPPED | OUTPATIENT
Start: 2021-07-15 | End: 2022-04-27

## 2021-07-15 RX ORDER — TRANEXAMIC ACID 650 MG/1
1950 TABLET ORAL ONCE
Status: COMPLETED | OUTPATIENT
Start: 2021-07-15 | End: 2021-07-15

## 2021-07-15 RX ORDER — LIDOCAINE 40 MG/G
CREAM TOPICAL
Status: DISCONTINUED | OUTPATIENT
Start: 2021-07-15 | End: 2021-07-15

## 2021-07-15 RX ORDER — CALCIUM CARBONATE 500 MG/1
500 TABLET, CHEWABLE ORAL 4 TIMES DAILY PRN
Status: DISCONTINUED | OUTPATIENT
Start: 2021-07-15 | End: 2021-07-16 | Stop reason: HOSPADM

## 2021-07-15 RX ORDER — MAGNESIUM HYDROXIDE 1200 MG/15ML
LIQUID ORAL PRN
Status: DISCONTINUED | OUTPATIENT
Start: 2021-07-15 | End: 2021-07-15 | Stop reason: HOSPADM

## 2021-07-15 RX ORDER — BISACODYL 10 MG
10 SUPPOSITORY, RECTAL RECTAL DAILY PRN
Status: DISCONTINUED | OUTPATIENT
Start: 2021-07-15 | End: 2021-07-16 | Stop reason: HOSPADM

## 2021-07-15 RX ORDER — MEPERIDINE HYDROCHLORIDE 25 MG/ML
12.5 INJECTION INTRAMUSCULAR; INTRAVENOUS; SUBCUTANEOUS EVERY 5 MIN PRN
Status: DISCONTINUED | OUTPATIENT
Start: 2021-07-15 | End: 2021-07-15 | Stop reason: HOSPADM

## 2021-07-15 RX ORDER — VANCOMYCIN HYDROCHLORIDE 1 G/20ML
INJECTION, POWDER, LYOPHILIZED, FOR SOLUTION INTRAVENOUS PRN
Status: DISCONTINUED | OUTPATIENT
Start: 2021-07-15 | End: 2021-07-15 | Stop reason: HOSPADM

## 2021-07-15 RX ORDER — LIDOCAINE HYDROCHLORIDE 20 MG/ML
INJECTION, SOLUTION INFILTRATION; PERINEURAL PRN
Status: DISCONTINUED | OUTPATIENT
Start: 2021-07-15 | End: 2021-07-15

## 2021-07-15 RX ORDER — LIDOCAINE 40 MG/G
CREAM TOPICAL
Status: DISCONTINUED | OUTPATIENT
Start: 2021-07-15 | End: 2021-07-16 | Stop reason: HOSPADM

## 2021-07-15 RX ORDER — CELECOXIB 200 MG/1
400 CAPSULE ORAL ONCE
Status: COMPLETED | OUTPATIENT
Start: 2021-07-15 | End: 2021-07-15

## 2021-07-15 RX ORDER — KETOROLAC TROMETHAMINE 15 MG/ML
15 INJECTION, SOLUTION INTRAMUSCULAR; INTRAVENOUS EVERY 6 HOURS
Status: DISCONTINUED | OUTPATIENT
Start: 2021-07-15 | End: 2021-07-16 | Stop reason: HOSPADM

## 2021-07-15 RX ORDER — AMOXICILLIN 250 MG
1 CAPSULE ORAL 2 TIMES DAILY
Status: DISCONTINUED | OUTPATIENT
Start: 2021-07-15 | End: 2021-07-16 | Stop reason: HOSPADM

## 2021-07-15 RX ORDER — ONDANSETRON 2 MG/ML
4 INJECTION INTRAMUSCULAR; INTRAVENOUS EVERY 6 HOURS PRN
Status: DISCONTINUED | OUTPATIENT
Start: 2021-07-15 | End: 2021-07-16 | Stop reason: HOSPADM

## 2021-07-15 RX ORDER — PROCHLORPERAZINE MALEATE 5 MG
5 TABLET ORAL EVERY 6 HOURS PRN
Status: DISCONTINUED | OUTPATIENT
Start: 2021-07-15 | End: 2021-07-16 | Stop reason: HOSPADM

## 2021-07-15 RX ORDER — OXYCODONE HYDROCHLORIDE 5 MG/1
10 TABLET ORAL EVERY 4 HOURS PRN
Status: DISCONTINUED | OUTPATIENT
Start: 2021-07-15 | End: 2021-07-16 | Stop reason: HOSPADM

## 2021-07-15 RX ORDER — CEFAZOLIN SODIUM 2 G/100ML
2 INJECTION, SOLUTION INTRAVENOUS
Status: COMPLETED | OUTPATIENT
Start: 2021-07-15 | End: 2021-07-15

## 2021-07-15 RX ORDER — EPHEDRINE SULFATE 50 MG/ML
INJECTION, SOLUTION INTRAMUSCULAR; INTRAVENOUS; SUBCUTANEOUS PRN
Status: DISCONTINUED | OUTPATIENT
Start: 2021-07-15 | End: 2021-07-15

## 2021-07-15 RX ORDER — CEFAZOLIN SODIUM 2 G/100ML
2 INJECTION, SOLUTION INTRAVENOUS EVERY 8 HOURS
Status: COMPLETED | OUTPATIENT
Start: 2021-07-15 | End: 2021-07-16

## 2021-07-15 RX ORDER — DILTIAZEM HYDROCHLORIDE 240 MG/1
240 CAPSULE, COATED, EXTENDED RELEASE ORAL DAILY
Status: DISCONTINUED | OUTPATIENT
Start: 2021-07-16 | End: 2021-07-16 | Stop reason: HOSPADM

## 2021-07-15 RX ORDER — PREGABALIN 150 MG/1
150 CAPSULE ORAL ONCE
Status: COMPLETED | OUTPATIENT
Start: 2021-07-15 | End: 2021-07-15

## 2021-07-15 RX ADMIN — KETOROLAC TROMETHAMINE 15 MG: 15 INJECTION, SOLUTION INTRAMUSCULAR; INTRAVENOUS at 19:56

## 2021-07-15 RX ADMIN — MIDAZOLAM 1 MG: 1 INJECTION INTRAMUSCULAR; INTRAVENOUS at 09:29

## 2021-07-15 RX ADMIN — SENNOSIDES AND DOCUSATE SODIUM 1 TABLET: 8.6; 5 TABLET ORAL at 19:56

## 2021-07-15 RX ADMIN — PHENYLEPHRINE HYDROCHLORIDE 100 MCG: 10 INJECTION INTRAVENOUS at 10:01

## 2021-07-15 RX ADMIN — SODIUM CHLORIDE, POTASSIUM CHLORIDE, SODIUM LACTATE AND CALCIUM CHLORIDE: 600; 310; 30; 20 INJECTION, SOLUTION INTRAVENOUS at 09:24

## 2021-07-15 RX ADMIN — MIDAZOLAM HYDROCHLORIDE 1 MG: 1 INJECTION, SOLUTION INTRAMUSCULAR; INTRAVENOUS at 09:16

## 2021-07-15 RX ADMIN — MIDAZOLAM HYDROCHLORIDE 1 MG: 1 INJECTION, SOLUTION INTRAMUSCULAR; INTRAVENOUS at 09:14

## 2021-07-15 RX ADMIN — TRANEXAMIC ACID 1950 MG: 650 TABLET ORAL at 08:11

## 2021-07-15 RX ADMIN — BUPIVACAINE HYDROCHLORIDE 17 ML: 5 INJECTION, SOLUTION EPIDURAL; INTRACAUDAL; PERINEURAL at 09:00

## 2021-07-15 RX ADMIN — PHENYLEPHRINE HYDROCHLORIDE 50 MCG: 10 INJECTION INTRAVENOUS at 09:55

## 2021-07-15 RX ADMIN — PROPOFOL 100 MCG/KG/MIN: 10 INJECTION, EMULSION INTRAVENOUS at 09:35

## 2021-07-15 RX ADMIN — Medication 10 MG: at 09:47

## 2021-07-15 RX ADMIN — PROPOFOL 30 MG: 10 INJECTION, EMULSION INTRAVENOUS at 09:35

## 2021-07-15 RX ADMIN — PHENYLEPHRINE HYDROCHLORIDE 100 MCG: 10 INJECTION INTRAVENOUS at 11:46

## 2021-07-15 RX ADMIN — DEXAMETHASONE SODIUM PHOSPHATE 4 MG: 4 INJECTION, SOLUTION INTRA-ARTICULAR; INTRALESIONAL; INTRAMUSCULAR; INTRAVENOUS; SOFT TISSUE at 09:47

## 2021-07-15 RX ADMIN — PHENYLEPHRINE HYDROCHLORIDE 50 MCG: 10 INJECTION INTRAVENOUS at 11:41

## 2021-07-15 RX ADMIN — PHENYLEPHRINE HYDROCHLORIDE 0.3 MCG/KG/MIN: 10 INJECTION INTRAVENOUS at 10:04

## 2021-07-15 RX ADMIN — CEFAZOLIN SODIUM 2 G: 2 INJECTION, SOLUTION INTRAVENOUS at 18:37

## 2021-07-15 RX ADMIN — CEFAZOLIN SODIUM 2 G: 2 INJECTION, SOLUTION INTRAVENOUS at 09:27

## 2021-07-15 RX ADMIN — FENTANYL CITRATE 50 MCG: 50 INJECTION, SOLUTION INTRAMUSCULAR; INTRAVENOUS at 09:29

## 2021-07-15 RX ADMIN — PREGABALIN 150 MG: 150 CAPSULE ORAL at 08:12

## 2021-07-15 RX ADMIN — CELECOXIB 400 MG: 200 CAPSULE ORAL at 08:11

## 2021-07-15 RX ADMIN — SODIUM CHLORIDE, POTASSIUM CHLORIDE, SODIUM LACTATE AND CALCIUM CHLORIDE: 600; 310; 30; 20 INJECTION, SOLUTION INTRAVENOUS at 09:58

## 2021-07-15 RX ADMIN — ONDANSETRON 4 MG: 2 INJECTION INTRAMUSCULAR; INTRAVENOUS at 09:47

## 2021-07-15 RX ADMIN — LIDOCAINE HYDROCHLORIDE 20 MG: 20 INJECTION, SOLUTION INFILTRATION; PERINEURAL at 09:35

## 2021-07-15 ASSESSMENT — MIFFLIN-ST. JEOR: SCORE: 1855.8

## 2021-07-15 ASSESSMENT — ENCOUNTER SYMPTOMS
SEIZURES: 0
DYSRHYTHMIAS: 1
ORTHOPNEA: 0

## 2021-07-15 ASSESSMENT — LIFESTYLE VARIABLES: TOBACCO_USE: 0

## 2021-07-15 ASSESSMENT — COPD QUESTIONNAIRES: COPD: 0

## 2021-07-15 NOTE — PROGRESS NOTES
07/15/21 1600   Quick Adds   Quick Adds Certification   Type of Visit Initial PT Evaluation   Living Environment   People in home spouse   Current Living Arrangements house   Home Accessibility stairs to enter home;stairs within home   Number of Stairs, Main Entrance 2   Stair Railings, Main Entrance none   Number of Stairs, Within Home, Primary 7   Stair Railings, Within Home, Primary railings safe and in good condition   Transportation Anticipated family or friend will provide   Living Environment Comments 2 steps without railing to enter then 7 steps up/down split level home with railing. Pt reports needs to navigate stairs throughout the day.    Self-Care   Usual Activity Tolerance good   Current Activity Tolerance moderate   Equipment Currently Used at Home none   Activity/Exercise/Self-Care Comment Pt owns a std walker, RW and std cane. Pt was ind with ADL's   Disability/Function   Hearing Difficulty or Deaf no   Wear Glasses or Blind yes   Vision Management glasses   Concentrating, Remembering or Making Decisions Difficulty no   Difficulty Communicating no   Difficulty Eating/Swallowing no   Dressing/Bathing Difficulty no   Toileting issues no   Doing Errands Independently Difficulty (such as shopping) no   Fall history within last six months no   Change in Functional Status Since Onset of Current Illness/Injury yes   General Information   Onset of Illness/Injury or Date of Surgery 07/15/21   Referring Physician Miguel Buckley MD   Patient/Family Therapy Goals Statement (PT) to go home tomorrow   Pertinent History of Current Problem (include personal factors and/or comorbidities that impact the POC) 72 y/o M POD #0 right TKA. ROM as tolerated, WBAT. Hx of left TKA 2019   Existing Precautions/Restrictions no known precautions/restrictions   Weight-Bearing Status - RLE weight-bearing as tolerated   General Observations resting in bed, NAD   Cognition   Orientation Status (Cognition) oriented x 3   Pain  Assessment   Patient Currently in Pain Yes, see Vital Sign flowsheet  (right knee 3-4/10)   Integumentary/Edema   Integumentary/Edema Comments ace wrap over right knee   Posture    Posture Not impaired   Range of Motion (ROM)   ROM Comment right knee: -10 to 80deg    Strength   Strength Comments right LE: IND with SLR and SAQ   Bed Mobility   Comment (Bed Mobility) SBA supine to sit    Transfers   Transfer Safety Comments min A with sit to stand with FWW   Gait/Stairs (Locomotion)   [place] Level (Gait) minimum assist (75% patient effort)   Assistive Device (Gait) walker, front-wheeled   Distance in Feet (Required for LE Total Joints) 10   Pattern (Gait) step-to   Balance   Balance Comments min A with standing balance with bilateral UE support on the FWW   Sensory Examination   Sensory Perception Comments pt denies any numbness/tingling. intact to light touch bilateral feet   Clinical Impression   Criteria for Skilled Therapeutic Intervention yes, treatment indicated   PT Diagnosis (PT) impaired gait   Influenced by the following impairments right knee pain, decreased ROM, impaired balance   Functional limitations due to impairments POD #0 R TKA, impaired IND with functional mobility    Clinical Presentation Stable/Uncomplicated   Clinical Presentation Rationale clinical judgement   Clinical Decision Making (Complexity) low complexity   Therapy Frequency (PT) 2x/day   Predicted Duration of Therapy Intervention (days/wks) 3 days   Planned Therapy Interventions (PT) balance training;bed mobility training;home exercise program;gait training;cryotherapy;patient/family education;stair training;strengthening;transfer training;home program guidelines;risk factor education;progressive activity/exercise   Risk & Benefits of therapy have been explained evaluation/treatment results reviewed;care plan/treatment goals reviewed;risks/benefits reviewed;current/potential barriers reviewed;participants voiced agreement with  care plan;participants included;patient   PT Discharge Planning    PT Rationale for DC Rec anticipate pt will progress to IND bed mobility, SBA transfers and gait with FWW, and min A with stairs.    Therapy Certification   Start of care date 07/15/21   Certification date from 07/15/21   Certification date to 07/17/21   Medical Diagnosis right TKA   Total Evaluation Time   Total Evaluation Time (Minutes) 10   Skin WDL   Skin Inspection Procedural focused assessment (identify areas inspected)   Skin WDL WDL

## 2021-07-15 NOTE — PROGRESS NOTES
Pts wife in to see pt, discharge instructions given with teach back.  Pt's wife will come and  pt tomorrow after pt texts her that his instructions and medications have been given.

## 2021-07-15 NOTE — PROGRESS NOTES
[facility]      OUTPATIENT PHYSICAL THERAPY EVALUATION  PLAN OF TREATMENT FOR OUTPATIENT REHABILITATION  (COMPLETE FOR INITIAL CLAIMS ONLY)  Patient's Last Name, First Name, M.I.  YOB: 1950  Dustin Lan                        Provider's Name  [facility] Medical Record No.  [id number]                               Onset Date:  07/15/21   Start of Care Date:  07/15/21      Type:     _X_PT   ___OT   ___SLP Medical Diagnosis:  right TKA                        PT Diagnosis:  impaired gait   Visits from SOC:  1   _________________________________________________________________________________  Plan of Treatment/Functional Goals    Planned Interventions: balance training, bed mobility training, home exercise program, gait training, cryotherapy, patient/family education, stair training, strengthening, transfer training, home program guidelines, risk factor education, progressive activity/exercise     Goals: See Physical Therapy Goals on Care Plan in [employer] electronic health record.    Therapy Frequency: 2x/day  Predicted Duration of Therapy Intervention: 3 days  _________________________________________________________________________________    I CERTIFY THE NEED FOR THESE SERVICES FURNISHED UNDER        THIS PLAN OF TREATMENT AND WHILE UNDER MY CARE     (Physician co-signature of this document indicates review and certification of the therapy plan).                Certification date from: 07/15/21, Certification date to: 07/17/21    Referring Physician: Miguel Buckley MD            Initial Assessment        See Physical Therapy evaluation dated 07/15/21 in Epic electronic health record.

## 2021-07-15 NOTE — OR NURSING
1310: Dr. Hammond updated on pt status. Approved to transfer to floor.    1314: Dr. Hammond at bedside.

## 2021-07-15 NOTE — CONSULTS
[facility]  Consult Note - Hospitalist Service     Date of Admission:  7/15/2021  Consult Requested by: Dr. Brice  Reason for Consult: Post-op medical management following right TKA  PRIMARY CARE PROVIDER:    Alessandro Blanco    Assessment & Plan   Dustin Lan is a 71 year old male admitted on 7/15/2021.    Past medical history significant for OA, Paroxsymal SVT, Mild persistent asthma, HTN, HLP who underwent an elective right TKA.      OA with degenerative changes of the right knee s/p right TKA  POD# 0.   - Orthopedic Service is managing.   --Defer analgesic management, DVT prophylaxis, PT/OT.    - HGB check in the morning.    - Encourage utilization of incentive spirometer.     HTN  Paroxsymal SVT  Asymptomatic and no symptoms since 2018.    - Resumed PTA diltiazem  mg/d.  Hold parameter in place.      Mild persistent asthma  - Resumed on PTA Advair 250-50 mg BID, please use patient's own inhaler.      HLP  - Hold PTA atorvastatin 10 mg at bedtime and resume at discharge.      Diet: Advance Diet as Tolerated: Regular Diet Adult  Regular Diet Adult     DVT Prophylaxis: Defer to primary service   Hall Catheter: Not present  Code Status: Full Code   Disposition Plan    Expected discharge: Per Ortho  Entered: Rashaad Montesinos PA-C 07/15/2021, 3:29 PM        The patient's care was discussed with the Patient.    The patient has been discussed with Dr. Spence, who agrees with the assessment and plan at this time. Dr. Spence will evaluate the patient independently.     At this time, I'd like to thank Dr. Brice for consulting the Hospitalist service.  We will continue to follow.        Rashaad Montesinos PA-C  [facility]  Securely message with the Vocera Web Console (learn more here)  Text page via [employer] Paging/Directory    ______________________________________________________________________    Chief Complaint   Elective right TKA.      History  is obtained from the patient and EMR.      History of Present Illness   Dustin Lan is a 71 year old male with a past medical history significant for OA, Paroxsymal SVT, Mild persistent asthma, HTN, HLP who underwent an elective right TKA.     Surgery was performed under Spinal anesthesia.  EBL was documented at 100.    Patient was resting in his hospital room upon arrival.  Initially, we reviewed his medical and surgical history as well as his home medications.      Upon questioning, he indicated he was having right knee pain which led to today's surgery.  He feels he busies easily.  Otherwise he has no issues/conscerns.      Reviewed plans with patient in regards to management of blood pressure (hold parameters), and plan to hold his statin medication as use his home inhaler.  Patient understood and agreed with this plan.        Review of Systems   The 10 point Review of Systems is negative other than noted in the HPI.    Past Medical History    I have reviewed this patient's medical history and updated it with pertinent information if needed.   Past Medical History:   Diagnosis Date     Elevated blood pressure reading without diagnosis of hypertension      Personal history of colonic polyps 2/2007    tubular adenoma, ileocecal valve, repeat 2010     Plantar fascial fibromatosis      Pure hypercholesterolemia    OA, Paroxsymal SVT, Mild persistent asthma, HTN, HLP    Past Surgical History   I have reviewed this patient's surgical history and updated it with pertinent information if needed.  Past Surgical History:   Procedure Laterality Date     ARTHROPLASTY KNEE Left 5/8/2019    Procedure: ARTHROPLASTY LEFT KNEE;  Surgeon: Miguel Buckley MD;  Location: [facility] OR     ARTHROSCOPY KNEE RT/LT  12/2008    right     BACK SURGERY      1988     COLONOSCOPY  2/2007    ileocecal valve polyp, tubular adenoma, repeat in 3 yrs     INJECT STEROID (LOCATION) Right 5/8/2019    Procedure: Inject steroid right knee;  Surgeon:  Miguel Buckley MD;  Location:  OR       Social History   I have reviewed this patient's social history and updated it with pertinent information if needed.  Patient resides in a house in [place], [place] with his wife.  He has never smoked.  He consumes alcohol occasionally and indicated that he has cut back.  He does not use illicit drugs.    Social History     Tobacco Use     Smoking status: Never Smoker     Smokeless tobacco: Never Used   Vaping Use     Vaping Use: Never used   Substance Use Topics     Alcohol use: Yes     Alcohol/week: 0.0 - 3.0 standard drinks     Comment: 5 - 7 beers a week     Drug use: No       Family History   I have reviewed this patient's family history and updated it with pertinent information if needed.   Family History   Problem Relation Age of Onset     Cerebrovascular Disease Mother 75         of a stroke     C.A.D. Father 67         suddenly when using the  at age 67. He was a smoker. On autopsy, told to have multiple heart attacks and scar tissue but he had never veronika a clinical heart attack.     Lipids Sister        Medications   Prior to Admission Medications   Prescriptions Last Dose Informant Patient Reported? Taking?   Ascorbic Acid (VITAMIN C PO) MORE THAN A WEEK Self Yes Yes   Sig: Take 1 tablet by mouth every morning   acetaminophen (TYLENOL) 500 MG tablet 2021 at PRN Self Yes Yes   Sig: Take 500-1,000 mg by mouth every 6 hours as needed for mild pain   amoxicillin (AMOXIL) 500 MG tablet MAY 2021 Self Yes Yes   Sig: TAKE 4 TABLETS BY MOUTH 1 HOUR BEFORE DENTAL APPOINTMENT   atorvastatin (LIPITOR) 10 MG tablet 2021 at PM Self No Yes   Sig: Take 1 tablet (10 mg) by mouth daily   diltiazem ER (TIAZAC) 240 MG 24 hr ER beaded capsule 2021 at 1200 Self No Yes   Sig: Take 1 capsule (240 mg) by mouth daily   fluticasone-salmeterol (ADVAIR DISKUS) 250-50 MCG/DOSE inhaler 2021 at PM Self No Yes   Sig: Inhale 1 puff into the lungs 2 times daily    glucosamine-chondroitin 500-400 MG CAPS per capsule MORE THAN A WEEK Self Yes Yes   Sig: Take 1 capsule by mouth 2 times daily   multivitamin, therapeutic (THERA-VIT) TABS tablet 7/13/2021 at PM Self Yes Yes   Sig: Take 1 tablet by mouth daily      Facility-Administered Medications: None     Allergies   Allergies   Allergen Reactions     Cats        Physical Exam   Temp: 98.1  F (36.7  C) Temp src: Oral BP: 109/73 Pulse: 79   Resp: 11 SpO2: 96 % O2 Device: None (Room air) Oxygen Delivery: 2 LPM  Weight: 232 lbs 1.6 oz    Constitutional: Awake, alert, cooperative, no apparent distress.   ENT: Normocephalic, without obvious abnormality, atraumatic.  Oral exam deferred as patient is wearing a mask.  Eyes pupils are equal, round and reactive to light; extra occular movements intact.  Normal sclera.    Neck: Supple, symmetrical, trachea midline, no adenopathy.  Pulmonary: No increased work of breathing, good air exchange, clear to auscultation bilaterally, no crackles or wheezing.  Cardiovascular: Regular rate and rhythm, normal S1 and S2, no S3 or S4, and no murmur noted.  GI: Normal bowel sounds, soft, non-distended, non-tender.    Skin/Integumen: Clear.  Neuro: CN II-XII grossly intact.  Upper extremities strength, coordination and sensation intact bilaterally.  Deferred lower extremity assessment due to post-op state.  Dorsal and plantar flexion intact and even bilaterally.    Psych:  Alert and oriented x 3. Normal affect.  Extremities: No lower extremity edema noted, and calves are non-tender to palpation bilaterally.      Data   I personally reviewed no images or EKG's today.  Results for orders placed or performed during the hospital encounter of 07/15/21 (from the past 24 hour(s))   Hemoglobin   Result Value Ref Range    Hemoglobin 14.1 13.3 - 17.7 g/dL   Creatinine   Result Value Ref Range    Creatinine 0.93 0.66 - 1.25 mg/dL    GFR Estimate 83 >60 mL/min/1.73m2   XR Knee Port Right 1/2 Views    Narrative     XR KNEE PORT RIGHT 1/2 VIEWS 7/15/2021 1:04 PM     HISTORY: Post-Op Total Knee      Impression    IMPRESSION: Right total knee arthroplasty without apparent  complication.    JESSICA MONTALVO MD         SYSTEM ID:  [id number]

## 2021-07-15 NOTE — PLAN OF CARE
Patient vital signs are at baseline: Yes  Patient able to ambulate as they were prior to admission or with assist devices provided by therapies during their stay:  No,  Reason:  Pt has not had PT yet  Patient MUST void prior to discharge:  No,  Reason:  DTV  Patient able to tolerate oral intake:  Yes  Pain has adequate pain control using Oral analgesics:  No,  Reason:  Block still in effect

## 2021-07-15 NOTE — PLAN OF CARE
OT: Orders received. Chart reviewed and discussed with care team.  Per PT, pt has had prior TKA and has necessary adaptive equipment, is mobilizing well currently. Pt's spouse able to assist at discharge. No acute OT needs at this time. Will complete orders.

## 2021-07-15 NOTE — ANESTHESIA PROCEDURE NOTES
"Adductor canal and Femoral Procedure Note  Pre-Procedure   Staff -        Anesthesiologist:  Taurus Hammond MD       Performed By: anesthesiologist       Location: pre-op       Pre-Anesthestic Checklist: patient identified, IV checked, site marked, risks and benefits discussed, informed consent, monitors and equipment checked, pre-op evaluation, at physician/surgeon's request and post-op pain management  Timeout:       Correct Patient: Yes        Correct Procedure: Yes        Correct Site: Yes        Correct Position: Yes        Correct Laterality: Yes        Site Marked: Yes  Procedure Documentation  Procedure: Adductor canal, Femoral       Patient Position: supine       Patient Prep/Sterile Barriers: sterile gloves, mask, \"No-touch\" technique       Skin prep: Chloraprep       Local skin infiltrated with 3 mL of 1% lidocaine.        Insertion site: upper, medial thigh.       Needle Type: insulated (B Cash)       Needle Gauge: 21.        Needle Length (millimeters): 100        Ultrasound guided       1. Ultrasound was used to identify targeted nerve, plexus, vascular marker, or fascial plane and place a needle adjacent to it in real-time.       2. Ultrasound was used to visualize the spread of anesthetic in close proximity to the above referenced structure.       3. A permanent image is entered into the patient's record.       4. The visualized anatomic structures appeared normal.       5. There were no apparent abnormal pathologic findings.    Assessment/Narrative         The placement was negative for: blood aspirated, painful injection and site bleeding       Paresthesias: No.    Test dose of mL at.         Test dose negative, 3 minutes after injection, for signs of intravascular, subdural, or intrathecal injection.     Bolus given via needle..        Secured via.        Insertion/Infusion Method: Single Shot       Complications: none       Injection made incrementally with aspirations every 3 " mL.    Medication(s) Administered   Bupivacaine 0.5% w/ 1:400K Epi (Injection), 17 mL  Medication Administration Time: 7/15/2021 9:00 AM    Comments:  0.5% Bupivacaine with epinephrine (1:400K) injected on the anterior side of the femoral artery, in close proximity to the femoral nerve branches via the adductor canal approach.      Pt tolerated well.    No complications.      The surgeon has given a verbal order transferring care of this patient to me for the performance of a regional analgesia block for post-op pain control. It is requested of me because I am uniquely trained and qualified to perform this block and the surgeon is neither trained nor qualified to perform this procedure.

## 2021-07-15 NOTE — ANESTHESIA PREPROCEDURE EVALUATION
Anesthesia Pre-Procedure Evaluation    Patient: Dustin Lan   MRN: [id number] : 1950        Preoperative Diagnosis: Arthralgia of right knee [M25.561]   Procedure : Procedure(s):  RIGHT TOTAL KNEE ARTHROPLASTY     Past Medical History:   Diagnosis Date     Elevated blood pressure reading without diagnosis of hypertension      Personal history of colonic polyps 2007    tubular adenoma, ileocecal valve, repeat      Plantar fascial fibromatosis      Pure hypercholesterolemia       Past Surgical History:   Procedure Laterality Date     ARTHROPLASTY KNEE Left 2019    Procedure: ARTHROPLASTY LEFT KNEE;  Surgeon: Miguel Buckley MD;  Location: [facility] OR     ARTHROSCOPY KNEE RT/LT  2008    right     BACK SURGERY           COLONOSCOPY  2007    ileocecal valve polyp, tubular adenoma, repeat in 3 yrs     INJECT STEROID (LOCATION) Right 2019    Procedure: Inject steroid right knee;  Surgeon: Miguel Buckley MD;  Location: [facility] OR      Allergies   Allergen Reactions     Cats       Social History     Tobacco Use     Smoking status: Never Smoker     Smokeless tobacco: Never Used   Substance Use Topics     Alcohol use: Yes     Alcohol/week: 0.0 - 3.0 standard drinks     Comment: 5 - 7 beers a week      Wt Readings from Last 1 Encounters:   07/15/21 105.3 kg (232 lb 1.6 oz)        Prior to Admission medications    Medication Sig Start Date End Date Taking? Authorizing Provider   acetaminophen (TYLENOL) 500 MG tablet Take 500-1,000 mg by mouth every 6 hours as needed for mild pain   Yes Reported, Patient   amoxicillin (AMOXIL) 500 MG tablet TAKE 4 TABLETS BY MOUTH 1 HOUR BEFORE DENTAL APPOINTMENT 3/9/21  Yes Reported, Patient   Ascorbic Acid (VITAMIN C PO) Take 1 tablet by mouth every morning   Yes Reported, Patient   atorvastatin (LIPITOR) 10 MG tablet Take 1 tablet (10 mg) by mouth daily 20  Yes Alessandro Blanco MD   diltiazem ER (TIAZAC) 240 MG 24 hr ER beaded capsule Take 1 capsule (240 mg) by  mouth daily 12/18/20  Yes Alessandro Blanco MD   fluticasone-salmeterol (ADVAIR DISKUS) 250-50 MCG/DOSE inhaler Inhale 1 puff into the lungs 2 times daily 12/18/20  Yes Alessandro Blanco MD   glucosamine-chondroitin 500-400 MG CAPS per capsule Take 1 capsule by mouth 2 times daily   Yes Reported, Patient   multivitamin, therapeutic (THERA-VIT) TABS tablet Take 1 tablet by mouth daily   Yes Reported, Patient     Anesthesia Evaluation   Pt has had prior anesthetic. Type: Regional.    No history of anesthetic complications       ROS/MED HX  ENT/Pulmonary:     (+) Mild Persistent, asthma Treatment: Inhaler daily,   (-) tobacco use, COPD, sleep apnea and recent URI   Neurologic:    (-) no seizures and no CVA   Cardiovascular:     (+) Dyslipidemia hypertension-----dysrhythmias (Hx of SVT - controlled with CCB), Other,  (-) angina, CAD, CHF, GODINEZ, orthopnea/PND, syncope, irregular heartbeat/palpitations, valvular problems/murmurs, angina, murmur and wheezes   METS/Exercise Tolerance: >4 METS    Hematologic:       Musculoskeletal:       GI/Hepatic:    (-) GERD and liver disease   Renal/Genitourinary:    (-) renal disease   Endo:    (-) Type II DM, thyroid disease and chronic steroid usage   Psychiatric/Substance Use:    (-) alcohol abuse history   Infectious Disease:       Malignancy:       Other:            Physical Exam    Airway        Mallampati: II   TM distance: > 3 FB   Neck ROM: full   Mouth opening: > 3 cm    Respiratory Devices and Support         Dental       (+) caps      Cardiovascular          Rhythm and rate: regular and normal (-) no murmur    Pulmonary           breath sounds clear to auscultation   (-) no rhonchi and no wheezes        OUTSIDE LABS:  CBC:   Lab Results   Component Value Date    WBC 7.5 06/22/2021    HGB 14.1 07/15/2021    HGB 14.7 06/22/2021    HCT 45.0 06/22/2021     06/22/2021     BMP:   Lab Results   Component Value Date     01/22/2021     12/13/2019    POTASSIUM 3.9  01/22/2021    POTASSIUM 4.1 12/13/2019    CHLORIDE 108 01/22/2021    CHLORIDE 108 12/13/2019    CO2 29 01/22/2021    CO2 28 12/13/2019    BUN 13 01/22/2021    BUN 12 12/13/2019    CR 0.93 07/15/2021    CR 1.00 01/22/2021    GLC 96 01/22/2021    GLC 90 12/13/2019     HEPATIC:   Lab Results   Component Value Date    ALBUMIN 4.1 01/22/2021    PROTTOTAL 6.4 (L) 01/22/2021    ALT 25 01/22/2021    AST 22 01/22/2021    ALKPHOS 85 01/22/2021    BILITOTAL 0.6 01/22/2021     OTHER:   Lab Results   Component Value Date    A1C 5.6 08/25/2011    BRICE 8.6 01/22/2021    TSH 1.32 02/27/2018       Anesthesia Plan    ASA Status:  2   NPO Status:  NPO Appropriate    Anesthesia Type: Spinal.              Consents    Anesthesia Plan(s) and associated risks, benefits, and realistic alternatives discussed. Questions answered and patient/representative(s) expressed understanding.     - Discussed with:  Patient         Postoperative Care    Pain management: Multi-modal analgesia, Peripheral nerve block (Single Shot).   PONV prophylaxis: Ondansetron (or other 5HT-3), Background Propofol Infusion     Comments:                Taurus Hammond MD

## 2021-07-15 NOTE — ANESTHESIA POSTPROCEDURE EVALUATION
Patient: Dustin Lan    Procedure(s):  RIGHT TOTAL KNEE ARTHROPLASTY    Diagnosis:Arthralgia of right knee [M25.561]  Diagnosis Additional Information: No value filed.    Anesthesia Type:  Spinal    Note:  Disposition: Admission   Postop Pain Control: Uneventful            Sign Out: Well controlled pain   PONV: No   Neuro/Psych: Uneventful            Sign Out: Acceptable/Baseline neuro status   Airway/Respiratory: Uneventful            Sign Out: Acceptable/Baseline resp. status   CV/Hemodynamics: Uneventful            Sign Out: Acceptable CV status   Other NRE: NONE   DID A NON-ROUTINE EVENT OCCUR? No    Event details/Postop Comments:  Stable and doing well prior to transfer to floor.               Last vitals:  Vitals:    07/15/21 1355 07/15/21 1425 07/15/21 1455   BP: 97/66 110/74 109/73   Pulse: 74 78 79   Resp: 12 10 11   Temp: 36.7  C (98.1  F)     SpO2: 94% 95% 96%       Last vitals prior to Anesthesia Care Transfer:  CRNA VITALS  7/15/2021 1152 - 7/15/2021 1252      7/15/2021             Resp Rate (set):  10          Electronically Signed By: Taurus Hammond MD  July 15, 2021  3:32 PM

## 2021-07-15 NOTE — ANESTHESIA CARE TRANSFER NOTE
Patient: Dustin Lan    Procedure(s):  RIGHT TOTAL KNEE ARTHROPLASTY    Diagnosis: Arthralgia of right knee [M25.561]  Diagnosis Additional Information: No value filed.    Anesthesia Type:   Spinal     Note:    Oropharynx: oropharynx clear of all foreign objects  Level of Consciousness: awake  Oxygen Supplementation: room air    Independent Airway: airway patency satisfactory and stable  Dentition: dentition unchanged  Vital Signs Stable: post-procedure vital signs reviewed and stable  Report to RN Given: handoff report given  Patient transferred to: PACU    Handoff Report: Identifed the Patient, Identified the Reponsible Provider, Reviewed the pertinent medical history, Discussed the surgical course, Reviewed Intra-OP anesthesia mangement and issues during anesthesia, Set expectations for post-procedure period and Allowed opportunity for questions and acknowledgement of understanding      Vitals: (Last set prior to Anesthesia Care Transfer)  CRNA VITALS  7/15/2021 1152 - 7/15/2021 1227      7/15/2021             Resp Rate (set):  10        Electronically Signed By: DONY Daly CRNA  July 15, 2021  12:27 PM

## 2021-07-16 ENCOUNTER — APPOINTMENT (OUTPATIENT)
Dept: PHYSICAL THERAPY | Facility: CLINIC | Age: 71
End: 2021-07-16
Attending: ORTHOPAEDIC SURGERY
Payer: COMMERCIAL

## 2021-07-16 VITALS
SYSTOLIC BLOOD PRESSURE: 99 MMHG | RESPIRATION RATE: 16 BRPM | DIASTOLIC BLOOD PRESSURE: 63 MMHG | OXYGEN SATURATION: 97 % | WEIGHT: 232.1 LBS | TEMPERATURE: 97.9 F | BODY MASS INDEX: 31.44 KG/M2 | HEIGHT: 72 IN | HEART RATE: 76 BPM

## 2021-07-16 LAB
FASTING STATUS PATIENT QL REPORTED: YES
GLUCOSE BLD-MCNC: 103 MG/DL (ref 70–99)
HGB BLD-MCNC: 11.2 G/DL (ref 13.3–17.7)

## 2021-07-16 PROCEDURE — 97530 THERAPEUTIC ACTIVITIES: CPT | Mod: GP

## 2021-07-16 PROCEDURE — 82947 ASSAY GLUCOSE BLOOD QUANT: CPT | Performed by: INTERNAL MEDICINE

## 2021-07-16 PROCEDURE — 36415 COLL VENOUS BLD VENIPUNCTURE: CPT | Performed by: ORTHOPAEDIC SURGERY

## 2021-07-16 PROCEDURE — 99214 OFFICE O/P EST MOD 30 MIN: CPT | Performed by: INTERNAL MEDICINE

## 2021-07-16 PROCEDURE — 250N000013 HC RX MED GY IP 250 OP 250 PS 637: Performed by: ORTHOPAEDIC SURGERY

## 2021-07-16 PROCEDURE — 97116 GAIT TRAINING THERAPY: CPT | Mod: GP

## 2021-07-16 PROCEDURE — 250N000011 HC RX IP 250 OP 636: Performed by: ORTHOPAEDIC SURGERY

## 2021-07-16 PROCEDURE — 99207 PR CDG-CODE CATEGORY CHANGED: CPT | Performed by: INTERNAL MEDICINE

## 2021-07-16 PROCEDURE — 85018 HEMOGLOBIN: CPT | Performed by: ORTHOPAEDIC SURGERY

## 2021-07-16 RX ORDER — OXYCODONE HYDROCHLORIDE 5 MG/1
5-10 TABLET ORAL EVERY 4 HOURS PRN
Qty: 40 TABLET | Refills: 0 | Status: SHIPPED | OUTPATIENT
Start: 2021-07-16 | End: 2022-04-27

## 2021-07-16 RX ADMIN — CEFAZOLIN SODIUM 2 G: 2 INJECTION, SOLUTION INTRAVENOUS at 01:57

## 2021-07-16 RX ADMIN — ASPIRIN 325 MG: 325 TABLET, COATED ORAL at 08:15

## 2021-07-16 RX ADMIN — ACETAMINOPHEN 975 MG: 325 TABLET, FILM COATED ORAL at 08:15

## 2021-07-16 RX ADMIN — SENNOSIDES AND DOCUSATE SODIUM 1 TABLET: 8.6; 5 TABLET ORAL at 08:15

## 2021-07-16 RX ADMIN — POLYETHYLENE GLYCOL 3350 17 G: 17 POWDER, FOR SOLUTION ORAL at 08:14

## 2021-07-16 RX ADMIN — KETOROLAC TROMETHAMINE 15 MG: 15 INJECTION, SOLUTION INTRAMUSCULAR; INTRAVENOUS at 01:57

## 2021-07-16 RX ADMIN — KETOROLAC TROMETHAMINE 15 MG: 15 INJECTION, SOLUTION INTRAMUSCULAR; INTRAVENOUS at 08:14

## 2021-07-16 NOTE — PROGRESS NOTES
[facility]    Medicine Progress Note - Hospitalist Service        Date of Admission:  7/15/2021  7:20 AM    Assessment & Plan:   Dustin Lan is a 71 year old male admitted on 7/15/2021.     Past medical history significant for OA, Paroxsymal SVT, Mild persistent asthma, HTN, HLP who underwent an elective right TKA.       OA with degenerative changes of the right knee s/p right TKA  -Underwent right total knee arthroplasty on 7/15, defer routine postop management to orthopedic surgery.     HTN  Paroxsymal SVT  -Asymptomatic and no symptoms since 2018.    -Continue PTA diltiazem  mg/d with hold parameters.  Blood pressure slightly on the softer side, did not receive it this morning    Mild persistent asthma  -Continue PTA Advair 250-50 mg BID     HLP  -Hold PTA atorvastatin 10 mg at bedtime and resume at discharge.     Diet: Advance Diet as Tolerated: Regular Diet Adult  Regular Diet Adult     DVT Prophylaxis: Defer to primary service   Hall Catheter: Not present  Code Status: Full Code     Disposition Plan    Expected discharge: Per primary team.  Entered: Steven Chen MD 07/16/2021, 11:49 AM        The patient's care was discussed with the Bedside Nurse and Patient.    Steven Chen MD  Hospitalist Service  [facility]  Text Page 7AM-6PM  Securely message with the Vocera Web Console (learn more here)  Text page via [employer] Paging/Directory    ______________________________________________________________________    Interval History   Pain adequately controlled.  Blood pressure slightly on the softer side but denies lightheadedness or dizziness.  No chest pain or dyspnea.    Data reviewed today: I reviewed all medications, new labs and imaging results over the last 24 hours. I personally reviewed no images or EKG's today.    Physical Exam   Vital signs:  Temp: 97.9  F (36.6  C) Temp src: Oral BP: 99/63 Pulse: 76   Resp: 16 SpO2: 97 % O2 Device: None (Room  air) Oxygen Delivery: 2 LPM Height: 182.9 cm (6') Weight: 105.3 kg (232 lb 1.6 oz)  Estimated body mass index is 31.48 kg/m  as calculated from the following:    Height as of this encounter: 1.829 m (6').    Weight as of this encounter: 105.3 kg (232 lb 1.6 oz).      Wt Readings from Last 2 Encounters:   07/15/21 105.3 kg (232 lb 1.6 oz)   06/22/21 106.8 kg (235 lb 8 oz)       Gen: AAOX3, NAD, comfortable  HEENT: no pallor  Resp: CTA B/L, normal WOB  CVS: RRR, no murmur  Abd/GI: Soft, non-tender. BS- normoactive.    MSK: Right knee bandaged  Neuro- CN- intact. No focal deficits.       Data   Recent Labs   Lab 07/16/21  0901 07/15/21  0758   HGB 11.2* 14.1   CR  --  0.93   *  --        Recent Results (from the past 24 hour(s))   XR Knee Port Right 1/2 Views    Narrative    XR KNEE PORT RIGHT 1/2 VIEWS 7/15/2021 1:04 PM     HISTORY: Post-Op Total Knee      Impression    IMPRESSION: Right total knee arthroplasty without apparent  complication.    JESSICA MONTALVO MD         SYSTEM ID:  [id number]     Medications     lactated ringers         acetaminophen  975 mg Oral Q8H     aspirin  325 mg Oral Daily     diltiazem ER COATED BEADS  240 mg Oral Daily     fluticasone-salmeterol  1 puff Inhalation BID     ketorolac  15 mg Intravenous Q6H     polyethylene glycol  17 g Oral Daily     senna-docusate  1 tablet Oral BID     sodium chloride (PF)  3 mL Intracatheter Q8H

## 2021-07-16 NOTE — PLAN OF CARE
Patient vital signs are at baseline: Yes  Patient able to ambulate as they were prior to admission or with assist devices provided by therapies during their stay:  Yes  Patient MUST void prior to discharge:  Yes  Patient able to tolerate oral intake:  Yes  Pain has adequate pain control using Oral analgesics:  No,  Reason:  Pt is not on any pain medication currently, toradol in use for now.  Pt refused tylenol.  Pt is progressing towards his goals.

## 2021-07-16 NOTE — PLAN OF CARE
Physical Therapy Discharge Summary    Reason for therapy discharge:    Discharged to home.    Progress towards therapy goal(s). See goals on Care Plan in [facility] electronic health record for goal details.  Goals met    Therapy recommendation(s):    No further therapy is recommended.

## 2021-07-16 NOTE — OP NOTE
Operative Note    Dustin Lan MRN# [id number]   YOB: 1950 Age: 71 year old   Date of Procedure: 7/15/2021    1st Assistant: Misbah Yusuf PA-C    PREOPERATIVE DIAGNOSIS: Right knee osteoarthritis, failure to respond to conservative management.     POSTOPERATIVE DIAGNOSIS: Right knee osteoarthritis, failure to respond to conservative management.     PROCEDURE: Right total knee arthroplasty, Otto Triathalon components, posterior stabilized.  Tourniquet-less technique.     DESCRIPTION OF PROCEDURE: Dustin Lan was brought to the operating room. After satisfactory anesthesia, the right lower extremity was prepped and draped in the usual sterile fashion. The patient received 1 gram of Ancef preoperatively.     Straight anterior incision was made. Dissection was carried down to the extensor mechanism. Medial arthrotomy was made.  Advanced osteoarthritis was noted. Patella was exposed, measured and resected to accept a size 38mm, asymmetric patella. The knee was then flexed up. Intramedullary guide was placed at 5 degrees as per the preoperative plan. The distal femoral cut was made followed by anteroposterior cuts and chamfer cuts. Box cut was made for the femoral component as well. Large flexion contracture, resected additional 6mm from distal femur.  Femur sized to be a size 7. Attention was then directed to the tibia. Tibial cut was made perpendicular to the long axis of the tibia in both AP and lateral planes, 0 degree posterior slope. The tibia sized to be a size 6. Soft tissue balancing was performed. Trial reduction was performed and with a 11-mm PS insert, there was excellent soft tissue balancing, patellar tracking, alignment as well as motion. Trial components were removed. Tibial baseplate was prepared for size 6 baseplate. All 3 components were cemented in place without difficulty. Excess cement was removed.  A three minute betadine soak was performed.  The wound was thoroughly  irrigated and tissues were infiltrated with toradol/marcaine mixture.  The real 11-mm PS insert was impacted in place.  One gram of vancomycin powder was placed deep and superficial prior to closure. The extensor mechanism was closed in sequential layers including a running number 1 Stratafix, then augmented with interrupted 0 Vicryl and 0 ethibond suture. The skin was closed with interrupted 2-0 Vicryl subcutaneously, then a running 2-0 Stratafix, followed by a subcuticular 3-0 monocryl.  A mesh dressing with skin glue was applied. A sterile dressing was applied. The patient left the operating room in satisfactory condition. Patient received 1 gm of tranexamic acid pre-op and at closure.  A skilled first assistant was necessary for this procedure for assistance with patient positioning, prepping, draping, surgical visualization, performance of the repair, wound closure, and application of the dressing.  The assistant was present for the entire procedure.       MD KIMMIE Arshad MD

## 2021-07-16 NOTE — PROGRESS NOTES
Pt ambulated in the benjamin down to the south benjamin and around the nurses station.  Pt also brushed his teeth and cleaned up with wipes.

## 2021-07-16 NOTE — PLAN OF CARE
A&Ox4. VSS On RA. Right knee dressing CDI with ace wrap. Pain managed with scheduled Toradol. CMS intact. Up SBA with walker. +BS. +gas. Voiding.     Pt discharged to home, ride given by Pt's wife. Discharge instructions reviewed with Pt, questions answered. Teach back completed. Sent all personal belongings and medications with Pt.

## 2021-07-16 NOTE — PROGRESS NOTES
Orthopedic Surgery  7/16/2021  POD 1    S: Patient voices no complaints today.     O: Blood pressure 106/57, pulse 90, temperature 98  F (36.7  C), temperature source Oral, resp. rate 16, height 1.829 m (6'), weight 105.3 kg (232 lb 1.6 oz), SpO2 93 %.  Lab Results   Component Value Date    HGB 14.1 07/15/2021    HGB 14.7 06/22/2021     Neurovascularly intact.  Calves are negative bilaterally, both soft and nontender.  The dressing is C/D/I.    A: Mr. Lan is doing well status post Procedure(s):  RIGHT TOTAL KNEE ARTHROPLASTY.    P: No dressing change, patient to remove outer dressing on POD3, leaving mesh adhesive in place.  Continue physical therapy. Discharge to home today.    Miguel Brice  [phone]

## 2021-07-16 NOTE — PLAN OF CARE
[id number]: A&Ox4. VSS on RA. Refused capno. LS clear. BS active. R knee incision CDI, CMS intact. Using scheduled toradol for pain control. Up SBA with walker, moving very well. Plan for home 7-16.

## 2021-07-20 ENCOUNTER — THERAPY VISIT (OUTPATIENT)
Dept: PHYSICAL THERAPY | Facility: CLINIC | Age: 71
End: 2021-07-20
Attending: INTERNAL MEDICINE
Payer: COMMERCIAL

## 2021-07-20 DIAGNOSIS — Z47.89 AFTERCARE FOLLOWING SURGERY OF THE MUSCULOSKELETAL SYSTEM: ICD-10-CM

## 2021-07-20 DIAGNOSIS — Z96.651 STATUS POST TOTAL RIGHT KNEE REPLACEMENT: Primary | ICD-10-CM

## 2021-07-20 PROCEDURE — 97110 THERAPEUTIC EXERCISES: CPT | Mod: GP | Performed by: PHYSICAL THERAPIST

## 2021-07-20 PROCEDURE — 97140 MANUAL THERAPY 1/> REGIONS: CPT | Mod: GP | Performed by: PHYSICAL THERAPIST

## 2021-07-20 PROCEDURE — 97161 PT EVAL LOW COMPLEX 20 MIN: CPT | Mod: GP | Performed by: PHYSICAL THERAPIST

## 2021-07-20 ASSESSMENT — ACTIVITIES OF DAILY LIVING (ADL)
STAND: ACTIVITY IS FAIRLY DIFFICULT
WEAKNESS: THE SYMPTOM AFFECTS MY ACTIVITY SEVERELY
KNEE_ACTIVITY_OF_DAILY_LIVING_SCORE: 31.43
STIFFNESS: THE SYMPTOM AFFECTS MY ACTIVITY MODERATELY
KNEE_ACTIVITY_OF_DAILY_LIVING_SUM: 22
SIT WITH YOUR KNEE BENT: ACTIVITY IS VERY DIFFICULT
RAW_SCORE: 22
AS_A_RESULT_OF_YOUR_KNEE_INJURY,_HOW_WOULD_YOU_RATE_YOUR_CURRENT_LEVEL_OF_DAILY_ACTIVITY?: SEVERELY ABNORMAL
LIMPING: THE SYMPTOM AFFECTS MY ACTIVITY MODERATELY
WALK: ACTIVITY IS FAIRLY DIFFICULT
PAIN: THE SYMPTOM AFFECTS MY ACTIVITY MODERATELY
RISE FROM A CHAIR: ACTIVITY IS SOMEWHAT DIFFICULT
SWELLING: THE SYMPTOM AFFECTS MY ACTIVITY SEVERELY
GO DOWN STAIRS: ACTIVITY IS FAIRLY DIFFICULT
HOW_WOULD_YOU_RATE_THE_OVERALL_FUNCTION_OF_YOUR_KNEE_DURING_YOUR_USUAL_DAILY_ACTIVITIES?: SEVERELY ABNORMAL
GIVING WAY, BUCKLING OR SHIFTING OF KNEE: THE SYMPTOM AFFECTS MY ACTIVITY MODERATELY
SQUAT: I AM UNABLE TO DO THE ACTIVITY
GO UP STAIRS: ACTIVITY IS FAIRLY DIFFICULT
HOW_WOULD_YOU_RATE_THE_CURRENT_FUNCTION_OF_YOUR_KNEE_DURING_YOUR_USUAL_DAILY_ACTIVITIES_ON_A_SCALE_FROM_0_TO_100_WITH_100_BEING_YOUR_LEVEL_OF_KNEE_FUNCTION_PRIOR_TO_YOUR_INJURY_AND_0_BEING_THE_INABILITY_TO_PERFORM_ANY_OF_YOUR_USUAL_DAILY_ACTIVITIES?: 10
KNEEL ON THE FRONT OF YOUR KNEE: I AM UNABLE TO DO THE ACTIVITY

## 2021-07-20 NOTE — PROGRESS NOTES
Physical Therapy Initial Evaluation  Subjective:  The history is provided by the patient. No  was used.   Patient Health History  Dustin Lan being seen for Right knee replacement.       Problem occurred: Pt has been managing well, minimal use of pain meds, struggles with some swelling.  Pt has been working hard to get his exercises going.   Pain is reported as 2/10 on pain scale.  General health as reported by patient is good.       Medical allergies: none.   Surgeries include:  Orthopedic surgery.    Current medications:  Anti-inflammatory, high blood pressure medication and pain medication.    Current occupation is None.   Primary job tasks include:  Driving and repetitive tasks.                  Therapist Generated HPI Evaluation         Type of problem:  Right knee.          Patient reports pain:  In the joint.  Pain is described as aching and is constant.  Pain is the same all the time.  Since onset symptoms are gradually improving.  Associated symptoms:  Loss of motion/stiffness and loss of strength. Symptoms are exacerbated by descending stairs, ascending stairs, activity, walking and weight bearing  and relieved by rest.    Previous treatment includes surgery.   Restrictions due to condition include:  Working in normal job without restrictions.  Barriers include:  None as reported by patient.                        Objective:    Gait:    Assistive Devices:  Walker                                                        Knee Evaluation:  ROM:    AROM      Extension: Left:    Right:  -3  Flexion: Left:   Right: 86        Strength:         Quad Set Left: Good    Pain:   Quad Set Right: Poor    Pain:                  General     ROS    Assessment/Plan:    Patient is a 71 year old male with right side knee complaints.    Patient has the following significant findings with corresponding treatment plan.                Diagnosis 1:  S/p R TKA      Pain -  hot/cold therapy, manual therapy,  self management, education and home program  Decreased ROM/flexibility - manual therapy and therapeutic exercise  Decreased strength - therapeutic exercise and therapeutic activities  Impaired gait - gait training  Impaired muscle performance - neuro re-education  Decreased function - therapeutic activities    Therapy Evaluation Codes:   1) History comprised of:   Personal factors that impact the plan of care:      Age and Time since onset of symptoms.    Comorbidity factors that impact the plan of care are:      None.     Medications impacting care: None.  2) Examination of Body Systems comprised of:   Body structures and functions that impact the plan of care:      Knee.   Activity limitations that impact the plan of care are:      Driving, Lifting, Squatting/kneeling, Stairs, Standing and Walking.  3) Clinical presentation characteristics are:   Stable/Uncomplicated.  4) Decision-Making    Low complexity using standardized patient assessment instrument and/or measureable assessment of functional outcome.  Cumulative Therapy Evaluation is: Low complexity.    Previous and current functional limitations:  (See Goal Flow Sheet for this information)    Short term and Long term goals: (See Goal Flow Sheet for this information)     Communication ability:  Patient appears to be able to clearly communicate and understand verbal and written communication and follow directions correctly.  Treatment Explanation - The following has been discussed with the patient:   RX ordered/plan of care  Anticipated outcomes  Possible risks and side effects  This patient would benefit from PT intervention to resume normal activities.   Rehab potential is good.    Frequency:  2 X week, once daily  Duration:  for 8 weeks  Discharge Plan:  Achieve all LTG.  Independent in home treatment program.  Reach maximal therapeutic benefit.    Please refer to the daily flowsheet for treatment today, total treatment time and time spent performing 1:1  timed codes.

## 2021-07-23 ENCOUNTER — THERAPY VISIT (OUTPATIENT)
Dept: PHYSICAL THERAPY | Facility: CLINIC | Age: 71
End: 2021-07-23
Attending: INTERNAL MEDICINE
Payer: COMMERCIAL

## 2021-07-23 DIAGNOSIS — Z96.651 STATUS POST TOTAL RIGHT KNEE REPLACEMENT: ICD-10-CM

## 2021-07-23 DIAGNOSIS — Z47.89 AFTERCARE FOLLOWING SURGERY OF THE MUSCULOSKELETAL SYSTEM: ICD-10-CM

## 2021-07-23 PROCEDURE — 97110 THERAPEUTIC EXERCISES: CPT | Mod: GP | Performed by: PHYSICAL THERAPIST

## 2021-07-23 PROCEDURE — 97140 MANUAL THERAPY 1/> REGIONS: CPT | Mod: GP | Performed by: PHYSICAL THERAPIST

## 2021-07-23 PROCEDURE — 97112 NEUROMUSCULAR REEDUCATION: CPT | Mod: GP | Performed by: PHYSICAL THERAPIST

## 2021-07-26 ENCOUNTER — THERAPY VISIT (OUTPATIENT)
Dept: PHYSICAL THERAPY | Facility: CLINIC | Age: 71
End: 2021-07-26
Payer: COMMERCIAL

## 2021-07-26 DIAGNOSIS — Z47.89 AFTERCARE FOLLOWING SURGERY OF THE MUSCULOSKELETAL SYSTEM: ICD-10-CM

## 2021-07-26 DIAGNOSIS — Z96.651 STATUS POST TOTAL RIGHT KNEE REPLACEMENT: ICD-10-CM

## 2021-07-26 PROCEDURE — 97010 HOT OR COLD PACKS THERAPY: CPT | Mod: GP | Performed by: PHYSICAL THERAPIST

## 2021-07-26 PROCEDURE — 97112 NEUROMUSCULAR REEDUCATION: CPT | Mod: GP | Performed by: PHYSICAL THERAPIST

## 2021-07-26 PROCEDURE — 97110 THERAPEUTIC EXERCISES: CPT | Mod: GP | Performed by: PHYSICAL THERAPIST

## 2021-07-29 ENCOUNTER — THERAPY VISIT (OUTPATIENT)
Dept: PHYSICAL THERAPY | Facility: CLINIC | Age: 71
End: 2021-07-29
Payer: COMMERCIAL

## 2021-07-29 DIAGNOSIS — Z47.89 AFTERCARE FOLLOWING SURGERY OF THE MUSCULOSKELETAL SYSTEM: ICD-10-CM

## 2021-07-29 DIAGNOSIS — Z96.651 STATUS POST TOTAL RIGHT KNEE REPLACEMENT: ICD-10-CM

## 2021-07-29 PROCEDURE — 97140 MANUAL THERAPY 1/> REGIONS: CPT | Mod: GP | Performed by: PHYSICAL THERAPIST

## 2021-07-29 PROCEDURE — 97110 THERAPEUTIC EXERCISES: CPT | Mod: GP | Performed by: PHYSICAL THERAPIST

## 2021-08-02 ENCOUNTER — TRANSFERRED RECORDS (OUTPATIENT)
Dept: HEALTH INFORMATION MANAGEMENT | Facility: CLINIC | Age: 71
End: 2021-08-02

## 2021-08-03 ENCOUNTER — THERAPY VISIT (OUTPATIENT)
Dept: PHYSICAL THERAPY | Facility: CLINIC | Age: 71
End: 2021-08-03
Payer: COMMERCIAL

## 2021-08-03 DIAGNOSIS — Z47.89 AFTERCARE FOLLOWING SURGERY OF THE MUSCULOSKELETAL SYSTEM: ICD-10-CM

## 2021-08-03 DIAGNOSIS — Z96.651 STATUS POST TOTAL RIGHT KNEE REPLACEMENT: ICD-10-CM

## 2021-08-03 PROCEDURE — 97110 THERAPEUTIC EXERCISES: CPT | Mod: GP | Performed by: PHYSICAL THERAPIST

## 2021-08-03 PROCEDURE — 97112 NEUROMUSCULAR REEDUCATION: CPT | Mod: GP | Performed by: PHYSICAL THERAPIST

## 2021-08-06 ENCOUNTER — THERAPY VISIT (OUTPATIENT)
Dept: PHYSICAL THERAPY | Facility: CLINIC | Age: 71
End: 2021-08-06
Payer: COMMERCIAL

## 2021-08-06 DIAGNOSIS — Z96.651 STATUS POST TOTAL RIGHT KNEE REPLACEMENT: ICD-10-CM

## 2021-08-06 DIAGNOSIS — Z47.89 AFTERCARE FOLLOWING SURGERY OF THE MUSCULOSKELETAL SYSTEM: ICD-10-CM

## 2021-08-06 PROCEDURE — 97110 THERAPEUTIC EXERCISES: CPT | Mod: GP | Performed by: PHYSICAL THERAPIST

## 2021-08-10 ENCOUNTER — THERAPY VISIT (OUTPATIENT)
Dept: PHYSICAL THERAPY | Facility: CLINIC | Age: 71
End: 2021-08-10
Payer: COMMERCIAL

## 2021-08-10 DIAGNOSIS — Z47.89 AFTERCARE FOLLOWING SURGERY OF THE MUSCULOSKELETAL SYSTEM: ICD-10-CM

## 2021-08-10 DIAGNOSIS — Z96.651 STATUS POST TOTAL RIGHT KNEE REPLACEMENT: ICD-10-CM

## 2021-08-10 PROCEDURE — 97110 THERAPEUTIC EXERCISES: CPT | Mod: GP | Performed by: PHYSICAL THERAPIST

## 2021-08-13 ENCOUNTER — THERAPY VISIT (OUTPATIENT)
Dept: PHYSICAL THERAPY | Facility: CLINIC | Age: 71
End: 2021-08-13
Payer: COMMERCIAL

## 2021-08-13 DIAGNOSIS — Z96.651 STATUS POST TOTAL RIGHT KNEE REPLACEMENT: ICD-10-CM

## 2021-08-13 DIAGNOSIS — Z47.89 AFTERCARE FOLLOWING SURGERY OF THE MUSCULOSKELETAL SYSTEM: ICD-10-CM

## 2021-08-13 PROCEDURE — 97140 MANUAL THERAPY 1/> REGIONS: CPT | Mod: GP | Performed by: PHYSICAL THERAPIST

## 2021-08-13 PROCEDURE — 97110 THERAPEUTIC EXERCISES: CPT | Mod: GP | Performed by: PHYSICAL THERAPIST

## 2021-08-16 ENCOUNTER — THERAPY VISIT (OUTPATIENT)
Dept: PHYSICAL THERAPY | Facility: CLINIC | Age: 71
End: 2021-08-16
Payer: COMMERCIAL

## 2021-08-16 DIAGNOSIS — Z47.89 AFTERCARE FOLLOWING SURGERY OF THE MUSCULOSKELETAL SYSTEM: ICD-10-CM

## 2021-08-16 DIAGNOSIS — Z96.651 STATUS POST TOTAL RIGHT KNEE REPLACEMENT: ICD-10-CM

## 2021-08-16 PROCEDURE — 97110 THERAPEUTIC EXERCISES: CPT | Mod: GP | Performed by: PHYSICAL THERAPIST

## 2021-08-16 PROCEDURE — 97530 THERAPEUTIC ACTIVITIES: CPT | Mod: GP | Performed by: PHYSICAL THERAPIST

## 2021-08-23 ENCOUNTER — THERAPY VISIT (OUTPATIENT)
Dept: PHYSICAL THERAPY | Facility: CLINIC | Age: 71
End: 2021-08-23
Payer: COMMERCIAL

## 2021-08-23 DIAGNOSIS — Z96.651 STATUS POST TOTAL RIGHT KNEE REPLACEMENT: ICD-10-CM

## 2021-08-23 DIAGNOSIS — Z47.89 AFTERCARE FOLLOWING SURGERY OF THE MUSCULOSKELETAL SYSTEM: ICD-10-CM

## 2021-08-23 PROCEDURE — 97530 THERAPEUTIC ACTIVITIES: CPT | Mod: GP | Performed by: PHYSICAL THERAPIST

## 2021-08-23 PROCEDURE — 97110 THERAPEUTIC EXERCISES: CPT | Mod: GP | Performed by: PHYSICAL THERAPIST

## 2021-08-27 ENCOUNTER — THERAPY VISIT (OUTPATIENT)
Dept: PHYSICAL THERAPY | Facility: CLINIC | Age: 71
End: 2021-08-27
Payer: COMMERCIAL

## 2021-08-27 DIAGNOSIS — Z47.89 AFTERCARE FOLLOWING SURGERY OF THE MUSCULOSKELETAL SYSTEM: ICD-10-CM

## 2021-08-27 DIAGNOSIS — Z96.651 STATUS POST TOTAL RIGHT KNEE REPLACEMENT: ICD-10-CM

## 2021-08-27 PROCEDURE — 97110 THERAPEUTIC EXERCISES: CPT | Mod: GP | Performed by: PHYSICAL THERAPIST

## 2021-08-27 PROCEDURE — 97140 MANUAL THERAPY 1/> REGIONS: CPT | Mod: GP | Performed by: PHYSICAL THERAPIST

## 2021-08-27 NOTE — PROGRESS NOTES
Subjective:  HPI  Physical Exam                    Objective:  System    Physical Exam    General     ROS    Assessment/Plan:    PROGRESS  REPORT    SUBJECTIVE  Overall pt is doing well, no problems and no concerns.     Current pain level is 2/10  .     Changes in function:  Yes (See Goal flowsheet attached for changes in current functional level)  Adverse reaction to treatment or activity: None    OBJECTIVE  AROM R knee flex 115, ext 0.  Mild incision tightness.     ASSESSMENT/PLAN  Updated problem list and treatment plan: Diagnosis 1:  S/p R TKA    Pain -  hot/cold therapy, self management, education and home program  Decreased strength - therapeutic exercise and therapeutic activities  STG/LTGs have been met or progress has been made towards goals:  Yes (See Goal flow sheet completed today.)  Assessment of Progress: The patient's condition is improving.  Self Management Plans:  Patient has been instructed in a home treatment program.  Patient  has been instructed in self management of symptoms.  I have re-evaluated this patient and find that the nature, scope, duration and intensity of the therapy is appropriate for the medical condition of the patient.  Dustin continues to require the following intervention to meet STG and LTG's:  PT    Recommendations:  This patient would benefit from continued therapy.     Frequency:  1 X week, once daily  Duration:  for 4 weeks      Please refer to the daily flowsheet for treatment today, total treatment time and time spent performing 1:1 timed codes.

## 2021-08-30 ENCOUNTER — THERAPY VISIT (OUTPATIENT)
Dept: PHYSICAL THERAPY | Facility: CLINIC | Age: 71
End: 2021-08-30
Payer: COMMERCIAL

## 2021-08-30 DIAGNOSIS — Z96.651 STATUS POST TOTAL RIGHT KNEE REPLACEMENT: ICD-10-CM

## 2021-08-30 DIAGNOSIS — Z47.89 AFTERCARE FOLLOWING SURGERY OF THE MUSCULOSKELETAL SYSTEM: ICD-10-CM

## 2021-08-30 PROCEDURE — 97110 THERAPEUTIC EXERCISES: CPT | Mod: GP | Performed by: PHYSICAL THERAPIST

## 2021-08-30 PROCEDURE — 97112 NEUROMUSCULAR REEDUCATION: CPT | Mod: GP | Performed by: PHYSICAL THERAPIST

## 2021-09-01 ENCOUNTER — TRANSFERRED RECORDS (OUTPATIENT)
Dept: HEALTH INFORMATION MANAGEMENT | Facility: CLINIC | Age: 71
End: 2021-09-01

## 2021-09-10 ENCOUNTER — THERAPY VISIT (OUTPATIENT)
Dept: PHYSICAL THERAPY | Facility: CLINIC | Age: 71
End: 2021-09-10
Payer: COMMERCIAL

## 2021-09-10 DIAGNOSIS — Z96.651 STATUS POST TOTAL RIGHT KNEE REPLACEMENT: ICD-10-CM

## 2021-09-10 DIAGNOSIS — Z47.89 AFTERCARE FOLLOWING SURGERY OF THE MUSCULOSKELETAL SYSTEM: ICD-10-CM

## 2021-09-10 PROCEDURE — 97110 THERAPEUTIC EXERCISES: CPT | Mod: GP | Performed by: PHYSICAL THERAPIST

## 2021-09-10 ASSESSMENT — ACTIVITIES OF DAILY LIVING (ADL)
KNEE_ACTIVITY_OF_DAILY_LIVING_SCORE: 91.43
GIVING WAY, BUCKLING OR SHIFTING OF KNEE: I DO NOT HAVE THE SYMPTOM
WEAKNESS: I HAVE THE SYMPTOM BUT IT DOES NOT AFFECT MY ACTIVITY
SIT WITH YOUR KNEE BENT: ACTIVITY IS NOT DIFFICULT
SQUAT: ACTIVITY IS MINIMALLY DIFFICULT
PAIN: I DO NOT HAVE THE SYMPTOM
AS_A_RESULT_OF_YOUR_KNEE_INJURY,_HOW_WOULD_YOU_RATE_YOUR_CURRENT_LEVEL_OF_DAILY_ACTIVITY?: NEARLY NORMAL
KNEE_ACTIVITY_OF_DAILY_LIVING_SUM: 64
STIFFNESS: I HAVE THE SYMPTOM BUT IT DOES NOT AFFECT MY ACTIVITY
STAND: ACTIVITY IS NOT DIFFICULT
WALK: ACTIVITY IS NOT DIFFICULT
KNEEL ON THE FRONT OF YOUR KNEE: ACTIVITY IS SOMEWHAT DIFFICULT
GO UP STAIRS: ACTIVITY IS NOT DIFFICULT
RISE FROM A CHAIR: ACTIVITY IS NOT DIFFICULT
LIMPING: I DO NOT HAVE THE SYMPTOM
HOW_WOULD_YOU_RATE_THE_CURRENT_FUNCTION_OF_YOUR_KNEE_DURING_YOUR_USUAL_DAILY_ACTIVITIES_ON_A_SCALE_FROM_0_TO_100_WITH_100_BEING_YOUR_LEVEL_OF_KNEE_FUNCTION_PRIOR_TO_YOUR_INJURY_AND_0_BEING_THE_INABILITY_TO_PERFORM_ANY_OF_YOUR_USUAL_DAILY_ACTIVITIES?: 85
RAW_SCORE: 64
GO DOWN STAIRS: ACTIVITY IS NOT DIFFICULT
SWELLING: I HAVE THE SYMPTOM BUT IT DOES NOT AFFECT MY ACTIVITY
HOW_WOULD_YOU_RATE_THE_OVERALL_FUNCTION_OF_YOUR_KNEE_DURING_YOUR_USUAL_DAILY_ACTIVITIES?: NEARLY NORMAL

## 2021-10-11 ENCOUNTER — LAB REQUISITION (OUTPATIENT)
Dept: LAB | Age: 71
End: 2021-10-11

## 2021-10-11 DIAGNOSIS — R73.01 IMPAIRED FASTING GLUCOSE: ICD-10-CM

## 2021-10-11 LAB — HBA1C MFR BLD: 5.8 % (ref 4.5–5.6)

## 2021-10-11 PROCEDURE — 83036 HEMOGLOBIN GLYCOSYLATED A1C: CPT | Performed by: CLINICAL MEDICAL LABORATORY

## 2021-10-11 PROCEDURE — PSEU8266 GLYCOHEMOGLOBIN: Performed by: CLINICAL MEDICAL LABORATORY

## 2021-11-29 ENCOUNTER — LAB REQUISITION (OUTPATIENT)
Dept: LAB | Age: 71
End: 2021-11-29

## 2021-11-29 DIAGNOSIS — R05.9 COUGH, UNSPECIFIED: ICD-10-CM

## 2021-11-29 PROCEDURE — U0005 INFEC AGEN DETEC AMPLI PROBE: HCPCS | Performed by: CLINICAL MEDICAL LABORATORY

## 2021-11-29 PROCEDURE — PSEU10635 2019 NOVEL CORONAVIRUS (SARS-COV-2): Performed by: CLINICAL MEDICAL LABORATORY

## 2021-11-29 PROCEDURE — U0003 INFECTIOUS AGENT DETECTION BY NUCLEIC ACID (DNA OR RNA); SEVERE ACUTE RESPIRATORY SYNDROME CORONAVIRUS 2 (SARS-COV-2) (CORONAVIRUS DISEASE [COVID-19]), AMPLIFIED PROBE TECHNIQUE, MAKING USE OF HIGH THROUGHPUT TECHNOLOGIES AS DESCRIBED BY CMS-2020-01-R: HCPCS | Performed by: CLINICAL MEDICAL LABORATORY

## 2021-11-30 LAB
SARS-COV-2 RNA RESP QL NAA+PROBE: NOT DETECTED
SERVICE CMNT-IMP: NORMAL
SERVICE CMNT-IMP: NORMAL

## 2022-02-13 ENCOUNTER — HEALTH MAINTENANCE LETTER (OUTPATIENT)
Age: 72
End: 2022-02-13

## 2022-04-27 ENCOUNTER — OFFICE VISIT (OUTPATIENT)
Dept: PEDIATRICS | Facility: CLINIC | Age: 72
End: 2022-04-27
Payer: COMMERCIAL

## 2022-04-27 ENCOUNTER — ALLIED HEALTH/NURSE VISIT (OUTPATIENT)
Dept: PEDIATRICS | Facility: CLINIC | Age: 72
End: 2022-04-27
Payer: COMMERCIAL

## 2022-04-27 VITALS
WEIGHT: 237 LBS | SYSTOLIC BLOOD PRESSURE: 120 MMHG | OXYGEN SATURATION: 97 % | RESPIRATION RATE: 14 BRPM | DIASTOLIC BLOOD PRESSURE: 100 MMHG | HEART RATE: 92 BPM | TEMPERATURE: 97.8 F | BODY MASS INDEX: 32.14 KG/M2

## 2022-04-27 VITALS
OXYGEN SATURATION: 93 % | DIASTOLIC BLOOD PRESSURE: 80 MMHG | TEMPERATURE: 98 F | SYSTOLIC BLOOD PRESSURE: 129 MMHG | HEART RATE: 96 BPM

## 2022-04-27 DIAGNOSIS — E78.5 HYPERLIPIDEMIA LDL GOAL <130: ICD-10-CM

## 2022-04-27 DIAGNOSIS — I10 ESSENTIAL HYPERTENSION: Primary | ICD-10-CM

## 2022-04-27 DIAGNOSIS — Z96.651 AFTERCARE FOLLOWING RIGHT KNEE JOINT REPLACEMENT SURGERY: ICD-10-CM

## 2022-04-27 DIAGNOSIS — Z47.1 AFTERCARE FOLLOWING RIGHT KNEE JOINT REPLACEMENT SURGERY: ICD-10-CM

## 2022-04-27 PROCEDURE — 99207 PR NO CHARGE NURSE ONLY: CPT

## 2022-04-27 PROCEDURE — 99213 OFFICE O/P EST LOW 20 MIN: CPT | Mod: GE | Performed by: STUDENT IN AN ORGANIZED HEALTH CARE EDUCATION/TRAINING PROGRAM

## 2022-04-27 PROCEDURE — 36415 COLL VENOUS BLD VENIPUNCTURE: CPT | Performed by: STUDENT IN AN ORGANIZED HEALTH CARE EDUCATION/TRAINING PROGRAM

## 2022-04-27 PROCEDURE — 80053 COMPREHEN METABOLIC PANEL: CPT | Performed by: STUDENT IN AN ORGANIZED HEALTH CARE EDUCATION/TRAINING PROGRAM

## 2022-04-27 PROCEDURE — 80061 LIPID PANEL: CPT | Performed by: STUDENT IN AN ORGANIZED HEALTH CARE EDUCATION/TRAINING PROGRAM

## 2022-04-27 ASSESSMENT — ASTHMA QUESTIONNAIRES: ACT_TOTALSCORE: 25

## 2022-04-27 ASSESSMENT — PAIN SCALES - GENERAL: PAINLEVEL: NO PAIN (0)

## 2022-04-27 NOTE — PATIENT INSTRUCTIONS
Your blood pressure seems to be a little variable.  I will send you home with a monitor.  Please check your blood pressure daily- every other day and make a note.  We can follow the trend to see if you need more to add a medication for your blood pressure medication.  We will plan to check some labs today.

## 2022-04-27 NOTE — PROGRESS NOTES
Dustin Lan is a 71 year old patient who comes in today for a Blood Pressure check.  Initial BP:  BP (!) 157/93   Pulse 90      90  Disposition: results routed to provider   will be checking in 10 mins, January Nelson CMA on 4/27/2022 at 1:44 PM     Patient will be seen with Dr. Arthur today 04/27/2022. Patient reports dizziness, tingling in lips and headache last 2 days. January Nelson CMA on 4/27/2022 at 2:07 PM

## 2022-04-27 NOTE — PROGRESS NOTES
Assessment & Plan       Essential hypertension  Noted to have mild hypertension and started on diltiazem given concomitant hypertension and PSVT in 2018.  He notes that overall he tolerates this medication well and takes it consistently.  He does not measure his blood pressure at home.  He notes that he used to have a blood pressure cuff, however it doesn't work very well so he stopped using it.  His BP is somewhat variable today, he has had it measured 3 times 2 were somewhat elevated and 1 was in the normal range.  He was initially concerned due to mouth tingling and some poor sleep, however this has since resolved. He has no chest pain or shortness of breath.  At this time we will send home with a blood pressure monitor with plans to check blood pressure at home.  Discussed warning signs that would require more urgent evaluation.  He has a scheduled appointment with Dr. Blanco, his primary care doctor in 1 month and can further discuss his blood pressures at that time.  - Comprehensive metabolic panel (BMP + Alb, Alk Phos, ALT, AST, Total. Bili, TP); Future  - Home Blood Pressure Monitor Order for DME - ONLY FOR DME  - Comprehensive metabolic panel (BMP + Alb, Alk Phos, ALT, AST, Total. Bili, TP)    Hyperlipidemia LDL goal <130  On statin  - Lipid Profile (Chol, Trig, HDL, LDL calc); Future  - Lipid Profile (Chol, Trig, HDL, LDL calc)    BMI:   Estimated body mass index is 32.14 kg/m  as calculated from the following:    Height as of 7/15/21: 1.829 m (6').    Weight as of this encounter: 107.5 kg (237 lb).   Weight management plan: quite active, likely some elevation reflective of muscle mass     See Patient Instructions    Return in about 4 weeks (around 5/25/2022) for As scheduled .    Nella Arthur MD  [facility]    STAFF NOTE:  Patient discussed with resident physician today.  We discussed beckham portions of the visit and I participated in the evaluation and management of the  patient today.     Lavonne Alarcon MD  Internal Medicine-Pediatrics        Marina Chan is a 71 year old who presents for the following health issues     History of Present Illness       Hypertension: He presents for follow up of hypertension.  He does not check blood pressure  regularly outside of the clinic. Outside blood pressures have been over 140/90. He does not follow a low salt diet.     He eats 2-3 servings of fruits and vegetables daily.He consumes 1 sweetened beverage(s) daily.He exercises with enough effort to increase his heart rate 30 to 60 minutes per day.  He exercises with enough effort to increase his heart rate 5 days per week.   He is taking medications regularly.     Notes some recent increased anxiety while he was getting to [place] last month.  Was worried that this was related to elevated blood pressure, and took 1 extra dose of his diltiazem to see if this would help.  He did not measure his blood pressure at this time.  He does not notice significant history of anxiety, however states that his dad  nightly after his 71th birthday, and this is something he has been thinking about quite a bit now that he is 71.    He has been feeling somewhat off for the past couple days with a mild headache and today felt that his lips were flushed and tingly and he was unable to sleep well.  He did not measure his blood pressure during this time.  He was worried that the symptoms were related to his blood pressure and therefore came in for evaluation.  His blood pressure was noted to be very mildly elevated.  He felt very relieved to note that his blood pressure was not significantly elevated.  The symptoms have since resolved.    He did get new glasses a couple weeks ago    He has no associated chest pain no shortness of breath.  He does note that he was recently been using a protein powder that he found to have a high sodium content and he was switched.  He will very much intermittently feel  lightheaded, however this is infrequent.  He has had no associated falls.  He has no palpitations.    He notes that his Fitbit will sometimes tell him his oxygen variability at nighttime is increased.  He denies snoring.  He has never been tested for sleep apnea.              Review of Systems   Constitutional, HEENT, cardiovascular, pulmonary, gi and gu systems are negative, except as otherwise noted.      Objective    BP (!) 120/100 (BP Location: Right arm, Patient Position: Sitting, Cuff Size: Adult Large)   Pulse 92   Temp 97.8  F (36.6  C) (Temporal)   Resp 14   Wt 107.5 kg (237 lb)   SpO2 93%   BMI 32.14 kg/m    Body mass index is 32.14 kg/m .  Physical Exam   GENERAL: healthy, alert and no distress  EYES: Eyes grossly normal to inspection, PERRL and conjunctivae and sclerae normal  HENT: ear canals and TM's normal, nose and mouth without ulcers or lesions  NECK: no adenopathy, no asymmetry, masses, or scars and thyroid normal to palpation  RESP: lungs clear to auscultation - no rales, rhonchi or wheezes  CV: regular rate and rhythm, normal S1 S2, no S3 or S4, no murmur, click or rub, no peripheral edema and peripheral pulses strong  ABDOMEN: soft, nontender, no hepatosplenomegaly, no masses and bowel sounds normal  MS: no gross musculoskeletal defects noted, no edema  SKIN: no suspicious lesions or rashes  NEURO: Normal strength and tone, mentation intact and speech normal  PSYCH: mentation appears normal, affect normal/bright    Results for orders placed or performed in visit on 04/27/22   Lipid Profile (Chol, Trig, HDL, LDL calc)     Status: Abnormal   Result Value Ref Range    Cholesterol 193 <200 mg/dL    Triglycerides 142 <150 mg/dL    Direct Measure HDL 62 >=40 mg/dL    LDL Cholesterol Calculated 103 (H) <=100 mg/dL    Non HDL Cholesterol 131 (H) <130 mg/dL    Patient Fasting > 8hrs? Unknown     Narrative    Cholesterol  Desirable:  <200 mg/dL    Triglycerides  Normal:  Less than 150  mg/dL  Borderline High:  150-199 mg/dL  High:  200-499 mg/dL  Very High:  Greater than or equal to 500 mg/dL    Direct Measure HDL  Female:  Greater than or equal to 50 mg/dL   Male:  Greater than or equal to 40 mg/dL    LDL Cholesterol  Desirable:  <100mg/dL  Above Desirable:  100-129 mg/dL   Borderline High:  130-159 mg/dL   High:  160-189 mg/dL   Very High:  >= 190 mg/dL    Non HDL Cholesterol  Desirable:  130 mg/dL  Above Desirable:  130-159 mg/dL  Borderline High:  160-189 mg/dL  High:  190-219 mg/dL  Very High:  Greater than or equal to 220 mg/dL   Comprehensive metabolic panel (BMP + Alb, Alk Phos, ALT, AST, Total. Bili, TP)     Status: Normal   Result Value Ref Range    Sodium 139 133 - 144 mmol/L    Potassium 3.8 3.4 - 5.3 mmol/L    Chloride 105 94 - 109 mmol/L    Carbon Dioxide (CO2) 23 20 - 32 mmol/L    Anion Gap 11 3 - 14 mmol/L    Urea Nitrogen 11 7 - 30 mg/dL    Creatinine 0.81 0.66 - 1.25 mg/dL    Calcium 9.4 8.5 - 10.1 mg/dL    Glucose 91 70 - 99 mg/dL    Alkaline Phosphatase 94 40 - 150 U/L    AST 28 0 - 45 U/L    ALT 29 0 - 70 U/L    Protein Total 7.2 6.8 - 8.8 g/dL    Albumin 4.3 3.4 - 5.0 g/dL    Bilirubin Total 0.6 0.2 - 1.3 mg/dL    GFR Estimate >90 >60 mL/min/1.73m2

## 2022-04-28 LAB
ALBUMIN SERPL-MCNC: 4.3 G/DL (ref 3.4–5)
ALP SERPL-CCNC: 94 U/L (ref 40–150)
ALT SERPL W P-5'-P-CCNC: 29 U/L (ref 0–70)
ANION GAP SERPL CALCULATED.3IONS-SCNC: 11 MMOL/L (ref 3–14)
AST SERPL W P-5'-P-CCNC: 28 U/L (ref 0–45)
BILIRUB SERPL-MCNC: 0.6 MG/DL (ref 0.2–1.3)
BUN SERPL-MCNC: 11 MG/DL (ref 7–30)
CALCIUM SERPL-MCNC: 9.4 MG/DL (ref 8.5–10.1)
CHLORIDE BLD-SCNC: 105 MMOL/L (ref 94–109)
CHOLEST SERPL-MCNC: 193 MG/DL
CO2 SERPL-SCNC: 23 MMOL/L (ref 20–32)
CREAT SERPL-MCNC: 0.81 MG/DL (ref 0.66–1.25)
FASTING STATUS PATIENT QL REPORTED: ABNORMAL
GFR SERPL CREATININE-BSD FRML MDRD: >90 ML/MIN/1.73M2
GLUCOSE BLD-MCNC: 91 MG/DL (ref 70–99)
HDLC SERPL-MCNC: 62 MG/DL
LDLC SERPL CALC-MCNC: 103 MG/DL
NONHDLC SERPL-MCNC: 131 MG/DL
POTASSIUM BLD-SCNC: 3.8 MMOL/L (ref 3.4–5.3)
PROT SERPL-MCNC: 7.2 G/DL (ref 6.8–8.8)
SODIUM SERPL-SCNC: 139 MMOL/L (ref 133–144)
TRIGL SERPL-MCNC: 142 MG/DL

## 2022-05-23 SDOH — ECONOMIC STABILITY: FOOD INSECURITY: WITHIN THE PAST 12 MONTHS, YOU WORRIED THAT YOUR FOOD WOULD RUN OUT BEFORE YOU GOT MONEY TO BUY MORE.: NEVER TRUE

## 2022-05-23 SDOH — ECONOMIC STABILITY: FOOD INSECURITY: WITHIN THE PAST 12 MONTHS, THE FOOD YOU BOUGHT JUST DIDN'T LAST AND YOU DIDN'T HAVE MONEY TO GET MORE.: NEVER TRUE

## 2022-05-23 SDOH — ECONOMIC STABILITY: INCOME INSECURITY: IN THE LAST 12 MONTHS, WAS THERE A TIME WHEN YOU WERE NOT ABLE TO PAY THE MORTGAGE OR RENT ON TIME?: NO

## 2022-05-23 SDOH — ECONOMIC STABILITY: INCOME INSECURITY: HOW HARD IS IT FOR YOU TO PAY FOR THE VERY BASICS LIKE FOOD, HOUSING, MEDICAL CARE, AND HEATING?: NOT HARD AT ALL

## 2022-05-23 SDOH — HEALTH STABILITY: PHYSICAL HEALTH: ON AVERAGE, HOW MANY DAYS PER WEEK DO YOU ENGAGE IN MODERATE TO STRENUOUS EXERCISE (LIKE A BRISK WALK)?: 5 DAYS

## 2022-05-23 SDOH — HEALTH STABILITY: PHYSICAL HEALTH: ON AVERAGE, HOW MANY MINUTES DO YOU ENGAGE IN EXERCISE AT THIS LEVEL?: 30 MIN

## 2022-05-23 SDOH — ECONOMIC STABILITY: TRANSPORTATION INSECURITY
IN THE PAST 12 MONTHS, HAS LACK OF TRANSPORTATION KEPT YOU FROM MEETINGS, WORK, OR FROM GETTING THINGS NEEDED FOR DAILY LIVING?: NO

## 2022-05-23 SDOH — ECONOMIC STABILITY: TRANSPORTATION INSECURITY
IN THE PAST 12 MONTHS, HAS THE LACK OF TRANSPORTATION KEPT YOU FROM MEDICAL APPOINTMENTS OR FROM GETTING MEDICATIONS?: NO

## 2022-05-23 ASSESSMENT — ENCOUNTER SYMPTOMS
DIZZINESS: 0
DIARRHEA: 0
FREQUENCY: 0
FEVER: 0
MYALGIAS: 0
ABDOMINAL PAIN: 0
HEMATOCHEZIA: 0
CHILLS: 0
SHORTNESS OF BREATH: 0
NAUSEA: 0
JOINT SWELLING: 0
EYE PAIN: 0
HEADACHES: 0
WEAKNESS: 0
ARTHRALGIAS: 0
DYSURIA: 0
NERVOUS/ANXIOUS: 1
PALPITATIONS: 0
HEARTBURN: 0
CONSTIPATION: 0
SORE THROAT: 0
COUGH: 0
PARESTHESIAS: 1
HEMATURIA: 0

## 2022-05-23 ASSESSMENT — SOCIAL DETERMINANTS OF HEALTH (SDOH)
DO YOU BELONG TO ANY CLUBS OR ORGANIZATIONS SUCH AS CHURCH GROUPS UNIONS, FRATERNAL OR ATHLETIC GROUPS, OR SCHOOL GROUPS?: YES
HOW OFTEN DO YOU ATTEND CHURCH OR RELIGIOUS SERVICES?: MORE THAN 4 TIMES PER YEAR
HOW OFTEN DO YOU GET TOGETHER WITH FRIENDS OR RELATIVES?: ONCE A WEEK
IN A TYPICAL WEEK, HOW MANY TIMES DO YOU TALK ON THE PHONE WITH FAMILY, FRIENDS, OR NEIGHBORS?: ONCE A WEEK

## 2022-05-23 ASSESSMENT — LIFESTYLE VARIABLES
HOW MANY STANDARD DRINKS CONTAINING ALCOHOL DO YOU HAVE ON A TYPICAL DAY: 1 OR 2
HOW OFTEN DO YOU HAVE SIX OR MORE DRINKS ON ONE OCCASION: NEVER
AUDIT-C TOTAL SCORE: 3
HOW OFTEN DO YOU HAVE A DRINK CONTAINING ALCOHOL: 2-3 TIMES A WEEK
SKIP TO QUESTIONS 9-10: 1

## 2022-05-23 ASSESSMENT — ACTIVITIES OF DAILY LIVING (ADL): CURRENT_FUNCTION: NO ASSISTANCE NEEDED

## 2022-05-26 ENCOUNTER — OFFICE VISIT (OUTPATIENT)
Dept: PEDIATRICS | Facility: CLINIC | Age: 72
End: 2022-05-26
Payer: COMMERCIAL

## 2022-05-26 VITALS
SYSTOLIC BLOOD PRESSURE: 116 MMHG | WEIGHT: 229 LBS | TEMPERATURE: 97.6 F | HEART RATE: 78 BPM | OXYGEN SATURATION: 95 % | RESPIRATION RATE: 20 BRPM | DIASTOLIC BLOOD PRESSURE: 78 MMHG | BODY MASS INDEX: 31.02 KG/M2 | HEIGHT: 72 IN

## 2022-05-26 DIAGNOSIS — I10 ESSENTIAL HYPERTENSION, BENIGN: ICD-10-CM

## 2022-05-26 DIAGNOSIS — R03.0 ELEVATED BLOOD-PRESSURE READING, WITHOUT DIAGNOSIS OF HYPERTENSION: ICD-10-CM

## 2022-05-26 DIAGNOSIS — I47.10 SVT (SUPRAVENTRICULAR TACHYCARDIA) (H): ICD-10-CM

## 2022-05-26 DIAGNOSIS — Z00.00 ENCOUNTER FOR MEDICARE ANNUAL WELLNESS EXAM: Primary | ICD-10-CM

## 2022-05-26 PROBLEM — Z96.652 STATUS POST TOTAL KNEE REPLACEMENT, LEFT: Status: RESOLVED | Noted: 2019-05-08 | Resolved: 2022-05-26

## 2022-05-26 PROCEDURE — 99397 PER PM REEVAL EST PAT 65+ YR: CPT | Performed by: INTERNAL MEDICINE

## 2022-05-26 PROCEDURE — 99213 OFFICE O/P EST LOW 20 MIN: CPT | Mod: 25 | Performed by: INTERNAL MEDICINE

## 2022-05-26 ASSESSMENT — ENCOUNTER SYMPTOMS
WEAKNESS: 0
JOINT SWELLING: 0
DYSURIA: 0
SORE THROAT: 0
NAUSEA: 0
CONSTIPATION: 0
MYALGIAS: 0
HEMATURIA: 0
NERVOUS/ANXIOUS: 1
DIZZINESS: 0
COUGH: 0
ARTHRALGIAS: 0
SHORTNESS OF BREATH: 0
DIARRHEA: 0
EYE PAIN: 0
PARESTHESIAS: 1
HEARTBURN: 0
HEMATOCHEZIA: 0
CHILLS: 0
ABDOMINAL PAIN: 0
FREQUENCY: 0
HEADACHES: 0
FEVER: 0
PALPITATIONS: 0

## 2022-05-26 ASSESSMENT — ACTIVITIES OF DAILY LIVING (ADL): CURRENT_FUNCTION: NO ASSISTANCE NEEDED

## 2022-05-26 NOTE — PATIENT INSTRUCTIONS
No Cancer screneings.    2023 we'll do another colonoscopy.    No shots today.    Yearly flu shot this fall.    No changes in meds.    They will call to set up a blood pressure monitoring kit.    Good job with the weight loss.     Alessandro Blanco MD  Internal Medicine and Pediatrics       Patient Education         Personalized Prevention Plan  You are due for the preventive services outlined below.  Your care team is available to assist you in scheduling these services.  If you have already completed any of these items, please share that information with your care team to update in your medical record.  Health Maintenance Due   Topic Date Due    ANNUAL REVIEW OF HM ORDERS  Never done    AORTIC ANEURYSM SCREENING (SYSTEM ASSIGNED)  Never done    Asthma Action Plan - yearly  11/19/2019    Annual Wellness Visit  12/18/2021

## 2022-05-26 NOTE — PROGRESS NOTES
"SUBJECTIVE:   Dustin Lan is a 71 year old male who presents for Preventive Visit.      Patient has been advised of split billing requirements and indicates understanding: Yes  Are you in the first 12 months of your Medicare coverage?  No    Healthy Habits:     In general, how would you rate your overall health?  Good    Frequency of exercise:  4-5 days/week    Duration of exercise:  30-45 minutes    Do you usually eat at least 4 servings of fruit and vegetables a day, include whole grains    & fiber and avoid regularly eating high fat or \"junk\" foods?  Yes    Taking medications regularly:  Yes    Medication side effects:  None    Ability to successfully perform activities of daily living:  No assistance needed    Home Safety:  No safety concerns identified    Hearing Impairment:  No hearing concerns    In the past 6 months, have you been bothered by leaking of urine?  No    In general, how would you rate your overall mental or emotional health?  Excellent      PHQ-2 Total Score: 0    Additional concerns today:  Yes    Do you feel safe in your environment? Yes    Have you ever done Advance Care Planning? (For example, a Health Directive, POLST, or a discussion with a medical provider or your loved ones about your wishes): Yes, advance care planning is on file.       Fall risk  Fallen 2 or more times in the past year?: No  Any fall with injury in the past year?: No    Cognitive Screening   1) Repeat 3 items (Leader, Season, Table)    2) Clock draw: NORMAL  3) 3 item recall: Recalls 3 objects  Results: 3 items recalled: COGNITIVE IMPAIRMENT LESS LIKELY    Mini-CogTM Copyright LISETTE Hernandez. Licensed by the author for use in [facility]; reprinted with permission (pamela@.[facility]). All rights reserved.      Do you have sleep apnea, excessive snoring or daytime drowsiness?: no    Reviewed and updated as needed this visit by clinical staff   Tobacco  Allergies  Meds   Med Hx              Reviewed and updated " as needed this visit by Provider     Meds               Social History     Tobacco Use     Smoking status: Never Smoker     Smokeless tobacco: Never Used   Substance Use Topics     Alcohol use: Yes     Alcohol/week: 0.0 - 3.0 standard drinks     Comment: 5 - 7 beers a week         Alcohol Use 5/23/2022   Prescreen: >3 drinks/day or >7 drinks/week? Yes   Prescreen: >3 drinks/day or >7 drinks/week? -       In [place] last month, and after his long trip, was wondering about issues with blood pressure.     At home, blood pressure has been running 130-150 systolic.  SOmetimes up to 104 diastolic    But not sure if readings are accurate.     Had otitis media last year; placed on antibiotic.  Thinks this caused him some sinus issues.     Weight is improved; 229 today.      Current providers sharing in care for this patient include:   Patient Care Team:  Alessandro Blanco MD as PCP - General (Internal Medicine)  Alessandro Blanco MD as Assigned PCP    The following health maintenance items are reviewed in Epic and correct as of today:  Health Maintenance Due   Topic Date Due     AORTIC ANEURYSM SCREENING (SYSTEM ASSIGNED)  Never done     ASTHMA ACTION PLAN  11/19/2019     Lab work is in process  Labs reviewed in EPIC  BP Readings from Last 3 Encounters:   05/26/22 116/78   04/27/22 (!) 120/100   04/27/22 129/80    Wt Readings from Last 3 Encounters:   05/26/22 103.9 kg (229 lb)   04/27/22 107.5 kg (237 lb)   07/15/21 105.3 kg (232 lb 1.6 oz)                  Patient Active Problem List   Diagnosis     History of colonic polyps     HYPERLIPIDEMIA LDL GOAL <130     Impaired fasting glucose     Right knee pain     Mild persistent asthma without complication     SVT (supraventricular tachycardia) (H)     Past Surgical History:   Procedure Laterality Date     ARTHROPLASTY KNEE Left 5/8/2019    Procedure: ARTHROPLASTY LEFT KNEE;  Surgeon: Miguel Buckley MD;  Location: [facility] OR     ARTHROPLASTY KNEE Right 7/15/2021    Procedure: RIGHT TOTAL  KNEE ARTHROPLASTY;  Surgeon: Miguel Buckley MD;  Location:  OR     ARTHROSCOPY KNEE RT/LT  2008    right     BACK SURGERY           COLONOSCOPY  2007    ileocecal valve polyp, tubular adenoma, repeat in 3 yrs     INJECT STEROID (LOCATION) Right 2019    Procedure: Inject steroid right knee;  Surgeon: Miguel Buckley MD;  Location:  OR       Social History     Tobacco Use     Smoking status: Never Smoker     Smokeless tobacco: Never Used   Substance Use Topics     Alcohol use: Yes     Alcohol/week: 0.0 - 3.0 standard drinks     Comment: 5 - 7 beers a week     Family History   Problem Relation Age of Onset     Cerebrovascular Disease Mother 75         of a stroke     C.A.D. Father 67         suddenly when using the  at age 67. He was a smoker. On autopsy, told to have multiple heart attacks and scar tissue but he had never veronika a clinical heart attack.     Lipids Sister          Current Outpatient Medications   Medication Sig Dispense Refill     amoxicillin (AMOXIL) 500 MG tablet TAKE 4 TABLETS BY MOUTH 1 HOUR BEFORE DENTAL APPOINTMENT       Ascorbic Acid (VITAMIN C PO) Take 1 tablet by mouth every morning       atorvastatin (LIPITOR) 10 MG tablet Take 1 tablet (10 mg) by mouth daily 90 tablet 3     diltiazem ER (TIADYLT ER) 240 MG 24 hr ER beaded capsule Take 1 capsule (240 mg) by mouth daily 90 capsule 3     fluticasone-salmeterol (ADVAIR DISKUS) 250-50 MCG/DOSE inhaler Inhale 1 puff into the lungs 2 times daily 3 each 3     glucosamine-chondroitin 500-400 MG CAPS per capsule Take 1 capsule by mouth 2 times daily       multivitamin, therapeutic (THERA-VIT) TABS tablet Take 1 tablet by mouth daily       Allergies   Allergen Reactions     Cats              Review of Systems   Constitutional: Negative for chills and fever.   HENT: Negative for congestion, ear pain, hearing loss and sore throat.    Eyes: Negative for pain and visual disturbance.   Respiratory: Negative for cough  and shortness of breath.    Cardiovascular: Negative for chest pain, palpitations and peripheral edema.   Gastrointestinal: Negative for abdominal pain, constipation, diarrhea, heartburn, hematochezia and nausea.   Genitourinary: Negative for dysuria, frequency, genital sores, hematuria, impotence, penile discharge and urgency.   Musculoskeletal: Negative for arthralgias, joint swelling and myalgias.   Skin: Negative for rash.   Neurological: Positive for paresthesias. Negative for dizziness, weakness and headaches.   Psychiatric/Behavioral: Negative for mood changes. The patient is nervous/anxious.      CONSTITUTIONAL: NEGATIVE for fever, chills, change in weight  INTEGUMENTARY/SKIN: NEGATIVE for worrisome rashes, moles or lesions  EYES: NEGATIVE for vision changes or irritation  ENT/MOUTH: NEGATIVE for ear, mouth and throat problems  RESP: NEGATIVE for significant cough or SOB  BREAST: NEGATIVE for masses, tenderness or discharge  CV: NEGATIVE for chest pain, palpitations or peripheral edema  GI: NEGATIVE for nausea, abdominal pain, heartburn, or change in bowel habits  : NEGATIVE for frequency, dysuria, or hematuria  MUSCULOSKELETAL: NEGATIVE for significant arthralgias or myalgia  NEURO: NEGATIVE for weakness, dizziness or paresthesias  ENDOCRINE: NEGATIVE for temperature intolerance, skin/hair changes  HEME: NEGATIVE for bleeding problems  PSYCHIATRIC: NEGATIVE for changes in mood or affect    OBJECTIVE:   /78   Pulse 78   Temp 97.6  F (36.4  C)   Resp 20   Ht 1.829 m (6')   Wt 103.9 kg (229 lb)   SpO2 95%   BMI 31.06 kg/m   Estimated body mass index is 31.06 kg/m  as calculated from the following:    Height as of this encounter: 1.829 m (6').    Weight as of this encounter: 103.9 kg (229 lb).  Physical Exam  GENERAL: healthy, alert and no distress  EYES: Eyes grossly normal to inspection, PERRL and conjunctivae and sclerae normal  HENT: ear canals and TM's normal, nose and mouth without ulcers  or lesions  NECK: no adenopathy, no asymmetry, masses, or scars and thyroid normal to palpation  RESP: lungs clear to auscultation - no rales, rhonchi or wheezes  CV: regular rate and rhythm, normal S1 S2, no S3 or S4, no murmur, click or rub, no peripheral edema and peripheral pulses strong  ABDOMEN: soft, nontender, no hepatosplenomegaly, no masses and bowel sounds normal   (male): normal male genitalia without lesions or urethral discharge, no hernia  MS: no gross musculoskeletal defects noted, no edema  SKIN: no suspicious lesions or rashes  NEURO: Normal strength and tone, mentation intact and speech normal  PSYCH: mentation appears normal, affect normal/bright    Diagnostic Test Results:  Labs reviewed in Epic    ASSESSMENT / PLAN:   (Z00.00) Encounter for Medicare annual wellness exam  (primary encounter diagnosis)  Comment:   Plan: Discussed diet, exercise, testicular self exam, blood pressure, cholesterol, and need for cancer surveillance at appropriate ages.     (I10) Essential hypertension, benign  Comment:   Plan: 24 Hour Blood Pressure Monitor - Adult        Not sure if his home readings are within normal limits.  Significant discrepancy between home an in-clinic. Readings excellent in clinc today today.  Will check 24 hour ambulatory.  If high would add ACE inhibitor.     (R03.0) Elevated blood-pressure reading, without diagnosis of hypertension   Comment:   Plan: 24 Hour Blood Pressure Monitor - Adult            (I47.1) SVT (supraventricular tachycardia) (H)  Comment:   Plan: asymptomatic.  Remains on diltiazem as only blood pressure management.     Patient has been advised of split billing requirements and indicates understanding: Yes    COUNSELING:  Reviewed preventive health counseling, as reflected in patient instructions       Regular exercise       Healthy diet/nutrition       Vision screening       Hearing screening       Colon cancer screening       Prostate cancer screening    Estimated  body mass index is 31.06 kg/m  as calculated from the following:    Height as of this encounter: 1.829 m (6').    Weight as of this encounter: 103.9 kg (229 lb).    Weight management plan: Patient was referred to their PCP to discuss a diet and exercise plan.    He reports that he has never smoked. He has never used smokeless tobacco.      Appropriate preventive services were discussed with this patient, including applicable screening as appropriate for cardiovascular disease, diabetes, osteopenia/osteoporosis, and glaucoma.  As appropriate for age/gender, discussed screening for colorectal cancer, prostate cancer, breast cancer, and cervical cancer. Checklist reviewing preventive services available has been given to the patient.    Reviewed patients plan of care and provided an AVS. The Basic Care Plan (routine screening as documented in Health Maintenance) for Dustin meets the Care Plan requirement. This Care Plan has been established and reviewed with the Patient.    Counseling Resources:  ATP IV Guidelines  Pooled Cohorts Equation Calculator  Breast Cancer Risk Calculator  Breast Cancer: Medication to Reduce Risk  FRAX Risk Assessment  ICSI Preventive Guidelines  Dietary Guidelines for Americans, 2010  USDA's MyPlate  ASA Prophylaxis  Lung CA Screening    Alessandro Blanco MD  [facility]    Identified Health Risks:

## 2022-10-11 ENCOUNTER — LAB REQUISITION (OUTPATIENT)
Dept: LAB | Age: 72
End: 2022-10-11

## 2022-10-11 DIAGNOSIS — R73.01 IMPAIRED FASTING GLUCOSE: ICD-10-CM

## 2022-10-11 LAB — HBA1C MFR BLD: 5.6 % (ref 4.5–5.6)

## 2022-10-11 PROCEDURE — PSEU8266 GLYCOHEMOGLOBIN: Performed by: CLINICAL MEDICAL LABORATORY

## 2022-10-11 PROCEDURE — 83036 HEMOGLOBIN GLYCOSYLATED A1C: CPT | Performed by: CLINICAL MEDICAL LABORATORY

## 2022-10-15 ENCOUNTER — HEALTH MAINTENANCE LETTER (OUTPATIENT)
Age: 72
End: 2022-10-15

## 2022-11-15 ASSESSMENT — ASTHMA QUESTIONNAIRES
QUESTION_1 LAST FOUR WEEKS HOW MUCH OF THE TIME DID YOUR ASTHMA KEEP YOU FROM GETTING AS MUCH DONE AT WORK, SCHOOL OR AT HOME: NONE OF THE TIME
ACT_TOTALSCORE: 25
QUESTION_5 LAST FOUR WEEKS HOW WOULD YOU RATE YOUR ASTHMA CONTROL: COMPLETELY CONTROLLED
ACT_TOTALSCORE: 25
QUESTION_4 LAST FOUR WEEKS HOW OFTEN HAVE YOU USED YOUR RESCUE INHALER OR NEBULIZER MEDICATION (SUCH AS ALBUTEROL): NOT AT ALL
QUESTION_2 LAST FOUR WEEKS HOW OFTEN HAVE YOU HAD SHORTNESS OF BREATH: NOT AT ALL
QUESTION_3 LAST FOUR WEEKS HOW OFTEN DID YOUR ASTHMA SYMPTOMS (WHEEZING, COUGHING, SHORTNESS OF BREATH, CHEST TIGHTNESS OR PAIN) WAKE YOU UP AT NIGHT OR EARLIER THAN USUAL IN THE MORNING: NOT AT ALL

## 2022-11-16 ENCOUNTER — OFFICE VISIT (OUTPATIENT)
Dept: PEDIATRICS | Facility: CLINIC | Age: 72
End: 2022-11-16
Payer: COMMERCIAL

## 2022-11-16 ENCOUNTER — ANCILLARY PROCEDURE (OUTPATIENT)
Dept: GENERAL RADIOLOGY | Facility: CLINIC | Age: 72
End: 2022-11-16
Attending: PHYSICIAN ASSISTANT
Payer: COMMERCIAL

## 2022-11-16 VITALS
WEIGHT: 234.9 LBS | BODY MASS INDEX: 31.82 KG/M2 | SYSTOLIC BLOOD PRESSURE: 132 MMHG | OXYGEN SATURATION: 94 % | HEIGHT: 72 IN | TEMPERATURE: 97.2 F | DIASTOLIC BLOOD PRESSURE: 72 MMHG | HEART RATE: 78 BPM | RESPIRATION RATE: 18 BRPM

## 2022-11-16 DIAGNOSIS — R05.1 ACUTE COUGH: ICD-10-CM

## 2022-11-16 DIAGNOSIS — J45.41 MODERATE PERSISTENT ASTHMA WITH EXACERBATION: Primary | ICD-10-CM

## 2022-11-16 DIAGNOSIS — J01.80 ACUTE NON-RECURRENT SINUSITIS OF OTHER SINUS: ICD-10-CM

## 2022-11-16 PROCEDURE — 71046 X-RAY EXAM CHEST 2 VIEWS: CPT | Mod: TC | Performed by: RADIOLOGY

## 2022-11-16 PROCEDURE — 99214 OFFICE O/P EST MOD 30 MIN: CPT | Performed by: PHYSICIAN ASSISTANT

## 2022-11-16 RX ORDER — PREDNISONE 20 MG/1
TABLET ORAL
Qty: 20 TABLET | Refills: 0 | Status: SHIPPED | OUTPATIENT
Start: 2022-11-16 | End: 2023-09-11

## 2022-11-16 RX ORDER — ALBUTEROL SULFATE 90 UG/1
2 AEROSOL, METERED RESPIRATORY (INHALATION) EVERY 6 HOURS
Qty: 18 G | Refills: 1 | Status: SHIPPED | OUTPATIENT
Start: 2022-11-16 | End: 2024-06-04

## 2022-11-16 ASSESSMENT — PAIN SCALES - GENERAL: PAINLEVEL: NO PAIN (0)

## 2022-11-16 NOTE — PROGRESS NOTES
Assessment & Plan     Moderate persistent asthma with exacerbation  Begin oral pred taper. Increase advair twice daily x one month, then return to one daily. Albuterol PRN.  - predniSONE (DELTASONE) 20 MG tablet; Take 3 tabs by mouth daily x 3 days, then 2 tabs daily x 3 days, then 1 tab daily x 3 days, then 1/2 tab daily x 3 days.  - albuterol (PROAIR HFA/PROVENTIL HFA/VENTOLIN HFA) 108 (90 Base) MCG/ACT inhaler; Inhale 2 puffs into the lungs every 6 hours    Acute cough  - XR Chest 2 Views; Future  - albuterol (PROAIR HFA/PROVENTIL HFA/VENTOLIN HFA) 108 (90 Base) MCG/ACT inhaler; Inhale 2 puffs into the lungs every 6 hours    Acute non-recurrent sinusitis of other sinus  Begin antibiotics.   - amoxicillin-clavulanate (AUGMENTIN) 875-125 MG tablet; Take 1 tablet by mouth 2 times daily    Yvette Serrano PA-C  [facility] JANIA Chan is a 72 year old, presenting for the following health issues:  URI      URI    History of Present Illness       Reason for visit:  Cough lasting 5 weeks  Symptom onset:  More than a month  Symptoms include:  Cough sinus  Symptom intensity:  Moderate  Symptom progression:  Staying the same  Had these symptoms before:  No  What makes it worse:  Reclining in chair  What makes it better:  Moving around and drinking water    He eats 2-3 servings of fruits and vegetables daily.He consumes 1 sweetened beverage(s) daily.He exercises with enough effort to increase his heart rate 30 to 60 minutes per day.  He exercises with enough effort to increase his heart rate 5 days per week.   He is taking medications regularly.       Acute Illness  Acute illness concerns: cough  Onset/Duration: 5 weeks  Symptoms:  Fever: No  Chills/Sweats: No  Headache (location?): YES  Sinus Pressure: YES  Conjunctivitis:  No  Ear Pain: no  Rhinorrhea: YES--PND present  Congestion: YES  Sore Throat: No  Cough: YES-productive of green sputum, barking  Wheeze: YES  Decreased  Appetite: No  Nausea: No  Vomiting: No  Diarrhea: No  Dysuria/Freq.: No  Dysuria or Hematuria: No  Fatigue/Achiness: No  Sick/Strep Exposure: YES- grandson ill in October  advair once daily  Therapies tried and outcome: Dayquil sporadically    History of asthma. Feels good. Energy level is good.     Review of Systems   Constitutional, HEENT, cardiovascular, pulmonary, gi and gu systems are negative, except as otherwise noted.      Objective    /72 (BP Location: Right arm, Patient Position: Sitting, Cuff Size: Adult Large)   Pulse 78   Temp 97.2  F (36.2  C) (Tympanic)   Resp 18   Ht 1.829 m (6')   Wt 106.5 kg (234 lb 14.4 oz)   SpO2 94%   BMI 31.86 kg/m    Body mass index is 31.86 kg/m .  Physical Exam   GENERAL: alert and no distress  EYES: Eyes grossly normal to inspection, PERRL and conjunctivae and sclerae normal  HENT: ear canals and TM's normal, nose and mouth without ulcers or lesions; PND present  NECK: no adenopathy  RESP: diminished breath sounds throughout; wheezing and rhonchi present  CV: regular rate and rhythm, normal S1 S2, no S3 or S4    No results found for any visits on 11/16/22.

## 2022-11-16 NOTE — PATIENT INSTRUCTIONS
Begin oral steroid in the AMs with food  Increase advair to twice daily x one month  Albuterol inhaler on hand to take as needed   Begin antibiotics--take twice daily with food  5. Lots of fluids

## 2022-12-28 DIAGNOSIS — I10 ESSENTIAL HYPERTENSION: ICD-10-CM

## 2022-12-28 RX ORDER — DILTIAZEM HYDROCHLORIDE 240 MG/1
CAPSULE, EXTENDED RELEASE ORAL
Qty: 90 CAPSULE | Refills: 1 | Status: SHIPPED | OUTPATIENT
Start: 2022-12-28 | End: 2023-07-19

## 2022-12-28 NOTE — TELEPHONE ENCOUNTER
Prescription approved per [facility] Refill Protocol.    Gamal Lujan RN on 12/28/2022 at 2:01 PM

## 2023-02-07 ENCOUNTER — MYC MEDICAL ADVICE (OUTPATIENT)
Dept: PEDIATRICS | Facility: CLINIC | Age: 73
End: 2023-02-07
Payer: COMMERCIAL

## 2023-02-07 DIAGNOSIS — J45.30 MILD PERSISTENT ASTHMA WITHOUT COMPLICATION: ICD-10-CM

## 2023-02-08 RX ORDER — FLUTICASONE PROPIONATE AND SALMETEROL 250; 50 UG/1; UG/1
1 POWDER RESPIRATORY (INHALATION) 2 TIMES DAILY
Qty: 3 EACH | Refills: 1 | Status: SHIPPED | OUTPATIENT
Start: 2023-02-08 | End: 2023-09-11

## 2023-02-08 NOTE — TELEPHONE ENCOUNTER
Prescription approved per [facility] Refill Protocol.  Opal Joy RN, BSN  [facility]

## 2023-02-14 RX ORDER — FLUTICASONE PROPIONATE AND SALMETEROL 50; 250 UG/1; UG/1
1 POWDER RESPIRATORY (INHALATION) 2 TIMES DAILY
Qty: 3 EACH | Refills: 1 | Status: SHIPPED | OUTPATIENT
Start: 2023-02-14 | End: 2024-09-24

## 2023-02-14 NOTE — TELEPHONE ENCOUNTER
[facility] states insurance will only cover brand name, not generic. Pended up new script for brand name.  JAMES Fermin on 2/14/2023 at 8:16 AM

## 2023-03-08 ENCOUNTER — TRANSFERRED RECORDS (OUTPATIENT)
Dept: HEALTH INFORMATION MANAGEMENT | Facility: CLINIC | Age: 73
End: 2023-03-08

## 2023-04-25 DIAGNOSIS — E78.00 HYPERCHOLESTEREMIA: ICD-10-CM

## 2023-04-26 ENCOUNTER — PATIENT OUTREACH (OUTPATIENT)
Dept: CARE COORDINATION | Facility: CLINIC | Age: 73
End: 2023-04-26
Payer: COMMERCIAL

## 2023-04-27 RX ORDER — ATORVASTATIN CALCIUM 10 MG/1
10 TABLET, FILM COATED ORAL DAILY
Qty: 90 TABLET | Refills: 2 | Status: SHIPPED | OUTPATIENT
Start: 2023-04-27 | End: 2023-09-11

## 2023-04-27 NOTE — TELEPHONE ENCOUNTER
Prescription approved per [facility] Refill Protocol.    Zaid Hernandez RN on 4/27/2023 at 3:22 PM

## 2023-05-10 ENCOUNTER — PATIENT OUTREACH (OUTPATIENT)
Dept: CARE COORDINATION | Facility: CLINIC | Age: 73
End: 2023-05-10
Payer: COMMERCIAL

## 2023-07-18 ENCOUNTER — MYC MEDICAL ADVICE (OUTPATIENT)
Dept: PEDIATRICS | Facility: CLINIC | Age: 73
End: 2023-07-18
Payer: COMMERCIAL

## 2023-07-18 DIAGNOSIS — I10 ESSENTIAL HYPERTENSION: ICD-10-CM

## 2023-07-19 RX ORDER — DILTIAZEM HYDROCHLORIDE 240 MG/1
240 CAPSULE, EXTENDED RELEASE ORAL DAILY
Qty: 90 CAPSULE | Refills: 1 | Status: SHIPPED | OUTPATIENT
Start: 2023-07-19 | End: 2023-09-11

## 2023-07-19 NOTE — TELEPHONE ENCOUNTER
Patient requested this through [facility] 10 days ago.... may have been out for a while. Routing high priority.    JAMES Fermin on 7/19/2023 at 8:01 AM

## 2023-08-20 ENCOUNTER — HEALTH MAINTENANCE LETTER (OUTPATIENT)
Age: 73
End: 2023-08-20

## 2023-09-06 SDOH — ECONOMIC STABILITY: INCOME INSECURITY: HOW HARD IS IT FOR YOU TO PAY FOR THE VERY BASICS LIKE FOOD, HOUSING, MEDICAL CARE, AND HEATING?: NOT HARD AT ALL

## 2023-09-06 SDOH — ECONOMIC STABILITY: INCOME INSECURITY: IN THE LAST 12 MONTHS, WAS THERE A TIME WHEN YOU WERE NOT ABLE TO PAY THE MORTGAGE OR RENT ON TIME?: NO

## 2023-09-06 SDOH — ECONOMIC STABILITY: FOOD INSECURITY: WITHIN THE PAST 12 MONTHS, THE FOOD YOU BOUGHT JUST DIDN'T LAST AND YOU DIDN'T HAVE MONEY TO GET MORE.: NEVER TRUE

## 2023-09-06 SDOH — ECONOMIC STABILITY: FOOD INSECURITY: WITHIN THE PAST 12 MONTHS, YOU WORRIED THAT YOUR FOOD WOULD RUN OUT BEFORE YOU GOT MONEY TO BUY MORE.: NEVER TRUE

## 2023-09-06 SDOH — HEALTH STABILITY: PHYSICAL HEALTH: ON AVERAGE, HOW MANY MINUTES DO YOU ENGAGE IN EXERCISE AT THIS LEVEL?: 50 MIN

## 2023-09-06 SDOH — HEALTH STABILITY: PHYSICAL HEALTH: ON AVERAGE, HOW MANY DAYS PER WEEK DO YOU ENGAGE IN MODERATE TO STRENUOUS EXERCISE (LIKE A BRISK WALK)?: 5 DAYS

## 2023-09-06 ASSESSMENT — ENCOUNTER SYMPTOMS
DYSURIA: 0
NERVOUS/ANXIOUS: 0
PALPITATIONS: 0
CHILLS: 0
MYALGIAS: 1
SORE THROAT: 0
HEMATOCHEZIA: 0
NAUSEA: 0
CONSTIPATION: 0
ABDOMINAL PAIN: 0
HEMATURIA: 0
DIARRHEA: 0
WEAKNESS: 0
SHORTNESS OF BREATH: 0
HEADACHES: 0
FREQUENCY: 0
FEVER: 0
HEARTBURN: 0
JOINT SWELLING: 0
DIZZINESS: 0
COUGH: 0
ARTHRALGIAS: 1
PARESTHESIAS: 0
EYE PAIN: 0

## 2023-09-06 ASSESSMENT — LIFESTYLE VARIABLES
HOW OFTEN DO YOU HAVE A DRINK CONTAINING ALCOHOL: 2-3 TIMES A WEEK
AUDIT-C TOTAL SCORE: 3
HOW MANY STANDARD DRINKS CONTAINING ALCOHOL DO YOU HAVE ON A TYPICAL DAY: 1 OR 2
HOW OFTEN DO YOU HAVE SIX OR MORE DRINKS ON ONE OCCASION: NEVER
SKIP TO QUESTIONS 9-10: 1

## 2023-09-06 ASSESSMENT — SOCIAL DETERMINANTS OF HEALTH (SDOH)
IN A TYPICAL WEEK, HOW MANY TIMES DO YOU TALK ON THE PHONE WITH FAMILY, FRIENDS, OR NEIGHBORS?: NEVER
HOW OFTEN DO YOU ATTEND CHURCH OR RELIGIOUS SERVICES?: 1 TO 4 TIMES PER YEAR
DO YOU BELONG TO ANY CLUBS OR ORGANIZATIONS SUCH AS CHURCH GROUPS UNIONS, FRATERNAL OR ATHLETIC GROUPS, OR SCHOOL GROUPS?: YES
HOW OFTEN DO YOU GET TOGETHER WITH FRIENDS OR RELATIVES?: ONCE A WEEK

## 2023-09-06 ASSESSMENT — ASTHMA QUESTIONNAIRES: ACT_TOTALSCORE: 25

## 2023-09-06 ASSESSMENT — ACTIVITIES OF DAILY LIVING (ADL): CURRENT_FUNCTION: NO ASSISTANCE NEEDED

## 2023-09-11 ENCOUNTER — OFFICE VISIT (OUTPATIENT)
Dept: PEDIATRICS | Facility: CLINIC | Age: 73
End: 2023-09-11
Payer: COMMERCIAL

## 2023-09-11 VITALS
DIASTOLIC BLOOD PRESSURE: 82 MMHG | SYSTOLIC BLOOD PRESSURE: 110 MMHG | BODY MASS INDEX: 31.52 KG/M2 | WEIGHT: 232.7 LBS | OXYGEN SATURATION: 97 % | RESPIRATION RATE: 16 BRPM | HEIGHT: 72 IN | TEMPERATURE: 97.3 F | HEART RATE: 65 BPM

## 2023-09-11 DIAGNOSIS — E78.5 HYPERLIPIDEMIA LDL GOAL <130: ICD-10-CM

## 2023-09-11 DIAGNOSIS — Z00.00 ENCOUNTER FOR ANNUAL WELLNESS EXAM IN MEDICARE PATIENT: Primary | ICD-10-CM

## 2023-09-11 DIAGNOSIS — I10 ESSENTIAL HYPERTENSION: ICD-10-CM

## 2023-09-11 DIAGNOSIS — Z00.00 ENCOUNTER FOR MEDICARE ANNUAL WELLNESS EXAM: ICD-10-CM

## 2023-09-11 DIAGNOSIS — J45.30 MILD PERSISTENT ASTHMA WITHOUT COMPLICATION: ICD-10-CM

## 2023-09-11 DIAGNOSIS — I47.10 SVT (SUPRAVENTRICULAR TACHYCARDIA) (H): ICD-10-CM

## 2023-09-11 DIAGNOSIS — R73.01 IMPAIRED FASTING GLUCOSE: ICD-10-CM

## 2023-09-11 DIAGNOSIS — B37.0 THRUSH: ICD-10-CM

## 2023-09-11 DIAGNOSIS — E78.00 HYPERCHOLESTEREMIA: ICD-10-CM

## 2023-09-11 LAB — HBA1C MFR BLD: 5.4 % (ref 0–5.6)

## 2023-09-11 PROCEDURE — 99214 OFFICE O/P EST MOD 30 MIN: CPT | Mod: 25 | Performed by: INTERNAL MEDICINE

## 2023-09-11 PROCEDURE — 36415 COLL VENOUS BLD VENIPUNCTURE: CPT | Performed by: INTERNAL MEDICINE

## 2023-09-11 PROCEDURE — 80053 COMPREHEN METABOLIC PANEL: CPT | Performed by: INTERNAL MEDICINE

## 2023-09-11 PROCEDURE — 83036 HEMOGLOBIN GLYCOSYLATED A1C: CPT | Performed by: INTERNAL MEDICINE

## 2023-09-11 PROCEDURE — 80061 LIPID PANEL: CPT | Performed by: INTERNAL MEDICINE

## 2023-09-11 PROCEDURE — G0439 PPPS, SUBSEQ VISIT: HCPCS | Performed by: INTERNAL MEDICINE

## 2023-09-11 RX ORDER — DILTIAZEM HYDROCHLORIDE 240 MG/1
240 CAPSULE, EXTENDED RELEASE ORAL DAILY
Qty: 90 CAPSULE | Refills: 3 | Status: SHIPPED | OUTPATIENT
Start: 2023-09-11 | End: 2024-09-24

## 2023-09-11 RX ORDER — ATORVASTATIN CALCIUM 10 MG/1
10 TABLET, FILM COATED ORAL DAILY
Qty: 90 TABLET | Refills: 3 | Status: SHIPPED | OUTPATIENT
Start: 2023-09-11 | End: 2024-09-24

## 2023-09-11 RX ORDER — AMOXICILLIN 500 MG/1
TABLET, FILM COATED ORAL
COMMUNITY
Start: 2023-09-11 | End: 2024-06-26

## 2023-09-11 ASSESSMENT — ENCOUNTER SYMPTOMS
DIZZINESS: 0
NAUSEA: 0
ABDOMINAL PAIN: 0
HEMATOCHEZIA: 0
WEAKNESS: 0
FEVER: 0
SORE THROAT: 0
NERVOUS/ANXIOUS: 0
FREQUENCY: 0
PARESTHESIAS: 0
PALPITATIONS: 0
JOINT SWELLING: 0
HEARTBURN: 0
MYALGIAS: 1
CONSTIPATION: 0
ARTHRALGIAS: 1
CHILLS: 0
DIARRHEA: 0
EYE PAIN: 0
COUGH: 0
DYSURIA: 0
SHORTNESS OF BREATH: 0
HEADACHES: 0
HEMATURIA: 0

## 2023-09-11 ASSESSMENT — ACTIVITIES OF DAILY LIVING (ADL): CURRENT_FUNCTION: NO ASSISTANCE NEEDED

## 2023-09-11 ASSESSMENT — PAIN SCALES - GENERAL: PAINLEVEL: NO PAIN (0)

## 2023-09-11 NOTE — PROGRESS NOTES
"SUBJECTIVE:   Dustin is a 73 year old who presents for Preventive Visit.      9/11/2023     8:56 AM   Additional Questions   Roomed by nicholas osorio   Accompanied by n/a         9/11/2023     8:56 AM   Patient Reported Additional Medications   Patient reports taking the following new medications n/a       Are you in the first 12 months of your Medicare coverage?  No    Healthy Habits:     In general, how would you rate your overall health?  Good    Frequency of exercise:  4-5 days/week    Duration of exercise:  45-60 minutes    Do you usually eat at least 4 servings of fruit and vegetables a day, include whole grains    & fiber and avoid regularly eating high fat or \"junk\" foods?  Yes    Taking medications regularly:  Yes    Ability to successfully perform activities of daily living:  No assistance needed    Home Safety:  No safety concerns identified    Hearing Impairment:  No hearing concerns    In the past 6 months, have you been bothered by leaking of urine?  No    In general, how would you rate your overall mental or emotional health?  Excellent    Additional concerns today:  No    Had COVID in March; takes as needed lotrimin (clotrimazole) troches.      Possilble rotator cuff injury, doing pull ups.  Left shoulder pain if does posterior extension.     Occasionally numb in fingers.  Has had lower L45 back surgery fragment relief.    Worse numbness at times after doing yard work.      Have you ever done Advance Care Planning? (For example, a Health Directive, POLST, or a discussion with a medical provider or your loved ones about your wishes): Yes, advance care planning is on file.       Fall risk  Fallen 2 or more times in the past year?: No  Any fall with injury in the past year?: No  click delete button to remove this line now  Cognitive Screening   1) Repeat 3 items (Leader, Season, Table)    2) Clock draw: NORMAL  3) 3 item recall: Recalls 3 objects  Results: 3 items recalled: COGNITIVE IMPAIRMENT LESS " LIKELY    Mini-CogTM Copyright LISETTE Hernandez. Licensed by the author for use in [facility]; reprinted with permission (soelke@[facility]). All rights reserved.      Do you have sleep apnea, excessive snoring or daytime drowsiness? : no    Reviewed and updated as needed this visit by clinical staff   Tobacco  Allergies  Meds              Reviewed and updated as needed this visit by Provider                 Social History     Tobacco Use    Smoking status: Never    Smokeless tobacco: Never   Substance Use Topics    Alcohol use: Yes     Alcohol/week: 0.0 - 3.0 standard drinks of alcohol     Comment: 5 - 7 beers a week             9/6/2023    10:44 AM   Alcohol Use   Prescreen: >3 drinks/day or >7 drinks/week? No         5/23/2022     9:11 AM   AUDIT - Alcohol Use Disorders Identification Test - Reproduced from the World Health Organization Audit 2001 (Second Edition)   1.  How often do you have a drink containing alcohol? 2 to 3 times a week   2.  How many drinks containing alcohol do you have on a typical day when you are drinking? 1 or 2   3.  How often do you have five or more drinks on one occasion? Never   9.  Have you or someone else been injured because of your drinking? No   10. Has a relative, friend, doctor or other health care worker been concerned about your drinking or suggested you cut down? No     Do you have a current opioid prescription? No  Do you use any other controlled substances or medications that are not prescribed by a provider? None              Current providers sharing in care for this patient include:   Patient Care Team:  Alessandro Blanco MD as PCP - General (Internal Medicine)  Alessandro Blanco MD as Assigned PCP    The following health maintenance items are reviewed in Epic and correct as of today:  Health Maintenance   Topic Date Due    AORTIC ANEURYSM SCREENING (SYSTEM ASSIGNED)  Never done    ASTHMA ACTION PLAN  11/19/2019    COVID-19 Vaccine (6 - Moderna series) 01/13/2023     MEDICARE ANNUAL WELLNESS VISIT  05/26/2023    ANNUAL REVIEW OF HM ORDERS  05/26/2023    INFLUENZA VACCINE (1) 09/01/2023    COLORECTAL CANCER SCREENING  12/05/2023    ASTHMA CONTROL TEST  03/11/2024    FALL RISK ASSESSMENT  09/11/2024    LIPID  04/27/2027    ADVANCE CARE PLANNING  05/26/2027    DTAP/TDAP/TD IMMUNIZATION (3 - Td or Tdap) 09/14/2028    HEPATITIS C SCREENING  Completed    PHQ-2 (once per calendar year)  Completed    Pneumococcal Vaccine: 65+ Years  Completed    ZOSTER IMMUNIZATION  Completed    IPV IMMUNIZATION  Aged Out    HPV IMMUNIZATION  Aged Out    MENINGITIS IMMUNIZATION  Aged Out     Lab work is in process  Labs reviewed in EPIC          Review of Systems   Constitutional:  Negative for chills and fever.   HENT:  Negative for congestion, ear pain, hearing loss and sore throat.    Eyes:  Negative for pain and visual disturbance.   Respiratory:  Negative for cough and shortness of breath.    Cardiovascular:  Negative for chest pain, palpitations and peripheral edema.   Gastrointestinal:  Negative for abdominal pain, constipation, diarrhea, heartburn, hematochezia and nausea.   Genitourinary:  Negative for dysuria, frequency, genital sores, hematuria, impotence, penile discharge and urgency.   Musculoskeletal:  Positive for arthralgias and myalgias. Negative for joint swelling.   Skin:  Negative for rash.   Neurological:  Negative for dizziness, weakness, headaches and paresthesias.   Psychiatric/Behavioral:  Negative for mood changes. The patient is not nervous/anxious.      CONSTITUTIONAL: NEGATIVE for fever, chills, change in weight  INTEGUMENTARY/SKIN: NEGATIVE for worrisome rashes, moles or lesions  EYES: NEGATIVE for vision changes or irritation  ENT/MOUTH: NEGATIVE for ear, mouth and throat problems  RESP: NEGATIVE for significant cough or SOB  BREAST: NEGATIVE for masses, tenderness or discharge  CV: NEGATIVE for chest pain, palpitations or peripheral edema  GI: NEGATIVE for nausea,  "abdominal pain, heartburn, or change in bowel habits  : NEGATIVE for frequency, dysuria, or hematuria  MUSCULOSKELETAL: NEGATIVE for significant arthralgias or myalgia  NEURO: NEGATIVE for weakness, dizziness or paresthesias  ENDOCRINE: NEGATIVE for temperature intolerance, skin/hair changes  HEME: NEGATIVE for bleeding problems  PSYCHIATRIC: NEGATIVE for changes in mood or affect    OBJECTIVE:   /82 (BP Location: Right arm, Patient Position: Sitting, Cuff Size: Adult Large)   Pulse 65   Temp 97.3  F (36.3  C) (Temporal)   Resp 16   Ht 1.833 m (6' 0.17\")   Wt 105.6 kg (232 lb 11.2 oz)   SpO2 97%   BMI 31.42 kg/m   Estimated body mass index is 31.42 kg/m  as calculated from the following:    Height as of this encounter: 1.833 m (6' 0.17\").    Weight as of this encounter: 105.6 kg (232 lb 11.2 oz).  Physical Exam  GENERAL: healthy, alert and no distress  EYES: Eyes grossly normal to inspection, PERRL and conjunctivae and sclerae normal  HENT: ear canals and TM's normal, nose and mouth without ulcers or lesions  NECK: no adenopathy, no asymmetry, masses, or scars and thyroid normal to palpation  RESP: lungs clear to auscultation - no rales, rhonchi or wheezes  CV: regular rate and rhythm, normal S1 S2, no S3 or S4, no murmur, click or rub, no peripheral edema and peripheral pulses strong  ABDOMEN: soft, nontender, no hepatosplenomegaly, no masses and bowel sounds normal  MS: no gross musculoskeletal defects noted, no edema  SKIN: no suspicious lesions or rashes  NEURO: Normal strength and tone, mentation intact and speech normal  PSYCH: mentation appears normal, affect normal/bright    Diagnostic Test Results:  Labs reviewed in Epic    ASSESSMENT / PLAN:   (Z00.00) Encounter for annual wellness exam in Medicare patient  (primary encounter diagnosis)  Comment:   Plan: Discussed diet, exercise, testicular self exam, blood pressure, cholesterol, and need for cancer surveillance at appropriate " "ages.    (B37.0) Thrush  Comment: not active   Plan: as needed for advair use.     (I47.1) SVT (supraventricular tachycardia) (H)  Comment:   Plan: asymptomatic.  On calcium channel blocker.      (J45.30) Mild persistent asthma without complication  Comment:   Plan: Advair working well.     (R73.01) Impaired fasting glucose  Comment:   Plan: Hemoglobin A1c            (E78.5) HYPERLIPIDEMIA LDL GOAL <130  Comment:   Plan: Lipid panel reflex to direct LDL Fasting,         Comprehensive metabolic panel (BMP + Alb, Alk         Phos, ALT, AST, Total. Bili, TP)            (E78.00) Hypercholesteremia  Comment:   Plan: atorvastatin (LIPITOR) 10 MG tablet        Tolerating statin well.     (I10) Essential hypertension  Comment:   Plan: diltiazem ER (TIADYLT ER) 240 MG 24 hr ER         beaded capsule        At goal     (Z00.00) Encounter for Medicare annual wellness exam  Comment:   Plan: Discussed diet, exercise, testicular self exam, blood pressure, cholesterol, and need for cancer surveillance at appropriate ages.     Patient has been advised of split billing requirements and indicates understanding: Yes      COUNSELING:  Reviewed preventive health counseling, as reflected in patient instructions       Regular exercise       Healthy diet/nutrition       Vision screening       Colon cancer screening       Prostate cancer screening      BMI:   Estimated body mass index is 31.42 kg/m  as calculated from the following:    Height as of this encounter: 1.833 m (6' 0.17\").    Weight as of this encounter: 105.6 kg (232 lb 11.2 oz).   Weight management plan: Patient was referred to their PCP to discuss a diet and exercise plan.      He reports that he has never smoked. He has never used smokeless tobacco.      Appropriate preventive services were discussed with this patient, including applicable screening as appropriate for cardiovascular disease, diabetes, osteopenia/osteoporosis, and glaucoma.  As appropriate for age/gender, " discussed screening for colorectal cancer, prostate cancer, breast cancer, and cervical cancer. Checklist reviewing preventive services available has been given to the patient.    Reviewed patients plan of care and provided an AVS. The Basic Care Plan (routine screening as documented in Health Maintenance) for Dustin meets the Care Plan requirement. This Care Plan has been established and reviewed with the Patient.          Alessandro Blanco MD  [facility]    Identified Health Risks:  I have reviewed Opioid Use Disorder and Substance Use Disorder risk factors and made any needed referrals.

## 2023-09-11 NOTE — PATIENT INSTRUCTIONS
"Get flu and covid shots next month.  Others are up to date.    Call and set up your next colonoscopy.    Goal weight:  225 or less.    Lab work today:  We can do labs in the exam room today, or you can get them done downstairs in the lab.      If you are going downstairs:  Directions:  As you walk through the first floor, you'll see (on the right) first the pharmacy, then some bathrooms, then the \"Lab and Imaging\" area. Give them your name at the window there and wait for them to call you.     Try to limit complex carbs (rice, bread, pasta, potatoes) and simple carbs (pop, juice, alcohol.)  Eating more lean proteins (chicken, fish, eggs, beans, nuts) and unlimited vegetables and fruits can definitely help as well.     In general, try to spread meals out during the day, eating smaller breakfasts, lunches and dinners--and having healthy snacks in between.  It's a good idea to limit your carbs at mealtimes to 60-75 grams of carbs, and to limit your carbs at snacktimes to 15-30 grams of carbs.  (Check the food labels to add up these carbs.)       Patient Education   Personalized Prevention Plan  You are due for the preventive services outlined below.  Your care team is available to assist you in scheduling these services.  If you have already completed any of these items, please share that information with your care team to update in your medical record.  Health Maintenance Due   Topic Date Due     AORTIC ANEURYSM SCREENING (SYSTEM ASSIGNED)  Never done     Asthma Action Plan - yearly  11/19/2019     COVID-19 Vaccine (6 - Moderna series) 01/13/2023     Annual Wellness Visit  05/26/2023     ANNUAL REVIEW OF HM ORDERS  05/26/2023     Flu Vaccine (1) 09/01/2023        "

## 2023-09-12 LAB
ALBUMIN SERPL BCG-MCNC: 4.6 G/DL (ref 3.5–5.2)
ALP SERPL-CCNC: 79 U/L (ref 40–129)
ALT SERPL W P-5'-P-CCNC: 23 U/L (ref 0–70)
ANION GAP SERPL CALCULATED.3IONS-SCNC: 13 MMOL/L (ref 7–15)
AST SERPL W P-5'-P-CCNC: 31 U/L (ref 0–45)
BILIRUB SERPL-MCNC: 0.5 MG/DL
BUN SERPL-MCNC: 14.1 MG/DL (ref 8–23)
CALCIUM SERPL-MCNC: 9.1 MG/DL (ref 8.8–10.2)
CHLORIDE SERPL-SCNC: 105 MMOL/L (ref 98–107)
CHOLEST SERPL-MCNC: 197 MG/DL
CREAT SERPL-MCNC: 0.86 MG/DL (ref 0.67–1.17)
DEPRECATED HCO3 PLAS-SCNC: 23 MMOL/L (ref 22–29)
EGFRCR SERPLBLD CKD-EPI 2021: >90 ML/MIN/1.73M2
GLUCOSE SERPL-MCNC: 88 MG/DL (ref 70–99)
HDLC SERPL-MCNC: 67 MG/DL
LDLC SERPL CALC-MCNC: 110 MG/DL
NONHDLC SERPL-MCNC: 130 MG/DL
POTASSIUM SERPL-SCNC: 3.8 MMOL/L (ref 3.4–5.3)
PROT SERPL-MCNC: 6.5 G/DL (ref 6.4–8.3)
SODIUM SERPL-SCNC: 141 MMOL/L (ref 136–145)
TRIGL SERPL-MCNC: 99 MG/DL

## 2023-10-13 ENCOUNTER — LAB REQUISITION (OUTPATIENT)
Dept: LAB | Age: 73
End: 2023-10-13

## 2023-10-13 DIAGNOSIS — R73.01 IMPAIRED FASTING GLUCOSE: ICD-10-CM

## 2023-10-13 LAB — HBA1C MFR BLD: 5.6 % (ref 4.5–5.6)

## 2023-10-13 PROCEDURE — PSEU8266 GLYCOHEMOGLOBIN: Performed by: CLINICAL MEDICAL LABORATORY

## 2023-10-13 PROCEDURE — 83036 HEMOGLOBIN GLYCOSYLATED A1C: CPT | Performed by: CLINICAL MEDICAL LABORATORY

## 2023-12-21 ENCOUNTER — TELEPHONE (OUTPATIENT)
Dept: PEDIATRICS | Facility: CLINIC | Age: 73
End: 2023-12-21
Payer: COMMERCIAL

## 2023-12-21 DIAGNOSIS — Z12.11 SPECIAL SCREENING FOR MALIGNANT NEOPLASMS, COLON: Primary | ICD-10-CM

## 2023-12-21 NOTE — TELEPHONE ENCOUNTER
Call placed to pt with message from provider  Pt double checked with his insurance company and he can use [facility] so he  does not need a new referral    Zaid Hernandez RN on 12/21/2023 at 3:20 PM

## 2023-12-21 NOTE — TELEPHONE ENCOUNTER
Dr. Blanco,    Pt calls d/t upcoming colonoscopy. Pt states his insurance is changing and he may not be able to be seen at [facility]. Pt inquires if his PCP has a recommendation on a colonoscopy provider within [place].    Recommended pt call his insurance to see what is in/out-of-network. Pt verbalized understanding and would like callback with provider recs.     Please review and advise. Thank you!  Jose Rafael Morales RN on 12/21/2023 at 3:01 PM

## 2024-01-03 ENCOUNTER — PATIENT OUTREACH (OUTPATIENT)
Dept: GASTROENTEROLOGY | Facility: CLINIC | Age: 74
End: 2024-01-03
Payer: COMMERCIAL

## 2024-01-10 ENCOUNTER — TRANSFERRED RECORDS (OUTPATIENT)
Dept: HEALTH INFORMATION MANAGEMENT | Facility: CLINIC | Age: 74
End: 2024-01-10
Payer: COMMERCIAL

## 2024-03-05 ENCOUNTER — TRANSFERRED RECORDS (OUTPATIENT)
Dept: HEALTH INFORMATION MANAGEMENT | Facility: CLINIC | Age: 74
End: 2024-03-05
Payer: COMMERCIAL

## 2024-04-10 ENCOUNTER — TELEPHONE (OUTPATIENT)
Dept: PEDIATRICS | Facility: CLINIC | Age: 74
End: 2024-04-10
Payer: COMMERCIAL

## 2024-04-10 NOTE — TELEPHONE ENCOUNTER
"Patient transferred from scheduling. Appointment had been made for tomorrow morning at [facility].    Covid with mild dry cough in January. Coughing since then. Seen in [place] at [facility] clinic as cough was worse and productive. Notes from clinic in chart 3/5.    Patient stated provider advised possible pneumonia after listening to lungs.    Tx'd with antibiotic and albuterol inhaler.    Patient stated improved \"80%\" and was able to play golf. Still had productive cough.    Patient states cough remains productive with greenish phlegm.  Some wheezing when coughing. Denies chest pain, SOB, and fever.    Uses Advair as directed for asthma. Does not use albuterol as does not like effects. Patient states when using \" feels like fluid in my lungs\" patient states does not need albuterol inhaler.    Patient states \"CPAP monitors breathing at night time and no notices of problems with O2 readings\".    Advised to be seen sooner for worsening symptoms. Patient feels ok with waiting for visit tomorrow 4/11.    Viridiana Barroso RN        "

## 2024-04-11 ENCOUNTER — ANCILLARY PROCEDURE (OUTPATIENT)
Dept: GENERAL RADIOLOGY | Facility: CLINIC | Age: 74
End: 2024-04-11
Attending: PHYSICIAN ASSISTANT
Payer: COMMERCIAL

## 2024-04-11 ENCOUNTER — OFFICE VISIT (OUTPATIENT)
Dept: PEDIATRICS | Facility: CLINIC | Age: 74
End: 2024-04-11
Payer: COMMERCIAL

## 2024-04-11 VITALS
HEIGHT: 72 IN | BODY MASS INDEX: 31.43 KG/M2 | OXYGEN SATURATION: 100 % | SYSTOLIC BLOOD PRESSURE: 153 MMHG | RESPIRATION RATE: 16 BRPM | TEMPERATURE: 98.3 F | HEART RATE: 74 BPM | DIASTOLIC BLOOD PRESSURE: 88 MMHG | WEIGHT: 232.06 LBS

## 2024-04-11 DIAGNOSIS — R05.9 COUGH, UNSPECIFIED TYPE: Primary | ICD-10-CM

## 2024-04-11 DIAGNOSIS — J45.30 MILD PERSISTENT ASTHMA WITHOUT COMPLICATION: ICD-10-CM

## 2024-04-11 DIAGNOSIS — J01.00 ACUTE NON-RECURRENT MAXILLARY SINUSITIS: ICD-10-CM

## 2024-04-11 DIAGNOSIS — R05.9 COUGH, UNSPECIFIED TYPE: ICD-10-CM

## 2024-04-11 PROCEDURE — 71046 X-RAY EXAM CHEST 2 VIEWS: CPT | Mod: TC | Performed by: RADIOLOGY

## 2024-04-11 PROCEDURE — 99214 OFFICE O/P EST MOD 30 MIN: CPT | Performed by: PHYSICIAN ASSISTANT

## 2024-04-11 RX ORDER — PREDNISONE 10 MG/1
TABLET ORAL
Qty: 30 TABLET | Refills: 0 | Status: SHIPPED | OUTPATIENT
Start: 2024-04-11 | End: 2024-07-25

## 2024-04-11 RX ORDER — MODERNA COVID-19 VACCINE, BIVALENT 25; 25 UG/.5ML; UG/.5ML
INJECTION, SUSPENSION INTRAMUSCULAR
COMMUNITY
Start: 2023-05-30 | End: 2024-06-26

## 2024-04-11 RX ORDER — COVID-19 ANTIGEN TEST
KIT MISCELLANEOUS
COMMUNITY
Start: 2023-03-04 | End: 2024-06-26

## 2024-04-11 RX ORDER — A/SINGAPORE/GP1908/2015 IVR-180 (AN A/MICHIGAN/45/2015 (H1N1)PDM09-LIKE VIRUS, A/HONG KONG/4801/2014, NYMC X-263B (H3N2) (AN A/HONG KONG/4801/2014-LIKE VIRUS), AND B/BRISBANE/60/2008, WILD TYPE (A B/BRISBANE/60/2008-LIKE VIRUS) 15; 15; 15 UG/.5ML; UG/.5ML; UG/.5ML
INJECTION, SUSPENSION INTRAMUSCULAR
COMMUNITY
Start: 2023-09-28 | End: 2024-09-24

## 2024-04-11 RX ORDER — COVID-19 VACCINE, MRNA 0.04 MG/.418ML
INJECTION, SUSPENSION INTRAMUSCULAR
COMMUNITY
Start: 2023-09-28 | End: 2024-09-24

## 2024-04-11 RX ORDER — BENZONATATE 100 MG/1
CAPSULE ORAL
COMMUNITY
Start: 2024-03-05 | End: 2024-06-26

## 2024-04-11 RX ORDER — FLUOROURACIL 50 MG/G
CREAM TOPICAL
COMMUNITY
Start: 2023-10-25

## 2024-04-11 RX ORDER — TRIAMCINOLONE ACETONIDE 1 MG/G
CREAM TOPICAL
COMMUNITY
Start: 2023-04-25 | End: 2024-06-26

## 2024-04-11 RX ORDER — DOXYCYCLINE 100 MG/1
TABLET ORAL
COMMUNITY
Start: 2024-03-05 | End: 2024-06-26

## 2024-04-11 ASSESSMENT — ASTHMA QUESTIONNAIRES
QUESTION_3 LAST FOUR WEEKS HOW OFTEN DID YOUR ASTHMA SYMPTOMS (WHEEZING, COUGHING, SHORTNESS OF BREATH, CHEST TIGHTNESS OR PAIN) WAKE YOU UP AT NIGHT OR EARLIER THAN USUAL IN THE MORNING: NOT AT ALL
QUESTION_5 LAST FOUR WEEKS HOW WOULD YOU RATE YOUR ASTHMA CONTROL: WELL CONTROLLED
ACT_TOTALSCORE: 24
ACT_TOTALSCORE: 24
QUESTION_1 LAST FOUR WEEKS HOW MUCH OF THE TIME DID YOUR ASTHMA KEEP YOU FROM GETTING AS MUCH DONE AT WORK, SCHOOL OR AT HOME: NONE OF THE TIME
QUESTION_2 LAST FOUR WEEKS HOW OFTEN HAVE YOU HAD SHORTNESS OF BREATH: NOT AT ALL
QUESTION_4 LAST FOUR WEEKS HOW OFTEN HAVE YOU USED YOUR RESCUE INHALER OR NEBULIZER MEDICATION (SUCH AS ALBUTEROL): NOT AT ALL

## 2024-04-11 ASSESSMENT — PAIN SCALES - GENERAL: PAINLEVEL: NO PAIN (0)

## 2024-04-11 NOTE — RESULT ENCOUNTER NOTE
Abhi Chan ,    The results from your recent chest XR appear to be within normal limits.  This is like we discussed in the clinic as well.     Thank you for choosing [facility] for your health care needs,      Adelina Skinner PA-C

## 2024-04-11 NOTE — PROGRESS NOTES
"  Assessment & Plan     Cough, unspecified type    - XR Chest 2 Views; Future  - predniSONE (DELTASONE) 10 MG tablet; Start with 4 tabs (40 mg) x3 days, then 3 tabs (30 mg) x3 days, then 2 tabs (20 mg) x3 days, then 1 tab (10 mg) x3 days, then stop.    Acute non-recurrent maxillary sinusitis    - amoxicillin-clavulanate (AUGMENTIN) 875-125 MG tablet; Take 1 tablet by mouth 2 times daily    Mild persistent asthma without complication    - predniSONE (DELTASONE) 10 MG tablet; Start with 4 tabs (40 mg) x3 days, then 3 tabs (30 mg) x3 days, then 2 tabs (20 mg) x3 days, then 1 tab (10 mg) x3 days, then stop.      Will treat for sinusitis and bronchitis along with prednisone for the mild asthma and coarse lung sounds.  Patient has been able to use his incentive spirometer without concern and is doing well.  Patient to followup if not improving as expected.  Patient understands and agrees with the plan today.        BMI  Estimated body mass index is 31.33 kg/m  as calculated from the following:    Height as of this encounter: 1.833 m (6' 0.17\").    Weight as of this encounter: 105.3 kg (232 lb 1 oz).       Marina Chan is a 73 year old, presenting for the following health issues:  breathing issues (Requesting a xray)        4/11/2024     8:42 AM   Additional Questions   Roomed by Smita Miller CMA   Accompanied by N/A         4/11/2024     8:42 AM   Patient Reported Additional Medications   Patient reports taking the following new medications N/A     History of Present Illness     Asthma:  He presents for follow up of asthma.  He has some cough, some wheezing, and some shortness of breath.  He is using a relief medication a few times a month. He does not miss any doses of his controller medication throughout the week. Patient is aware of the following triggers: animal dander. The patient has had a visit to the Emergency Room, Urgent Care or Hospital due to asthma since the last clinic visit. He has been to the " "Emergency Room or Urgent Care 1 time.He has had a Hospitalization 0 times.    He eats 2-3 servings of fruits and vegetables daily.He exercises with enough effort to increase his heart rate 30 to 60 minutes per day.  He exercises with enough effort to increase his heart rate 6 days per week.   He is taking medications regularly.       Patient continues to have rattling in his chest and a bothersome cough after recent illness that has been ongoing for 1-2 months.  He was treated in [place] with Doxycycline and did improve some, that was in March, but he is back in [place] and his cough is still bothersome and productive.  He is congested in his sinuses and feels a lot of post nasal drip.        Review of Systems  Constitutional, neuro, ENT, endocrine, pulmonary, cardiac, gastrointestinal, genitourinary, musculoskeletal, integument and psychiatric systems are negative, except as otherwise noted.      Objective    BP (!) 153/88 (BP Location: Right arm, Patient Position: Sitting, Cuff Size: Adult Regular)   Pulse 74   Temp 98.3  F (36.8  C) (Oral)   Resp 16   Ht 1.833 m (6' 0.17\")   Wt 105.3 kg (232 lb 1 oz)   SpO2 100%   BMI 31.33 kg/m    Body mass index is 31.33 kg/m .  Physical Exam   GENERAL: alert and no distress  EYES: Eyes grossly normal to inspection, PERRL and conjunctivae and sclerae normal  HENT: ear canals and TM's normal, nose and mouth without ulcers or lesions  NECK: no adenopathy, no asymmetry, masses, or scars  RESP: rhonchi throughout  CV: regular rate and rhythm, normal S1 S2, no S3 or S4, no murmur, click or rub, no peripheral edema  ABDOMEN: soft, nontender, no hepatosplenomegaly, no masses and bowel sounds normal  MS: no gross musculoskeletal defects noted, no edema    CXR - Reviewed and interpreted by me Normal- no infiltrates, effusions, pneumothoraces, cardiomegaly or masses        Signed Electronically by: Adelina Skinner PA-C    "

## 2024-04-11 NOTE — PATIENT INSTRUCTIONS
Please take the antibiotic as prescribed for sinusitis/bronchitis.      Also, the prednisone taper should be taken as follows:  Start with 4 tabs (40 mg) x3 days, then 3 tabs (30 mg) x3 days, then 2 tabs (20 mg) x3 days, then 1 tab (10 mg) x3 days, then stop.

## 2024-05-28 ENCOUNTER — OFFICE VISIT (OUTPATIENT)
Dept: PEDIATRICS | Facility: CLINIC | Age: 74
End: 2024-05-28
Payer: COMMERCIAL

## 2024-05-28 VITALS
SYSTOLIC BLOOD PRESSURE: 134 MMHG | DIASTOLIC BLOOD PRESSURE: 77 MMHG | WEIGHT: 223.5 LBS | OXYGEN SATURATION: 95 % | RESPIRATION RATE: 16 BRPM | TEMPERATURE: 98.4 F | HEIGHT: 72 IN | HEART RATE: 87 BPM | BODY MASS INDEX: 30.27 KG/M2

## 2024-05-28 DIAGNOSIS — J45.30 MILD PERSISTENT ASTHMA WITHOUT COMPLICATION: Primary | ICD-10-CM

## 2024-05-28 DIAGNOSIS — J45.31 MILD PERSISTENT ASTHMA WITH EXACERBATION: ICD-10-CM

## 2024-05-28 PROBLEM — D12.3 BENIGN NEOPLASM OF TRANSVERSE COLON: Status: ACTIVE | Noted: 2024-01-12

## 2024-05-28 PROBLEM — K57.30 DIVERTICULAR DISEASE OF LARGE INTESTINE: Status: ACTIVE | Noted: 2018-12-05

## 2024-05-28 PROBLEM — K63.5 POLYP OF COLON: Status: ACTIVE | Noted: 2024-01-10

## 2024-05-28 PROCEDURE — 99214 OFFICE O/P EST MOD 30 MIN: CPT | Performed by: PHYSICIAN ASSISTANT

## 2024-05-28 RX ORDER — PREDNISONE 20 MG/1
40 TABLET ORAL DAILY
Qty: 10 TABLET | Refills: 0 | Status: SHIPPED | OUTPATIENT
Start: 2024-05-28 | End: 2024-06-26

## 2024-05-28 ASSESSMENT — PAIN SCALES - GENERAL: PAINLEVEL: NO PAIN (0)

## 2024-05-28 NOTE — PROGRESS NOTES
"  Assessment & Plan     Mild persistent asthma without complication    - Adult Allergy/Asthma  Referral; Future    Mild persistent asthma with exacerbation    - predniSONE (DELTASONE) 20 MG tablet; Take 2 tablets (40 mg) by mouth daily    Patient was seen today and currently has uncontrolled chronic asthma.  Plan per below:    Patient Plan:  Please be sure to take the Advair, one puff two times daily for treatment of asthma.      I have also prescribed the Prednisone for you to take again.  For this you will take 40 mg once daily for 5 days.     Please be sure to use the albuterol inhaler every 4-6 hours as needed for shortness of breath, cough or wheezing.      Please be sure to followup with [facility] to address the concern of your asthma not being fully under control.     You can continue the daily allergy pill, Zyrtec, Claritin or Allegra once daily as advised by Dr. Blanco.     Please seek more immediate care if your symptoms change or worsen in any way.        Patient understands and agrees with the plan today.      BMI  Estimated body mass index is 30.61 kg/m  as calculated from the following:    Height as of this encounter: 1.82 m (5' 11.65\").    Weight as of this encounter: 101.4 kg (223 lb 8 oz).       Marina Chan is a 73 year old, presenting for the following health issues:  Asthma (Reoccurring, lung sounds breathing issues)        5/28/2024    10:25 AM   Additional Questions   Roomed by Smita oconnell CMA   Accompanied by N/A         5/28/2024    10:25 AM   Patient Reported Additional Medications   Patient reports taking the following new medications N/A     History of Present Illness     Asthma:  He presents for follow up of asthma.  He has some cough, some wheezing, and some shortness of breath.  He is using a relief medication a few times a month. He does not miss any doses of his controller medication throughout the week. Patient is aware of the following triggers: " "exercise or sports, strong odors and fumes and upper respiratory infections. The patient has not had a visit to the Emergency Room, Urgent Care or Hospital due to asthma since the last clinic visit.     He eats 2-3 servings of fruits and vegetables daily.He consumes 1 sweetened beverage(s) daily.He exercises with enough effort to increase his heart rate 30 to 60 minutes per day.  He exercises with enough effort to increase his heart rate 6 days per week.   He is taking medications regularly.       He states that he is not on the Advair right now, he has not noticed much of a difference, but he is wheezing and can heard sounds in his chest.  He states that he had a hard time golfing more than 9 holes this past weekend due to the cough and shortness of breath.  He is not having fevers or chills.  He was recently treated with an antibiotic and prednisone and he did start to feel better, but his symptoms returned.            Review of Systems  Constitutional, neuro, ENT, endocrine, pulmonary, cardiac, gastrointestinal, genitourinary, musculoskeletal, integument and psychiatric systems are negative, except as otherwise noted.      Objective    /77 (BP Location: Right arm, Patient Position: Sitting, Cuff Size: Adult Regular)   Pulse 87   Temp 98.4  F (36.9  C) (Oral)   Resp 16   Ht 1.82 m (5' 11.65\")   Wt 101.4 kg (223 lb 8 oz)   SpO2 95%   BMI 30.61 kg/m    Body mass index is 30.61 kg/m .  Physical Exam   GENERAL: alert and no distress  EYES: Eyes grossly normal to inspection, PERRL and conjunctivae and sclerae normal  NECK: no adenopathy, no asymmetry, masses, or scars  RESP: Coarse lung sounds with wheezing heard in all lung fields.  Coarse throughout.  Patient took his Advair inhaler and he reports feeling improvement in the clinic.  His lung sounds improved as well, coarse sounds were gone after inhaler.  Still appreciated diffuse expiratory wheezing.    CV: regular rate and rhythm, normal S1 S2, no S3 " or S4, no murmur, click or rub, no peripheral edema  ABDOMEN: soft, nontender, no hepatosplenomegaly, no masses and bowel sounds normal  MS: no gross musculoskeletal defects noted, no edema          Signed Electronically by: Adelina Skinner PA-C

## 2024-05-28 NOTE — PATIENT INSTRUCTIONS
Please be sure to take the Advair, one puff two times daily for treatment of asthma.      I have also prescribed the Prednisone for you to take again.  For this you will take 40 mg once daily for 5 days.     Please be sure to use the albuterol inhaler every 4-6 hours as needed for shortness of breath, cough or wheezing.      Please be sure to followup with [facility] to address the concern of your asthma not being fully under control.     You can continue the daily allergy pill, Zyrtec, Claritin or Allegra once daily as advised by Dr. Blanco.     Please seek more immediate care if your symptoms change or worsen in any way.

## 2024-06-04 ENCOUNTER — MYC REFILL (OUTPATIENT)
Dept: PEDIATRICS | Facility: CLINIC | Age: 74
End: 2024-06-04
Payer: COMMERCIAL

## 2024-06-04 DIAGNOSIS — J45.41 MODERATE PERSISTENT ASTHMA WITH EXACERBATION: ICD-10-CM

## 2024-06-04 DIAGNOSIS — R05.1 ACUTE COUGH: ICD-10-CM

## 2024-06-04 RX ORDER — ALBUTEROL SULFATE 90 UG/1
2 AEROSOL, METERED RESPIRATORY (INHALATION) EVERY 6 HOURS
Qty: 18 G | Refills: 1 | Status: SHIPPED | OUTPATIENT
Start: 2024-06-04 | End: 2024-09-24

## 2024-06-26 ENCOUNTER — OFFICE VISIT (OUTPATIENT)
Dept: PEDIATRICS | Facility: CLINIC | Age: 74
End: 2024-06-26
Payer: COMMERCIAL

## 2024-06-26 VITALS
WEIGHT: 229.1 LBS | BODY MASS INDEX: 32.07 KG/M2 | SYSTOLIC BLOOD PRESSURE: 148 MMHG | HEART RATE: 75 BPM | DIASTOLIC BLOOD PRESSURE: 89 MMHG | TEMPERATURE: 96.4 F | HEIGHT: 71 IN | RESPIRATION RATE: 18 BRPM | OXYGEN SATURATION: 95 %

## 2024-06-26 DIAGNOSIS — B37.0 THRUSH: ICD-10-CM

## 2024-06-26 DIAGNOSIS — J45.30 MILD PERSISTENT ASTHMA WITHOUT COMPLICATION: Primary | ICD-10-CM

## 2024-06-26 PROCEDURE — 99214 OFFICE O/P EST MOD 30 MIN: CPT | Performed by: PHYSICIAN ASSISTANT

## 2024-06-26 RX ORDER — IPRATROPIUM BROMIDE AND ALBUTEROL SULFATE 2.5; .5 MG/3ML; MG/3ML
1 SOLUTION RESPIRATORY (INHALATION) EVERY 6 HOURS PRN
Qty: 90 ML | Refills: 0 | Status: SHIPPED | OUTPATIENT
Start: 2024-06-26 | End: 2024-07-24

## 2024-06-26 RX ORDER — CLOTRIMAZOLE 10 MG/1
10 LOZENGE ORAL
Qty: 70 TROCHE | Refills: 0 | Status: SHIPPED | OUTPATIENT
Start: 2024-06-26

## 2024-06-26 ASSESSMENT — PAIN SCALES - GENERAL: PAINLEVEL: NO PAIN (0)

## 2024-06-26 NOTE — PATIENT INSTRUCTIONS
We will have you try the duoneb every 4-6 hours as needed for asthma symptoms.      Please continue the Advair two times daily and followup as scheduled.     Please seek more immediate care if symptoms change or worsen in any way.

## 2024-06-26 NOTE — PROGRESS NOTES
"  Assessment & Plan     Mild persistent asthma without complication    - Nebulizer and Supplies Order for DME - ONLY FOR DME  - ipratropium - albuterol 0.5 mg/2.5 mg/3 mL (DUONEB) 0.5-2.5 (3) MG/3ML neb solution; Take 1 vial (3 mLs) by nebulization every 6 hours as needed for shortness of breath, wheezing or cough    Thrush    - clotrimazole (MYCELEX) 10 MG lozenge; Place 1 lozenge (10 mg) inside cheek 5 times daily      Patient Plan:  We will have you try the duoneb every 4-6 hours as needed for asthma symptoms.      Please continue the Advair two times daily and followup as scheduled.     Please seek more immediate care if symptoms change or worsen in any way.        Patient understands and agrees with the plan today.      BMI  Estimated body mass index is 31.69 kg/m  as calculated from the following:    Height as of this encounter: 1.811 m (5' 11.3\").    Weight as of this encounter: 103.9 kg (229 lb 1.6 oz).       Marina Chan is a 74 year old, presenting for the following health issues:  Asthma      6/26/2024     8:13 AM   Additional Questions   Roomed by LO   Accompanied by Self         6/26/2024     8:13 AM   Patient Reported Additional Medications   Patient reports taking the following new medications No     History of Present Illness     Asthma:  He presents for follow up of asthma.  He has some cough, some wheezing, and some shortness of breath.  He is using a relief medication 2-3 times per day. He does not miss any doses of his controller medication throughout the week. Patient is aware of the following triggers: pollens. The patient has not had a visit to the Emergency Room, Urgent Care or Hospital due to asthma since the last clinic visit.     He eats 2-3 servings of fruits and vegetables daily.He consumes 2 sweetened beverage(s) daily.He exercises with enough effort to increase his heart rate 30 to 60 minutes per day.  He exercises with enough effort to increase his heart rate 5 days per week. " "  He is taking medications regularly.       Asthma Follow-Up    Was ACT completed today?  Yes        4/11/2024     8:37 AM   ACT Total Scores   ACT TOTAL SCORE (Goal Greater than or Equal to 20) 24   In the past 12 months, how many times did you visit the emergency room for your asthma without being admitted to the hospital? 0   In the past 12 months, how many times were you hospitalized overnight because of your asthma? 0       Patient is here for an asthma followup.  He reports that over the weekend his breathing was bad again.  He was outside in the humid air and he was coughing a lot.  He says that his wife wanted him to followup.  He reports that now his breathing is better.  He is taking his albuterol about 30 minutes before he takes his Advair and that seems to really open him up.  He states that typically his Oxygen hangs around 90/91 and today he is at 94/95.  He continues to feel wet and like he is producing a lot of saliva and drainage.  He cannot tolerate the Zyrtec he tried for seasonal allergies and congestion as this made him feel too funny.  He is taking a non-drowsy over the counter antihistamine that is a 4 hour tablet and that helps him in the morning.  He still feels himself wheezing when he lays down at night.          Review of Systems  Constitutional, neuro, ENT, endocrine, pulmonary, cardiac, gastrointestinal, genitourinary, musculoskeletal, integument and psychiatric systems are negative, except as otherwise noted.      Objective    BP (!) 148/89   Pulse 75   Temp (!) 96.4  F (35.8  C) (Tympanic)   Resp 18   Ht 1.811 m (5' 11.3\")   Wt 103.9 kg (229 lb 1.6 oz)   SpO2 95%   BMI 31.69 kg/m    Body mass index is 31.69 kg/m .  Physical Exam   GENERAL: alert and no distress  EYES: Eyes grossly normal to inspection, PERRL and conjunctivae and sclerae normal  NECK: no adenopathy, no asymmetry, masses, or scars  RESP: Mildly coarse lung sounds, that seem transient and move with cough.  No " wheezing.    CV: regular rate and rhythm, normal S1 S2, no S3 or S4, no murmur, click or rub, no peripheral edema  MS: no gross musculoskeletal defects noted, no edema          Signed Electronically by: Adelina Skinner PA-C

## 2024-07-24 DIAGNOSIS — J45.30 MILD PERSISTENT ASTHMA WITHOUT COMPLICATION: ICD-10-CM

## 2024-07-24 RX ORDER — IPRATROPIUM BROMIDE AND ALBUTEROL SULFATE 2.5; .5 MG/3ML; MG/3ML
1 SOLUTION RESPIRATORY (INHALATION) EVERY 6 HOURS PRN
Qty: 60 ML | Refills: 0 | Status: SHIPPED | OUTPATIENT
Start: 2024-07-24 | End: 2024-09-24

## 2024-07-25 ENCOUNTER — HOSPITAL ENCOUNTER (EMERGENCY)
Facility: CLINIC | Age: 74
Discharge: HOME | End: 2024-07-26
Attending: EMERGENCY MEDICINE | Admitting: EMERGENCY MEDICINE
Payer: COMMERCIAL

## 2024-07-25 DIAGNOSIS — I47.10 PAROXYSMAL SUPRAVENTRICULAR TACHYCARDIA (H): ICD-10-CM

## 2024-07-25 LAB
ANION GAP SERPL CALCULATED.3IONS-SCNC: 13 MMOL/L (ref 7–15)
BASOPHILS # BLD AUTO: 0.1 10E3/UL (ref 0–0.2)
BASOPHILS NFR BLD AUTO: 1 %
BUN SERPL-MCNC: 16.1 MG/DL (ref 8–23)
CALCIUM SERPL-MCNC: 9.7 MG/DL (ref 8.8–10.4)
CHLORIDE SERPL-SCNC: 104 MMOL/L (ref 98–107)
CREAT SERPL-MCNC: 1.06 MG/DL (ref 0.67–1.17)
EGFRCR SERPLBLD CKD-EPI 2021: 75 ML/MIN/1.73M2
EOSINOPHIL # BLD AUTO: 0.2 10E3/UL (ref 0–0.7)
EOSINOPHIL NFR BLD AUTO: 2 %
ERYTHROCYTE [DISTWIDTH] IN BLOOD BY AUTOMATED COUNT: 13.1 % (ref 10–15)
GLUCOSE SERPL-MCNC: 98 MG/DL (ref 70–99)
HCO3 SERPL-SCNC: 22 MMOL/L (ref 22–29)
HCT VFR BLD AUTO: 46.3 % (ref 40–53)
HGB BLD-MCNC: 15.6 G/DL (ref 13.3–17.7)
IMM GRANULOCYTES # BLD: 0 10E3/UL
IMM GRANULOCYTES NFR BLD: 0 %
LYMPHOCYTES # BLD AUTO: 2.2 10E3/UL (ref 0.8–5.3)
LYMPHOCYTES NFR BLD AUTO: 20 %
MCH RBC QN AUTO: 31 PG (ref 26.5–33)
MCHC RBC AUTO-ENTMCNC: 33.7 G/DL (ref 31.5–36.5)
MCV RBC AUTO: 92 FL (ref 78–100)
MONOCYTES # BLD AUTO: 1.2 10E3/UL (ref 0–1.3)
MONOCYTES NFR BLD AUTO: 11 %
NEUTROPHILS # BLD AUTO: 7.2 10E3/UL (ref 1.6–8.3)
NEUTROPHILS NFR BLD AUTO: 66 %
NRBC # BLD AUTO: 0 10E3/UL
NRBC BLD AUTO-RTO: 0 /100
PLATELET # BLD AUTO: 162 10E3/UL (ref 150–450)
POTASSIUM SERPL-SCNC: 4 MMOL/L (ref 3.4–5.3)
RBC # BLD AUTO: 5.04 10E6/UL (ref 4.4–5.9)
SODIUM SERPL-SCNC: 139 MMOL/L (ref 135–145)
WBC # BLD AUTO: 10.9 10E3/UL (ref 4–11)

## 2024-07-25 PROCEDURE — 96360 HYDRATION IV INFUSION INIT: CPT | Mod: 59

## 2024-07-25 PROCEDURE — 85025 COMPLETE CBC W/AUTO DIFF WBC: CPT | Performed by: EMERGENCY MEDICINE

## 2024-07-25 PROCEDURE — 80048 BASIC METABOLIC PNL TOTAL CA: CPT | Performed by: EMERGENCY MEDICINE

## 2024-07-25 PROCEDURE — 258N000003 HC RX IP 258 OP 636: Performed by: EMERGENCY MEDICINE

## 2024-07-25 PROCEDURE — 85041 AUTOMATED RBC COUNT: CPT | Performed by: EMERGENCY MEDICINE

## 2024-07-25 PROCEDURE — 93005 ELECTROCARDIOGRAM TRACING: CPT

## 2024-07-25 PROCEDURE — 84443 ASSAY THYROID STIM HORMONE: CPT | Performed by: EMERGENCY MEDICINE

## 2024-07-25 PROCEDURE — 83880 ASSAY OF NATRIURETIC PEPTIDE: CPT | Performed by: EMERGENCY MEDICINE

## 2024-07-25 PROCEDURE — 84484 ASSAY OF TROPONIN QUANT: CPT | Performed by: EMERGENCY MEDICINE

## 2024-07-25 PROCEDURE — 36415 COLL VENOUS BLD VENIPUNCTURE: CPT | Performed by: EMERGENCY MEDICINE

## 2024-07-25 PROCEDURE — 99284 EMERGENCY DEPT VISIT MOD MDM: CPT | Mod: 25

## 2024-07-25 RX ADMIN — SODIUM CHLORIDE 1000 ML: 9 INJECTION, SOLUTION INTRAVENOUS at 23:46

## 2024-07-25 ASSESSMENT — COLUMBIA-SUICIDE SEVERITY RATING SCALE - C-SSRS
1. IN THE PAST MONTH, HAVE YOU WISHED YOU WERE DEAD OR WISHED YOU COULD GO TO SLEEP AND NOT WAKE UP?: NO
6. HAVE YOU EVER DONE ANYTHING, STARTED TO DO ANYTHING, OR PREPARED TO DO ANYTHING TO END YOUR LIFE?: NO
2. HAVE YOU ACTUALLY HAD ANY THOUGHTS OF KILLING YOURSELF IN THE PAST MONTH?: NO

## 2024-07-25 ASSESSMENT — ACTIVITIES OF DAILY LIVING (ADL): ADLS_ACUITY_SCORE: 38

## 2024-07-26 VITALS
SYSTOLIC BLOOD PRESSURE: 101 MMHG | BODY MASS INDEX: 30.79 KG/M2 | HEIGHT: 72 IN | WEIGHT: 227.29 LBS | TEMPERATURE: 98.4 F | DIASTOLIC BLOOD PRESSURE: 69 MMHG | RESPIRATION RATE: 12 BRPM | HEART RATE: 72 BPM | OXYGEN SATURATION: 95 %

## 2024-07-26 LAB
ATRIAL RATE - MUSE: 153 BPM
DIASTOLIC BLOOD PRESSURE - MUSE: NORMAL MMHG
HOLD SPECIMEN: NORMAL
HOLD SPECIMEN: NORMAL
INTERPRETATION ECG - MUSE: NORMAL
NT-PROBNP SERPL-MCNC: 182 PG/ML (ref 0–900)
P AXIS - MUSE: NORMAL DEGREES
PR INTERVAL - MUSE: NORMAL MS
QRS DURATION - MUSE: 88 MS
QT - MUSE: 320 MS
QTC - MUSE: 493 MS
R AXIS - MUSE: -8 DEGREES
SYSTOLIC BLOOD PRESSURE - MUSE: NORMAL MMHG
T AXIS - MUSE: 57 DEGREES
TROPONIN T SERPL HS-MCNC: 27 NG/L
TROPONIN T SERPL HS-MCNC: 27 NG/L
TSH SERPL DL<=0.005 MIU/L-ACNC: 3.74 UIU/ML (ref 0.3–4.2)
VENTRICULAR RATE- MUSE: 143 BPM

## 2024-07-26 PROCEDURE — 84484 ASSAY OF TROPONIN QUANT: CPT | Performed by: EMERGENCY MEDICINE

## 2024-07-26 PROCEDURE — 96361 HYDRATE IV INFUSION ADD-ON: CPT

## 2024-07-26 PROCEDURE — 36415 COLL VENOUS BLD VENIPUNCTURE: CPT | Performed by: EMERGENCY MEDICINE

## 2024-07-26 ASSESSMENT — ACTIVITIES OF DAILY LIVING (ADL): ADLS_ACUITY_SCORE: 38

## 2024-07-26 NOTE — ED TRIAGE NOTES
Patient reports he has been having palpitations throughout today.  Patient states he has noted heart rates in the 140's on his apple watch.      Triage Assessment (Adult)       Row Name 07/25/24 4536          Triage Assessment    Airway WDL WDL        Respiratory WDL    Respiratory WDL WDL        Skin Circulation/Temperature WDL    Skin Circulation/Temperature WDL WDL        Cardiac WDL    Cardiac WDL WDL        Peripheral/Neurovascular WDL    Peripheral Neurovascular WDL WDL        Cognitive/Neuro/Behavioral WDL    Cognitive/Neuro/Behavioral WDL WDL

## 2024-07-26 NOTE — ED PROVIDER NOTES
Emergency Department Note      History of Present Illness     Chief Complaint   Palpitations    HPI   Dustin Lan is a 74 year old male with a history of hypertension and hyperlipidemia who presents to the ED today for evaluation of heart palpitations. The patient reports his heart rate has been very high on and off throughout the day. He reports that laying down causes his heart to beat very hard and very fast, feeling different from normal. He went golfing two days ago and reports being fine then. He also reports that he is on a cephalosporin for his lungs and sinusitis, in addition to medications for hypertension. He denies any vomiting, diarrhea, or fever. He was previously on Betanate and doxycycline for complications following him having Covid-19 in January. He reports that he had a vasovagal event that he wore a heart monitor for in 2016, and has been taking blood pressure medications ever since.     Independent Historian   None    Review of External Notes   I reviewed Care Everywhere and updated Epic.     Past Medical History   Medical History and Problem List   Past Medical History:   Diagnosis Date    Hypertension     Personal history of colonic polyps 02/2007    Plantar fascial fibromatosis     Pure hypercholesterolemia      Medications   atorvastatin (LIPITOR) 10 MG tablet  diltiazem ER (TIADYLT ER) 240 MG 24 hr ER beaded capsule  ADVAIR DISKUS 250-50 MCG/ACT inhaler  albuterol (PROAIR HFA/PROVENTIL HFA/VENTOLIN HFA) 108 (90 Base) MCG/ACT inhaler  Ascorbic Acid (VITAMIN C PO)  clotrimazole (MYCELEX) 10 MG lozenge  COMIRNATY 30 MCG/0.3ML JITENDRA  FLUAD QUADRIVALENT 0.5 ML PRSY injection  fluorouracil (EFUDEX) 5 % external cream  glucosamine-chondroitin 500-400 MG CAPS per capsule  ipratropium - albuterol 0.5 mg/2.5 mg/3 mL (DUONEB) 0.5-2.5 (3) MG/3ML neb solution  multivitamin, therapeutic (THERA-VIT) TABS tablet    Surgical History   Past Surgical History:   Procedure Laterality Date    ARTHROPLASTY  KNEE Left 5/8/2019    Procedure: ARTHROPLASTY LEFT KNEE;  Surgeon: Miguel Buckley MD;  Location:  OR    ARTHROPLASTY KNEE Right 7/15/2021    Procedure: RIGHT TOTAL KNEE ARTHROPLASTY;  Surgeon: Miguel Buckley MD;  Location:  OR    ARTHROSCOPY KNEE RT/LT  12/2008    right    BACK SURGERY      1988    COLONOSCOPY  2/2007    ileocecal valve polyp, tubular adenoma, repeat in 3 yrs    INJECT STEROID (LOCATION) Right 5/8/2019    Procedure: Inject steroid right knee;  Surgeon: Miguel Buckley MD;  Location:  OR       Physical Exam     Patient Vitals for the past 24 hrs:   BP Temp Temp src Pulse Resp SpO2 Height Weight   07/26/24 0112 96/73 -- -- 76 13 96 % -- --   07/26/24 0057 109/69 -- -- 76 12 98 % -- --   07/26/24 0042 109/73 -- -- 74 15 97 % -- --   07/26/24 0030 107/79 -- -- 77 11 98 % -- --   07/26/24 0015 111/80 -- -- 81 10 94 % -- --   07/26/24 0000 108/82 -- -- 82 21 97 % -- --   07/25/24 2345 125/95 -- -- 89 -- 96 % -- --   07/25/24 2335 96/79 -- -- 126 -- 99 % -- --   07/25/24 2315 126/85 -- -- 144 -- 99 % -- --   07/25/24 2304  119/92 -- -- 144 12 -- -- --   07/25/24 2246 153/130 98.4  F (36.9  C) Oral 110 18 100 % 1.829 m (6') 103.1 kg (227 lb 4.7 oz)     Physical Exam  Nursing note and vitals reviewed.  Constitutional: Cooperative.   HENT:   Mouth/Throat: Mucous membranes are normal.   Eyes: Pupils are equal, round, and reactive to light.   Cardiovascular: Normal rate, regular rhythm and normal heart sounds.  No murmur.  Pulmonary/Chest: Effort normal and breath sounds normal. No respiratory distress. No wheezes. No rales.   Abdominal: Soft. Normal appearance and bowel sounds are normal. No distension. There is no tenderness. There is no rigidity and no guarding.   Musculoskeletal: Normal range of motion.   Neurological: Alert.   Skin: Skin is warm and dry. No rash noted.   Psychiatric: Normal mood and affect.       Diagnostics   Lab Results   Labs Ordered and Resulted from Time of ED Arrival to  Time of ED Departure   TROPONIN T, HIGH SENSITIVITY - Abnormal       Result Value    Troponin T, High Sensitivity 27 (*)    TROPONIN T, HIGH SENSITIVITY - Abnormal    Troponin T, High Sensitivity 27 (*)    BASIC METABOLIC PANEL - Normal    Sodium 139      Potassium 4.0      Chloride 104      Carbon Dioxide (CO2) 22      Anion Gap 13      Urea Nitrogen 16.1      Creatinine 1.06      GFR Estimate 75      Calcium 9.7      Glucose 98     TSH WITH FREE T4 REFLEX - Normal    TSH 3.74     NT PROBNP INPATIENT - Normal    N terminal Pro BNP Inpatient 182     CBC WITH PLATELETS AND DIFFERENTIAL    WBC Count 10.9      RBC Count 5.04      Hemoglobin 15.6      Hematocrit 46.3      MCV 92      MCH 31.0      MCHC 33.7      RDW 13.1      Platelet Count 162      % Neutrophils 66      % Lymphocytes 20      % Monocytes 11      % Eosinophils 2      % Basophils 1      % Immature Granulocytes 0      NRBCs per 100 WBC 0      Absolute Neutrophils 7.2      Absolute Lymphocytes 2.2      Absolute Monocytes 1.2      Absolute Eosinophils 0.2      Absolute Basophils 0.1      Absolute Immature Granulocytes 0.0      Absolute NRBCs 0.0         Imaging   No orders to display     EKG   ECG taken at 2253, ECG read at 2334  Supraventricular tachycardia   Rate 143 bpm. IL interval * ms. QRS duration 88 ms. QT/QTc 320/493 ms. P-R-T axes * -8 57.    Independent Interpretation   None    ED Course    Medications Administered   Medications   sodium chloride 0.9% BOLUS 1,000 mL (0 mLs Intravenous Stopped 7/26/24 0127)     Procedures   Vagal maneuver mediated cardioversion         Procedure: Cardioversion        Indication: SVT     Consent: Verbal from Patient     Anesthesia/Sedation: None     Procedure Detail:   In a semirecumbent position the patient was instructed to breath-hold and bear down for 10 seconds.  Following this he was placed in Trendelenburg with bilateral leg lift.  On the second attempt he converted to sinus rhythm.     Patient Status:  The  patient tolerated the procedure well: Yes. There were no complications.     Discussion of Management   None    ED Course   ED Course as of 07/26/24 0148   Thu Jul 25, 2024   2334 I obtained history and examined the patient as noted above.    2342 Converted with modified vagal maneuver and leg lift.    Fri Jul 26, 2024   0029 I rechecked and updated the patient.        Optional/Additional Documentation  None    Medical Decision Making / Diagnosis     VANESSA Lan is a 74 year old male who presents with narrow complex regular tachycardia.  Given appearance and resolution with vagal maneuvers this is most consistent with paroxysmal supraventricular tachycardia.  Unlikely represent atrial fibrillation or atrial flutter or other malignant arrhythmias.  Sounds like he has been tachycardic most of the day.  He had a mild troponin leak that was unchanged after 2 hours making primary cardiac ischemia very unlikely.  I suspect this is simply due to strain from his prolonged tachycardia.  Would have him continue his current blood pressure medications with referral placed for cardiology and consideration of EP study in the future.  Return precautions discussed.    Disposition   The patient was discharged.     Diagnosis     ICD-10-CM    1. Paroxysmal supraventricular tachycardia (H24)  I47.10            Scribe Disclosure:  Sandra BRADFORD, am serving as a scribe at 12:49 AM on 7/26/2024 to document services personally performed by Hermann Bowden MD based on my observations and the provider's statements to me.        Hermann Bowden MD  07/26/24 0620

## 2024-07-29 ENCOUNTER — PATIENT OUTREACH (OUTPATIENT)
Dept: PEDIATRICS | Facility: CLINIC | Age: 74
End: 2024-07-29
Payer: COMMERCIAL

## 2024-07-29 NOTE — TELEPHONE ENCOUNTER
"Please call and complete TCM outreach.    Seen in ED with SVT    \"Would have him continue his current blood pressure medications with referral placed for cardiology and consideration of EP study in the future. \"      Heather SANDERS RN on 7/29/2024 at 10:35 AM     "

## 2024-07-30 NOTE — TELEPHONE ENCOUNTER
Called the patient at [phone] to complete TCM Outreach   - Patient states that he is doing well and reports that he has not experienced any more episodes since the ED visit   - Patient is scheduled for an upcoming appointment with cardiology on 8/26/2024     Transitions of Care Outreach  Chief Complaint   Patient presents with    Hospital F/U       Most Recent Admission Date: 7/25/2024   Most Recent Admission Diagnosis:      Most Recent Discharge Date: 7/26/2024   Most Recent Discharge Diagnosis: Paroxysmal supraventricular tachycardia (H24) - I47.10     Transitions of Care Assessment    Discharge Assessment  How are you doing now that you are home?: Patient states that he is doing well and reports that he has not experienced any more episodes since the ED visit.  How are your symptoms? (Red Flag symptoms escalate to triage hotline per guidelines): Improved  Do you know how to contact your clinic care team if you have future questions or changes to your health status? : Yes  Does the patient have their discharge instructions? : Yes  Does the patient have questions regarding their discharge instructions? : No  Were you started on any new medications or were there changes to any of your previous medications? : No  Does the patient have all of their medications?: Yes  Do you have questions regarding any of your medications? : No  Do you have all of your needed medical supplies or equipment (DME)?  (i.e. oxygen tank, CPAP, cane, etc.): Yes    Follow up Plan     Discharge Follow-Up  Discharge follow up appointment scheduled in alignment with recommended follow up timeframe or Transitions of Risk Category? (Low = within 30 days; Moderate= within 14 days; High= within 7 days): Yes  Discharge Follow Up Appointment Date: 08/26/24  Discharge Follow Up Appointment Scheduled with?: Specialty Care Provider    Future Appointments   Date Time Provider Department Center   8/26/2024  8:30 AM Dustin Rodas MD [facility]  [facility]       Outpatient Plan as outlined on AVS reviewed with patient.    For any urgent concerns, please contact our 24 hour nurse triage line: [phone] ([phone])       Lorraine Greenwood RN

## 2024-08-06 NOTE — PROGRESS NOTES
PHYSICIAN NOTE:  This visit was completed in person at the [facility].      I had the pleasure of seeing Mr. Dustin Lan for evaluation of paroxysmal tachycardia symptoms. He is being referred by the ER physicians at [facility].    Very pleasant 74-year-old male with HTN and dyslipidemia. In the winter of  he had an episode of palpitation and severe dizziness/brief syncope while driving. An outpatient cardiac monitor showed an asymptomatic 25-beat of narrow QRS tachycardia. He was placed on dilt XT by Dr. GINA Kohler (primarily for HTN).     He presented to the ER on 2024 with tachycardia. ECG revealed narrow QRS tachycardia at 143 bpm. Termination after vagal maneuver.    Since then he has been monitoring his heart rate closely using his FitBid. He has had occasional heart rates in the 90s to low 100s and did not feel quite right. He even tried vagal maneuvers for a heart rate in the 90s last night. His heart rate did not slow down.    , retired. Non-smoker, modest ETOH use.    Father  of heart attack at 69.      PHYSICAL EXAMINATION:  Vital signs: 129/81, 74, 102.7 kg, BMI 31.6.  General: very pleasant gentlemen, in no apparent distress  ENT/Mouth:  no nasal discharge.  Eyes:  normal conjunctivae.   Neck:  no thyromegaly or lymphadenopathy.  Chest/Lungs:  patient is not dyspneic.  Lungs CTA, without rales or wheezing  Cardiovascular:  nl JVP, rhythm is regular.  No gallop, murmur or rub.    Abdomen: mild abdominal obesity.    Extremities:  no edema  Skin:  no xanthelasma.    Neurologic:  alert & oriented x 3.  No tremor.    Vascular:  2+ carotids without bruits.        DIAGNOSTIC STUDIES (reviewed/interpreted in the clinic today):  Laboratory studies: potassium 4.0, creatinine 1.06, calcium 9.7, TSH 3.74, troponin T 27 - 27, hematocrit 46.3%, platelets 162K.  ECG: previous ECGs show SR, no preexcitation, WNL  Stress echocardiogram (2018): exercised for 7 minutes  "and 53 seconds, normal stress echocardiogram.      IMPRESSION:  Narrow QRS tachycardia. It reportedly terminated after Valsalva maneuver in the ER. ECG baseline is a bit \"noisy\" making it difficult to be sure whether this was SVT or 2:1 atrial tachycardia/flutter. No clear prior or subsequent episodes of such tachycardia.  I had a good discussion with Mr. Lan regarding the most likely diagnosis which is SVT. We discussed that this arrhythmia is not life-threatening. However, it may recur. He should continue taking diltiazem for both arrhythmia suppression and HTN.  Given that he has only had a single documented episode of tachycardia I would not recommend EP study/catheter ablation at this juncture. We would of course reconsider if he develops recurrences of this arrhythmia in the future.    RECOMMENDATIONS:  continue present medical therapy.  Follow-up with EP on an as-needed basis.    It was my pleasure seeing this delightful patient.  Please feel free to call with any questions.     The longitudinal plan of care for the diagnosis(es)/condition(s) as documented were addressed during this visit. Due to the added complexity in care, I will continue to support Dustin in the subsequent management and with ongoing continuity of care.     Zion Zuñiga MD, [facility]      (Chart documentation was completed, in part, using Dragon voice-recognition software. The note was reviewed, however grammatical and spelling errors may be present.)      CURRENT MEDICATIONS:  Current Outpatient Medications   Medication Sig Dispense Refill    ADVAIR DISKUS 250-50 MCG/ACT inhaler Inhale 1 puff into the lungs 2 times daily 3 each 1    albuterol (PROAIR HFA/PROVENTIL HFA/VENTOLIN HFA) 108 (90 Base) MCG/ACT inhaler Inhale 2 puffs into the lungs every 6 hours 18 g 1    Ascorbic Acid (VITAMIN C PO) Take 1 tablet by mouth every morning      atorvastatin (LIPITOR) 10 MG tablet Take 1 tablet (10 mg) by mouth daily 90 tablet 3    clotrimazole " (MYCELEX) 10 MG lozenge Place 1 lozenge (10 mg) inside cheek 5 times daily 70 Heydi 0    COMIRNATY 30 MCG/0.3ML JITENDRA       diltiazem ER (TIADYLT ER) 240 MG 24 hr ER beaded capsule Take 1 capsule (240 mg) by mouth daily 90 capsule 3    FLUAD QUADRIVALENT 0.5 ML PRSY injection       fluorouracil (EFUDEX) 5 % external cream PLEASE SEE ATTACHED FOR DETAILED DIRECTIONS      glucosamine-chondroitin 500-400 MG CAPS per capsule Take 1 capsule by mouth 2 times daily      ipratropium - albuterol 0.5 mg/2.5 mg/3 mL (DUONEB) 0.5-2.5 (3) MG/3ML neb solution TAKE 1 VIAL (3 MLS) BY NEBULIZATION EVERY 6 HOURS AS NEEDED FOR SHORTNESS OF BREATH, WHEEZING OR COUGH 60 mL 0    multivitamin, therapeutic (THERA-VIT) TABS tablet Take 1 tablet by mouth daily         ALLERGIES     Allergies   Allergen Reactions    Cats        PAST MEDICAL HISTORY:  Past Medical History:   Diagnosis Date    Hypertension     Personal history of colonic polyps 2007    tubular adenoma, ileocecal valve, repeat     Plantar fascial fibromatosis     Pure hypercholesterolemia        PAST SURGICAL HISTORY:  Past Surgical History:   Procedure Laterality Date    ARTHROPLASTY KNEE Left 2019    Procedure: ARTHROPLASTY LEFT KNEE;  Surgeon: Miguel Buckley MD;  Location:  OR    ARTHROPLASTY KNEE Right 7/15/2021    Procedure: RIGHT TOTAL KNEE ARTHROPLASTY;  Surgeon: Miguel Buckley MD;  Location:  OR    ARTHROSCOPY KNEE RT/LT  2008    right    BACK SURGERY          COLONOSCOPY  2007    ileocecal valve polyp, tubular adenoma, repeat in 3 yrs    INJECT STEROID (LOCATION) Right 2019    Procedure: Inject steroid right knee;  Surgeon: Miguel Buckley MD;  Location:  OR       FAMILY HISTORY:  Family History   Problem Relation Age of Onset    Cerebrovascular Disease Mother 75         of a stroke    C.A.D. Father 67         suddenly when using the  at age 67. He was a smoker. On autopsy, told to have multiple heart attacks and  scar tissue but he had never veronika a clinical heart attack.    Lipids Sister        SOCIAL HISTORY:  Social History     Socioeconomic History    Marital status:     Number of children: 2   Occupational History    Occupation: Retired lumbar sales   Tobacco Use    Smoking status: Never     Passive exposure: Never    Smokeless tobacco: Never   Vaping Use    Vaping status: Never Used   Substance and Sexual Activity    Alcohol use: Yes     Alcohol/week: 0.0 - 3.0 standard drinks of alcohol     Comment: 5 - 7 beers a week    Drug use: No    Sexual activity: Yes     Partners: Female   Other Topics Concern    Parent/sibling w/ CABG, MI or angioplasty before 65F 55M? No     Social Determinants of Health     Financial Resource Strain: Low Risk  (9/6/2023)    Overall Financial Resource Strain (CARDIA)     Difficulty of Paying Living Expenses: Not hard at all   Food Insecurity: No Food Insecurity (9/6/2023)    Hunger Vital Sign     Worried About Running Out of Food in the Last Year: Never true     Ran Out of Food in the Last Year: Never true   Transportation Needs: No Transportation Needs (9/6/2023)    PRAPARE - Transportation     Lack of Transportation (Medical): No     Lack of Transportation (Non-Medical): No   Physical Activity: Sufficiently Active (9/6/2023)    Exercise Vital Sign     Days of Exercise per Week: 5 days     Minutes of Exercise per Session: 50 min   Stress: No Stress Concern Present (9/6/2023)    [place] [place] [employer] - Occupational Stress Questionnaire     Feeling of Stress : Not at all   Social Connections: Moderately Integrated (9/6/2023)    Social Connection and Isolation Panel [NHANES]     Frequency of Communication with Friends and Family: Never     Frequency of Social Gatherings with Friends and Family: Once a week     Attends Nondenominational Services: 1 to 4 times per year     Active Member of Clubs or Organizations: Yes     Marital Status:    Interpersonal Safety: Low Risk   (4/11/2024)    Interpersonal Safety     Do you feel physically and emotionally safe where you currently live?: Yes     Within the past 12 months, have you been hit, slapped, kicked or otherwise physically hurt by someone?: No     Within the past 12 months, have you been humiliated or emotionally abused in other ways by your partner or ex-partner?: No   Housing Stability: Unknown (9/6/2023)    Housing Stability Vital Sign     Unable to Pay for Housing in the Last Year: No     Unstable Housing in the Last Year: No

## 2024-08-07 ENCOUNTER — OFFICE VISIT (OUTPATIENT)
Dept: CARDIOLOGY | Facility: CLINIC | Age: 74
End: 2024-08-07
Payer: COMMERCIAL

## 2024-08-07 VITALS
WEIGHT: 226.5 LBS | HEART RATE: 74 BPM | SYSTOLIC BLOOD PRESSURE: 129 MMHG | HEIGHT: 71 IN | DIASTOLIC BLOOD PRESSURE: 81 MMHG | OXYGEN SATURATION: 95 % | BODY MASS INDEX: 31.71 KG/M2

## 2024-08-07 DIAGNOSIS — I47.10 PAROXYSMAL SUPRAVENTRICULAR TACHYCARDIA (H): Primary | ICD-10-CM

## 2024-08-07 PROCEDURE — 99203 OFFICE O/P NEW LOW 30 MIN: CPT | Performed by: INTERNAL MEDICINE

## 2024-08-07 NOTE — LETTER
2024    Alessandro Blanco MD  [address] [place] [address]    RE: Dustin Lan       Dear Colleague,     I had the pleasure of seeing Dustin Lan in the [facility].  PHYSICIAN NOTE:  This visit was completed in person at the [facility].      I had the pleasure of seeing Mr. Dustin Lan for evaluation of paroxysmal tachycardia symptoms. He is being referred by the ER physicians at [facility].    Very pleasant 74-year-old male with HTN and dyslipidemia. In the winter of  he had an episode of palpitation and severe dizziness/brief syncope while driving. An outpatient cardiac monitor showed an asymptomatic 25-beat of narrow QRS tachycardia. He was placed on dilt XT by Dr. GINA Kohler (primarily for HTN).     He presented to the ER on 2024 with tachycardia. ECG revealed narrow QRS tachycardia at 143 bpm. Termination after vagal maneuver.    Since then he has been monitoring his heart rate closely using his FitBid. He has had occasional heart rates in the 90s to low 100s and did not feel quite right. He even tried vagal maneuvers for a heart rate in the 90s last night. His heart rate did not slow down.    , retired. Non-smoker, modest ETOH use.    Father  of heart attack at 69.      PHYSICAL EXAMINATION:  Vital signs: 129/81, 74, 102.7 kg, BMI 31.6.  General: very pleasant gentlemen, in no apparent distress  ENT/Mouth:  no nasal discharge.  Eyes:  normal conjunctivae.   Neck:  no thyromegaly or lymphadenopathy.  Chest/Lungs:  patient is not dyspneic.  Lungs CTA, without rales or wheezing  Cardiovascular:  nl JVP, rhythm is regular.  No gallop, murmur or rub.    Abdomen: mild abdominal obesity.    Extremities:  no edema  Skin:  no xanthelasma.    Neurologic:  alert & oriented x 3.  No tremor.    Vascular:  2+ carotids without bruits.        DIAGNOSTIC STUDIES (reviewed/interpreted in the clinic today):  Laboratory studies:  "potassium 4.0, creatinine 1.06, calcium 9.7, TSH 3.74, troponin T 27 - 27, hematocrit 46.3%, platelets 162K.  ECG: previous ECGs show SR, no preexcitation, WNL  Stress echocardiogram (03/2018): exercised for 7 minutes and 53 seconds, normal stress echocardiogram.      IMPRESSION:  Narrow QRS tachycardia. It reportedly terminated after Valsalva maneuver in the ER. ECG baseline is a bit \"noisy\" making it difficult to be sure whether this was SVT or 2:1 atrial tachycardia/flutter. No clear prior or subsequent episodes of such tachycardia.  I had a good discussion with Mr. Lan regarding the most likely diagnosis which is SVT. We discussed that this arrhythmia is not life-threatening. However, it may recur. He should continue taking diltiazem for both arrhythmia suppression and HTN.  Given that he has only had a single documented episode of tachycardia I would not recommend EP study/catheter ablation at this juncture. We would of course reconsider if he develops recurrences of this arrhythmia in the future.    RECOMMENDATIONS:  continue present medical therapy.  Follow-up with EP on an as-needed basis.    It was my pleasure seeing this delightful patient.  Please feel free to call with any questions.     The longitudinal plan of care for the diagnosis(es)/condition(s) as documented were addressed during this visit. Due to the added complexity in care, I will continue to support Dustin in the subsequent management and with ongoing continuity of care.     Zion Zuñiga MD, [facility]      (Chart documentation was completed, in part, using Dragon voice-recognition software. The note was reviewed, however grammatical and spelling errors may be present.)      CURRENT MEDICATIONS:  Current Outpatient Medications   Medication Sig Dispense Refill     ADVAIR DISKUS 250-50 MCG/ACT inhaler Inhale 1 puff into the lungs 2 times daily 3 each 1     albuterol (PROAIR HFA/PROVENTIL HFA/VENTOLIN HFA) 108 (90 Base) MCG/ACT inhaler " Inhale 2 puffs into the lungs every 6 hours 18 g 1     Ascorbic Acid (VITAMIN C PO) Take 1 tablet by mouth every morning       atorvastatin (LIPITOR) 10 MG tablet Take 1 tablet (10 mg) by mouth daily 90 tablet 3     clotrimazole (MYCELEX) 10 MG lozenge Place 1 lozenge (10 mg) inside cheek 5 times daily 70 Heydi 0     COMIRNATY 30 MCG/0.3ML JITENDRA        diltiazem ER (TIADYLT ER) 240 MG 24 hr ER beaded capsule Take 1 capsule (240 mg) by mouth daily 90 capsule 3     FLUAD QUADRIVALENT 0.5 ML PRSY injection        fluorouracil (EFUDEX) 5 % external cream PLEASE SEE ATTACHED FOR DETAILED DIRECTIONS       glucosamine-chondroitin 500-400 MG CAPS per capsule Take 1 capsule by mouth 2 times daily       ipratropium - albuterol 0.5 mg/2.5 mg/3 mL (DUONEB) 0.5-2.5 (3) MG/3ML neb solution TAKE 1 VIAL (3 MLS) BY NEBULIZATION EVERY 6 HOURS AS NEEDED FOR SHORTNESS OF BREATH, WHEEZING OR COUGH 60 mL 0     multivitamin, therapeutic (THERA-VIT) TABS tablet Take 1 tablet by mouth daily         ALLERGIES     Allergies   Allergen Reactions     Cats        PAST MEDICAL HISTORY:  Past Medical History:   Diagnosis Date     Hypertension      Personal history of colonic polyps 02/2007    tubular adenoma, ileocecal valve, repeat 2010     Plantar fascial fibromatosis      Pure hypercholesterolemia        PAST SURGICAL HISTORY:  Past Surgical History:   Procedure Laterality Date     ARTHROPLASTY KNEE Left 5/8/2019    Procedure: ARTHROPLASTY LEFT KNEE;  Surgeon: Miguel Buckley MD;  Location:  OR     ARTHROPLASTY KNEE Right 7/15/2021    Procedure: RIGHT TOTAL KNEE ARTHROPLASTY;  Surgeon: Miguel Buckley MD;  Location:  OR     ARTHROSCOPY KNEE RT/LT  12/2008    right     BACK SURGERY      1988     COLONOSCOPY  2/2007    ileocecal valve polyp, tubular adenoma, repeat in 3 yrs     INJECT STEROID (LOCATION) Right 5/8/2019    Procedure: Inject steroid right knee;  Surgeon: Miguel Buckley MD;  Location:  OR       FAMILY HISTORY:  Family  History   Problem Relation Age of Onset     Cerebrovascular Disease Mother 75         of a stroke     C.A.D. Father 67         suddenly when using the  at age 67. He was a smoker. On autopsy, told to have multiple heart attacks and scar tissue but he had never veronika a clinical heart attack.     Lipids Sister        SOCIAL HISTORY:  Social History     Socioeconomic History     Marital status:      Number of children: 2   Occupational History     Occupation: Retired lumbar sales   Tobacco Use     Smoking status: Never     Passive exposure: Never     Smokeless tobacco: Never   Vaping Use     Vaping status: Never Used   Substance and Sexual Activity     Alcohol use: Yes     Alcohol/week: 0.0 - 3.0 standard drinks of alcohol     Comment: 5 - 7 beers a week     Drug use: No     Sexual activity: Yes     Partners: Female   Other Topics Concern     Parent/sibling w/ CABG, MI or angioplasty before 65F 55M? No     Social Determinants of Health     Financial Resource Strain: Low Risk  (2023)    Overall Financial Resource Strain (CARDIA)      Difficulty of Paying Living Expenses: Not hard at all   Food Insecurity: No Food Insecurity (2023)    Hunger Vital Sign      Worried About Running Out of Food in the Last Year: Never true      Ran Out of Food in the Last Year: Never true   Transportation Needs: No Transportation Needs (2023)    PRAPARE - Transportation      Lack of Transportation (Medical): No      Lack of Transportation (Non-Medical): No   Physical Activity: Sufficiently Active (2023)    Exercise Vital Sign      Days of Exercise per Week: 5 days      Minutes of Exercise per Session: 50 min   Stress: No Stress Concern Present (2023)    [employer] - Occupational Stress Questionnaire      Feeling of Stress : Not at all   Social Connections: Moderately Integrated (2023)    Social Connection and Isolation Panel [NHANES]      Frequency of Communication  with Friends and Family: Never      Frequency of Social Gatherings with Friends and Family: Once a week      Attends [employer] Services: 1 to 4 times per year      Active Member of Clubs or Organizations: Yes      Marital Status:    Interpersonal Safety: Low Risk  (4/11/2024)    Interpersonal Safety      Do you feel physically and emotionally safe where you currently live?: Yes      Within the past 12 months, have you been hit, slapped, kicked or otherwise physically hurt by someone?: No      Within the past 12 months, have you been humiliated or emotionally abused in other ways by your partner or ex-partner?: No   Housing Stability: Unknown (9/6/2023)    Housing Stability Vital Sign      Unable to Pay for Housing in the Last Year: No      Unstable Housing in the Last Year: No                     Thank you for allowing me to participate in the care of your patient.      Sincerely,     Zion Zuñiga MD     [facility]  cc:   No referring provider defined for this encounter.

## 2024-08-13 ENCOUNTER — TRANSFERRED RECORDS (OUTPATIENT)
Dept: HEALTH INFORMATION MANAGEMENT | Facility: CLINIC | Age: 74
End: 2024-08-13
Payer: COMMERCIAL

## 2024-09-24 ENCOUNTER — OFFICE VISIT (OUTPATIENT)
Dept: PEDIATRICS | Facility: CLINIC | Age: 74
End: 2024-09-24
Payer: COMMERCIAL

## 2024-09-24 VITALS
DIASTOLIC BLOOD PRESSURE: 78 MMHG | OXYGEN SATURATION: 97 % | SYSTOLIC BLOOD PRESSURE: 134 MMHG | WEIGHT: 225.4 LBS | TEMPERATURE: 98.2 F | BODY MASS INDEX: 30.53 KG/M2 | RESPIRATION RATE: 16 BRPM | HEART RATE: 84 BPM | HEIGHT: 72 IN

## 2024-09-24 DIAGNOSIS — J45.30 MILD PERSISTENT ASTHMA WITHOUT COMPLICATION: Primary | ICD-10-CM

## 2024-09-24 DIAGNOSIS — I47.10 SVT (SUPRAVENTRICULAR TACHYCARDIA) (H): ICD-10-CM

## 2024-09-24 DIAGNOSIS — I10 ESSENTIAL HYPERTENSION: ICD-10-CM

## 2024-09-24 DIAGNOSIS — E78.5 HYPERLIPIDEMIA LDL GOAL <130: ICD-10-CM

## 2024-09-24 DIAGNOSIS — I47.10 PAROXYSMAL SUPRAVENTRICULAR TACHYCARDIA (H): ICD-10-CM

## 2024-09-24 DIAGNOSIS — E78.00 HYPERCHOLESTEREMIA: ICD-10-CM

## 2024-09-24 DIAGNOSIS — R73.01 IMPAIRED FASTING GLUCOSE: ICD-10-CM

## 2024-09-24 DIAGNOSIS — Z00.00 ENCOUNTER FOR MEDICARE ANNUAL WELLNESS EXAM: ICD-10-CM

## 2024-09-24 PROBLEM — B37.0 THRUSH: Status: RESOLVED | Noted: 2023-09-11 | Resolved: 2024-09-24

## 2024-09-24 PROBLEM — K57.30 DIVERTICULAR DISEASE OF LARGE INTESTINE: Status: RESOLVED | Noted: 2018-12-05 | Resolved: 2024-09-24

## 2024-09-24 LAB
CHOLEST SERPL-MCNC: 203 MG/DL
EST. AVERAGE GLUCOSE BLD GHB EST-MCNC: 111 MG/DL
FASTING STATUS PATIENT QL REPORTED: YES
HBA1C MFR BLD: 5.5 % (ref 0–5.6)
HDLC SERPL-MCNC: 69 MG/DL
LDLC SERPL CALC-MCNC: 113 MG/DL
NONHDLC SERPL-MCNC: 134 MG/DL
TRIGL SERPL-MCNC: 106 MG/DL

## 2024-09-24 PROCEDURE — 83036 HEMOGLOBIN GLYCOSYLATED A1C: CPT | Performed by: INTERNAL MEDICINE

## 2024-09-24 PROCEDURE — 99213 OFFICE O/P EST LOW 20 MIN: CPT | Mod: 25 | Performed by: INTERNAL MEDICINE

## 2024-09-24 PROCEDURE — G0439 PPPS, SUBSEQ VISIT: HCPCS | Performed by: INTERNAL MEDICINE

## 2024-09-24 PROCEDURE — 80061 LIPID PANEL: CPT | Performed by: INTERNAL MEDICINE

## 2024-09-24 PROCEDURE — 36415 COLL VENOUS BLD VENIPUNCTURE: CPT | Performed by: INTERNAL MEDICINE

## 2024-09-24 RX ORDER — ALBUTEROL SULFATE AND BUDESONIDE 90; 80 UG/1; UG/1
AEROSOL, METERED RESPIRATORY (INHALATION)
COMMUNITY
Start: 2024-07-12

## 2024-09-24 RX ORDER — DILTIAZEM HYDROCHLORIDE 240 MG/1
240 CAPSULE, EXTENDED RELEASE ORAL DAILY
Qty: 90 CAPSULE | Refills: 3 | Status: SHIPPED | OUTPATIENT
Start: 2024-09-24

## 2024-09-24 RX ORDER — ATORVASTATIN CALCIUM 10 MG/1
10 TABLET, FILM COATED ORAL DAILY
Qty: 90 TABLET | Refills: 3 | Status: SHIPPED | OUTPATIENT
Start: 2024-09-24

## 2024-09-24 RX ORDER — INHALER, ASSIST DEVICES
SPACER (EA) MISCELLANEOUS
COMMUNITY
Start: 2024-07-11

## 2024-09-24 RX ORDER — BUDESONIDE AND FORMOTEROL FUMARATE DIHYDRATE 160; 4.5 UG/1; UG/1
2 AEROSOL RESPIRATORY (INHALATION)
COMMUNITY
Start: 2024-07-16

## 2024-09-24 ASSESSMENT — PAIN SCALES - GENERAL: PAINLEVEL: NO PAIN (0)

## 2024-09-24 NOTE — PROGRESS NOTES
Preventive Care Visit  [facility] JANIA Blanco MD, Internal Medicine - Pediatrics  Sep 24, 2024      Assessment & Plan     Paroxysmal supraventricular tachycardia (H24)  No ablataion needed unless significant recurrences.   - OFFICE/OUTPT VISIT,EST,LEVL III    Essential hypertension  At goal. Continue calcium channel blocker.   - diltiazem ER (TIADYLT ER) 240 MG 24 hr ER beaded capsule; Take 1 capsule (240 mg) by mouth daily.  - OFFICE/OUTPT VISIT,EST,LEVL III    Hypercholesteremia  Ongoing statin.   - atorvastatin (LIPITOR) 10 MG tablet; Take 1 tablet (10 mg) by mouth daily.  - Lipid panel reflex to direct LDL Fasting; Future  - OFFICE/OUTPT VISIT,EST,LEVL III    Mild persistent asthma without complication  Well controlled with curretn pulm regimen.   - OFFICE/OUTPT VISIT,EST,LEVL III    Impaired fasting glucose  Diabetes blood test (A1c) today.   - Hemoglobin A1c; Future  - OFFICE/OUTPT VISIT,EST,LEVL III    SVT (supraventricular tachycardia) (H24)  As above.   - OFFICE/OUTPT VISIT,EST,LEVL III    HYPERLIPIDEMIA LDL GOAL <130  Statin ongoing.   - OFFICE/OUTPT VISIT,EST,LEVL III    Encounter for Medicare annual wellness exam  Discussed diet, exercise, testicular self exam, blood pressure, cholesterol, and need for cancer surveillance at appropriate ages.     Patient has been advised of split billing requirements and indicates understanding: Yes        BMI  Estimated body mass index is 30.57 kg/m  as calculated from the following:    Height as of this encounter: 1.829 m (6').    Weight as of this encounter: 102.2 kg (225 lb 6.4 oz).   Weight management plan: Discussed healthy diet and exercise guidelines    Counseling  Appropriate preventive services were addressed with this patient via screening, questionnaire, or discussion as appropriate for fall prevention, nutrition, physical activity, Tobacco-use cessation, social engagement, weight loss and cognition.  Checklist reviewing preventive  services available has been given to the patient.  Reviewed patient's diet, addressing concerns and/or questions.           Marina Chan is a 74 year old, presenting for the following:  Wellness Visit (Fasting )        9/24/2024     8:37 AM   Additional Questions   Roomed by HANNA Roberts   Accompanied by n/a         9/24/2024     8:37 AM   Patient Reported Additional Medications   Patient reports taking the following new medications n/a       Health Care Directive  Patient has a Health Care Directive on file  Discussed advance care planning with patient.    HPI  Asthma follows with pulmonary:  Uses inhaler Airsupra twice daily when gets cough spasms.  Also on maintenance symbicort twice daily> Will cough up phlegm a few times per day.  Recntly on antibiotic 2 months.  No longer needing much albuterol at all.  Cough variant asthma.      Recent PSVT:  had follow up with cardology , but no ablation recommended.  no changes made.  Remains on diltiazem and statin.     Seen here by NIKO for some coughs this past spring / summer.                 9/21/2024   General Health   How would you rate your overall physical health? Good   Feel stress (tense, anxious, or unable to sleep) Not at all            9/21/2024   Nutrition   Diet: Regular (no restrictions)            9/21/2024   Exercise   Days per week of moderate/strenous exercise 5 days   Average minutes spent exercising at this level 30 min            9/21/2024   Social Factors   Frequency of gathering with friends or relatives Once a week   Worry food won't last until get money to buy more No   Food not last or not have enough money for food? No   Do you have housing? (Housing is defined as stable permanent housing and does not include staying ouside in a car, in a tent, in an abandoned building, in an overnight shelter, or couch-surfing.) Yes   Are you worried about losing your housing? No   Lack of transportation? No   Unable to get utilities (heat,electricity)? No             9/21/2024   Fall Risk   Fallen 2 or more times in the past year? No    No   Trouble with walking or balance? No    No       Multiple values from one day are sorted in reverse-chronological order          9/21/2024   Activities of Daily Living- Home Safety   Needs help with the following daily activites None of the above   Safety concerns in the home None of the above            9/21/2024   Dental   Dentist two times every year? Yes            9/21/2024   Hearing Screening   Hearing concerns? None of the above            9/21/2024   Driving Risk Screening   Patient/family members have concerns about driving No            9/21/2024   General Alertness/Fatigue Screening   Have you been more tired than usual lately? No            9/21/2024   Urinary Incontinence Screening   Bothered by leaking urine in past 6 months No            9/21/2024   TB Screening   Were you born outside of the [place]? No            Today's PHQ-2 Score:       9/24/2024     8:36 AM   PHQ-2 ( 1999 [employer])   Q1: Little interest or pleasure in doing things 0   Q2: Feeling down, depressed or hopeless 0   PHQ-2 Score 0   Q1: Little interest or pleasure in doing things Not at all   Q2: Feeling down, depressed or hopeless Not at all   PHQ-2 Score 0           9/21/2024   Substance Use   Alcohol more than 3/day or more than 7/wk No   Do you have a current opioid prescription? No   How severe/bad is pain from 1 to 10? 1/10   Do you use any other substances recreationally? No        Social History     Tobacco Use    Smoking status: Never     Passive exposure: Never    Smokeless tobacco: Never   Vaping Use    Vaping status: Never Used   Substance Use Topics    Alcohol use: Yes     Alcohol/week: 0.0 - 3.0 standard drinks of alcohol     Comment: 5 - 7 beers a week    Drug use: No           9/21/2024   AAA Screening   Family history of Abdominal Aortic Aneurysm (AAA)? No      ASCVD Risk   The 10-year ASCVD risk score (Brenda POWERS, et al., 2019) is:  26.2%    Values used to calculate the score:      Age: 74 years      Sex: Male      Is Non- : No      Diabetic: No      Tobacco smoker: No      Systolic Blood Pressure: 147 mmHg      Is BP treated: Yes      HDL Cholesterol: 67 mg/dL      Total Cholesterol: 197 mg/dL            Reviewed and updated as needed this visit by Provider                    Past Medical History:   Diagnosis Date    Hypertension     Hypertension 2016    Personal history of colonic polyps 02/2007    tubular adenoma, ileocecal valve, repeat 2010    Plantar fascial fibromatosis     Pure hypercholesterolemia     Uncomplicated asthma 2002    Asthma has gotten worse since March 2024     Past Surgical History:   Procedure Laterality Date    ARTHROPLASTY KNEE Left 5/8/2019    Procedure: ARTHROPLASTY LEFT KNEE;  Surgeon: Miguel Buckley MD;  Location: [facility] OR    ARTHROPLASTY KNEE Right 7/15/2021    Procedure: RIGHT TOTAL KNEE ARTHROPLASTY;  Surgeon: Miguel Buckley MD;  Location: [facility] OR    ARTHROSCOPY KNEE RT/LT  12/2008    right    BACK SURGERY      1988    COLONOSCOPY  2/2007    ileocecal valve polyp, tubular adenoma, repeat in 3 yrs    INJECT STEROID (LOCATION) Right 5/8/2019    Procedure: Inject steroid right knee;  Surgeon: Miguel Buckley MD;  Location:  OR     Current providers sharing in care for this patient include:  Patient Care Team:  Alessandro Blanco MD as PCP - General (Internal Medicine)  Alessandro Blanco MD as Assigned PCP  Dustin Rodas MD as MD (Cardiovascular Disease)  Zion Zuñiga MD as Assigned Heart and Vascular Provider    The following health maintenance items are reviewed in Epic and correct as of today:  Health Maintenance   Topic Date Due    ASTHMA ACTION PLAN  11/19/2019    ANNUAL REVIEW OF HM ORDERS  05/26/2023    INFLUENZA VACCINE (1) 09/01/2024    MEDICARE ANNUAL WELLNESS VISIT  09/11/2024    ASTHMA CONTROL TEST  10/11/2024    BMP  07/25/2025    FALL RISK ASSESSMENT   09/24/2025    COLORECTAL CANCER SCREENING  01/10/2027    GLUCOSE  07/25/2027    LIPID  09/11/2028    ADVANCE CARE PLANNING  09/11/2028    DTAP/TDAP/TD IMMUNIZATION (3 - Td or Tdap) 09/14/2028    HEPATITIS C SCREENING  Completed    PHQ-2 (once per calendar year)  Completed    Pneumococcal Vaccine: 65+ Years  Completed    ZOSTER IMMUNIZATION  Completed    RSV VACCINE  Completed    COVID-19 Vaccine  Completed    HPV IMMUNIZATION  Aged Out    MENINGITIS IMMUNIZATION  Aged Out    RSV MONOCLONAL ANTIBODY  Aged Out         Review of Systems  Constitutional, HEENT, cardiovascular, pulmonary, GI, , musculoskeletal, neuro, skin, endocrine and psych systems are negative, except as otherwise noted.     Objective    Exam  BP (!) 147/89   Pulse 84   Temp 98.2  F (36.8  C) (Temporal)   Resp 16   Ht 1.829 m (6')   Wt 102.2 kg (225 lb 6.4 oz)   SpO2 97%   BMI 30.57 kg/m     Estimated body mass index is 30.57 kg/m  as calculated from the following:    Height as of this encounter: 1.829 m (6').    Weight as of this encounter: 102.2 kg (225 lb 6.4 oz).    Physical Exam  GENERAL: alert and no distress  EYES: Eyes grossly normal to inspection, PERRL and conjunctivae and sclerae normal  HENT: ear canals and TM's normal, nose and mouth without ulcers or lesions  NECK: no adenopathy, no asymmetry, masses, or scars  RESP: lungs clear to auscultation - no rales, rhonchi or wheezes  CV: regular rate and rhythm, normal S1 S2, no S3 or S4, no murmur, click or rub, no peripheral edema  ABDOMEN: soft, nontender, no hepatosplenomegaly, no masses and bowel sounds normal   (male): normal male genitalia without lesions or urethral discharge, no hernia  MS: no gross musculoskeletal defects noted, no edema  SKIN: no suspicious lesions or rashes  NEURO: Normal strength and tone, mentation intact and speech normal  PSYCH: mentation appears normal, affect normal/bright        9/24/2024   Mini Cog   Clock Draw Score 2 Normal   3 Item Recall 3  objects recalled   Mini Cog Total Score 5                 Signed Electronically by: Alessandro Blanco MD

## 2024-09-24 NOTE — PATIENT INSTRUCTIONS
Colonsocopy 2027.    No prostate screening needed.    Get flu shot soon. Other shots up to date.    Keep follow up with pulmonary.    Let us know if your SVT episodes worsen:  avoid excess caffeine, albuterol, decongestants.    Refilled meds today: blood pressure is good.     Lab work today:  We can do labs in the exam room today, or you can get them done downstairs in the lab.            Patient Education   Preventive Care Advice   This is general advice given by our system to help you stay healthy. However, your care team may have specific advice just for you. Please talk to your care team about your preventive care needs.  Nutrition  Eat 5 or more servings of fruits and vegetables each day.  Try wheat bread, brown rice and whole grain pasta (instead of white bread, rice, and pasta).  Get enough calcium and vitamin D. Check the label on foods and aim for 100% of the RDA (recommended daily allowance).  Lifestyle  Exercise at least 150 minutes each week  (30 minutes a day, 5 days a week).  Do muscle strengthening activities 2 days a week. These help control your weight and prevent disease.  No smoking.  Wear sunscreen to prevent skin cancer.  Have a dental exam and cleaning every 6 months.  Yearly exams  See your health care team every year to talk about:  Any changes in your health.  Any medicines your care team has prescribed.  Preventive care, family planning, and ways to prevent chronic diseases.  Shots (vaccines)   HPV shots (up to age 26), if you've never had them before.  Hepatitis B shots (up to age 59), if you've never had them before.  COVID-19 shot: Get this shot when it's due.  Flu shot: Get a flu shot every year.  Tetanus shot: Get a tetanus shot every 10 years.  Pneumococcal, hepatitis A, and RSV shots: Ask your care team if you need these based on your risk.  Shingles shot (for age 50 and up)  General health tests  Diabetes screening:  Starting at age 35, Get screened for diabetes at least every 3  years.  If you are younger than age 35, ask your care team if you should be screened for diabetes.  Cholesterol test: At age 39, start having a cholesterol test every 5 years, or more often if advised.  Bone density scan (DEXA): At age 50, ask your care team if you should have this scan for osteoporosis (brittle bones).  Hepatitis C: Get tested at least once in your life.  STIs (sexually transmitted infections)  Before age 24: Ask your care team if you should be screened for STIs.  After age 24: Get screened for STIs if you're at risk. You are at risk for STIs (including HIV) if:  You are sexually active with more than one person.  You don't use condoms every time.  You or a partner was diagnosed with a sexually transmitted infection.  If you are at risk for HIV, ask about PrEP medicine to prevent HIV.  Get tested for HIV at least once in your life, whether you are at risk for HIV or not.  Cancer screening tests  Cervical cancer screening: If you have a cervix, begin getting regular cervical cancer screening tests starting at age 21.  Breast cancer scan (mammogram): If you've ever had breasts, begin having regular mammograms starting at age 40. This is a scan to check for breast cancer.  Colon cancer screening: It is important to start screening for colon cancer at age 45.  Have a colonoscopy test every 10 years (or more often if you're at risk) Or, ask your provider about stool tests like a FIT test every year or Cologuard test every 3 years.  To learn more about your testing options, visit:   .  For help making a decision, visit:   https://bit.ly/wl36718.  Prostate cancer screening test: If you have a prostate, ask your care team if a prostate cancer screening test (PSA) at age 55 is right for you.  Lung cancer screening: If you are a current or former smoker ages 50 to 80, ask your care team if ongoing lung cancer screenings are right for you.  For informational purposes only. Not to replace the advice of your  health care provider. Copyright   2023 [facility]. All rights reserved. Clinically reviewed by the [facility]itions Program. [employer] [id number] - REV 01/24.

## 2024-10-14 ENCOUNTER — LAB REQUISITION (OUTPATIENT)
Dept: LAB | Age: 74
End: 2024-10-14

## 2024-10-14 DIAGNOSIS — R73.03 PREDIABETES: ICD-10-CM

## 2024-10-14 LAB — HBA1C MFR BLD: 5.5 % (ref 4.5–5.6)

## 2024-10-14 PROCEDURE — 83036 HEMOGLOBIN GLYCOSYLATED A1C: CPT | Performed by: CLINICAL MEDICAL LABORATORY

## 2024-10-14 PROCEDURE — PSEU8266 GLYCOHEMOGLOBIN: Performed by: CLINICAL MEDICAL LABORATORY

## 2024-11-01 ENCOUNTER — TRANSFERRED RECORDS (OUTPATIENT)
Dept: HEALTH INFORMATION MANAGEMENT | Facility: CLINIC | Age: 74
End: 2024-11-01
Payer: COMMERCIAL

## 2024-12-14 ENCOUNTER — APPOINTMENT (OUTPATIENT)
Dept: ULTRASOUND IMAGING | Facility: CLINIC | Age: 74
DRG: 580 | End: 2024-12-14
Attending: STUDENT IN AN ORGANIZED HEALTH CARE EDUCATION/TRAINING PROGRAM
Payer: COMMERCIAL

## 2024-12-14 ENCOUNTER — APPOINTMENT (OUTPATIENT)
Dept: GENERAL RADIOLOGY | Facility: CLINIC | Age: 74
DRG: 580 | End: 2024-12-14
Attending: PHYSICIAN ASSISTANT
Payer: COMMERCIAL

## 2024-12-14 ENCOUNTER — HOSPITAL ENCOUNTER (INPATIENT)
Facility: CLINIC | Age: 74
DRG: 580 | End: 2024-12-14
Attending: PHYSICIAN ASSISTANT | Admitting: INTERNAL MEDICINE
Payer: COMMERCIAL

## 2024-12-14 DIAGNOSIS — S61.452A DOG BITE, HAND, LEFT, INITIAL ENCOUNTER: ICD-10-CM

## 2024-12-14 DIAGNOSIS — L02.519 CELLULITIS AND ABSCESS OF HAND: ICD-10-CM
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
## 2024-12-14 DIAGNOSIS — L03.119 CELLULITIS AND ABSCESS OF HAND: ICD-10-CM

## 2024-12-14 LAB
ANION GAP SERPL CALCULATED.3IONS-SCNC: 15 MMOL/L (ref 7–15)
BASOPHILS # BLD AUTO: 0.1 10E3/UL (ref 0–0.2)
BASOPHILS NFR BLD AUTO: 0 %
BUN SERPL-MCNC: 14 MG/DL (ref 8–23)
CALCIUM SERPL-MCNC: 9.2 MG/DL (ref 8.8–10.4)
CHLORIDE SERPL-SCNC: 102 MMOL/L (ref 98–107)
CREAT SERPL-MCNC: 0.92 MG/DL (ref 0.67–1.17)
CRP SERPL-MCNC: 155.04 MG/L
EGFRCR SERPLBLD CKD-EPI 2021: 88 ML/MIN/1.73M2
EOSINOPHIL # BLD AUTO: 0.1 10E3/UL (ref 0–0.7)
EOSINOPHIL NFR BLD AUTO: 1 %
ERYTHROCYTE [DISTWIDTH] IN BLOOD BY AUTOMATED COUNT: 13 % (ref 10–15)
GLUCOSE SERPL-MCNC: 93 MG/DL (ref 70–99)
HCO3 SERPL-SCNC: 25 MMOL/L (ref 22–29)
HCT VFR BLD AUTO: 46.5 % (ref 40–53)
HGB BLD-MCNC: 15.6 G/DL (ref 13.3–17.7)
IMM GRANULOCYTES # BLD: 0 10E3/UL
IMM GRANULOCYTES NFR BLD: 0 %
LYMPHOCYTES # BLD AUTO: 2.2 10E3/UL (ref 0.8–5.3)
LYMPHOCYTES NFR BLD AUTO: 15 %
MCH RBC QN AUTO: 30.7 PG (ref 26.5–33)
MCHC RBC AUTO-ENTMCNC: 33.5 G/DL (ref 31.5–36.5)
MCV RBC AUTO: 92 FL (ref 78–100)
MONOCYTES # BLD AUTO: 1.3 10E3/UL (ref 0–1.3)
MONOCYTES NFR BLD AUTO: 9 %
NEUTROPHILS # BLD AUTO: 10.8 10E3/UL (ref 1.6–8.3)
NEUTROPHILS NFR BLD AUTO: 74 %
NRBC # BLD AUTO: 0 10E3/UL
NRBC BLD AUTO-RTO: 0 /100
PLATELET # BLD AUTO: 181 10E3/UL (ref 150–450)
POTASSIUM SERPL-SCNC: 3.8 MMOL/L (ref 3.4–5.3)
RBC # BLD AUTO: 5.08 10E6/UL (ref 4.4–5.9)
SODIUM SERPL-SCNC: 142 MMOL/L (ref 135–145)
WBC # BLD AUTO: 14.5 10E3/UL (ref 4–11)

## 2024-12-14 PROCEDURE — 76882 US LMTD JT/FCL EVL NVASC XTR: CPT | Mod: LT

## 2024-12-14 PROCEDURE — G0378 HOSPITAL OBSERVATION PER HR: HCPCS

## 2024-12-14 PROCEDURE — 73130 X-RAY EXAM OF HAND: CPT | Mod: LT

## 2024-12-14 PROCEDURE — 80048 BASIC METABOLIC PNL TOTAL CA: CPT | Performed by: PHYSICIAN ASSISTANT

## 2024-12-14 PROCEDURE — 99285 EMERGENCY DEPT VISIT HI MDM: CPT | Mod: 25

## 2024-12-14 PROCEDURE — 36415 COLL VENOUS BLD VENIPUNCTURE: CPT | Performed by: PHYSICIAN ASSISTANT

## 2024-12-14 PROCEDURE — 76882 US LMTD JT/FCL EVL NVASC XTR: CPT | Mod: RT

## 2024-12-14 PROCEDURE — 87040 BLOOD CULTURE FOR BACTERIA: CPT | Performed by: PHYSICIAN ASSISTANT

## 2024-12-14 PROCEDURE — 85025 COMPLETE CBC W/AUTO DIFF WBC: CPT | Performed by: PHYSICIAN ASSISTANT

## 2024-12-14 PROCEDURE — 250N000011 HC RX IP 250 OP 636: Performed by: STUDENT IN AN ORGANIZED HEALTH CARE EDUCATION/TRAINING PROGRAM

## 2024-12-14 PROCEDURE — 96376 TX/PRO/DX INJ SAME DRUG ADON: CPT

## 2024-12-14 PROCEDURE — 99222 1ST HOSP IP/OBS MODERATE 55: CPT | Performed by: STUDENT IN AN ORGANIZED HEALTH CARE EDUCATION/TRAINING PROGRAM

## 2024-12-14 PROCEDURE — 86140 C-REACTIVE PROTEIN: CPT | Performed by: PHYSICIAN ASSISTANT

## 2024-12-14 PROCEDURE — 250N000011 HC RX IP 250 OP 636: Performed by: PHYSICIAN ASSISTANT

## 2024-12-14 PROCEDURE — 96374 THER/PROPH/DIAG INJ IV PUSH: CPT

## 2024-12-14 PROCEDURE — 250N000013 HC RX MED GY IP 250 OP 250 PS 637: Performed by: STUDENT IN AN ORGANIZED HEALTH CARE EDUCATION/TRAINING PROGRAM

## 2024-12-14 RX ORDER — ONDANSETRON 2 MG/ML
4 INJECTION INTRAMUSCULAR; INTRAVENOUS EVERY 6 HOURS PRN
Status: DISCONTINUED | OUTPATIENT
Start: 2024-12-14 | End: 2024-12-15

## 2024-12-14 RX ORDER — BUDESONIDE AND FORMOTEROL FUMARATE DIHYDRATE 160; 4.5 UG/1; UG/1
2 AEROSOL RESPIRATORY (INHALATION) AT BEDTIME
COMMUNITY

## 2024-12-14 RX ORDER — NALOXONE HYDROCHLORIDE 0.4 MG/ML
0.4 INJECTION, SOLUTION INTRAMUSCULAR; INTRAVENOUS; SUBCUTANEOUS
Status: DISCONTINUED | OUTPATIENT
Start: 2024-12-14 | End: 2024-12-20 | Stop reason: HOSPADM

## 2024-12-14 RX ORDER — ALBUTEROL SULFATE 90 UG/1
2 INHALANT RESPIRATORY (INHALATION) EVERY 6 HOURS PRN
Status: DISCONTINUED | OUTPATIENT
Start: 2024-12-14 | End: 2024-12-20 | Stop reason: HOSPADM

## 2024-12-14 RX ORDER — ACETAMINOPHEN 325 MG/1
650 TABLET ORAL EVERY 4 HOURS PRN
Status: DISCONTINUED | OUTPATIENT
Start: 2024-12-14 | End: 2024-12-20 | Stop reason: HOSPADM

## 2024-12-14 RX ORDER — ACETAMINOPHEN 650 MG/1
650 SUPPOSITORY RECTAL EVERY 4 HOURS PRN
Status: DISCONTINUED | OUTPATIENT
Start: 2024-12-14 | End: 2024-12-20 | Stop reason: HOSPADM

## 2024-12-14 RX ORDER — ONDANSETRON 4 MG/1
4 TABLET, ORALLY DISINTEGRATING ORAL EVERY 6 HOURS PRN
Status: DISCONTINUED | OUTPATIENT
Start: 2024-12-14 | End: 2024-12-15

## 2024-12-14 RX ORDER — NALOXONE HYDROCHLORIDE 0.4 MG/ML
0.2 INJECTION, SOLUTION INTRAMUSCULAR; INTRAVENOUS; SUBCUTANEOUS
Status: DISCONTINUED | OUTPATIENT
Start: 2024-12-14 | End: 2024-12-20 | Stop reason: HOSPADM

## 2024-12-14 RX ORDER — AMPICILLIN AND SULBACTAM 2; 1 G/1; G/1
3 INJECTION, POWDER, FOR SOLUTION INTRAMUSCULAR; INTRAVENOUS ONCE
Status: COMPLETED | OUTPATIENT
Start: 2024-12-14 | End: 2024-12-14

## 2024-12-14 RX ORDER — AMPICILLIN AND SULBACTAM 2; 1 G/1; G/1
3 INJECTION, POWDER, FOR SOLUTION INTRAMUSCULAR; INTRAVENOUS EVERY 6 HOURS
Status: DISCONTINUED | OUTPATIENT
Start: 2024-12-14 | End: 2024-12-15

## 2024-12-14 RX ORDER — POLYETHYLENE GLYCOL 3350 17 G/17G
17 POWDER, FOR SOLUTION ORAL 2 TIMES DAILY PRN
Status: DISCONTINUED | OUTPATIENT
Start: 2024-12-14 | End: 2024-12-20 | Stop reason: HOSPADM

## 2024-12-14 RX ORDER — DILTIAZEM HYDROCHLORIDE 120 MG/1
240 CAPSULE, COATED, EXTENDED RELEASE ORAL DAILY
Status: DISCONTINUED | OUTPATIENT
Start: 2024-12-15 | End: 2024-12-20 | Stop reason: HOSPADM

## 2024-12-14 RX ORDER — PROCHLORPERAZINE MALEATE 5 MG/1
5 TABLET ORAL EVERY 6 HOURS PRN
Status: DISCONTINUED | OUTPATIENT
Start: 2024-12-14 | End: 2024-12-15

## 2024-12-14 RX ORDER — ATORVASTATIN CALCIUM 10 MG/1
10 TABLET, FILM COATED ORAL AT BEDTIME
COMMUNITY

## 2024-12-14 RX ORDER — FLUTICASONE FUROATE AND VILANTEROL 200; 25 UG/1; UG/1
1 POWDER RESPIRATORY (INHALATION) DAILY
Status: DISCONTINUED | OUTPATIENT
Start: 2024-12-15 | End: 2024-12-16 | Stop reason: ALTCHOICE

## 2024-12-14 RX ORDER — OXYCODONE HYDROCHLORIDE 5 MG/1
5 TABLET ORAL EVERY 4 HOURS PRN
Status: DISCONTINUED | OUTPATIENT
Start: 2024-12-14 | End: 2024-12-20 | Stop reason: HOSPADM

## 2024-12-14 RX ORDER — AMOXICILLIN 250 MG
2 CAPSULE ORAL 2 TIMES DAILY PRN
Status: DISCONTINUED | OUTPATIENT
Start: 2024-12-14 | End: 2024-12-20 | Stop reason: HOSPADM

## 2024-12-14 RX ORDER — AMOXICILLIN 250 MG
1 CAPSULE ORAL 2 TIMES DAILY PRN
Status: DISCONTINUED | OUTPATIENT
Start: 2024-12-14 | End: 2024-12-20 | Stop reason: HOSPADM

## 2024-12-14 RX ADMIN — ACETAMINOPHEN 650 MG: 325 TABLET, FILM COATED ORAL at 22:32

## 2024-12-14 RX ADMIN — ACETAMINOPHEN 650 MG: 325 TABLET, FILM COATED ORAL at 18:32

## 2024-12-14 RX ADMIN — AMPICILLIN SODIUM AND SULBACTAM SODIUM 3 G: 2; 1 INJECTION, POWDER, FOR SOLUTION INTRAMUSCULAR; INTRAVENOUS at 18:31

## 2024-12-14 RX ADMIN — AMPICILLIN SODIUM AND SULBACTAM SODIUM 3 G: 2; 1 INJECTION, POWDER, FOR SOLUTION INTRAMUSCULAR; INTRAVENOUS at 12:35

## 2024-12-14 ASSESSMENT — ACTIVITIES OF DAILY LIVING (ADL)
ADLS_ACUITY_SCORE: 28
ADLS_ACUITY_SCORE: 28
ADLS_ACUITY_SCORE: 49
ADLS_ACUITY_SCORE: 49
ADLS_ACUITY_SCORE: 28
ADLS_ACUITY_SCORE: 49
ADLS_ACUITY_SCORE: 29
ADLS_ACUITY_SCORE: 28
ADLS_ACUITY_SCORE: 28

## 2024-12-14 ASSESSMENT — COLUMBIA-SUICIDE SEVERITY RATING SCALE - C-SSRS
6. HAVE YOU EVER DONE ANYTHING, STARTED TO DO ANYTHING, OR PREPARED TO DO ANYTHING TO END YOUR LIFE?: NO
2. HAVE YOU ACTUALLY HAD ANY THOUGHTS OF KILLING YOURSELF IN THE PAST MONTH?: NO
1. IN THE PAST MONTH, HAVE YOU WISHED YOU WERE DEAD OR WISHED YOU COULD GO TO SLEEP AND NOT WAKE UP?: NO

## 2024-12-14 NOTE — PHARMACY-ADMISSION MEDICATION HISTORY
Pharmacy Intern Admission Medication History    Admission medication history is complete. The information provided in this note is only as accurate as the sources available at the time of the update.    Information Source(s): Patient and CareEverywhere/SureScripts via in-person    Pertinent Information: None    Changes made to PTA medication list:  Added: AUGMENTIN  Deleted: clotrimazole, EFUDEX  Changed: SYMBICORT 2 puffs BID-->1 puff AM and 2 puffs PM, glucosamine-chondroitin BID-->once daily    Allergies reviewed with patient and updates made in EHR: yes    Medication History Completed By: Dante Lucia 12/14/2024 2:05 PM    PTA Med List   Medication Sig Last Dose/Taking    AIRSUPRA 90-80 MCG/ACT AERO Inhale 1 puff into the lungs as needed. Taking As Needed    amoxicillin-clavulanate (AUGMENTIN) 875-125 MG tablet Take 1 tablet by mouth 2 times daily. 12/14/2024 Morning    Ascorbic Acid (VITAMIN C PO) Take 1 tablet by mouth every morning 12/13/2024 Morning    atorvastatin (LIPITOR) 10 MG tablet Take 10 mg by mouth at bedtime. 12/13/2024 Bedtime    budesonide-formoterol (SYMBICORT) 160-4.5 MCG/ACT Inhaler Inhale 2 puffs into the lungs at bedtime. 12/13/2024 Bedtime    budesonide-formoterol (SYMBICORT) 160-4.5 MCG/ACT Inhaler Inhale 1 puff into the lungs every morning. 12/14/2024 Morning    diltiazem ER (TIADYLT ER) 240 MG 24 hr ER beaded capsule Take 1 capsule (240 mg) by mouth daily. 12/14/2024 Morning    glucosamine-chondroitin 500-400 MG CAPS per capsule Take 1 capsule by mouth daily. 12/14/2024 Morning    multivitamin, therapeutic (THERA-VIT) TABS tablet Take 1 tablet by mouth daily 12/14/2024

## 2024-12-14 NOTE — ED NOTES
[facility]  ED Nurse Handoff Report    ED Chief complaint: Wound Infection  . ED Diagnosis:   Final diagnoses:   None       Allergies:   Allergies   Allergen Reactions    Cats        Code Status: Full Code    Activity level - Baseline/Home:  independent.  Activity Level - Current:   independent.   Lift room needed: No.   Bariatric: No   Needed: No   Isolation: No.   Infection: Not Applicable.     Respiratory status: Room air    Vital Signs (within 30 minutes):   Vitals:    12/14/24 1048   BP: (!) 137/98   Pulse: 99   Resp: 18   Temp: 97.5  F (36.4  C)   TempSrc: Temporal   SpO2: 96%       Cardiac Rhythm:  ,      Pain level:    Patient confused: No.   Patient Falls Risk: nonskid shoes/slippers when out of bed and patient and family education.   Elimination Status: Has voided     Patient Report - Initial Complaint: Wound infection.   Focused Assessment:    Skin  CT      SkinSkin WDL: .WDL except; color; integrity; temperature (Redness, swelling & pain to left hand and lower forearm. Pt states on Monday a dog broke his skin on top of hand when trying to grab their toy. Pt had swollen area to top of hand that was drained at urgent care. Swelling now increasing and going up arm.)Skin Color: shinySkin Temperature: warm          Abnormal Results:   Labs Ordered and Resulted from Time of ED Arrival to Time of ED Departure   CBC WITH PLATELETS AND DIFFERENTIAL - Abnormal       Result Value    WBC Count 14.5 (*)     RBC Count 5.08      Hemoglobin 15.6      Hematocrit 46.5      MCV 92      MCH 30.7      MCHC 33.5      RDW 13.0      Platelet Count 181      % Neutrophils 74      % Lymphocytes 15      % Monocytes 9      % Eosinophils 1      % Basophils 0      % Immature Granulocytes 0      NRBCs per 100 WBC 0      Absolute Neutrophils 10.8 (*)     Absolute Lymphocytes 2.2      Absolute Monocytes 1.3      Absolute Eosinophils 0.1      Absolute Basophils 0.1      Absolute Immature Granulocytes 0.0       Absolute NRBCs 0.0     BASIC METABOLIC PANEL   CRP INFLAMMATION   BLOOD CULTURE   BLOOD CULTURE        XR Hand Left G/E 3 Views    (Results Pending)       Treatments provided: Ampicillin  Family Comments: Pt here with wife who is aware of plan of care  OBS brochure/video discussed/provided to patient:  N/A  ED Medications:   Medications   ampicillin-sulbactam (UNASYN) 3 g vial to attach to  mL bag (has no administration in time range)       Drips infusing:  No  For the majority of the shift this patient was Green.   Interventions performed were n/a.    Sepsis treatment initiated: No    Cares/treatment/interventions/medications to be completed following ED care: Monitor pain and redness to left hand/arm    ED Nurse Name: Sandra Barroso RN  12:09 PM   RECEIVING UNIT ED HANDOFF REVIEW    Above ED Nurse Handoff Report was reviewed: Yes  Reviewed by: Lavonne Wesley RN on December 14, 2024 at 12:38 PM

## 2024-12-14 NOTE — PLAN OF CARE
ROOM # 206-2    Living Situation (if not independent, order SW consult): home with wife  Facility name:  : wife    Activity level at baseline: ind  Activity level on admit: ind      Patient registered to observation; given Patient Bill of Rights; given the opportunity to ask questions about observation status and their plan of care.  Patient has been oriented to the observation room, bathroom and call light is in place.    Discussed discharge goals and expectations with patient/family.

## 2024-12-14 NOTE — ED TRIAGE NOTES
"Arrives ambulatory from home. States he was bite by a dog on Monday evening, states he was not seen at that time.     States Thursday it started to appear infected.     States was seen yesterday @ [facility]. States was started on antibiotic. States he has been on the antibiotics for less than 24 hours.     Reports it \"Has gotten\" worse.       "

## 2024-12-14 NOTE — PLAN OF CARE
PRIMARY DIAGNOSIS: L hand cellulitis, Dog bite.  OUTPATIENT/OBSERVATION GOALS TO BE MET BEFORE DISCHARGE:  Vitals sign stable or return to baseline: Yes    Tolerating oral antibiotics or has home infusion set up if applicable:  tbd    Pain status: Improved with use of alternative comfort measures i.e.: ice and positioning    Return to near baseline physical activity: Yes    Discharge Planner Nurse   Safe discharge environment identified: Yes  Barriers to discharge: No       Entered by: Lavonne Wesley RN 12/14/2024 2:51 PM     Please review provider order for any additional goals.     Nurse to notify provider when observation goals have been met and patient is ready for discharge.  Goal Outcome Evaluation:      Plan of Care Reviewed With: patient    Overall Patient Progress: no changeOverall Patient Progress: no change    Outcome Evaluation: L hand cellulitis  Dog bite  Possible abscess - hand elevated, ice applied. Declined pain medications at this time. NPO at midnight, Ortho consulted. MRI planned.    /86 (BP Location: Left arm)   Pulse 81   Temp 98.2  F (36.8  C) (Oral)   Resp 16   Ht 1.829 m (6')   Wt 103.1 kg (227 lb 6.4 oz)   SpO2 96%   BMI 30.84 kg/m

## 2024-12-14 NOTE — CONSULTS
[facility]    Orthopedic Consultation    Dustin Lan MRN# [id number]   Age: 74 year old YOB: 1950     Date of Admission:  12/14/2024    Reason for consult: L hand dogbite       Level of consult: Consult, follow and place orders           Assessment and Plan:   Assessment:   74M w/ PMH SVT, astham, HTN, HLD sustained dog bite on 12/9 now w/ L hand cellulitis.      WBC 14.5    Overall he looks quite good on exam.  I do not see an obvious abscess requiring debridement.  He has good motion with minimal pain of all of his digits and wrist.  Therefore not concerned about FTS, septic arthritis, nor abscesses at this time.  I think he is going to get better with IV antibiotics x 48 to 72 hours.    Nonetheless, we will have him n.p.o. at midnight for reassessment in the morning.  If worsens will do surgical debridement.  Will also get an ultrasound to rule out fluid collection.  I do not think needs an MRI, he looks really good on exam.      Plan:   Recommendations:  -Diet: NPO at MN  -Activity: as tolerated  -Further diagnostics: US R hand to look for dorsal abscess/fluid collection    Please contact orthopedic trauma team if any questions or concerns arise.           Chief Complaint:   Hand pain         History of Present Illness:   Medical history obtained via chart review and discussion with the patient.    74M w/ PMH SVT, astham, HTN, HLD sustained dog bite on 12/9 now w/ L hand cellulitis and concern for dorsal abscess. Went to urgent care Thursday 12/12, got augmentin. Had continued swelling and drainage. Admitted to IM team for cellulitis.          Past Medical History:     Past Medical History:   Diagnosis Date    Hypertension     Hypertension 2016    Personal history of colonic polyps 02/2007    tubular adenoma, ileocecal valve, repeat 2010    Plantar fascial fibromatosis     Pure hypercholesterolemia     Uncomplicated asthma 2002    Asthma has gotten worse since  2024             Past Surgical History:     Past Surgical History:   Procedure Laterality Date    ARTHROPLASTY KNEE Left 2019    Procedure: ARTHROPLASTY LEFT KNEE;  Surgeon: Miguel Buckley MD;  Location:  OR    ARTHROPLASTY KNEE Right 7/15/2021    Procedure: RIGHT TOTAL KNEE ARTHROPLASTY;  Surgeon: Miguel Buckley MD;  Location:  OR    ARTHROSCOPY KNEE RT/LT  2008    right    BACK SURGERY          COLONOSCOPY  2007    ileocecal valve polyp, tubular adenoma, repeat in 3 yrs    INJECT STEROID (LOCATION) Right 2019    Procedure: Inject steroid right knee;  Surgeon: Miguel Buckley MD;  Location:  OR             Social History:     Social History     Tobacco Use    Smoking status: Never     Passive exposure: Never    Smokeless tobacco: Never   Substance Use Topics    Alcohol use: Yes     Alcohol/week: 0.0 - 3.0 standard drinks of alcohol     Comment: 5 - 7 beers a week             Family History:     Family History   Problem Relation Age of Onset    Cerebrovascular Disease Mother 75         of a stroke    C.A.D. Father 67         suddenly when using the  at age 67. He was a smoker. On autopsy, told to have multiple heart attacks and scar tissue but he had never veronika a clinical heart attack.    Lipids Sister              Immunizations:     VACCINE/DOSE   Diptheria   DPT   DTAP   HBIG   Hepatitis A   Hepatitis B   HIB   Influenza   Measles   Meningococcal   MMR   Mumps   Pneumococcal   Polio   Rubella   Small Pox   TDAP   Varicella   Zoster             Allergies:     Allergies   Allergen Reactions    Cats              Medications:     Current Facility-Administered Medications   Medication Dose Route Frequency Provider Last Rate Last Admin    acetaminophen (TYLENOL) tablet 650 mg  650 mg Oral Q4H PRN Opal Zhang DO        Or    acetaminophen (TYLENOL) Suppository 650 mg  650 mg Rectal Q4H PRN Opal Zhang DO        albuterol (PROVENTIL HFA/VENTOLIN  HFA) inhaler  2 puff Inhalation Q6H PRN Gayla Zhangine, DO        ampicillin-sulbactam (UNASYN) 3 g vial to attach to  mL bag  3 g Intravenous Q6H Leatha, Opal, DO        [START ON 12/15/2024] diltiazem ER COATED BEADS (CARDIZEM CD/CARTIA XT) 24 hr capsule 240 mg  240 mg Oral Daily Gayla Zhangine, DO        [START ON 12/15/2024] fluticasone-vilanterol (BREO ELLIPTA) 200-25 MCG/ACT inhaler 1 puff  1 puff Inhalation Daily Gayla Zhangine, DO        melatonin tablet 1 mg  1 mg Oral At Bedtime PRN Gayla Zhangine, DO        naloxone (NARCAN) injection 0.2 mg  0.2 mg Intravenous Q2 Min PRN EttenCm PA-C        Or    naloxone (NARCAN) injection 0.4 mg  0.4 mg Intravenous Q2 Min PRN EttenCm PA-C        Or    naloxone (NARCAN) injection 0.2 mg  0.2 mg Intramuscular Q2 Min PRN EttenCm PA-C        Or    naloxone (NARCAN) injection 0.4 mg  0.4 mg Intramuscular Q2 Min PRN EttenCm PA-C        ondansetron (ZOFRAN ODT) ODT tab 4 mg  4 mg Oral Q6H PRN Opal Zhang,         Or    ondansetron (ZOFRAN) injection 4 mg  4 mg Intravenous Q6H PRN Gayla Zhangine, DO        oxyCODONE (ROXICODONE) tablet 5 mg  5 mg Oral Q4H PRN Gayla Zhangine, DO        oxyCODONE IR (ROXICODONE) half-tab 2.5 mg  2.5 mg Oral Q4H PRN Gayla Zhangine, DO        polyethylene glycol (MIRALAX) Packet 17 g  17 g Oral BID PRN Gayla Zhangine, DO        prochlorperazine (COMPAZINE) injection 5 mg  5 mg Intravenous Q6H PRN Gayla Zhangine, DO        Or    prochlorperazine (COMPAZINE) tablet 5 mg  5 mg Oral Q6H PRN Opal Zhang DO        senna-docusate (SENOKOT-S/PERICOLACE) 8.6-50 MG per tablet 1 tablet  1 tablet Oral BID PRN Opal Zhang DO        Or    senna-docusate (SENOKOT-S/PERICOLACE) 8.6-50 MG per tablet 2 tablet  2 tablet Oral BID PRN Opal Zhang DO                 Physical Exam:   All vitals have  been reviewed  Patient Vitals for the past 24 hrs:   BP Temp Temp src Pulse Resp SpO2 Height Weight   12/14/24 1300 124/86 98.2  F (36.8  C) Oral 81 16 96 % 1.829 m (6') 103.1 kg (227 lb 6.4 oz)   12/14/24 1048 (!) 137/98 97.5  F (36.4  C) Temporal 99 18 96 % -- --     No intake or output data in the 24 hours ending 12/14/24 1620    Musculoskeletal: Right hand with dorsal wound, healing.  Surrounding erythema.  No fluctuance or obvious induration.  No apparent fluid collections.  Is able to flex and extend fingers, although stiff and somewhat sore due to swelling.  Is able to range his wrist.  No pain with passive motion.          Data:   All laboratory data reviewed  Results for orders placed or performed during the hospital encounter of 12/14/24   XR Hand Left G/E 3 Views     Status: None    Narrative    EXAM: XR HAND LEFT G/E 3 VIEWS  LOCATION: [facility]  DATE: 12/14/2024    INDICATION: Cellulitis, hand.  COMPARISON: None.      Impression    IMPRESSION: Left hand soft tissue swelling. No radiopaque foreign body or soft tissue gas. No evidence of an acute fracture. Scattered arthritic changes which are severe at the thumb CMC joint. Vague lucency projecting over the proximal scaphoid pole is   felt to be projectional as opposed to an acute fracture given the patient's history, although correlation with point tenderness is recommended.       Basic metabolic panel     Status: Normal   Result Value Ref Range    Sodium 142 135 - 145 mmol/L    Potassium 3.8 3.4 - 5.3 mmol/L    Chloride 102 98 - 107 mmol/L    Carbon Dioxide (CO2) 25 22 - 29 mmol/L    Anion Gap 15 7 - 15 mmol/L    Urea Nitrogen 14.0 8.0 - 23.0 mg/dL    Creatinine 0.92 0.67 - 1.17 mg/dL    GFR Estimate 88 >60 mL/min/1.73m2    Calcium 9.2 8.8 - 10.4 mg/dL    Glucose 93 70 - 99 mg/dL   CRP inflammation     Status: Abnormal   Result Value Ref Range    CRP Inflammation 155.04 (H) <5.00 mg/L   CBC with platelets and differential      Status: Abnormal   Result Value Ref Range    WBC Count 14.5 (H) 4.0 - 11.0 10e3/uL    RBC Count 5.08 4.40 - 5.90 10e6/uL    Hemoglobin 15.6 13.3 - 17.7 g/dL    Hematocrit 46.5 40.0 - 53.0 %    MCV 92 78 - 100 fL    MCH 30.7 26.5 - 33.0 pg    MCHC 33.5 31.5 - 36.5 g/dL    RDW 13.0 10.0 - 15.0 %    Platelet Count 181 150 - 450 10e3/uL    % Neutrophils 74 %    % Lymphocytes 15 %    % Monocytes 9 %    % Eosinophils 1 %    % Basophils 0 %    % Immature Granulocytes 0 %    NRBCs per 100 WBC 0 <1 /100    Absolute Neutrophils 10.8 (H) 1.6 - 8.3 10e3/uL    Absolute Lymphocytes 2.2 0.8 - 5.3 10e3/uL    Absolute Monocytes 1.3 0.0 - 1.3 10e3/uL    Absolute Eosinophils 0.1 0.0 - 0.7 10e3/uL    Absolute Basophils 0.1 0.0 - 0.2 10e3/uL    Absolute Immature Granulocytes 0.0 <=0.4 10e3/uL    Absolute NRBCs 0.0 10e3/uL   CBC with platelets differential     Status: Abnormal    Narrative    The following orders were created for panel order CBC with platelets differential.  Procedure                               Abnormality         Status                     ---------                               -----------         ------                     CBC with platelets and d...[[id number]]  Abnormal            Final result                 Please view results for these tests on the individual orders.          Yossi Barba MD  [facility]

## 2024-12-14 NOTE — H&P
[facility]    History and Physical - Hospitalist Service       Date of Admission:  12/14/2024    Assessment & Plan      Dustin Lan is a 74 year old male with PMH of SVT, asthma, HTN and HLD, who was admitted to observation on 12/14/2024 for an infected hand wound after a dog bite.     L hand cellulitis  Dog bite  Possible abscess  Presenting with worsening pain, swelling, redness, and purulent drainage on dorsum of L hand. Dog bit his hand and broke the skin on Monday. Starting getting red and painful on Thursday. Got worse and starting draining pus so went to urgent care day prior to admission. They drained the small abscess and gave him augmentin. He took 2 doses and despite that, the swelling worsened with ongoing drainage and limited ROM of his fingers.  On arrival, hemodynamically stable and afebrile. Labs notable for  and WBC 14.5. XR showed left hand soft tissue swelling, no radiopaque foreign body or soft tissue gas, no evidence of an acute fracture.   Medically optimized for OR if required.   - Unasyn  - Ortho consult    HTN  Hx narrow QRS tachycardia   Had an episode of abnormal rhythm requiring Valsalva maneuver 7/2024. Has been evaluated by cardiology, suspect SVT. No further episodes since then.   - PTA diltiazem    HLD - hold PTA statin, resume on discharge  Mild persistent asthma - PTA inhalers        Diet:  NPO  DVT Prophylaxis: Ambulate every shift  Hall Catheter: Not present  Lines: None     Cardiac Monitoring: None  Code Status:  full    Clinically Significant Risk Factors Present on Admission                                 # Asthma: noted on problem list        Disposition Plan     Medically Ready for Discharge: Anticipated Tomorrow           Opal Zhang DO  Hospitalist Service  [facility]  Securely message with [employer] (more info)  Text page via [facility] Paging/Directory      ______________________________________________________________________    Chief Complaint   Hand infection    History is obtained from the patient    History of Present Illness   Dustin Lan is a 74 year old male who presents with L hand swelling and pain. Started after he was playing with his family's dog on Monday. Dog grabbed for a toy in his hand and scraped a tooth on his hand. Broke the skin and had some minor bleeding. Washed the wound and applied bacitracin. Was fine for a couple days but then became red and swollen on Thursday. It kept getting worse and started draining pus so went to urgent care yesterday. They drained some pus and prescribed augmentin. He took 2 doses but it got worse. Swelling and drainage returned. Now barely able to move his fingers, unable to make a fist. Had some chills but no other systemic symptoms.     Past Medical History    Past Medical History:   Diagnosis Date    Hypertension     Hypertension 2016    Personal history of colonic polyps 02/2007    tubular adenoma, ileocecal valve, repeat 2010    Plantar fascial fibromatosis     Pure hypercholesterolemia     Uncomplicated asthma 2002    Asthma has gotten worse since March 2024       Past Surgical History   Past Surgical History:   Procedure Laterality Date    ARTHROPLASTY KNEE Left 5/8/2019    Procedure: ARTHROPLASTY LEFT KNEE;  Surgeon: Miguel Buckley MD;  Location:  OR    ARTHROPLASTY KNEE Right 7/15/2021    Procedure: RIGHT TOTAL KNEE ARTHROPLASTY;  Surgeon: Miguel Buckley MD;  Location:  OR    ARTHROSCOPY KNEE RT/LT  12/2008    right    BACK SURGERY      1988    COLONOSCOPY  2/2007    ileocecal valve polyp, tubular adenoma, repeat in 3 yrs    INJECT STEROID (LOCATION) Right 5/8/2019    Procedure: Inject steroid right knee;  Surgeon: Miguel Buckley MD;  Location:  OR       Prior to Admission Medications   Prior to Admission Medications   Prescriptions Last Dose Informant Patient Reported? Taking?    AIRSUPRA 90-80 MCG/ACT AERO   Yes No   Ascorbic Acid (VITAMIN C PO)  Self Yes No   Sig: Take 1 tablet by mouth every morning   atorvastatin (LIPITOR) 10 MG tablet   No No   Sig: Take 1 tablet (10 mg) by mouth daily.   budesonide-formoterol (SYMBICORT) 160-4.5 MCG/ACT Inhaler   Yes No   Sig: Inhale 2 puffs into the lungs two times daily.   clotrimazole (MYCELEX) 10 MG lozenge   No No   Sig: Place 1 lozenge (10 mg) inside cheek 5 times daily   diltiazem ER (TIADYLT ER) 240 MG 24 hr ER beaded capsule   No No   Sig: Take 1 capsule (240 mg) by mouth daily.   fluorouracil (EFUDEX) 5 % external cream   Yes No   Sig: PLEASE SEE ATTACHED FOR DETAILED DIRECTIONS   glucosamine-chondroitin 500-400 MG CAPS per capsule  Self Yes No   Sig: Take 1 capsule by mouth 2 times daily   multivitamin, therapeutic (THERA-VIT) TABS tablet  Self Yes No   Sig: Take 1 tablet by mouth daily   spacer (OPTICHAMBER FUAD) holding chamber   Yes No   Sig: ATTACH AND USE WITH ASTHMA INHALER      Facility-Administered Medications: None           Physical Exam   Vital Signs: Temp: 97.5  F (36.4  C) Temp src: Temporal BP: (!) 137/98 Pulse: 99   Resp: 18 SpO2: 96 % O2 Device: None (Room air)    Weight: 0 lbs 0 oz    Constitutional: Awake, alert, no distress, and cooperative  Cardiovascular: Regular rate and rhythm, normal S1 and S2, no S3 or S4, and no murmur noted  Respiratory: No increased work of breathing, good air exchange, clear to auscultation bilaterally, no crackles or wheezing  Gastrointestinal: Abdomen soft, non-tender, non-distended. BS normal. No masses, organomegaly   L hand: erythema and swelling over dorsum of hand, open area with serous drainage in the center of dorsum    Medical Decision Making       65 MINUTES SPENT BY ME on the date of service doing chart review, history, exam, documentation & further activities per the note.      Data     I have personally reviewed the following data over the past 24 hrs:    14.5 (H)  \   15.6   /  181     142 102 14.0 /  93   3.8 25 0.92 \     Procal: N/A CRP: 155.04 (H) Lactic Acid: N/A

## 2024-12-14 NOTE — ED PROVIDER NOTES
Emergency Department Note      History of Present Illness     Chief Complaint   Wound Infection    HPI   Dustin Lan is a 74 year old male with a history of asthma, hypertension and hyperlipidemia presenting with a wound infection. The patient was playing with a dog recently when it accidentally bit his left hand on Monday evening (12/9/24). On Thursday (12/12/24), it began to appear infected. He attempted to drain the puss at home but presented to the UR yesterday (12/13/24). He was prescribed Augmentin, given a tetanus, and the area was drained. Movement of his second finger has improved, but he is still unable to move his third, fourth, and fifth fingers. He has been elevating the area at home and he tried to clean the area this morning. He endorses chills yesterday (12/13/24) and persisting wrist tenderness. No recent fever. No history of diabetes mellitus. He last took Augmentin at 0800 this morning. Upon examination, the patient declined pain medications.     Independent Historian   None    Review of External Notes   I reviewed the  note from Dr. Medina from 12/13/24. Started on Augmentin, tetanus was updated and the area was drained.   No xray at that time.    Past Medical History     Medical History and Problem List   Hypertension  Personal history of colonic polyps  Plantar fascial fibromatosis  Pure hypercholesterolemia  Uncomplicated asthma    Medications   Atorvastatin   Albuterol   Diltiazem     Surgical History   Knee arthroplasty, bilateral   Back surgery   Colonoscopy     Physical Exam     Patient Vitals for the past 24 hrs:   BP Temp Temp src Pulse Resp SpO2 Height Weight   12/14/24 1300 124/86 98.2  F (36.8  C) Oral 81 16 96 % 1.829 m (6') 103.1 kg (227 lb 6.4 oz)   12/14/24 1048 (!) 137/98 97.5  F (36.4  C) Temporal 99 18 96 % -- --     Physical Exam  General: Awake, alert, non-toxic.  Head:  Scalp is NC/AT  Eyes:  Conjunctiva normal, PERRL  ENT:  The external nose and ears are normal.    Neck:  Normal range of motion without rigidity.  CV:  Regular rate and rhythm    No pathologic murmur, rubs, or gallops.  Resp:  Breath sounds are clear bilaterally    Non-labored, no retractions or accessory muscle use  Abdomen: Abdomen is soft, no distension, no tenderness, no masses  MS:  There is puncture wound to the dorsal left hand with extensive surrounding redness swelling and tenderness as shown below.  This extends past the outlined area both proximally and distally.  Still able to range fingers and wrist, mild pain in extensor tendon area with ranging.  No involvement of the volar/flexor hand or surfaces.  Neuro:  Alert and oriented.  GCS 15 Moves all extremities normal.  No facial asymmetry. Gait normal.  Psych:  Awake. Alert. Normal affect. Appropriate interactions.            Diagnostics     Lab Results   Labs Ordered and Resulted from Time of ED Arrival to Time of ED Departure   CRP INFLAMMATION - Abnormal       Result Value    CRP Inflammation 155.04 (*)    CBC WITH PLATELETS AND DIFFERENTIAL - Abnormal    WBC Count 14.5 (*)     RBC Count 5.08      Hemoglobin 15.6      Hematocrit 46.5      MCV 92      MCH 30.7      MCHC 33.5      RDW 13.0      Platelet Count 181      % Neutrophils 74      % Lymphocytes 15      % Monocytes 9      % Eosinophils 1      % Basophils 0      % Immature Granulocytes 0      NRBCs per 100 WBC 0      Absolute Neutrophils 10.8 (*)     Absolute Lymphocytes 2.2      Absolute Monocytes 1.3      Absolute Eosinophils 0.1      Absolute Basophils 0.1      Absolute Immature Granulocytes 0.0      Absolute NRBCs 0.0     BASIC METABOLIC PANEL - Normal    Sodium 142      Potassium 3.8      Chloride 102      Carbon Dioxide (CO2) 25      Anion Gap 15      Urea Nitrogen 14.0      Creatinine 0.92      GFR Estimate 88      Calcium 9.2      Glucose 93     BLOOD CULTURE   BLOOD CULTURE     Imaging   XR Hand Left G/E 3 Views   Final Result   IMPRESSION: Left hand soft tissue swelling. No  radiopaque foreign body or soft tissue gas. No evidence of an acute fracture. Scattered arthritic changes which are severe at the thumb CMC joint. Vague lucency projecting over the proximal scaphoid pole is    felt to be projectional as opposed to an acute fracture given the patient's history, although correlation with point tenderness is recommended.              Independent Interpretation   X-ray left hand shows no fracture, dislocation or radioopaque fb    ED Course      Medications Administered   Medications   senna-docusate (SENOKOT-S/PERICOLACE) 8.6-50 MG per tablet 1 tablet (has no administration in time range)     Or   senna-docusate (SENOKOT-S/PERICOLACE) 8.6-50 MG per tablet 2 tablet (has no administration in time range)   ondansetron (ZOFRAN ODT) ODT tab 4 mg (has no administration in time range)     Or   ondansetron (ZOFRAN) injection 4 mg (has no administration in time range)   prochlorperazine (COMPAZINE) injection 5 mg (has no administration in time range)     Or   prochlorperazine (COMPAZINE) tablet 5 mg (has no administration in time range)   acetaminophen (TYLENOL) tablet 650 mg (has no administration in time range)     Or   acetaminophen (TYLENOL) Suppository 650 mg (has no administration in time range)   oxyCODONE IR (ROXICODONE) half-tab 2.5 mg (has no administration in time range)   oxyCODONE (ROXICODONE) tablet 5 mg (has no administration in time range)   melatonin tablet 1 mg (has no administration in time range)   polyethylene glycol (MIRALAX) Packet 17 g (has no administration in time range)   ampicillin-sulbactam (UNASYN) 3 g vial to attach to  mL bag (has no administration in time range)   naloxone (NARCAN) injection 0.2 mg (has no administration in time range)     Or   naloxone (NARCAN) injection 0.4 mg (has no administration in time range)     Or   naloxone (NARCAN) injection 0.2 mg (has no administration in time range)     Or   naloxone (NARCAN) injection 0.4 mg (has no  administration in time range)   fluticasone-vilanterol (BREO ELLIPTA) 200-25 MCG/ACT inhaler 1 puff (has no administration in time range)   diltiazem ER COATED BEADS (CARDIZEM CD/CARTIA XT) 24 hr capsule 240 mg (has no administration in time range)   albuterol (PROVENTIL HFA/VENTOLIN HFA) inhaler (has no administration in time range)   ampicillin-sulbactam (UNASYN) 3 g vial to attach to  mL bag (3 g Intravenous $New Bag 12/14/24 1235)       Procedures   Procedures     Discussion of Management   Admitting Hospitalist, Dr. Zhang  Orthopedics, Ryanne Hammond PA-C     ED Course   ED Course as of 12/14/24 1721   Sat Dec 14, 2024   1134 I obtained the history and examined the patient as noted above.      1200 I spoke with Ryanne Hammond PA-C from Orthopedics regarding the patient's presentation and plan of care.      1226 I spoke with Dr. Zhang from the hospitalist service regarding the patient's presentation, findings here in the ED, and plan of care.       Additional Documentation  None    Medical Decision Making / Diagnosis     CMS Diagnoses: None    MIPS       None    MDM   Dustin Lan is a 74 year old male patient presents with left hand swelling and redness extending towards the fingers and up the arm after dog bite a few days ago.  Seen in urgent care yesterday had bedside I&D and started on Augmentin and took 2 doses but continued worsening/spread.  Too early to consider full oral antibiotic treatment failure however appears to have extensive hand infection and significant worsening from outlined area as well as concern for residual abscess/deeper infection.  Nothing to suggest joint infection or necrotizing infection.  I do not think MRSA coverage needed at this time.  He is afebrile not septic but does have markedly elevated CRP and white count.  Discussed with orthopedic hand who agrees with admission to the hospital for IV antibiotics and likely OR exploration.  X-rays obtained which  show no evidence of fracture dislocation or bony involvement and no radiopaque foreign body.  No indication for tetanus/rabies series tetanus already addressed at visit yesterday.  We will give Unasyn.  Discussed with hospitalist who agrees to except we will keep him n.p.o. for now.    Disposition   The patient was admitted to the hospital.     Diagnosis     ICD-10-CM    1. Cellulitis and abscess of hand  L03.119     L02.519       2. Dog bite, hand, left, initial encounter  S61.452A     W54.0XXA            Scribe Disclosure:  Myriam BRADFORD, am serving as a scribe at 11:16 AM on 12/14/2024 to document services personally performed by Cm Mckenna PA-C based on my observations and the provider's statements to me.        Cm Mckenna PA-C  12/14/24 2535

## 2024-12-15 ENCOUNTER — ANESTHESIA EVENT (OUTPATIENT)
Dept: SURGERY | Facility: CLINIC | Age: 74
DRG: 580 | End: 2024-12-15
Payer: COMMERCIAL

## 2024-12-15 ENCOUNTER — ANESTHESIA (OUTPATIENT)
Dept: SURGERY | Facility: CLINIC | Age: 74
DRG: 580 | End: 2024-12-15
Payer: COMMERCIAL

## 2024-12-15 LAB
ERYTHROCYTE [DISTWIDTH] IN BLOOD BY AUTOMATED COUNT: 13.1 % (ref 10–15)
HCT VFR BLD AUTO: 38.8 % (ref 40–53)
HGB BLD-MCNC: 13.2 G/DL (ref 13.3–17.7)
MCH RBC QN AUTO: 31.1 PG (ref 26.5–33)
MCHC RBC AUTO-ENTMCNC: 34 G/DL (ref 31.5–36.5)
MCV RBC AUTO: 91 FL (ref 78–100)
PLATELET # BLD AUTO: 152 10E3/UL (ref 150–450)
RBC # BLD AUTO: 4.25 10E6/UL (ref 4.4–5.9)
WBC # BLD AUTO: 10.1 10E3/UL (ref 4–11)

## 2024-12-15 PROCEDURE — 120N000001 HC R&B MED SURG/OB

## 2024-12-15 PROCEDURE — 99233 SBSQ HOSP IP/OBS HIGH 50: CPT | Performed by: PHYSICIAN ASSISTANT

## 2024-12-15 PROCEDURE — G0378 HOSPITAL OBSERVATION PER HR: HCPCS

## 2024-12-15 PROCEDURE — 250N000013 HC RX MED GY IP 250 OP 250 PS 637: Performed by: STUDENT IN AN ORGANIZED HEALTH CARE EDUCATION/TRAINING PROGRAM

## 2024-12-15 PROCEDURE — 85018 HEMOGLOBIN: CPT | Performed by: STUDENT IN AN ORGANIZED HEALTH CARE EDUCATION/TRAINING PROGRAM

## 2024-12-15 PROCEDURE — 272N000001 HC OR GENERAL SUPPLY STERILE: Performed by: STUDENT IN AN ORGANIZED HEALTH CARE EDUCATION/TRAINING PROGRAM

## 2024-12-15 PROCEDURE — 87176 TISSUE HOMOGENIZATION CULTR: CPT | Performed by: PHYSICIAN ASSISTANT

## 2024-12-15 PROCEDURE — 250N000009 HC RX 250: Performed by: STUDENT IN AN ORGANIZED HEALTH CARE EDUCATION/TRAINING PROGRAM

## 2024-12-15 PROCEDURE — 999N000141 HC STATISTIC PRE-PROCEDURE NURSING ASSESSMENT: Performed by: STUDENT IN AN ORGANIZED HEALTH CARE EDUCATION/TRAINING PROGRAM

## 2024-12-15 PROCEDURE — 250N000013 HC RX MED GY IP 250 OP 250 PS 637: Performed by: PHYSICIAN ASSISTANT

## 2024-12-15 PROCEDURE — 250N000011 HC RX IP 250 OP 636: Performed by: STUDENT IN AN ORGANIZED HEALTH CARE EDUCATION/TRAINING PROGRAM

## 2024-12-15 PROCEDURE — 96376 TX/PRO/DX INJ SAME DRUG ADON: CPT

## 2024-12-15 PROCEDURE — 250N000011 HC RX IP 250 OP 636: Performed by: PHYSICIAN ASSISTANT

## 2024-12-15 PROCEDURE — 87077 CULTURE AEROBIC IDENTIFY: CPT | Performed by: STUDENT IN AN ORGANIZED HEALTH CARE EDUCATION/TRAINING PROGRAM

## 2024-12-15 PROCEDURE — 360N000076 HC SURGERY LEVEL 3, PER MIN: Performed by: STUDENT IN AN ORGANIZED HEALTH CARE EDUCATION/TRAINING PROGRAM

## 2024-12-15 PROCEDURE — 0KBD0ZZ EXCISION OF LEFT HAND MUSCLE, OPEN APPROACH: ICD-10-PCS | Performed by: STUDENT IN AN ORGANIZED HEALTH CARE EDUCATION/TRAINING PROGRAM

## 2024-12-15 PROCEDURE — 710N000012 HC RECOVERY PHASE 2, PER MINUTE: Performed by: STUDENT IN AN ORGANIZED HEALTH CARE EDUCATION/TRAINING PROGRAM

## 2024-12-15 PROCEDURE — 36415 COLL VENOUS BLD VENIPUNCTURE: CPT | Performed by: STUDENT IN AN ORGANIZED HEALTH CARE EDUCATION/TRAINING PROGRAM

## 2024-12-15 PROCEDURE — 87070 CULTURE OTHR SPECIMN AEROBIC: CPT | Performed by: STUDENT IN AN ORGANIZED HEALTH CARE EDUCATION/TRAINING PROGRAM

## 2024-12-15 RX ORDER — ONDANSETRON 2 MG/ML
4 INJECTION INTRAMUSCULAR; INTRAVENOUS EVERY 6 HOURS PRN
Status: DISCONTINUED | OUTPATIENT
Start: 2024-12-15 | End: 2024-12-20 | Stop reason: HOSPADM

## 2024-12-15 RX ORDER — BUPIVACAINE HYDROCHLORIDE 5 MG/ML
INJECTION, SOLUTION EPIDURAL; INTRACAUDAL PRN
Status: DISCONTINUED | OUTPATIENT
Start: 2024-12-15 | End: 2024-12-15 | Stop reason: HOSPADM

## 2024-12-15 RX ORDER — CEFAZOLIN SODIUM 2 G/100ML
2 INJECTION, SOLUTION INTRAVENOUS EVERY 8 HOURS
Status: DISCONTINUED | OUTPATIENT
Start: 2024-12-15 | End: 2024-12-15

## 2024-12-15 RX ORDER — PROCHLORPERAZINE MALEATE 5 MG/1
5 TABLET ORAL EVERY 6 HOURS PRN
Status: DISCONTINUED | OUTPATIENT
Start: 2024-12-15 | End: 2024-12-20 | Stop reason: HOSPADM

## 2024-12-15 RX ORDER — LIDOCAINE 40 MG/G
CREAM TOPICAL
Status: DISCONTINUED | OUTPATIENT
Start: 2024-12-15 | End: 2024-12-20 | Stop reason: HOSPADM

## 2024-12-15 RX ORDER — MAGNESIUM HYDROXIDE 1200 MG/15ML
LIQUID ORAL PRN
Status: DISCONTINUED | OUTPATIENT
Start: 2024-12-15 | End: 2024-12-15 | Stop reason: HOSPADM

## 2024-12-15 RX ORDER — BISACODYL 10 MG
10 SUPPOSITORY, RECTAL RECTAL DAILY PRN
Status: DISCONTINUED | OUTPATIENT
Start: 2024-12-18 | End: 2024-12-20 | Stop reason: HOSPADM

## 2024-12-15 RX ORDER — POLYETHYLENE GLYCOL 3350 17 G/17G
17 POWDER, FOR SOLUTION ORAL DAILY
Status: DISCONTINUED | OUTPATIENT
Start: 2024-12-16 | End: 2024-12-20 | Stop reason: HOSPADM

## 2024-12-15 RX ORDER — BUDESONIDE AND FORMOTEROL FUMARATE DIHYDRATE 160; 4.5 UG/1; UG/1
2 AEROSOL RESPIRATORY (INHALATION) AT BEDTIME
Status: DISCONTINUED | OUTPATIENT
Start: 2024-12-15 | End: 2024-12-15

## 2024-12-15 RX ORDER — ONDANSETRON 4 MG/1
4 TABLET, ORALLY DISINTEGRATING ORAL EVERY 6 HOURS PRN
Status: DISCONTINUED | OUTPATIENT
Start: 2024-12-15 | End: 2024-12-20 | Stop reason: HOSPADM

## 2024-12-15 RX ORDER — AMPICILLIN AND SULBACTAM 2; 1 G/1; G/1
3 INJECTION, POWDER, FOR SOLUTION INTRAMUSCULAR; INTRAVENOUS EVERY 6 HOURS
Status: DISCONTINUED | OUTPATIENT
Start: 2024-12-15 | End: 2024-12-19

## 2024-12-15 RX ORDER — AMOXICILLIN 250 MG
1 CAPSULE ORAL 2 TIMES DAILY
Status: DISCONTINUED | OUTPATIENT
Start: 2024-12-15 | End: 2024-12-20 | Stop reason: HOSPADM

## 2024-12-15 RX ORDER — LIDOCAINE HYDROCHLORIDE AND EPINEPHRINE 10; 10 MG/ML; UG/ML
INJECTION, SOLUTION INFILTRATION; PERINEURAL PRN
Status: DISCONTINUED | OUTPATIENT
Start: 2024-12-15 | End: 2024-12-15 | Stop reason: HOSPADM

## 2024-12-15 RX ORDER — BUDESONIDE AND FORMOTEROL FUMARATE DIHYDRATE 160; 4.5 UG/1; UG/1
2 AEROSOL RESPIRATORY (INHALATION) AT BEDTIME
Status: DISCONTINUED | OUTPATIENT
Start: 2024-12-15 | End: 2024-12-20 | Stop reason: HOSPADM

## 2024-12-15 RX ADMIN — AMPICILLIN SODIUM AND SULBACTAM SODIUM 3 G: 2; 1 INJECTION, POWDER, FOR SOLUTION INTRAMUSCULAR; INTRAVENOUS at 06:34

## 2024-12-15 RX ADMIN — ACETAMINOPHEN 650 MG: 325 TABLET, FILM COATED ORAL at 07:52

## 2024-12-15 RX ADMIN — ACETAMINOPHEN 650 MG: 325 TABLET, FILM COATED ORAL at 19:56

## 2024-12-15 RX ADMIN — DILTIAZEM HYDROCHLORIDE 240 MG: 120 CAPSULE, COATED, EXTENDED RELEASE ORAL at 07:52

## 2024-12-15 RX ADMIN — ACETAMINOPHEN 650 MG: 325 TABLET, FILM COATED ORAL at 14:27

## 2024-12-15 RX ADMIN — BUDESONIDE AND FORMOTEROL FUMARATE DIHYDRATE 2 PUFF: 160; 4.5 AEROSOL RESPIRATORY (INHALATION) at 20:37

## 2024-12-15 RX ADMIN — AMPICILLIN SODIUM AND SULBACTAM SODIUM 3 G: 2; 1 INJECTION, POWDER, FOR SOLUTION INTRAMUSCULAR; INTRAVENOUS at 19:51

## 2024-12-15 RX ADMIN — SENNOSIDES AND DOCUSATE SODIUM 1 TABLET: 8.6; 5 TABLET ORAL at 19:55

## 2024-12-15 RX ADMIN — OXYCODONE HYDROCHLORIDE 5 MG: 5 TABLET ORAL at 16:05

## 2024-12-15 RX ADMIN — OXYCODONE HYDROCHLORIDE 5 MG: 5 TABLET ORAL at 21:04

## 2024-12-15 RX ADMIN — SENNOSIDES AND DOCUSATE SODIUM 1 TABLET: 8.6; 5 TABLET ORAL at 21:10

## 2024-12-15 RX ADMIN — AMPICILLIN SODIUM AND SULBACTAM SODIUM 3 G: 2; 1 INJECTION, POWDER, FOR SOLUTION INTRAMUSCULAR; INTRAVENOUS at 13:40

## 2024-12-15 RX ADMIN — AMPICILLIN SODIUM AND SULBACTAM SODIUM 3 G: 2; 1 INJECTION, POWDER, FOR SOLUTION INTRAMUSCULAR; INTRAVENOUS at 00:32

## 2024-12-15 ASSESSMENT — ACTIVITIES OF DAILY LIVING (ADL)
ADLS_ACUITY_SCORE: 28

## 2024-12-15 NOTE — PROGRESS NOTES
Ortho Plan Update:    Clinical exam not improved today despite IV ABx. US w/ phlegmon concerning for abscess. Will take to OR.    - NPO  - OR today for L hand I&D (case request placed)    Yossi Barba MD  [facility]

## 2024-12-15 NOTE — PLAN OF CARE
PRIMARY DIAGNOSIS: L hand cellulitis / dog bite   OUTPATIENT/OBSERVATION GOALS TO BE MET BEFORE DISCHARGE:  1. Pain Status: Improved-controlled with oral pain medications.    2. Return to near baseline physical activity: Yes    3. Cleared for discharge by consultants (if involved): No    Discharge Planner Nurse   Safe discharge environment identified: Yes  Barriers to discharge: Yes       Entered by: Sarmad Montgomery RN 12/15/2024 4:20 AM     Please review provider order for any additional goals.   Nurse to notify provider when observation goals have been met and patient is ready for discharge.            Pt is Aox4 and calm. Pt denies pain when L hand is elevated and resting, but has pain when moving L hand. L hand has erythema and inflammation still within the lines drawn in ED. Pt is NPO as of midnight. Pt is up IND in the room. IV is saline locked and pt tolerated IV antibiotic Unasyn well. PRN Tylenol and ice packs given for pain. Orthosurg following. Continue to follow plan of care.     BP 96/51 (BP Location: Right arm)   Pulse 62   Temp 98.5  F (36.9  C) (Oral)   Resp 16   Ht 1.829 m (6')   Wt 103.1 kg (227 lb 6.4 oz)   SpO2 94%   BMI 30.84 kg/m

## 2024-12-15 NOTE — PLAN OF CARE
"Goal Outcome Evaluation:      Plan of Care Reviewed With: patient    Overall Patient Progress: no changeOverall Patient Progress: no change    Outcome Evaluation: L hand cellulitis/abscess - Pain 2/10 Tylenol given, hand elevated, ICE applied.  NPO since midnight, except AM meds. IND in room. PLAN OR for L hand I&D.      Problem: Adult Inpatient Plan of Care  Goal: Plan of Care Review  Description: The Plan of Care Review/Shift note should be completed every shift.  The Outcome Evaluation is a brief statement about your assessment that the patient is improving, declining, or no change.  This information will be displayed automatically on your shift  note.  Outcome: Progressing  Flowsheets (Taken 12/15/2024 0833)  Outcome Evaluation: L hand cellulitis/abscess - Pain 2/10 Tylenol given, hand elevated, ICE applied.  NPO since midnight, except AM meds. IND in room. PLAN OR for L hand I&D.  Overall Patient Progress: no change  Goal: Patient-Specific Goal (Individualized)  Description: You can add care plan individualizations to a care plan. Examples of Individualization might be:  \"Parent requests to be called daily at 9am for status\", \"I have a hard time hearing out of my right ear\", or \"Do not touch me to wake me up as it startles  me\".  Outcome: Progressing  Goal: Absence of Hospital-Acquired Illness or Injury  Outcome: Progressing  Intervention: Identify and Manage Fall Risk  Recent Flowsheet Documentation  Taken 12/15/2024 0815 by Lavonne Wesley RN  Safety Promotion/Fall Prevention:   assistive device/personal items within reach   clutter free environment maintained   nonskid shoes/slippers when out of bed   safety round/check completed  Intervention: Prevent Skin Injury  Recent Flowsheet Documentation  Taken 12/15/2024 0815 by Lavonne Wesley RN  Body Position: position changed independently  Goal: Optimal Comfort and Wellbeing  Outcome: Progressing  Intervention: Monitor Pain and Promote Comfort  Recent Flowsheet " Documentation  Taken 12/15/2024 0752 by Lavnone Wesley RN  Pain Management Interventions: medication (see MAR)  Goal: Readiness for Transition of Care  Outcome: Progressing

## 2024-12-15 NOTE — PLAN OF CARE
PRIMARY DIAGNOSIS:  I&D of L hand dorsal abscess s/p dogbite   OUTPATIENT/OBSERVATION GOALS TO BE MET BEFORE DISCHARGE:  1. Stable vital signs Yes  2. Tolerating diet:Yes  3. Pain controlled with oral pain medications:  Yes  4. Positive bowel sounds:  Yes  5. Voiding without difficulty:  Yes  6. Able to ambulate:  Yes  7. Provider specific discharge goals met:  Yes    Discharge Planner Nurse   Safe discharge environment identified: Yes  Barriers to discharge: No       Entered by: Lavonne Wesley RN 12/15/2024 1:00 PM     Please review provider order for any additional goals.   Nurse to notify provider when observation goals have been met and patient is ready for discharge.  Goal Outcome Evaluation:      Plan of Care Reviewed With: patient    Overall Patient Progress: improvingOverall Patient Progress: improving    Outcome Evaluation: Patient arrived from PACU. Denies pain. Left hand - CMS intact, edema present, elevated, ice applied.

## 2024-12-15 NOTE — PLAN OF CARE
PRIMARY DIAGNOSIS: L hand cellulitis  OUTPATIENT/OBSERVATION GOALS TO BE MET BEFORE DISCHARGE:  1. Pain Status: Improved-controlled with oral pain medications.    2. Return to near baseline physical activity: Yes    3. Cleared for discharge by consultants (if involved): No    Discharge Planner Nurse   Safe discharge environment identified: Yes  Barriers to discharge: Yes       Entered by: Sarmad Montgomery RN 12/15/2024 1:11 AM     Please review provider order for any additional goals.   Nurse to notify provider when observation goals have been met and patient is ready for discharge.            Pt is Aox4 and calm. Pt denies pain when L hand is elevated and resting, but has pain when moving L hand. LUE hand has erythema and inflammation still within the lines drawn in ED. Reg diet in place, but will switch to NPO at midnight. Pt is up IND in the room. IV is saline locked. PRN Tylenol and ice packs given for pain. Orthosurg following. Continue to follow plan of care.     /58 (BP Location: Right arm)   Pulse 67   Temp 98.3  F (36.8  C) (Oral)   Resp 16   Ht 1.829 m (6')   Wt 103.1 kg (227 lb 6.4 oz)   SpO2 97%   BMI 30.84 kg/m

## 2024-12-15 NOTE — PROGRESS NOTES
[facility]    Medicine Progress Note - Hospitalist Service    Date of Admission:  12/14/2024    Assessment & Plan   Dustin Lan is a 74 year old male with PMH of SVT, asthma, HTN and HLD, who was admitted to observation on 12/14/2024 for an infected hand wound after a dog bite.     In the ER he was afebrile with blood pressure 137/98, pulse 99 and breathing comfortably on room air without hypoxia.  Lab work was remarkable for normal BMP, CRP of 155, WBC of 14.5 and hemoglobin 15.6.  Blood cultures x 2 were obtained, hand x-ray showed left hand soft tissue swelling without foreign body or soft tissue gas and ultrasound of the right upper extremity showed diffuse subcutaneous edema of the left dorsal hand.  He was started on IV Unasyn with orthopedic consultation and admission under observation.    Since admission patient has been stable.  Orthopedics consulted and is taking to the OR for cleanout on 12/15.    # Left hand dog bite with abscess/cellulitis  -Dog bite on 12/9 with development of infectious concerns on 12/12  -Presented to emergency room on 12/13 where abscess was drained and Augmentin was started with tetanus shot  -Unfortunately symptoms did not improve and presented again on 12/14  -No evidence of gas or foreign body in imaging  -Started on IV Unasyn with blood cultures drawn which are currently negative  -Orthopedic consult who is taking 2 OR for washout on 12/15  -Likely will stay on IV antibiotics for 1-2 more days    #HTN  #Hx narrow QRS tachycardia   -Had an episode of abnormal rhythm requiring Valsalva maneuver 7/2024. Has been evaluated by cardiology, suspect SVT. No further episodes since then.   - PTA diltiazem     #HLD   - hold PTA statin, resume on discharge    #Mild persistent asthma   - PTA inhalers         Observation Goals: -diagnostic tests and consults completed and resulted, -vital signs normal or at patient baseline, Nurse to notify provider when  observation goals have been met and patient is ready for discharge.  Diet: NPO per Anesthesia Guidelines for Procedure/Surgery Except for: Meds    DVT Prophylaxis: Pneumatic Compression Devices  Hall Catheter: Not present  Lines: None     Cardiac Monitoring: None  Code Status: Full Code      Clinically Significant Risk Factors Present on Admission                             # Obesity: Estimated body mass index is 30.84 kg/m  as calculated from the following:    Height as of this encounter: 1.829 m (6').    Weight as of this encounter: 103.1 kg (227 lb 6.4 oz).       # Asthma: noted on problem list        Social Drivers of Health    Social Connections: Unknown (9/21/2024)    Social Connection and Isolation Panel [NHANES]     Frequency of Social Gatherings with Friends and Family: Once a week          Disposition Plan     Medically Ready for Discharge: Anticipated in 2-4 Days           The patient's care was discussed with the Bedside Nurse and Patient.    Sulma Lan PA-C  Hospitalist Service  [facility]  Securely message with [employer] (more info)  Text page via [employer] Paging/Directory   ______________________________________________________________________    Interval History   Left hand is still swollen and painful with significant redness. Denies nausea, vomiting, diarrhea, abdominal pain, chest pain, shortness of breath and cough.        Physical Exam   Vital Signs: Temp: 98.3  F (36.8  C) Temp src: Oral BP: 120/82 Pulse: 78   Resp: 18 SpO2: 96 % O2 Device: None (Room air)    Weight: 227 lbs 6.4 oz    General Appearance: Alert and orientated x 3  Respiratory: CTAB  Cardiovascular: RRR without murmur  GI: Bowel sounds are present without tenderness  Skin: Left hand has bite on dorsam of hand with drainage and surrounding redness. Very swollen  Other: No lower extremity swelling     Medical Decision Making       55 MINUTES SPENT BY ME on the date of service doing chart review,  history, exam, documentation & further activities per the note.      Data     I have personally reviewed the following data over the past 24 hrs:    10.1  \   13.2 (L)   / 152     142 102 14.0 /  93   3.8 25 0.92 \     Procal: N/A CRP: 155.04 (H) Lactic Acid: N/A         Imaging results reviewed over the past 24 hrs:   Recent Results (from the past 24 hours)   XR Hand Left G/E 3 Views    Narrative    EXAM: XR HAND LEFT G/E 3 VIEWS  LOCATION: [facility]  DATE: 12/14/2024    INDICATION: Cellulitis, hand.  COMPARISON: None.      Impression    IMPRESSION: Left hand soft tissue swelling. No radiopaque foreign body or soft tissue gas. No evidence of an acute fracture. Scattered arthritic changes which are severe at the thumb CMC joint. Vague lucency projecting over the proximal scaphoid pole is   felt to be projectional as opposed to an acute fracture given the patient's history, although correlation with point tenderness is recommended.       US Upper Extremity Non Vascular Left    Narrative    EXAM: US UPPER EXTREMITY NON VASCULAR LEFT  LOCATION: [facility]  DATE: 12/14/2024    INDICATION: Left dorsal hand swelling and redness.  COMPARISON: None.  TECHNIQUE: Targeted ultrasound of the left dorsal hand at the clinical area of concern. Of note, ultrasound images were incorrectly labeled right. This was confirmed on the updated technologist worksheet.    FINDINGS:     At the clinical area of concern in the right dorsal hand, there is diffuse subcutaneous edema. There is a focal elongated hypoechoic area with irregular margins, measuring 1.7 x 0.7 x 0.5 cm.      Impression    IMPRESSION:  1.  Diffuse subcutaneous edema of the left dorsal hand, with irregular 1.7 cm hypoechoic area, favored to represent phlegmon or possibly early developing abscess.

## 2024-12-15 NOTE — PLAN OF CARE
PRIMARY DIAGNOSIS: L hand cellulitis / dog bite  OUTPATIENT/OBSERVATION GOALS TO BE MET BEFORE DISCHARGE:  1. Pain Status: Improved-controlled with oral pain medications.    2. Return to near baseline physical activity: Yes    3. Cleared for discharge by consultants (if involved): No    Discharge Planner Nurse   Safe discharge environment identified: Yes  Barriers to discharge: Yes       Entered by: Sarmad Montgomery RN 12/15/2024 1:15 AM     Please review provider order for any additional goals.   Nurse to notify provider when observation goals have been met and patient is ready for discharge.              Pt is Aox4 and calm. Pt denies pain when L hand is elevated and resting, but has pain when moving L hand. LUE hand has erythema and inflammation still within the lines drawn in ED. Pt is NPO as of midnight. Pt is up IND in the room. IV is saline locked and pt tolerated IV antibiotic Unasyn well. PRN Tylenol and ice packs given for pain. Orthosurg following. Continue to follow plan of care.     /58 (BP Location: Right arm)   Pulse 67   Temp 98.3  F (36.8  C) (Oral)   Resp 16   Ht 1.829 m (6')   Wt 103.1 kg (227 lb 6.4 oz)   SpO2 97%   BMI 30.84 kg/m

## 2024-12-15 NOTE — OP NOTE
DATE OF PROCEDURE: 12/15/24    STAFF SURGEON: Yossi Barba MD    PRE-OPERATIVE DIAGNOSIS: L hand dorsal abscess s/p dogbite    POST-OPERATIVE DIAGNOSIS: same    PROCEDURE: L hand dorsal I&D at level of muscle, 3x1cm wound    ANESTHESIA: local  ANTIBIOTICS: none  EBL: 10ml  TOURNIQUET TIME: 19 min @ 250mmHg    INTRAOPERATIVE FINDINGS: No gross purulence encountered.  The tissue appeared indurated and unhealthy with some serosanguineous drainage; overall this is an appearance that is consistent with a lower virulence infection.    COMPLICATIONS: none apparent    INDICATIONS FOR PROCEDURE: The patient is a 74 year old male w/ PMH SVT, asthma, HTN, HLD sustained dog bite on 12/9 now w/ L hand cellulitis recalcitrant to IV antibiotics with ultrasound demonstrating concern for a fluid collection and developing abscess.  He was therefore indicated for the above surgery for definitive management of infection.    Risks, benefits, and alternatives to surgery were reviewed with the patient and all questions were answered. Risks include, but are not limited to, infection, bleeding, injury to surrounding neurovascular structures, incomplete resolution of symptoms, need for further surgery, and complications with anesthesia. The patient expressed understanding and consented to proceed with the above surgery.    DESCRIPTION OF PROCEDURE: The patient was identified in the preoperative holding area and the correct operative site was marked.  He underwent a local block by myself with a cocktail of 5 cc 1% lidocaine with epinephrine and 5 cc of 0.5% Marcaine. Patient was transferred to the operating room and anesthesia was induced. Patient was placed in the supine position and all bony prominences were well-padded. The patient was prepped and draped in the standard sterile fashion.  A timeout was performed identifying the patient, procedure, and laterality.    Extremity was elevated for gravity exsanguination and tourniquet inflated.   Open his prior dog bite wound and extended 1 cm on either side.  Tissue was undermined with a hemostat, and findings as above were noted.  Multiple swabs and tissue cultures were collected.  Performed excisional debridement with rongeur in which all nonviable unhealthy appearing tissue was removed.  Used a knife to clean up the skin to healthy edges.    Next, a counterincision was made using a hemostat through the wound, out proximally, and a small stab incision was made here.  Next the wound was thoroughly irrigated with 200 cc of normal saline.    Finally quarter inch Nu Gauze packing strip was placed in the wound.  The wound was closed with a single 0 Prolene vertical mattress fashion permitting a little bit of wound gapping for fluid egress.  2 packing strips were left in place.    All counts were correct at the end of the case.  The patient was awoken from anesthesia without incident and was transferred to the postoperative recovery area in stable condition.     POST-OPERATIVE PLAN:   - WBAT LUE; ROMAT, AAT, finger motion encouraged  - Continue Unasyn  - Will follow cultures  - If looking clinically good tomorrow, can discharge home on Augmentin x 14 days  - Pain control w/ tylenol, ibuprofen; recommend weaning opioids  - Dressing to remain c/d/i x 2-3 days; at that time, dressing should come off, packing strips should be removed, and he can cover wound w/ bandaids and shower as shamir at that point but no submerging wound in tubs/pools  - RTC 1 week with myself for wound check    Yossi Barba MD  [facility]

## 2024-12-16 ENCOUNTER — APPOINTMENT (OUTPATIENT)
Dept: OCCUPATIONAL THERAPY | Facility: CLINIC | Age: 74
DRG: 580 | End: 2024-12-16
Attending: STUDENT IN AN ORGANIZED HEALTH CARE EDUCATION/TRAINING PROGRAM
Payer: COMMERCIAL

## 2024-12-16 LAB
ANION GAP SERPL CALCULATED.3IONS-SCNC: 14 MMOL/L (ref 7–15)
BUN SERPL-MCNC: 17.4 MG/DL (ref 8–23)
CALCIUM SERPL-MCNC: 8.8 MG/DL (ref 8.8–10.4)
CHLORIDE SERPL-SCNC: 103 MMOL/L (ref 98–107)
CREAT SERPL-MCNC: 0.8 MG/DL (ref 0.67–1.17)
CRP SERPL-MCNC: 64.14 MG/L
EGFRCR SERPLBLD CKD-EPI 2021: >90 ML/MIN/1.73M2
ERYTHROCYTE [DISTWIDTH] IN BLOOD BY AUTOMATED COUNT: 12.9 % (ref 10–15)
GLUCOSE SERPL-MCNC: 106 MG/DL (ref 70–99)
HCO3 SERPL-SCNC: 23 MMOL/L (ref 22–29)
HCT VFR BLD AUTO: 37.9 % (ref 40–53)
HGB BLD-MCNC: 12.5 G/DL (ref 13.3–17.7)
MCH RBC QN AUTO: 30 PG (ref 26.5–33)
MCHC RBC AUTO-ENTMCNC: 33 G/DL (ref 31.5–36.5)
MCV RBC AUTO: 91 FL (ref 78–100)
PLATELET # BLD AUTO: 160 10E3/UL (ref 150–450)
POTASSIUM SERPL-SCNC: 3.6 MMOL/L (ref 3.4–5.3)
RBC # BLD AUTO: 4.16 10E6/UL (ref 4.4–5.9)
SODIUM SERPL-SCNC: 140 MMOL/L (ref 135–145)
WBC # BLD AUTO: 9.1 10E3/UL (ref 4–11)

## 2024-12-16 PROCEDURE — 97110 THERAPEUTIC EXERCISES: CPT | Mod: GO

## 2024-12-16 PROCEDURE — 120N000001 HC R&B MED SURG/OB

## 2024-12-16 PROCEDURE — 250N000013 HC RX MED GY IP 250 OP 250 PS 637: Performed by: PHYSICIAN ASSISTANT

## 2024-12-16 PROCEDURE — 86140 C-REACTIVE PROTEIN: CPT | Performed by: PHYSICIAN ASSISTANT

## 2024-12-16 PROCEDURE — 250N000011 HC RX IP 250 OP 636: Performed by: PHYSICIAN ASSISTANT

## 2024-12-16 PROCEDURE — 250N000013 HC RX MED GY IP 250 OP 250 PS 637: Performed by: STUDENT IN AN ORGANIZED HEALTH CARE EDUCATION/TRAINING PROGRAM

## 2024-12-16 PROCEDURE — 85027 COMPLETE CBC AUTOMATED: CPT | Performed by: PHYSICIAN ASSISTANT

## 2024-12-16 PROCEDURE — 36415 COLL VENOUS BLD VENIPUNCTURE: CPT | Performed by: PHYSICIAN ASSISTANT

## 2024-12-16 PROCEDURE — 80048 BASIC METABOLIC PNL TOTAL CA: CPT | Performed by: PHYSICIAN ASSISTANT

## 2024-12-16 PROCEDURE — 99233 SBSQ HOSP IP/OBS HIGH 50: CPT | Performed by: PHYSICIAN ASSISTANT

## 2024-12-16 PROCEDURE — 97165 OT EVAL LOW COMPLEX 30 MIN: CPT | Mod: GO

## 2024-12-16 PROCEDURE — 85014 HEMATOCRIT: CPT | Performed by: PHYSICIAN ASSISTANT

## 2024-12-16 PROCEDURE — 82374 ASSAY BLOOD CARBON DIOXIDE: CPT | Performed by: PHYSICIAN ASSISTANT

## 2024-12-16 RX ORDER — IBUPROFEN 400 MG/1
400 TABLET, FILM COATED ORAL 2 TIMES DAILY
Status: DISPENSED | OUTPATIENT
Start: 2024-12-16 | End: 2024-12-19

## 2024-12-16 RX ADMIN — SENNOSIDES AND DOCUSATE SODIUM 1 TABLET: 8.6; 5 TABLET ORAL at 20:29

## 2024-12-16 RX ADMIN — IBUPROFEN 400 MG: 400 TABLET, FILM COATED ORAL at 20:29

## 2024-12-16 RX ADMIN — AMPICILLIN SODIUM AND SULBACTAM SODIUM 3 G: 2; 1 INJECTION, POWDER, FOR SOLUTION INTRAMUSCULAR; INTRAVENOUS at 08:29

## 2024-12-16 RX ADMIN — AMPICILLIN SODIUM AND SULBACTAM SODIUM 3 G: 2; 1 INJECTION, POWDER, FOR SOLUTION INTRAMUSCULAR; INTRAVENOUS at 20:27

## 2024-12-16 RX ADMIN — ACETAMINOPHEN 650 MG: 325 TABLET, FILM COATED ORAL at 08:09

## 2024-12-16 RX ADMIN — OXYCODONE HYDROCHLORIDE 5 MG: 5 TABLET ORAL at 06:27

## 2024-12-16 RX ADMIN — BUDESONIDE AND FORMOTEROL FUMARATE DIHYDRATE 2 PUFF: 160; 4.5 AEROSOL RESPIRATORY (INHALATION) at 01:55

## 2024-12-16 RX ADMIN — IBUPROFEN 400 MG: 400 TABLET, FILM COATED ORAL at 11:57

## 2024-12-16 RX ADMIN — ACETAMINOPHEN 650 MG: 325 TABLET, FILM COATED ORAL at 15:34

## 2024-12-16 RX ADMIN — DILTIAZEM HYDROCHLORIDE 240 MG: 120 CAPSULE, COATED, EXTENDED RELEASE ORAL at 08:08

## 2024-12-16 RX ADMIN — AMPICILLIN SODIUM AND SULBACTAM SODIUM 3 G: 2; 1 INJECTION, POWDER, FOR SOLUTION INTRAMUSCULAR; INTRAVENOUS at 01:52

## 2024-12-16 RX ADMIN — BUDESONIDE AND FORMOTEROL FUMARATE DIHYDRATE 2 PUFF: 160; 4.5 AEROSOL RESPIRATORY (INHALATION) at 22:00

## 2024-12-16 RX ADMIN — AMPICILLIN SODIUM AND SULBACTAM SODIUM 3 G: 2; 1 INJECTION, POWDER, FOR SOLUTION INTRAMUSCULAR; INTRAVENOUS at 14:12

## 2024-12-16 RX ADMIN — POLYETHYLENE GLYCOL 3350 17 G: 17 POWDER, FOR SOLUTION ORAL at 08:09

## 2024-12-16 RX ADMIN — SENNOSIDES AND DOCUSATE SODIUM 1 TABLET: 8.6; 5 TABLET ORAL at 08:08

## 2024-12-16 ASSESSMENT — ACTIVITIES OF DAILY LIVING (ADL)
ADLS_ACUITY_SCORE: 25
IADL_COMMENTS: INDP
ADLS_ACUITY_SCORE: 28
ADLS_ACUITY_SCORE: 25
ADLS_ACUITY_SCORE: 28
ADLS_ACUITY_SCORE: 25
ADLS_ACUITY_SCORE: 25
ADLS_ACUITY_SCORE: 28
ADLS_ACUITY_SCORE: 25
ADLS_ACUITY_SCORE: 28
ADLS_ACUITY_SCORE: 28
ADLS_ACUITY_SCORE: 25
ADLS_ACUITY_SCORE: 25
ADLS_ACUITY_SCORE: 28
ADLS_ACUITY_SCORE: 25
ADLS_ACUITY_SCORE: 28
ADLS_ACUITY_SCORE: 25
ADLS_ACUITY_SCORE: 25

## 2024-12-16 NOTE — PROGRESS NOTES
Orthopedic Surgery  Dustin Lan  12/16/2024     Admit Date:  12/14/2024  POD: 1 Day Post-Op   Procedure(s):  Left hand dorsal irrigation and debridement at level of muscle, 3x1cm wound     Patient resting comfortably in bed.    Pain and finger motion improving in left hand.  Did have increased pain with blood pressure cuff inflated on the left but now pain is improved.    Tolerating oral intake.    Denies nausea or vomiting.   Denies chest pain or shortness of breath.     Temp:  [98.1  F (36.7  C)-99  F (37.2  C)] 98.1  F (36.7  C)  Pulse:  [64-78] 71  Resp:  [16-18] 18  BP: (114-167)/() 130/70  SpO2:  [94 %-98 %] 97 %    Alert and oriented  Dressing is intact with distal dorsal dried drainage.  No active drainage noted.   Swelling in fingers present  Able to flex and extend fingers with some decreased ROM due to swelling  Reports equal sensation   Brisk cap refill     Labs:  Recent Labs   Lab Test 12/16/24  0529 12/15/24  0526 12/14/24  1157   WBC 9.1 10.1 14.5*   HGB 12.5* 13.2* 15.6    152 181     No lab results found.  Recent Labs   Lab Test 12/16/24  0529 12/14/24  1157   CRPI 64.14* 155.04*     All cultures:  Recent Labs   Lab 12/15/24  1206 12/15/24  1205 12/14/24  1214 12/14/24  1157   CULTURE No growth, less than 1 day  No growth, less than 1 day No growth, less than 1 day No growth after 1 day No growth after 1 day       1. PLAN:  - WBAT LUE; ROMAT, AAT, finger motion encouraged  - Continue Unasyn while inpatient   - Will follow cultures  - Pain control w/ tylenol, ibuprofen; recommend weaning opioids  - Dressing to  to remain in place until tomorrow.  PA will do dressing change and pull paakcing tomorrow.  Follwing this, he can cover wound w/ bandaids and shower as normal at that point but no submerging wound in tubs/pools  - If looking clinically good tomorrow, can discharge home on Augmentin x 14 days  - RTC 1 week with myself for wound check    2. Disposition   Anticipate d/c to home  possible tomorrow pending abx plan, labs and exam.     Yessenia De Souza PA-C

## 2024-12-16 NOTE — PLAN OF CARE
"Inpatient: [id number]    Dx: Left Hand Dog Bite      Orientation: x4  Pain: 1-2/10 throbbing and burning pain to left hand   Activity: Independent in room   LDA: Right PIV SL   Diet: Regular Diet   Neuro: Intact - CMS's intact to LUE  Cardio: WDL  Resp: WDL  MS: Pain to left wrist/hand related to dog bite   GI: WDL   : Voiding without difficulty   Derm: Unable to assess left hand due to wrap in place - Fingers do appear swollen - CMS's intact.   Plan: Ortho following - wrap in placed to left hand - Continue ampicillin/sulbactam every 6 hours.       Post-op day 1 of irrigation and debridement to left hand      PRN acetaminophen for pain - scheduled ibuprofen started this shift. He states he feels that his hand is improving.       Orthopedic following patient and will see tomorrow to assess hand.       /70 (BP Location: Right arm)   Pulse 71   Temp 98.1  F (36.7  C) (Oral)   Resp 18   Ht 1.829 m (6')   Wt 103.1 kg (227 lb 6.4 oz)   SpO2 97%   BMI 30.84 kg/m       Problem: Adult Inpatient Plan of Care  Goal: Plan of Care Review  Description: The Plan of Care Review/Shift note should be completed every shift.  The Outcome Evaluation is a brief statement about your assessment that the patient is improving, declining, or no change.  This information will be displayed automatically on your shift  note.  Outcome: Progressing  Flowsheets (Taken 12/16/2024 1655)  Outcome Evaluation: Ortho is Following  Overall Patient Progress: no change  Goal: Patient-Specific Goal (Individualized)  Description: You can add care plan individualizations to a care plan. Examples of Individualization might be:  \"Parent requests to be called daily at 9am for status\", \"I have a hard time hearing out of my right ear\", or \"Do not touch me to wake me up as it startles  me\".  Outcome: Progressing  Goal: Absence of Hospital-Acquired Illness or Injury  Outcome: Progressing  Intervention: Identify and Manage Fall Risk  Recent Flowsheet " Documentation  Taken 12/16/2024 1212 by Marcial Bonner RN  Safety Promotion/Fall Prevention:   clutter free environment maintained   lighting adjusted   nonskid shoes/slippers when out of bed   safety round/check completed  Intervention: Prevent Skin Injury  Recent Flowsheet Documentation  Taken 12/16/2024 1212 by Marcial Bonner RN  Body Position: position changed independently  Intervention: Prevent and Manage VTE (Venous Thromboembolism) Risk  Recent Flowsheet Documentation  Taken 12/16/2024 1212 by Marcial Bonner RN  VTE Prevention/Management: SCDs off (sequential compression devices)  Taken 12/16/2024 0806 by Marcial Bonner RN  VTE Prevention/Management: (Left Hand wrapped) other (see comments)  Goal: Optimal Comfort and Wellbeing  Outcome: Progressing  Intervention: Monitor Pain and Promote Comfort  Recent Flowsheet Documentation  Taken 12/16/2024 0802 by Marcial Bonner RN  Pain Management Interventions: medication (see MAR)  Goal: Readiness for Transition of Care  Outcome: Progressing  Intervention: Mutually Develop Transition Plan  Recent Flowsheet Documentation  Taken 12/16/2024 1145 by Marcial Bonner RN  Equipment Currently Used at Home: none     Problem: Wound  Goal: Optimal Coping  Outcome: Progressing  Goal: Optimal Functional Ability  Outcome: Progressing  Intervention: Optimize Functional Ability  Recent Flowsheet Documentation  Taken 12/16/2024 1212 by Marcial Bonner RN  Activity Management: up ad catrachito  Activity Assistance Provided: independent  Goal: Absence of Infection Signs and Symptoms  Outcome: Progressing  Goal: Improved Oral Intake  Outcome: Progressing  Goal: Optimal Pain Control and Function  Outcome: Progressing  Intervention: Prevent or Manage Pain  Recent Flowsheet Documentation  Taken 12/16/2024 0802 by Marcial Bonner RN  Pain Management Interventions: medication (see MAR)  Goal: Skin Health and Integrity  Outcome: Progressing  Intervention: Optimize Skin  Protection  Recent Flowsheet Documentation  Taken 12/16/2024 1212 by Marcial Bonner, RN  Activity Management: up ad catrachito  Goal: Optimal Wound Healing  Outcome: Progressing   Goal Outcome Evaluation:           Overall Patient Progress: no changeOverall Patient Progress: no change    Outcome Evaluation: Ortho is Following

## 2024-12-16 NOTE — PROGRESS NOTES
[facility]    Medicine Progress Note - Hospitalist Service    Date of Admission:  12/14/2024    Assessment & Plan   Dustin Lan is a 74 year old male with PMH of SVT, asthma, HTN and HLD, who was admitted to observation on 12/14/2024 for an infected hand wound after a dog bite.     In the ER he was afebrile with blood pressure 137/98, pulse 99 and breathing comfortably on room air without hypoxia.  Lab work was remarkable for normal BMP, CRP of 155, WBC of 14.5 and hemoglobin 15.6.  Blood cultures x 2 were obtained, hand x-ray showed left hand soft tissue swelling without foreign body or soft tissue gas and ultrasound of the right upper extremity showed diffuse subcutaneous edema of the left dorsal hand.  He was started on IV Unasyn with orthopedic consultation and admission under observation.    Since admission patient has been stable.  Orthopedics consulted and is taking to the OR for cleanout on 12/15.    # Left hand dog bite with abscess/cellulitis  -Dog bite on 12/9 with development of infectious concerns on 12/12  -Presented to emergency room on 12/13 where abscess was drained and Augmentin was started with tetanus shot  -Unfortunately symptoms did not improve and presented again on 12/14  -No evidence of gas or foreign body in imaging  -Started on IV Unasyn with blood cultures drawn which are currently negative  -Orthopedic consult who is taking 2 OR for washout on 12/15  -Likely will stay on IV antibiotics for 1-2 more days    #HTN  #Hx narrow QRS tachycardia   -Had an episode of abnormal rhythm requiring Valsalva maneuver 7/2024. Has been evaluated by cardiology, suspect SVT. No further episodes since then.   - PTA diltiazem     #HLD   - hold PTA statin, resume on discharge    #Mild persistent asthma   - PTA inhalers            Diet: Advance Diet as Tolerated: Regular Diet Adult    DVT Prophylaxis: Pneumatic Compression Devices  Hall Catheter: Not present  Lines: None      Cardiac Monitoring: None  Code Status: Full Code      Clinically Significant Risk Factors                              # Obesity: Estimated body mass index is 30.84 kg/m  as calculated from the following:    Height as of this encounter: 1.829 m (6').    Weight as of this encounter: 103.1 kg (227 lb 6.4 oz)., PRESENT ON ADMISSION     # Asthma: noted on problem list        Social Drivers of Health    Social Connections: Unknown (9/21/2024)    Social Connection and Isolation Panel [NHANES]     Frequency of Social Gatherings with Friends and Family: Once a week          Disposition Plan     Medically Ready for Discharge: Anticipated Tomorrow           The patient's care was discussed with the Bedside Nurse, Patient, and ortho Consultant(s).    Sulma Lan PA-C  Hospitalist Service  [facility]  Securely message with [employer] (more info)  Text page via [employer] Paging/Directory   ______________________________________________________________________    Interval History   Reports adequate pain control in hand with medicines. Hand still swollen and difficult to move fingers. No fever, chills, nausea, vomiting, diarrhea or urinary symptoms.     Physical Exam   Vital Signs: Temp: 98.2  F (36.8  C) Temp src: Oral BP: 123/74 Pulse: 68   Resp: 16 SpO2: 95 % O2 Device: None (Room air)    Weight: 227 lbs 6.4 oz    General Appearance: Alert and orientated x 3  Respiratory: CTAB  Cardiovascular: RRR without murmur  GI: Bowel sounds are present without tenderness  Skin: Left hand bandaged. Fingers swollen but warm.   Other: No lower extremity swelling    Medical Decision Making       55 MINUTES SPENT BY ME on the date of service doing chart review, history, exam, documentation & further activities per the note.      Data     I have personally reviewed the following data over the past 24 hrs:    9.1  \   12.5 (L)   / 160     140 103 17.4 /  106 (H)   3.6 23 0.80 \     Procal: N/A CRP: 64.14 (H) Lactic  Acid: N/A         Imaging results reviewed over the past 24 hrs:   No results found for this or any previous visit (from the past 24 hours).

## 2024-12-16 NOTE — PLAN OF CARE
Assumed care from: [id number]    Blood pressure 125/75, pulse 64, temperature 98.4  F (36.9  C), temperature source Oral, resp. rate 16, height 1.829 m (6'), weight 103.1 kg (227 lb 6.4 oz), SpO2 98%.    Admit Date: 12/14/24  Admitting Diagnosis: L hand dorsal abscess s/p dogbite   Neuro: Alert and Oriented x4  Activity: are independent with no assistive devices   Pain: complaining of 2-8/10 pain in their left hand.  Tylenol and Oxycodone given for pain.  Medicatons decreased pain.  GI: WNL  : WNL  Diet: Regular  Consults: Ortho  Treatment: Unasyn q6 hr  Discharge Disposition: home      Goal Outcome Evaluation:      Plan of Care Reviewed With: patient    Overall Patient Progress: improvingOverall Patient Progress: improving    Outcome Evaluation: Patient pain 8/10 after local anesthetic wore off, 5 mg oxy given along with ice/elevation with relief.      Problem: Adult Inpatient Plan of Care  Goal: Plan of Care Review  Description: The Plan of Care Review/Shift note should be completed every shift.  The Outcome Evaluation is a brief statement about your assessment that the patient is improving, declining, or no change.  This information will be displayed automatically on your shift  note.  12/15/2024 1825 by Lavonne Wesley RN  Outcome: Progressing  Flowsheets (Taken 12/15/2024 1825)  Outcome Evaluation: Patient pain 8/10 after local anesthetic wore off, 5 mg oxy given along with ice/elevation with relief.  Plan of Care Reviewed With: patient  Overall Patient Progress: improving  12/15/2024 1256 by Lavonne Wesley RN  Outcome: Progressing  Flowsheets (Taken 12/15/2024 1256)  Outcome Evaluation: Patient arrived from PACU. Denies pain. Left hand - CMS intact, edema present, elevated, ice applied.  Plan of Care Reviewed With: patient  Overall Patient Progress: improving  12/15/2024 0834 by Lavonne Wesley RN  Outcome: Progressing  Flowsheets (Taken 12/15/2024 0833)  Outcome Evaluation: L hand cellulitis/abscess - Pain  "2/10 Tylenol given, hand elevated, ICE applied.  NPO since midnight, except AM meds. IND in room. PLAN OR for L hand I&D.  Overall Patient Progress: no change  Goal: Patient-Specific Goal (Individualized)  Description: You can add care plan individualizations to a care plan. Examples of Individualization might be:  \"Parent requests to be called daily at 9am for status\", \"I have a hard time hearing out of my right ear\", or \"Do not touch me to wake me up as it startles  me\".  12/15/2024 1825 by Lavonne Wesley RN  Outcome: Progressing  12/15/2024 1256 by Lavonne Wesley RN  Outcome: Progressing  12/15/2024 0834 by Lavonne Wesley RN  Outcome: Progressing  Goal: Absence of Hospital-Acquired Illness or Injury  12/15/2024 1825 by Lavonne Wesley RN  Outcome: Progressing  12/15/2024 1256 by Lavonne Wesley RN  Outcome: Progressing  12/15/2024 0834 by Lavonne Wesley RN  Outcome: Progressing  Intervention: Identify and Manage Fall Risk  Recent Flowsheet Documentation  Taken 12/15/2024 0815 by Lavonne Wesley RN  Safety Promotion/Fall Prevention:   assistive device/personal items within reach   clutter free environment maintained   nonskid shoes/slippers when out of bed   safety round/check completed  Intervention: Prevent Skin Injury  Recent Flowsheet Documentation  Taken 12/15/2024 0815 by Lavonne Wesley RN  Body Position: position changed independently  Goal: Optimal Comfort and Wellbeing  12/15/2024 1825 by Lavonne Wesley RN  Outcome: Progressing  12/15/2024 1256 by Lavonne Wesley RN  Outcome: Progressing  12/15/2024 0834 by Lavonne Wesley RN  Outcome: Progressing  Intervention: Monitor Pain and Promote Comfort  Recent Flowsheet Documentation  Taken 12/15/2024 0752 by Lavonne Wesley RN  Pain Management Interventions: medication (see MAR)  Goal: Readiness for Transition of Care  12/15/2024 1825 by Lavonne Wesley RN  Outcome: Progressing  12/15/2024 1256 by Ostby, Lavonne E, RN  Outcome: Progressing  12/15/2024 0834 " by Ostby, Lavonne E, RN  Outcome: Progressing

## 2024-12-16 NOTE — PROGRESS NOTES
Occupational Therapy Discharge Summary    Reason for therapy discharge:    All goals and outcomes met, no further needs identified.    Progress towards therapy goal(s). See goals on Care Plan in [facility] electronic health record for goal details.  Goals met    Therapy recommendation(s):    Continue home exercise program.  OP hand therapy. I will put orders in

## 2024-12-16 NOTE — PROGRESS NOTES
12/16/24 0800   Appointment Info   Signing Clinician's Name / Credentials (OT) Jayda Riky, OTR/L   Rehab Comments (OT) Initial OT Eval   Living Environment   Living Environment Comments sopuse, house, stairs   Self-Care   Equipment Currently Used at Home none   Fall history within last six months no   Activity/Exercise/Self-Care Comment indp   Instrumental Activities of Daily Living (IADL)   IADL Comments indp   General Information   Onset of Illness/Injury or Date of Surgery 12/14/24   Referring Physician Yossi Barba MD   Patient/Family Therapy Goal Statement (OT) to go home   Additional Occupational Profile Info/Pertinent History of Current Problem Dustin Lan is a 74 year old male with PMH of SVT, asthma, HTN and HLD, who was admitted to observation on 12/14/2024 for an infected hand wound after a dog bite. s/p I&D   General Observations and Info ok for SH, hand, wrist, elbow . no WB restrictions per chart   Cognitive Status Examination   Orientation Status orientation to person, place and time   Sensory   Sensory Quick Adds sensation intact   Posture   Posture not impaired   Range of Motion Comprehensive   Comment, General Range of Motion L UE digits swollen, limited ROM in fingers, hand, wrist 2/2 dressing. limited flexion to 25% full.   Strength Comprehensive (MMT)   Comment, General Manual Muscle Testing (MMT) Assessment unable to  2/2 dressing   Coordination   Upper Extremity Coordination Left UE impaired   Transfers   Transfer Comments no deficits   Activities of Daily Living   BADL Assessment/Intervention other (see comments);clothing fastener management  (IADL mgmt work, computer, golf)   Clothes Fastener Management   [place] (Clothes Fastener Management) zippers;snaps;shoelaces;minimum assist (75% patient effort)   Clinical Impression   Criteria for Skilled Therapeutic Interventions Met (OT) Yes, treatment indicated   OT Diagnosis decreased function in ADL/IADL   OT Problem  List-Impairments impacting ADL pain;range of motion (ROM);strength   Assessment of Occupational Performance 1-3 Performance Deficits   Identified Performance Deficits fine motor ADL and IADL skills, grocery, cleaning, golf   Planned Therapy Interventions (OT) home program guidelines   Clinical Decision Making Complexity (OT) problem focused assessment/low complexity   Risk & Benefits of therapy have been explained evaluation/treatment results reviewed;care plan/treatment goals reviewed;risks/benefits reviewed;current/potential barriers reviewed;participants voiced agreement with care plan;participants included;patient   OT Total Evaluation Time   OT Eval, Low Complexity Minutes (43412) 8   OT Goals   Therapy Frequency (OT) One time eval and treatment   OT Predicted Duration/Target Date for Goal Attainment 12/16/24   OT Goals OT Goal 1;OT Goal 2   OT: Goal 1 Pt will demo HEP with handout and indp   OT: Goal 2 Pt will verbalize 2 safety and modification techniques for ADL/IADL   Interventions   Interventions Quick Adds Therapeutic Procedures/Exercise   Therapeutic Procedures/Exercise   Therapeutic Procedure: strength, endurance, ROM, flexibillity minutes (61378) 10   Symptoms Noted During/After Treatment none   Treatment Detail/Skilled Intervention ed on lack of ROM precautions, WB, elevation, swelling mgmt . handout provided. pt able to demo hand, wrist, elbow ex for 10 reps x2 sets. answered questions within scope regarding dressing changes. educated on safety and modifications for home technique and ADL. no questions. all needs inr each   OT Discharge Planning   OT Plan 1x eval and tx   OT Discharge Recommendation (DC Rec) home with outpatient occupational therapy   OT Rationale for DC Rec rec home with OP hand therapy for return to full ADL and IADL function. limitation in fine motor, ROM and strength impacting function of L hand   Total Session Time   Timed Code Treatment Minutes 10   Total Session Time (sum of  timed and untimed services) 18

## 2024-12-16 NOTE — PLAN OF CARE
"19:00-07:30    SP I&D of left hand wound, dressing intact. CMS intact. Rating pain 7-8/10 with movement described as a throbbing/burning pain, PRN po Oxycodone and Tylenol given, elevated left arm and ice applied. On IV Unasyn Q 6H. Ortho following.    Problem: Adult Inpatient Plan of Care  Goal: Plan of Care Review  Description: The Plan of Care Review/Shift note should be completed every shift.  The Outcome Evaluation is a brief statement about your assessment that the patient is improving, declining, or no change.  This information will be displayed automatically on your shift  note.  Outcome: Progressing  Flowsheets (Taken 12/16/2024 0632)  Outcome Evaluation: rating pain 7-8/10  Plan of Care Reviewed With: patient  Overall Patient Progress: improving  Goal: Patient-Specific Goal (Individualized)  Description: You can add care plan individualizations to a care plan. Examples of Individualization might be:  \"Parent requests to be called daily at 9am for status\", \"I have a hard time hearing out of my right ear\", or \"Do not touch me to wake me up as it startles  me\".  Outcome: Progressing  Goal: Absence of Hospital-Acquired Illness or Injury  Outcome: Progressing  Intervention: Identify and Manage Fall Risk  Recent Flowsheet Documentation  Taken 12/15/2024 2300 by Jeanette Esteban RN  Safety Promotion/Fall Prevention: nonskid shoes/slippers when out of bed  Intervention: Prevent and Manage VTE (Venous Thromboembolism) Risk  Recent Flowsheet Documentation  Taken 12/15/2024 2300 by Jeanette Esteban RN  VTE Prevention/Management: compression stockings off  Intervention: Prevent Infection  Recent Flowsheet Documentation  Taken 12/15/2024 2300 by Jeanette Esteban RN  Infection Prevention: rest/sleep promoted  Goal: Optimal Comfort and Wellbeing  Outcome: Progressing  Goal: Readiness for Transition of Care  Outcome: Progressing     Problem: Adult Inpatient Plan of Care  Goal: Plan of Care Review  Description: The " Plan of Care Review/Shift note should be completed every shift.  The Outcome Evaluation is a brief statement about your assessment that the patient is improving, declining, or no change.  This information will be displayed automatically on your shift  note.  Outcome: Progressing  Flowsheets (Taken 12/16/2024 0632)  Outcome Evaluation: rating pain 7-8/10  Plan of Care Reviewed With: patient  Overall Patient Progress: improving  Goal: Absence of Hospital-Acquired Illness or Injury  Intervention: Identify and Manage Fall Risk  Recent Flowsheet Documentation  Taken 12/15/2024 2300 by Jenaette Esteban RN  Safety Promotion/Fall Prevention: nonskid shoes/slippers when out of bed  Intervention: Prevent and Manage VTE (Venous Thromboembolism) Risk  Recent Flowsheet Documentation  Taken 12/15/2024 2300 by Jeanette Esteban RN  VTE Prevention/Management: compression stockings off  Intervention: Prevent Infection  Recent Flowsheet Documentation  Taken 12/15/2024 2300 by Jeanette Esteban RN  Infection Prevention: rest/sleep promoted

## 2024-12-17 ENCOUNTER — PATIENT OUTREACH (OUTPATIENT)
Dept: GASTROENTEROLOGY | Facility: CLINIC | Age: 74
End: 2024-12-17
Payer: COMMERCIAL

## 2024-12-17 LAB
ANION GAP SERPL CALCULATED.3IONS-SCNC: 13 MMOL/L (ref 7–15)
BACTERIA ABSC ANAEROBE+AEROBE CULT: ABNORMAL
BACTERIA ABSC ANAEROBE+AEROBE CULT: NO GROWTH
BUN SERPL-MCNC: 13.1 MG/DL (ref 8–23)
CALCIUM SERPL-MCNC: 8.9 MG/DL (ref 8.8–10.4)
CHLORIDE SERPL-SCNC: 104 MMOL/L (ref 98–107)
CREAT SERPL-MCNC: 0.78 MG/DL (ref 0.67–1.17)
CRP SERPL-MCNC: 53.26 MG/L
EGFRCR SERPLBLD CKD-EPI 2021: >90 ML/MIN/1.73M2
ERYTHROCYTE [DISTWIDTH] IN BLOOD BY AUTOMATED COUNT: 12.8 % (ref 10–15)
GLUCOSE SERPL-MCNC: 102 MG/DL (ref 70–99)
HCO3 SERPL-SCNC: 25 MMOL/L (ref 22–29)
HCT VFR BLD AUTO: 40.8 % (ref 40–53)
HGB BLD-MCNC: 13.6 G/DL (ref 13.3–17.7)
MCH RBC QN AUTO: 30.6 PG (ref 26.5–33)
MCHC RBC AUTO-ENTMCNC: 33.3 G/DL (ref 31.5–36.5)
MCV RBC AUTO: 92 FL (ref 78–100)
PLATELET # BLD AUTO: 191 10E3/UL (ref 150–450)
POTASSIUM SERPL-SCNC: 3.4 MMOL/L (ref 3.4–5.3)
RBC # BLD AUTO: 4.45 10E6/UL (ref 4.4–5.9)
SODIUM SERPL-SCNC: 142 MMOL/L (ref 135–145)
WBC # BLD AUTO: 7.4 10E3/UL (ref 4–11)

## 2024-12-17 PROCEDURE — 80048 BASIC METABOLIC PNL TOTAL CA: CPT | Performed by: PHYSICIAN ASSISTANT

## 2024-12-17 PROCEDURE — 36415 COLL VENOUS BLD VENIPUNCTURE: CPT | Performed by: PHYSICIAN ASSISTANT

## 2024-12-17 PROCEDURE — 99233 SBSQ HOSP IP/OBS HIGH 50: CPT | Performed by: PHYSICIAN ASSISTANT

## 2024-12-17 PROCEDURE — 85027 COMPLETE CBC AUTOMATED: CPT | Performed by: PHYSICIAN ASSISTANT

## 2024-12-17 PROCEDURE — 250N000011 HC RX IP 250 OP 636: Performed by: PHYSICIAN ASSISTANT

## 2024-12-17 PROCEDURE — 86140 C-REACTIVE PROTEIN: CPT | Performed by: PHYSICIAN ASSISTANT

## 2024-12-17 PROCEDURE — 250N000013 HC RX MED GY IP 250 OP 250 PS 637: Performed by: STUDENT IN AN ORGANIZED HEALTH CARE EDUCATION/TRAINING PROGRAM

## 2024-12-17 PROCEDURE — 250N000013 HC RX MED GY IP 250 OP 250 PS 637: Performed by: PHYSICIAN ASSISTANT

## 2024-12-17 PROCEDURE — 82374 ASSAY BLOOD CARBON DIOXIDE: CPT | Performed by: PHYSICIAN ASSISTANT

## 2024-12-17 PROCEDURE — 120N000001 HC R&B MED SURG/OB

## 2024-12-17 RX ADMIN — ACETAMINOPHEN 650 MG: 325 TABLET, FILM COATED ORAL at 06:57

## 2024-12-17 RX ADMIN — AMPICILLIN SODIUM AND SULBACTAM SODIUM 3 G: 2; 1 INJECTION, POWDER, FOR SOLUTION INTRAMUSCULAR; INTRAVENOUS at 14:20

## 2024-12-17 RX ADMIN — AMPICILLIN SODIUM AND SULBACTAM SODIUM 3 G: 2; 1 INJECTION, POWDER, FOR SOLUTION INTRAMUSCULAR; INTRAVENOUS at 02:11

## 2024-12-17 RX ADMIN — AMPICILLIN SODIUM AND SULBACTAM SODIUM 3 G: 2; 1 INJECTION, POWDER, FOR SOLUTION INTRAMUSCULAR; INTRAVENOUS at 20:23

## 2024-12-17 RX ADMIN — AMPICILLIN SODIUM AND SULBACTAM SODIUM 3 G: 2; 1 INJECTION, POWDER, FOR SOLUTION INTRAMUSCULAR; INTRAVENOUS at 08:52

## 2024-12-17 RX ADMIN — BUDESONIDE AND FORMOTEROL FUMARATE DIHYDRATE 2 PUFF: 160; 4.5 AEROSOL RESPIRATORY (INHALATION) at 21:04

## 2024-12-17 RX ADMIN — IBUPROFEN 400 MG: 400 TABLET, FILM COATED ORAL at 08:51

## 2024-12-17 RX ADMIN — IBUPROFEN 400 MG: 400 TABLET, FILM COATED ORAL at 20:20

## 2024-12-17 RX ADMIN — DILTIAZEM HYDROCHLORIDE 240 MG: 120 CAPSULE, COATED, EXTENDED RELEASE ORAL at 08:51

## 2024-12-17 ASSESSMENT — ACTIVITIES OF DAILY LIVING (ADL)
ADLS_ACUITY_SCORE: 26
ADLS_ACUITY_SCORE: 25
ADLS_ACUITY_SCORE: 26
ADLS_ACUITY_SCORE: 26
ADLS_ACUITY_SCORE: 25
ADLS_ACUITY_SCORE: 26
ADLS_ACUITY_SCORE: 25
ADLS_ACUITY_SCORE: 26
ADLS_ACUITY_SCORE: 25
ADLS_ACUITY_SCORE: 26
ADLS_ACUITY_SCORE: 25
ADLS_ACUITY_SCORE: 25
ADLS_ACUITY_SCORE: 26
ADLS_ACUITY_SCORE: 25

## 2024-12-17 NOTE — PLAN OF CARE
"[id number]  A&Ox4. VSS on RA. Denies pain. L hand swollen, tingling present. Independent in room. Ortho following.   Problem: Adult Inpatient Plan of Care  Goal: Plan of Care Review  Description: The Plan of Care Review/Shift note should be completed every shift.  The Outcome Evaluation is a brief statement about your assessment that the patient is improving, declining, or no change.  This information will be displayed automatically on your shift  note.  Outcome: Progressing  Flowsheets (Taken 12/17/2024 0619)  Outcome Evaluation: Denies pain  Plan of Care Reviewed With: patient  Overall Patient Progress: improving  Goal: Patient-Specific Goal (Individualized)  Description: You can add care plan individualizations to a care plan. Examples of Individualization might be:  \"Parent requests to be called daily at 9am for status\", \"I have a hard time hearing out of my right ear\", or \"Do not touch me to wake me up as it startles  me\".  Outcome: Progressing  Goal: Absence of Hospital-Acquired Illness or Injury  Outcome: Progressing  Intervention: Identify and Manage Fall Risk  Recent Flowsheet Documentation  Taken 12/16/2024 2030 by Tory Cantu RN  Safety Promotion/Fall Prevention:   safety round/check completed   patient and family education   clutter free environment maintained  Intervention: Prevent Skin Injury  Recent Flowsheet Documentation  Taken 12/16/2024 2030 by Tory Cantu RN  Body Position: position changed independently  Intervention: Prevent Infection  Recent Flowsheet Documentation  Taken 12/16/2024 2030 by Tory Cantu RN  Infection Prevention:   rest/sleep promoted   hand hygiene promoted  Goal: Optimal Comfort and Wellbeing  Outcome: Progressing  Goal: Readiness for Transition of Care  Outcome: Progressing     Problem: Wound  Goal: Optimal Coping  Outcome: Progressing  Goal: Optimal Functional Ability  Outcome: Progressing  Goal: Absence of Infection Signs and Symptoms  Outcome: " Progressing  Goal: Improved Oral Intake  Outcome: Progressing  Goal: Optimal Pain Control and Function  Outcome: Progressing  Goal: Skin Health and Integrity  Outcome: Progressing  Goal: Optimal Wound Healing  Outcome: Progressing     Goal Outcome Evaluation:      Plan of Care Reviewed With: patient    Overall Patient Progress: improvingOverall Patient Progress: improving    Outcome Evaluation: Denies pain

## 2024-12-17 NOTE — PLAN OF CARE
"  Problem: Adult Inpatient Plan of Care  Goal: Plan of Care Review  Description: The Plan of Care Review/Shift note should be completed every shift.  The Outcome Evaluation is a brief statement about your assessment that the patient is improving, declining, or no change.  This information will be displayed automatically on your shift  note.  Outcome: Progressing  Flowsheets (Taken 12/17/2024 1430)  Outcome Evaluation: vss afebrile. pain controlled wtih scheduled ibuprofen. iv abx given. pt to have another I&D tomorrow morning. npo after mn. drsg changed per Yessenia BELCHER.  Plan of Care Reviewed With: patient  Overall Patient Progress: no change  Goal: Patient-Specific Goal (Individualized)  Description: You can add care plan individualizations to a care plan. Examples of Individualization might be:  \"Parent requests to be called daily at 9am for status\", \"I have a hard time hearing out of my right ear\", or \"Do not touch me to wake me up as it startles  me\".  Outcome: Progressing  Goal: Absence of Hospital-Acquired Illness or Injury  Outcome: Progressing  Intervention: Identify and Manage Fall Risk  Recent Flowsheet Documentation  Taken 12/17/2024 0845 by Ally Bakns RN  Safety Promotion/Fall Prevention:   safety round/check completed   patient and family education   clutter free environment maintained  Intervention: Prevent Skin Injury  Recent Flowsheet Documentation  Taken 12/17/2024 0845 by Ally Banks RN  Body Position: position changed independently  Intervention: Prevent and Manage VTE (Venous Thromboembolism) Risk  Recent Flowsheet Documentation  Taken 12/17/2024 0845 by Ally Banks RN  VTE Prevention/Management: SCDs off (sequential compression devices)  Intervention: Prevent Infection  Recent Flowsheet Documentation  Taken 12/17/2024 0845 by Ally Banks RN  Infection Prevention:   rest/sleep promoted   hand hygiene promoted  Goal: Optimal Comfort and " Wellbeing  Outcome: Progressing  Intervention: Monitor Pain and Promote Comfort  Recent Flowsheet Documentation  Taken 12/17/2024 0850 by Ally Banks RN  Pain Management Interventions: medication (see MAR)  Goal: Readiness for Transition of Care  Outcome: Progressing     Problem: Wound  Goal: Optimal Coping  Outcome: Progressing  Goal: Optimal Functional Ability  Outcome: Progressing  Intervention: Optimize Functional Ability  Recent Flowsheet Documentation  Taken 12/17/2024 0845 by Ally Banks RN  Activity Management: up ad catrachito  Activity Assistance Provided: independent  Goal: Absence of Infection Signs and Symptoms  Outcome: Progressing  Goal: Improved Oral Intake  Outcome: Progressing  Goal: Optimal Pain Control and Function  Outcome: Progressing  Intervention: Prevent or Manage Pain  Recent Flowsheet Documentation  Taken 12/17/2024 0850 by Ally Banks RN  Pain Management Interventions: medication (see MAR)  Goal: Skin Health and Integrity  Outcome: Progressing  Intervention: Optimize Skin Protection  Recent Flowsheet Documentation  Taken 12/17/2024 0845 by Ally Banks RN  Activity Management: up ad catrachito  Head of Bed (HOB) Positioning: HOB at 30 degrees  Goal: Optimal Wound Healing  Outcome: Progressing       Goal Outcome Evaluation:      Plan of Care Reviewed With: patient    Overall Patient Progress: no changeOverall Patient Progress: no change    Outcome Evaluation: vss afebrile. pain controlled wtih scheduled ibuprofen. iv abx given. pt to have another I&D tomorrow morning. npo after mn. drsg changed per Yessenia BELCHER.

## 2024-12-17 NOTE — PROGRESS NOTES
Orthopedic Surgery  Dustin Lan  12/17/2024     Admit Date:  12/14/2024  POD: 2 Days Post-Op   Procedure(s):  Left hand dorsal irrigation and debridement at level of muscle, 3x1cm wound     Patient resting comfortably in bed.    Pain and finger motion improving in left hand.  Reporting increased burning sensation in hand this am.    Tolerating oral intake.    Denies nausea or vomiting.   Denies chest pain or shortness of breath.     Temp:  [97.4  F (36.3  C)-98.6  F (37  C)] 98.3  F (36.8  C)  Pulse:  [61-75] 75  Resp:  [16-22] 20  BP: (117-147)/(68-81) 147/78  SpO2:  [90 %-97 %] 97 %    Alert and oriented  Dressing is removed today and packing pulled.  Erythema remains along dorsal hand and unfortunately following packing removal, I was able to express purulent drainage from both sites.   Swelling in fingers present  Able to flex and extend fingers with some decreased ROM due to swelling  Reports equal sensation   Brisk cap refill     Labs:  Recent Labs   Lab Test 12/17/24  0810 12/16/24  0529 12/15/24  0526   WBC 7.4 9.1 10.1   HGB 13.6 12.5* 13.2*    160 152     No lab results found.  Recent Labs   Lab Test 12/17/24  0810 12/16/24  0529 12/14/24  1157   CRPI 53.26* 64.14* 155.04*     All cultures:  Recent Labs   Lab 12/15/24  1206 12/15/24  1205 12/14/24  1214 12/14/24  1157   CULTURE No growth after 1 day  No Growth  No anaerobic organisms isolated after 1 day  No anaerobic organisms isolated after 1 day No anaerobic organisms isolated after 1 day  No growth, less than 1 day No growth after 2 days No growth after 2 days       PLAN:  - WBAT LUE; ROMAT, AAT, finger motion encouraged  - Continue Abx while inpatient   - Will follow cultures  - Pain control w/ tylenol, ibuprofen; recommend weaning opioids  -  Due to continued purulent drainage, return to OR tomorrow for repeat I&D.  Surgeon: Dr Melgar  NPRADHA midnight.     Disposition   Anticipate d/c to home 1-2 days on oral Antibiotics     Yessenia NICHOLS  IVAN De Souza

## 2024-12-17 NOTE — PROGRESS NOTES
[facility]    Medicine Progress Note - Hospitalist Service    Date of Admission:  12/14/2024    Assessment & Plan   Dustin Lan is a 74 year old male with PMH of SVT, asthma, HTN and HLD, who was admitted to observation on 12/14/2024 for an infected hand wound after a dog bite.     In the ER he was afebrile with blood pressure 137/98, pulse 99 and breathing comfortably on room air without hypoxia.  Lab work was remarkable for normal BMP, CRP of 155, WBC of 14.5 and hemoglobin 15.6.  Blood cultures x 2 were obtained, hand x-ray showed left hand soft tissue swelling without foreign body or soft tissue gas and ultrasound of the right upper extremity showed diffuse subcutaneous edema of the left dorsal hand.  He was started on IV Unasyn with orthopedic consultation and admission under observation.    Since admission patient has been stable.  Orthopedics consulted and is taking to the OR for cleanout on 12/15. Cultures still not growing anything. CRP levels decreasing and WBC normal. Ortho unwrapped bandage on 12/17 and pus is still coming from wound so will return to OR on 12/18.    # Left hand dog bite with abscess/cellulitis s/p I & D on 12/15  -Dog bite on 12/9 with development of infectious concerns on 12/12  -Presented to emergency room on 12/13 where abscess was drained and Augmentin was started with tetanus shot  -Unfortunately symptoms did not improve and presented again on 12/14  -No evidence of gas or foreign body in imaging  -Started on IV Unasyn with blood cultures drawn which are currently negative  -Orthopedic consult who took to OR for washout on 12/15, cultures currently negative  -Ortho unwrapped wound on 12/17 and pus still present will take to the OR again on 12/18  -Continue Unasyn IV  -Tylenol, ibuprofen, icing and oxycodone for pain. Keep hand elevated  -NPO at midnight    #HTN  #Hx narrow QRS tachycardia   -Had an episode of abnormal rhythm requiring Valsalva maneuver  7/2024. Has been evaluated by cardiology, suspect SVT. No further episodes since then.   - PTA diltiazem     #HLD   - hold PTA statin, resume on discharge    #Mild persistent asthma   - PTA inhalers            Diet: Advance Diet as Tolerated: Regular Diet Adult    DVT Prophylaxis: Pneumatic Compression Devices  Hall Catheter: Not present  Lines: None     Cardiac Monitoring: None  Code Status: Full Code      Clinically Significant Risk Factors                              # Obesity: Estimated body mass index is 30.84 kg/m  as calculated from the following:    Height as of this encounter: 1.829 m (6').    Weight as of this encounter: 103.1 kg (227 lb 6.4 oz)., PRESENT ON ADMISSION     # Asthma: noted on problem list        Social Drivers of Health    Social Connections: Unknown (9/21/2024)    Social Connection and Isolation Panel [NHANES]     Frequency of Social Gatherings with Friends and Family: Once a week          Disposition Plan     Medically Ready for Discharge: Anticipated in 2-4 Days           The patient's care was discussed with the Bedside Nurse, Patient, and Orthopedic Consultant(s).    Sulma Lan PA-C  Hospitalist Service  [facility]  Securely message with [employer] (more info)  Text page via [facility] Paging/Directory   ______________________________________________________________________    Interval History   Reports that left hand still hurt and is swollen but no fever, chills, nausea, vomiting, diarrhea, abdominal pain or urinary symptoms.     Physical Exam   Vital Signs: Temp: 98.3  F (36.8  C) Temp src: Oral BP: (!) 147/78 Pulse: 75   Resp: 20 SpO2: 97 % O2 Device: None (Room air)    Weight: 227 lbs 6.4 oz    General Appearance: Alert and orientated x 3  Respiratory: CTAB  Cardiovascular: RRR without murmur  GI: Bowel sounds are present without tenderness  Skin: Left hand is wrapped with bandage. Fingers are swollen but less so then 12/16  Other: No lower extremity  swelling     Medical Decision Making       50 MINUTES SPENT BY ME on the date of service doing chart review, history, exam, documentation & further activities per the note.      Data     I have personally reviewed the following data over the past 24 hrs:    7.4  \   13.6   / 191     142 104 13.1 /  102 (H)   3.4 25 0.78 \     Procal: N/A CRP: 53.26 (H) Lactic Acid: N/A         Imaging results reviewed over the past 24 hrs:   No results found for this or any previous visit (from the past 24 hours).

## 2024-12-18 ENCOUNTER — ANESTHESIA (OUTPATIENT)
Dept: SURGERY | Facility: CLINIC | Age: 74
End: 2024-12-18
Payer: COMMERCIAL

## 2024-12-18 ENCOUNTER — ANESTHESIA EVENT (OUTPATIENT)
Dept: SURGERY | Facility: CLINIC | Age: 74
End: 2024-12-18
Payer: COMMERCIAL

## 2024-12-18 LAB
ANION GAP SERPL CALCULATED.3IONS-SCNC: 11 MMOL/L (ref 7–15)
BACTERIA ABSC ANAEROBE+AEROBE CULT: ABNORMAL
BACTERIA ABSC ANAEROBE+AEROBE CULT: ABNORMAL
BACTERIA SPEC CULT: NORMAL
BUN SERPL-MCNC: 13.7 MG/DL (ref 8–23)
CALCIUM SERPL-MCNC: 8.7 MG/DL (ref 8.8–10.4)
CHLORIDE SERPL-SCNC: 104 MMOL/L (ref 98–107)
CREAT SERPL-MCNC: 0.74 MG/DL (ref 0.67–1.17)
CRP SERPL-MCNC: 28.09 MG/L
EGFRCR SERPLBLD CKD-EPI 2021: >90 ML/MIN/1.73M2
ERYTHROCYTE [DISTWIDTH] IN BLOOD BY AUTOMATED COUNT: 12.8 % (ref 10–15)
GLUCOSE BLDC GLUCOMTR-MCNC: 88 MG/DL (ref 70–99)
GLUCOSE SERPL-MCNC: 92 MG/DL (ref 70–99)
GRAM STAIN RESULT: NORMAL
GRAM STAIN RESULT: NORMAL
HCO3 SERPL-SCNC: 24 MMOL/L (ref 22–29)
HCT VFR BLD AUTO: 37.8 % (ref 40–53)
HGB BLD-MCNC: 12.6 G/DL (ref 13.3–17.7)
MCH RBC QN AUTO: 30.3 PG (ref 26.5–33)
MCHC RBC AUTO-ENTMCNC: 33.3 G/DL (ref 31.5–36.5)
MCV RBC AUTO: 91 FL (ref 78–100)
PLATELET # BLD AUTO: 203 10E3/UL (ref 150–450)
POTASSIUM SERPL-SCNC: 3.4 MMOL/L (ref 3.4–5.3)
RBC # BLD AUTO: 4.16 10E6/UL (ref 4.4–5.9)
SODIUM SERPL-SCNC: 139 MMOL/L (ref 135–145)
WBC # BLD AUTO: 6.4 10E3/UL (ref 4–11)

## 2024-12-18 PROCEDURE — 85014 HEMATOCRIT: CPT | Performed by: PHYSICIAN ASSISTANT

## 2024-12-18 PROCEDURE — 80048 BASIC METABOLIC PNL TOTAL CA: CPT | Performed by: PHYSICIAN ASSISTANT

## 2024-12-18 PROCEDURE — 250N000013 HC RX MED GY IP 250 OP 250 PS 637: Performed by: STUDENT IN AN ORGANIZED HEALTH CARE EDUCATION/TRAINING PROGRAM

## 2024-12-18 PROCEDURE — 87176 TISSUE HOMOGENIZATION CULTR: CPT | Performed by: ORTHOPAEDIC SURGERY

## 2024-12-18 PROCEDURE — 272N000001 HC OR GENERAL SUPPLY STERILE: Performed by: ORTHOPAEDIC SURGERY

## 2024-12-18 PROCEDURE — 258N000003 HC RX IP 258 OP 636: Performed by: ANESTHESIOLOGY

## 2024-12-18 PROCEDURE — 87205 SMEAR GRAM STAIN: CPT | Performed by: ORTHOPAEDIC SURGERY

## 2024-12-18 PROCEDURE — 250N000013 HC RX MED GY IP 250 OP 250 PS 637: Performed by: PHYSICIAN ASSISTANT

## 2024-12-18 PROCEDURE — 250N000011 HC RX IP 250 OP 636

## 2024-12-18 PROCEDURE — 999N000141 HC STATISTIC PRE-PROCEDURE NURSING ASSESSMENT: Performed by: ORTHOPAEDIC SURGERY

## 2024-12-18 PROCEDURE — 250N000009 HC RX 250: Performed by: ORTHOPAEDIC SURGERY

## 2024-12-18 PROCEDURE — 250N000009 HC RX 250

## 2024-12-18 PROCEDURE — 250N000011 HC RX IP 250 OP 636: Performed by: ORTHOPAEDIC SURGERY

## 2024-12-18 PROCEDURE — 36415 COLL VENOUS BLD VENIPUNCTURE: CPT | Performed by: PHYSICIAN ASSISTANT

## 2024-12-18 PROCEDURE — 360N000083 HC SURGERY LEVEL 3 W/ FLUORO, PER MIN: Performed by: ORTHOPAEDIC SURGERY

## 2024-12-18 PROCEDURE — 250N000025 HC SEVOFLURANE, PER MIN: Performed by: ORTHOPAEDIC SURGERY

## 2024-12-18 PROCEDURE — 99232 SBSQ HOSP IP/OBS MODERATE 35: CPT | Performed by: PHYSICIAN ASSISTANT

## 2024-12-18 PROCEDURE — 120N000001 HC R&B MED SURG/OB

## 2024-12-18 PROCEDURE — 86140 C-REACTIVE PROTEIN: CPT | Performed by: PHYSICIAN ASSISTANT

## 2024-12-18 PROCEDURE — 250N000011 HC RX IP 250 OP 636: Performed by: PHYSICIAN ASSISTANT

## 2024-12-18 PROCEDURE — 710N000009 HC RECOVERY PHASE 1, LEVEL 1, PER MIN: Performed by: ORTHOPAEDIC SURGERY

## 2024-12-18 PROCEDURE — 370N000017 HC ANESTHESIA TECHNICAL FEE, PER MIN: Performed by: ORTHOPAEDIC SURGERY

## 2024-12-18 PROCEDURE — 87070 CULTURE OTHR SPECIMN AEROBIC: CPT | Performed by: ORTHOPAEDIC SURGERY

## 2024-12-18 PROCEDURE — 82374 ASSAY BLOOD CARBON DIOXIDE: CPT | Performed by: PHYSICIAN ASSISTANT

## 2024-12-18 PROCEDURE — 0JBK0ZZ EXCISION OF LEFT HAND SUBCUTANEOUS TISSUE AND FASCIA, OPEN APPROACH: ICD-10-PCS | Performed by: ORTHOPAEDIC SURGERY

## 2024-12-18 RX ORDER — FENTANYL CITRATE 50 UG/ML
25 INJECTION, SOLUTION INTRAMUSCULAR; INTRAVENOUS EVERY 5 MIN PRN
Status: DISCONTINUED | OUTPATIENT
Start: 2024-12-18 | End: 2024-12-18 | Stop reason: HOSPADM

## 2024-12-18 RX ORDER — FENTANYL CITRATE 50 UG/ML
INJECTION, SOLUTION INTRAMUSCULAR; INTRAVENOUS PRN
Status: DISCONTINUED | OUTPATIENT
Start: 2024-12-18 | End: 2024-12-18

## 2024-12-18 RX ORDER — CEFAZOLIN SODIUM/WATER 2 G/20 ML
2 SYRINGE (ML) INTRAVENOUS SEE ADMIN INSTRUCTIONS
Status: DISCONTINUED | OUTPATIENT
Start: 2024-12-18 | End: 2024-12-18 | Stop reason: HOSPADM

## 2024-12-18 RX ORDER — NALOXONE HYDROCHLORIDE 0.4 MG/ML
0.1 INJECTION, SOLUTION INTRAMUSCULAR; INTRAVENOUS; SUBCUTANEOUS
Status: DISCONTINUED | OUTPATIENT
Start: 2024-12-18 | End: 2024-12-18 | Stop reason: HOSPADM

## 2024-12-18 RX ORDER — DEXAMETHASONE SODIUM PHOSPHATE 4 MG/ML
4 INJECTION, SOLUTION INTRA-ARTICULAR; INTRALESIONAL; INTRAMUSCULAR; INTRAVENOUS; SOFT TISSUE
Status: DISCONTINUED | OUTPATIENT
Start: 2024-12-18 | End: 2024-12-18 | Stop reason: HOSPADM

## 2024-12-18 RX ORDER — ONDANSETRON 4 MG/1
4 TABLET, ORALLY DISINTEGRATING ORAL EVERY 30 MIN PRN
Status: DISCONTINUED | OUTPATIENT
Start: 2024-12-18 | End: 2024-12-18 | Stop reason: HOSPADM

## 2024-12-18 RX ORDER — FENTANYL CITRATE 50 UG/ML
50 INJECTION, SOLUTION INTRAMUSCULAR; INTRAVENOUS EVERY 5 MIN PRN
Status: DISCONTINUED | OUTPATIENT
Start: 2024-12-18 | End: 2024-12-18 | Stop reason: HOSPADM

## 2024-12-18 RX ORDER — ONDANSETRON 2 MG/ML
INJECTION INTRAMUSCULAR; INTRAVENOUS PRN
Status: DISCONTINUED | OUTPATIENT
Start: 2024-12-18 | End: 2024-12-18

## 2024-12-18 RX ORDER — LACTOBACILLUS RHAMNOSUS GG 10B CELL
1 CAPSULE ORAL 2 TIMES DAILY
Status: DISCONTINUED | OUTPATIENT
Start: 2024-12-18 | End: 2024-12-20 | Stop reason: HOSPADM

## 2024-12-18 RX ORDER — SODIUM CHLORIDE, SODIUM LACTATE, POTASSIUM CHLORIDE, CALCIUM CHLORIDE 600; 310; 30; 20 MG/100ML; MG/100ML; MG/100ML; MG/100ML
INJECTION, SOLUTION INTRAVENOUS CONTINUOUS
Status: DISCONTINUED | OUTPATIENT
Start: 2024-12-18 | End: 2024-12-18 | Stop reason: HOSPADM

## 2024-12-18 RX ORDER — LIDOCAINE 40 MG/G
CREAM TOPICAL
Status: DISCONTINUED | OUTPATIENT
Start: 2024-12-18 | End: 2024-12-18 | Stop reason: HOSPADM

## 2024-12-18 RX ORDER — HYDROMORPHONE HCL IN WATER/PF 6 MG/30 ML
0.4 PATIENT CONTROLLED ANALGESIA SYRINGE INTRAVENOUS EVERY 5 MIN PRN
Status: DISCONTINUED | OUTPATIENT
Start: 2024-12-18 | End: 2024-12-18 | Stop reason: HOSPADM

## 2024-12-18 RX ORDER — CEFAZOLIN SODIUM/WATER 2 G/20 ML
2 SYRINGE (ML) INTRAVENOUS
Status: COMPLETED | OUTPATIENT
Start: 2024-12-18 | End: 2024-12-18

## 2024-12-18 RX ORDER — DEXAMETHASONE SODIUM PHOSPHATE 4 MG/ML
INJECTION, SOLUTION INTRA-ARTICULAR; INTRALESIONAL; INTRAMUSCULAR; INTRAVENOUS; SOFT TISSUE PRN
Status: DISCONTINUED | OUTPATIENT
Start: 2024-12-18 | End: 2024-12-18

## 2024-12-18 RX ORDER — LIDOCAINE HYDROCHLORIDE 20 MG/ML
INJECTION, SOLUTION INFILTRATION; PERINEURAL PRN
Status: DISCONTINUED | OUTPATIENT
Start: 2024-12-18 | End: 2024-12-18

## 2024-12-18 RX ORDER — ONDANSETRON 2 MG/ML
4 INJECTION INTRAMUSCULAR; INTRAVENOUS EVERY 30 MIN PRN
Status: DISCONTINUED | OUTPATIENT
Start: 2024-12-18 | End: 2024-12-18 | Stop reason: HOSPADM

## 2024-12-18 RX ORDER — MAGNESIUM HYDROXIDE 1200 MG/15ML
LIQUID ORAL PRN
Status: DISCONTINUED | OUTPATIENT
Start: 2024-12-18 | End: 2024-12-18 | Stop reason: HOSPADM

## 2024-12-18 RX ORDER — EPHEDRINE SULFATE 50 MG/ML
INJECTION, SOLUTION INTRAMUSCULAR; INTRAVENOUS; SUBCUTANEOUS PRN
Status: DISCONTINUED | OUTPATIENT
Start: 2024-12-18 | End: 2024-12-18

## 2024-12-18 RX ORDER — HYDROMORPHONE HCL IN WATER/PF 6 MG/30 ML
0.2 PATIENT CONTROLLED ANALGESIA SYRINGE INTRAVENOUS EVERY 5 MIN PRN
Status: DISCONTINUED | OUTPATIENT
Start: 2024-12-18 | End: 2024-12-18 | Stop reason: HOSPADM

## 2024-12-18 RX ORDER — GLYCOPYRROLATE 0.2 MG/ML
INJECTION, SOLUTION INTRAMUSCULAR; INTRAVENOUS PRN
Status: DISCONTINUED | OUTPATIENT
Start: 2024-12-18 | End: 2024-12-18

## 2024-12-18 RX ORDER — LIDOCAINE 40 MG/G
CREAM TOPICAL
Status: DISCONTINUED | OUTPATIENT
Start: 2024-12-18 | End: 2024-12-20 | Stop reason: HOSPADM

## 2024-12-18 RX ORDER — HYDRALAZINE HYDROCHLORIDE 20 MG/ML
2.5-5 INJECTION INTRAMUSCULAR; INTRAVENOUS EVERY 10 MIN PRN
Status: DISCONTINUED | OUTPATIENT
Start: 2024-12-18 | End: 2024-12-18 | Stop reason: HOSPADM

## 2024-12-18 RX ORDER — LABETALOL HYDROCHLORIDE 5 MG/ML
10 INJECTION, SOLUTION INTRAVENOUS
Status: DISCONTINUED | OUTPATIENT
Start: 2024-12-18 | End: 2024-12-18 | Stop reason: HOSPADM

## 2024-12-18 RX ORDER — PROPOFOL 10 MG/ML
INJECTION, EMULSION INTRAVENOUS PRN
Status: DISCONTINUED | OUTPATIENT
Start: 2024-12-18 | End: 2024-12-18

## 2024-12-18 RX ORDER — ALBUTEROL SULFATE 0.83 MG/ML
2.5 SOLUTION RESPIRATORY (INHALATION) EVERY 4 HOURS PRN
Status: DISCONTINUED | OUTPATIENT
Start: 2024-12-18 | End: 2024-12-18 | Stop reason: HOSPADM

## 2024-12-18 RX ORDER — BUPIVACAINE HYDROCHLORIDE 2.5 MG/ML
INJECTION, SOLUTION INFILTRATION; PERINEURAL PRN
Status: DISCONTINUED | OUTPATIENT
Start: 2024-12-18 | End: 2024-12-20 | Stop reason: HOSPADM

## 2024-12-18 RX ADMIN — Medication 2 G: at 07:39

## 2024-12-18 RX ADMIN — EPHEDRINE SULFATE 5 MG: 5 INJECTION INTRAVENOUS at 08:05

## 2024-12-18 RX ADMIN — AMPICILLIN SODIUM AND SULBACTAM SODIUM 3 G: 2; 1 INJECTION, POWDER, FOR SOLUTION INTRAMUSCULAR; INTRAVENOUS at 01:38

## 2024-12-18 RX ADMIN — SODIUM CHLORIDE, POTASSIUM CHLORIDE, SODIUM LACTATE AND CALCIUM CHLORIDE: 600; 310; 30; 20 INJECTION, SOLUTION INTRAVENOUS at 06:46

## 2024-12-18 RX ADMIN — LIDOCAINE HYDROCHLORIDE 50 MG: 20 INJECTION, SOLUTION INFILTRATION; PERINEURAL at 07:46

## 2024-12-18 RX ADMIN — SODIUM CHLORIDE, POTASSIUM CHLORIDE, SODIUM LACTATE AND CALCIUM CHLORIDE: 600; 310; 30; 20 INJECTION, SOLUTION INTRAVENOUS at 09:12

## 2024-12-18 RX ADMIN — GLYCOPYRROLATE 0.2 MG: 0.2 INJECTION, SOLUTION INTRAMUSCULAR; INTRAVENOUS at 07:54

## 2024-12-18 RX ADMIN — PROPOFOL 200 MG: 10 INJECTION, EMULSION INTRAVENOUS at 07:46

## 2024-12-18 RX ADMIN — AMPICILLIN SODIUM AND SULBACTAM SODIUM 3 G: 2; 1 INJECTION, POWDER, FOR SOLUTION INTRAMUSCULAR; INTRAVENOUS at 14:17

## 2024-12-18 RX ADMIN — FENTANYL CITRATE 100 MCG: 50 INJECTION INTRAMUSCULAR; INTRAVENOUS at 07:46

## 2024-12-18 RX ADMIN — DEXAMETHASONE SODIUM PHOSPHATE 4 MG: 4 INJECTION, SOLUTION INTRA-ARTICULAR; INTRALESIONAL; INTRAMUSCULAR; INTRAVENOUS; SOFT TISSUE at 07:46

## 2024-12-18 RX ADMIN — EPHEDRINE SULFATE 5 MG: 5 INJECTION INTRAVENOUS at 08:02

## 2024-12-18 RX ADMIN — ONDANSETRON 4 MG: 2 INJECTION INTRAMUSCULAR; INTRAVENOUS at 08:17

## 2024-12-18 RX ADMIN — AMPICILLIN SODIUM AND SULBACTAM SODIUM 3 G: 2; 1 INJECTION, POWDER, FOR SOLUTION INTRAMUSCULAR; INTRAVENOUS at 20:27

## 2024-12-18 RX ADMIN — Medication 1 CAPSULE: at 20:28

## 2024-12-18 RX ADMIN — IBUPROFEN 400 MG: 400 TABLET, FILM COATED ORAL at 20:28

## 2024-12-18 RX ADMIN — SENNOSIDES AND DOCUSATE SODIUM 1 TABLET: 8.6; 5 TABLET ORAL at 20:28

## 2024-12-18 RX ADMIN — ACETAMINOPHEN 650 MG: 325 TABLET, FILM COATED ORAL at 17:44

## 2024-12-18 ASSESSMENT — ACTIVITIES OF DAILY LIVING (ADL)
ADLS_ACUITY_SCORE: 28
ADLS_ACUITY_SCORE: 27
ADLS_ACUITY_SCORE: 29
ADLS_ACUITY_SCORE: 28
ADLS_ACUITY_SCORE: 26
ADLS_ACUITY_SCORE: 29
ADLS_ACUITY_SCORE: 29
ADLS_ACUITY_SCORE: 26
ADLS_ACUITY_SCORE: 26
ADLS_ACUITY_SCORE: 29
ADLS_ACUITY_SCORE: 29
ADLS_ACUITY_SCORE: 27
ADLS_ACUITY_SCORE: 28
ADLS_ACUITY_SCORE: 29
ADLS_ACUITY_SCORE: 26
ADLS_ACUITY_SCORE: 27
ADLS_ACUITY_SCORE: 29
ADLS_ACUITY_SCORE: 26

## 2024-12-18 NOTE — PLAN OF CARE
"Goal Outcome Evaluation:      Plan of Care Reviewed With: patient    PRIMARY DIAGNOSIS: INFECTION LEFT HAND   OUTPATIENT/OBSERVATION GOALS TO BE MET BEFORE DISCHARGE:  ADLs back to baseline: Yes    Activity and level of assistance: SBA    Pain status: Pain free.    Return to near baseline physical activity: Yes     Discharge Planner Nurse   Safe discharge environment identified: Yes  Barriers to discharge: Yes       Entered by: Janki Escobar RN 12/18/2024 12:34 PM   The patient arrived post operative from I and D at 0900 this am. Patient reports no pain. Left hand packed and wrapped with ACE bandage upon arrival. Patient reports feeling in hand with mild numbness and tingling. Ice pack applied. Patient put on regular diet. SCDS applied. Able to ambulate SBA. Vitally stable on room air.   Please review provider order for any additional goals.   Nurse to notify provider when observation goals have been met and patient is ready for discharge.    Problem: Adult Inpatient Plan of Care  Goal: Plan of Care Review  Description: The Plan of Care Review/Shift note should be completed every shift.  The Outcome Evaluation is a brief statement about your assessment that the patient is improving, declining, or no change.  This information will be displayed automatically on your shift  note.  12/18/2024 1234 by Janki Escobar RN  Outcome: Progressing  Flowsheets (Taken 12/18/2024 1234)  Plan of Care Reviewed With: patient  12/18/2024 1011 by aJnki Escobar RN  Outcome: Progressing  Flowsheets (Taken 12/18/2024 1011)  Plan of Care Reviewed With: patient  Goal: Patient-Specific Goal (Individualized)  Description: You can add care plan individualizations to a care plan. Examples of Individualization might be:  \"Parent requests to be called daily at 9am for status\", \"I have a hard time hearing out of my right ear\", or \"Do not touch me to wake me up as it startles  me\".  12/18/2024 1234 by Janki Escobar RN  Outcome: " Progressing  12/18/2024 1011 by Janki Escobar RN  Outcome: Progressing  Goal: Absence of Hospital-Acquired Illness or Injury  12/18/2024 1234 by Janki Escobar RN  Outcome: Progressing  12/18/2024 1011 by Janki Escobar RN  Outcome: Progressing  Intervention: Identify and Manage Fall Risk  Recent Flowsheet Documentation  Taken 12/18/2024 0915 by Janki Escobar RN  Safety Promotion/Fall Prevention: activity supervised  Intervention: Prevent Skin Injury  Recent Flowsheet Documentation  Taken 12/18/2024 0915 by Janki Escobar RN  Body Position: upper extremity elevated  Intervention: Prevent and Manage VTE (Venous Thromboembolism) Risk  Recent Flowsheet Documentation  Taken 12/18/2024 0915 by Janki Escobar RN  VTE Prevention/Management: SCDs on (sequential compression devices)  Goal: Optimal Comfort and Wellbeing  12/18/2024 1234 by Janki Escobar RN  Outcome: Progressing  12/18/2024 1011 by Janki Escobar RN  Outcome: Progressing  Goal: Readiness for Transition of Care  12/18/2024 1234 by Janki Escobar RN  Outcome: Progressing  12/18/2024 1011 by Janki Escobar RN  Outcome: Progressing     Problem: Wound  Goal: Optimal Coping  12/18/2024 1234 by Janki Escobar RN  Outcome: Progressing  12/18/2024 1011 by Janki Escobar RN  Outcome: Progressing  Goal: Optimal Functional Ability  12/18/2024 1234 by Janki Escobar RN  Outcome: Progressing  12/18/2024 1011 by Janki Escobar RN  Outcome: Progressing  Intervention: Optimize Functional Ability  Recent Flowsheet Documentation  Taken 12/18/2024 0915 by Jnaki Escobar RN  Activity Management: activity adjusted per tolerance  Activity Assistance Provided: assistance, 1 person  Goal: Absence of Infection Signs and Symptoms  12/18/2024 1234 by Janki Escobar RN  Outcome: Progressing  12/18/2024 1011 by Janki Escobar RN  Outcome: Progressing  Goal: Improved Oral Intake  12/18/2024 1234 by Janki Escobar RN  Outcome: Progressing  12/18/2024  1011 by Shawn, Janki C, RN  Outcome: Progressing  Goal: Optimal Pain Control and Function  12/18/2024 1234 by Janki Escobar RN  Outcome: Progressing  12/18/2024 1011 by Janki Escobar RN  Outcome: Progressing  Goal: Skin Health and Integrity  12/18/2024 1234 by Janki Escobar RN  Outcome: Progressing  12/18/2024 1011 by Janki Escobar RN  Outcome: Progressing  Intervention: Optimize Skin Protection  Recent Flowsheet Documentation  Taken 12/18/2024 0915 by Janki Escobar RN  Activity Management: activity adjusted per tolerance  Head of Bed (HOB) Positioning: HOB at 20-30 degrees  Goal: Optimal Wound Healing  12/18/2024 1234 by Janki Escobar RN  Outcome: Progressing  12/18/2024 1011 by Janki Escobar RN  Outcome: Progressing

## 2024-12-18 NOTE — PLAN OF CARE
"Inpatient: [id number]     Dx: Dog Bite with Abscess/Cellulitis       Orientation: x4   Pain: 2/10 intermittent throbbing in left hand   Activity: Independent in Room   LDA: Right PIV SL   Diet: Regular Diet   Neuro: Intact   Cardio: WDL   Resp: WDL  MS: Pain in left hand related to dog bite and recent procedure - remains wrapped   GI: Bowel sounds hypoactive - otherwise passing flatus - last BM on 12/17/24 per patient   : Voiding without difficulty   Derm: Small abrasion on upper lip - Unable to assess left hand   Plan: Ortho, ID, and PT/OT following with  care - continuing with IV antibiotics         CMS's intact to left hand - wrapped by ortho. Had 2nd irrigation and drainage today.       Acetaminophen given for pain.       Blood pressure 121/83, pulse 86, temperature 98.4  F (36.9  C), temperature source Oral, resp. rate 14, height 1.829 m (6'), weight 103.1 kg (227 lb 6.4 oz), SpO2 93%.     Problem: Adult Inpatient Plan of Care  Goal: Plan of Care Review  Description: The Plan of Care Review/Shift note should be completed every shift.  The Outcome Evaluation is a brief statement about your assessment that the patient is improving, declining, or no change.  This information will be displayed automatically on your shift  note.  Outcome: Progressing  Flowsheets (Taken 12/18/2024 9554)  Outcome Evaluation: Post op from 2nd I&D to left hand  Plan of Care Reviewed With: patient  Overall Patient Progress: no change  Goal: Patient-Specific Goal (Individualized)  Description: You can add care plan individualizations to a care plan. Examples of Individualization might be:  \"Parent requests to be called daily at 9am for status\", \"I have a hard time hearing out of my right ear\", or \"Do not touch me to wake me up as it startles  me\".  Outcome: Progressing  Goal: Absence of Hospital-Acquired Illness or Injury  Outcome: Progressing  Intervention: Identify and Manage Fall Risk  Recent Flowsheet Documentation  Taken 12/18/2024 " 1736 by Marcial Bonner RN  Safety Promotion/Fall Prevention:   clutter free environment maintained   lighting adjusted   nonskid shoes/slippers when out of bed   patient and family education   safety round/check completed  Intervention: Prevent Skin Injury  Recent Flowsheet Documentation  Taken 12/18/2024 1736 by Marcial Bonner RN  Body Position: position changed independently  Goal: Optimal Comfort and Wellbeing  Outcome: Progressing  Intervention: Monitor Pain and Promote Comfort  Recent Flowsheet Documentation  Taken 12/18/2024 1733 by Marcial Bonner RN  Pain Management Interventions: medication (see MAR)  Goal: Readiness for Transition of Care  Outcome: Progressing     Problem: Wound  Goal: Optimal Coping  Outcome: Progressing  Goal: Optimal Functional Ability  Outcome: Progressing  Intervention: Optimize Functional Ability  Recent Flowsheet Documentation  Taken 12/18/2024 1736 by Marcial Bonner RN  Activity Management: up ad catrachito  Activity Assistance Provided: independent  Goal: Absence of Infection Signs and Symptoms  Outcome: Progressing  Goal: Improved Oral Intake  Outcome: Progressing  Goal: Optimal Pain Control and Function  Outcome: Progressing  Intervention: Prevent or Manage Pain  Recent Flowsheet Documentation  Taken 12/18/2024 1733 by Marcial Bonner RN  Pain Management Interventions: medication (see MAR)  Goal: Skin Health and Integrity  Outcome: Progressing  Intervention: Optimize Skin Protection  Recent Flowsheet Documentation  Taken 12/18/2024 1736 by Marcial Bonner RN  Activity Management: up ad catrachito  Goal: Optimal Wound Healing  Outcome: Progressing   Goal Outcome Evaluation:      Plan of Care Reviewed With: patient    Overall Patient Progress: no changeOverall Patient Progress: no change    Outcome Evaluation: Post op from 2nd I&D to left hand

## 2024-12-18 NOTE — PLAN OF CARE
"INPATIENT NOTE: 1500 - 2300     PRIMARY PROBLEM: Wound Infection/Dog Bite     Vital Signs: Stable        Orientation: A/O x 4  Neuro: Intact  Pain status: Pain to left hand managed with scheduled Ibuprofen  Resp: Lung sounds CTA, denies any SOB  Cardiac: WDL   GI: Bowel sounds active. Last BM 12/17  : Continent, voiding adequately  Skin: Dog bite to left hand/finger, dressing intact, CMS intact  LDA: Peripheral IV to RLA SL  Pertinent Labs/Imaging: X-ray Left hand shows soft tissue swelling, no Fx  Diet: Regular  Activity: Independent  Alarms/Safety: All alarms on and audible   Consults: Orthopedic Surgery, PT   Discharge Plan: Continue with Unasyn q6hrs. NPO from midnight for L hand surgery tomorrow  Discharge Date: TBD     Will continue to monitor and provide cares.     Maria Luisa Taveras RN Problem: Adult Inpatient Plan of Care  Goal: Plan of Care Review  Description: The Plan of Care Review/Shift note should be completed every shift.  The Outcome Evaluation is a brief statement about your assessment that the patient is improving, declining, or no change.  This information will be displayed automatically on your shift  note.  Outcome: Progressing  Goal: Patient-Specific Goal (Individualized)  Description: You can add care plan individualizations to a care plan. Examples of Individualization might be:  \"Parent requests to be called daily at 9am for status\", \"I have a hard time hearing out of my right ear\", or \"Do not touch me to wake me up as it startles  me\".  Outcome: Progressing  Goal: Absence of Hospital-Acquired Illness or Injury  Outcome: Progressing  Intervention: Identify and Manage Fall Risk  Recent Flowsheet Documentation  Taken 12/17/2024 1929 by Maria Luisa Taveras, RN  Safety Promotion/Fall Prevention:   activity supervised   clutter free environment maintained   lighting adjusted   mobility aid in reach   nonskid shoes/slippers when out of bed  Intervention: Prevent Skin Injury  Recent Flowsheet " Documentation  Taken 12/17/2024 1929 by Maria Luisa Taveras RN  Body Position: position changed independently  Intervention: Prevent and Manage VTE (Venous Thromboembolism) Risk  Recent Flowsheet Documentation  Taken 12/17/2024 1929 by Maria Luisa Taveras RN  VTE Prevention/Management: SCDs off (sequential compression devices)  Intervention: Prevent Infection  Recent Flowsheet Documentation  Taken 12/17/2024 1929 by Maria Luisa Taveras RN  Infection Prevention:   cohorting utilized   hand hygiene promoted   rest/sleep promoted  Goal: Optimal Comfort and Wellbeing  Outcome: Progressing  Goal: Readiness for Transition of Care  Outcome: Progressing     Problem: Wound  Goal: Optimal Coping  Outcome: Progressing  Goal: Optimal Functional Ability  Outcome: Progressing  Intervention: Optimize Functional Ability  Recent Flowsheet Documentation  Taken 12/17/2024 1929 by Maria Luisa Taveras RN  Activity Management: activity adjusted per tolerance  Activity Assistance Provided: independent  Goal: Absence of Infection Signs and Symptoms  Outcome: Progressing  Goal: Improved Oral Intake  Outcome: Progressing  Goal: Optimal Pain Control and Function  Outcome: Progressing  Goal: Skin Health and Integrity  Outcome: Progressing  Intervention: Optimize Skin Protection  Recent Flowsheet Documentation  Taken 12/17/2024 1929 by Maria Luisa Taveras RN  Activity Management: activity adjusted per tolerance  Head of Bed (HOB) Positioning: HOB at 30-45 degrees  Goal: Optimal Wound Healing  Outcome: Progressing

## 2024-12-18 NOTE — PROGRESS NOTES
[facility]    Medicine Progress Note - Hospitalist Service    Date of Admission: 12/14/2024    Assessment & Plan   Dustin Lan is a 74 year old male with PMH of SVT, asthma, HTN and HLD, who was admitted to observation on 12/14/2024 for an infected hand wound after a dog bite.      In the ER he was afebrile with blood pressure 137/98, pulse 99 and breathing comfortably on room air without hypoxia.  Lab work was remarkable for normal BMP, CRP of 155, WBC of 14.5 and hemoglobin 15.6.  Blood cultures x 2 were obtained, hand x-ray showed left hand soft tissue swelling without foreign body or soft tissue gas and ultrasound of the right upper extremity showed diffuse subcutaneous edema of the left dorsal hand.  He was started on IV Unasyn with orthopedic consultation and admission under observation.     Since admission patient has been stable.  Orthopedics consulted and is taking to the OR for cleanout on 12/15. Cultures still not growing anything. CRP levels decreasing and WBC normal. Ortho unwrapped bandage on 12/17 and pus is still coming from wound so will return to OR on 12/18.     # Left hand dog bite with abscess/cellulitis s/p I&D on 12/15 and 12/18  -Dog bite on 12/9 with development of infectious concerns on 12/12  -Presented to emergency room on 12/13 where abscess was drained and Augmentin was started with tetanus shot  -Unfortunately symptoms did not improve and presented again on 12/14  -No evidence of gas or foreign body on imaging  -IV Unasyn, continue (day #4)  -blood cultures from 12/14 with NGTD  -Orthopedic consult who took to OR for washout on 12/15 and 12/18  -intraoperative wound cultures from 12/15 growing group A strep and staph capitis   -repeat wound culture obtained on 12/18, follow   -CRP downward trending   -ID consult for further antibiotic recommendations  -Tylenol, ibuprofen, icing and oxycodone for pain. Keep hand elevated  -ADAT after washout on 12/18    -packing to be removed on 12/19, daily warm soapy water soaks of hand followed by packing chnages     #HTN  #Hx narrow QRS tachycardia   -Had an episode of abnormal rhythm requiring Valsalva maneuver 7/2024. Has been evaluated by cardiology, suspect SVT. No further episodes since then.   - PTA diltiazem     #HLD   - hold PTA statin, resume on discharge     #Mild persistent asthma   - PTA inhalers           Diet: NPO per Anesthesia Guidelines for Procedure/Surgery Except for: Meds, Ice Chips    DVT Prophylaxis: Pneumatic Compression Devices  Hall Catheter: Not present  Lines: None     Cardiac Monitoring: ACTIVE order. Indication: Procedural area  Code Status: Full Code      Clinically Significant Risk Factors                              # Obesity: Estimated body mass index is 30.84 kg/m  as calculated from the following:    Height as of this encounter: 1.829 m (6').    Weight as of this encounter: 103.1 kg (227 lb 6.4 oz)., PRESENT ON ADMISSION     # Asthma: noted on problem list        Social Drivers of Health    Social Connections: Unknown (9/21/2024)    Social Connection and Isolation Panel [NHANES]     Frequency of Social Gatherings with Friends and Family: Once a week          Disposition Plan     Medically Ready for Discharge: Anticipated in 2-4 Days       The patient's care was discussed with the Bedside Nurse, Patient, and Patient's Family.    Rochelle Ennis PA-C  Hospitalist Service  [facility]  Securely message with [employer] (more info)  Text page via [facility] Paging/Directory   ______________________________________________________________________    Interval History   Patient reports pain is currently tolerable since procedure and no associated numbness/tingling of left hand. Swelling of left hand and fingers still present but improving. Denies nausea, vomiting, chest pain, or shortness of breath.     Physical Exam   Vital Signs: Temp: 97.3  F (36.3  C) Temp src: Temporal BP: (!)  135/90 Pulse: 61   Resp: 12 SpO2: 95 % O2 Device: None (Room air)    Weight: 227 lbs 6.4 oz    General Appearance: Sitting up in bed, pleasant, A&OX3, NAD  Respiratory: CTAB without wheezing or rales  Cardiovascular: RRR without murmur or rub  GI: soft, nontender, nondistended, normoactive bowel sounds   Skin: left hand - wrapped with ACE bandage, fingers swollen but able to oppose each digit to thumb, CMS intact     Medical Decision Making       40 MINUTES SPENT BY ME on the date of service doing chart review, history, exam, documentation & further activities per the note.      Data   ------------------------- PAST 24 HR DATA REVIEWED -----------------------------------------------

## 2024-12-18 NOTE — ANESTHESIA POSTPROCEDURE EVALUATION
Patient: Dustin Lan    Procedure: Procedure(s):  Repeat irrigation and debridement, left hand       Anesthesia Type:  General    Note:  Disposition: Inpatient   Postop Pain Control: Uneventful            Sign Out: Well controlled pain   PONV: No   Neuro/Psych: Uneventful            Sign Out: Acceptable/Baseline neuro status   Airway/Respiratory: Uneventful            Sign Out: Acceptable/Baseline resp. status   CV/Hemodynamics: Uneventful            Sign Out: Acceptable CV status; No obvious hypovolemia; No obvious fluid overload   Other NRE:    DID A NON-ROUTINE EVENT OCCUR? No           Last vitals:  Vitals Value Taken Time   /87 12/18/24 0900   Temp 97.9  F (36.6  C) 12/18/24 0900   Pulse 62 12/18/24 0900   Resp 12 12/18/24 0900   SpO2 95 % 12/18/24 0900       Electronically Signed By: Desiree Casas MD  December 18, 2024  9:42 AM

## 2024-12-18 NOTE — PLAN OF CARE
"Goal Outcome Evaluation:      Plan of Care Reviewed With: patient      PRIMARY DIAGNOSIS:   OUTPATIENT/OBSERVATION GOALS TO BE MET BEFORE DISCHARGE:  ADLs back to baseline: Yes    Activity and level of assistance: Up with standby assistance.    Pain status: Pain free.    Return to near baseline physical activity: Yes     Discharge Planner Nurse   Safe discharge environment identified: Yes  Barriers to discharge: Yes       Entered by: Janki Escobar RN 12/18/2024 2:04 PM   Patient alert and oriented x4 and  Patient reports no pain. Left hand packed and wrapped with ACE bandage upon arrival. Patient reports feeling in hand, with mild numbness and tingling. Ice pack applied. Patient put on regular diet. Able to ambulate independent. Vitally stable on room air.   Please review provider order for any additional goals.   Nurse to notify provider when observation goals have been met and patient is ready for discharge.  Problem: Adult Inpatient Plan of Care  Goal: Plan of Care Review  Description: The Plan of Care Review/Shift note should be completed every shift.  The Outcome Evaluation is a brief statement about your assessment that the patient is improving, declining, or no change.  This information will be displayed automatically on your shift  note.  12/18/2024 1404 by Janki Escobar RN  Outcome: Progressing  Flowsheets (Taken 12/18/2024 1404)  Plan of Care Reviewed With: patient  12/18/2024 1234 by Janki Escobar RN  Outcome: Progressing  Flowsheets (Taken 12/18/2024 1234)  Plan of Care Reviewed With: patient  12/18/2024 1011 by Janki Escobar RN  Outcome: Progressing  Flowsheets (Taken 12/18/2024 1011)  Plan of Care Reviewed With: patient  Goal: Patient-Specific Goal (Individualized)  Description: You can add care plan individualizations to a care plan. Examples of Individualization might be:  \"Parent requests to be called daily at 9am for status\", \"I have a hard time hearing out of my right ear\", or \"Do not " "touch me to wake me up as it startles  me\".  12/18/2024 1404 by Janki Escobar RN  Outcome: Progressing  12/18/2024 1234 by Janki Escobar RN  Outcome: Progressing  12/18/2024 1011 by Janki Escobar RN  Outcome: Progressing  Goal: Absence of Hospital-Acquired Illness or Injury  12/18/2024 1404 by Janki Escobar RN  Outcome: Progressing  12/18/2024 1234 by Janki Escobar RN  Outcome: Progressing  12/18/2024 1011 by Janki Escobar RN  Outcome: Progressing  Intervention: Identify and Manage Fall Risk  Recent Flowsheet Documentation  Taken 12/18/2024 0915 by Janki Escobar RN  Safety Promotion/Fall Prevention: activity supervised  Intervention: Prevent Skin Injury  Recent Flowsheet Documentation  Taken 12/18/2024 0915 by Janki Escobar RN  Body Position: upper extremity elevated  Intervention: Prevent and Manage VTE (Venous Thromboembolism) Risk  Recent Flowsheet Documentation  Taken 12/18/2024 0915 by Janki Escobar RN  VTE Prevention/Management: SCDs on (sequential compression devices)  Goal: Optimal Comfort and Wellbeing  12/18/2024 1404 by Janki Escobar RN  Outcome: Progressing  12/18/2024 1234 by Janki Escobar RN  Outcome: Progressing  12/18/2024 1011 by Janki Escobar RN  Outcome: Progressing  Goal: Readiness for Transition of Care  12/18/2024 1404 by Janki Escobar RN  Outcome: Progressing  12/18/2024 1234 by Janki Escobar RN  Outcome: Progressing  12/18/2024 1011 by Janki Escobar RN  Outcome: Progressing     Problem: Wound  Goal: Optimal Coping  12/18/2024 1404 by Janki Escobar RN  Outcome: Progressing  12/18/2024 1234 by Janki Escobar RN  Outcome: Progressing  12/18/2024 1011 by Janki Escobar RN  Outcome: Progressing  Goal: Optimal Functional Ability  12/18/2024 1404 by Janki Escobar RN  Outcome: Progressing  12/18/2024 1234 by Shawn, Janki C, RN  Outcome: Progressing  12/18/2024 1011 by Janki Escobar, RN  Outcome: Progressing  Intervention: Optimize Functional " Ability  Recent Flowsheet Documentation  Taken 12/18/2024 0915 by Janki Escobar RN  Activity Management: activity adjusted per tolerance  Activity Assistance Provided: assistance, 1 person  Goal: Absence of Infection Signs and Symptoms  12/18/2024 1404 by Janki Escobar RN  Outcome: Progressing  12/18/2024 1234 by Janki Escobar RN  Outcome: Progressing  12/18/2024 1011 by Janki Escobar RN  Outcome: Progressing  Goal: Improved Oral Intake  12/18/2024 1404 by Janki Escobar RN  Outcome: Progressing  12/18/2024 1234 by Janki Escobar RN  Outcome: Progressing  12/18/2024 1011 by Janki Escobar RN  Outcome: Progressing  Goal: Optimal Pain Control and Function  12/18/2024 1404 by Janki Escobar RN  Outcome: Progressing  12/18/2024 1234 by Janki Escobar, RN  Outcome: Progressing  12/18/2024 1011 by Janki Escobar RN  Outcome: Progressing  Goal: Skin Health and Integrity  12/18/2024 1404 by Janki Escobar RN  Outcome: Progressing  12/18/2024 1234 by Janki Escobar RN  Outcome: Progressing  12/18/2024 1011 by Janki Escobar RN  Outcome: Progressing  Intervention: Optimize Skin Protection  Recent Flowsheet Documentation  Taken 12/18/2024 0915 by Janki Escobar RN  Activity Management: activity adjusted per tolerance  Head of Bed (HOB) Positioning: HOB at 20-30 degrees  Goal: Optimal Wound Healing  12/18/2024 1404 by Janki Escobar RN  Outcome: Progressing  12/18/2024 1234 by Janki Escobar RN  Outcome: Progressing  12/18/2024 1011 by Janki Escobar RN  Outcome: Progressing

## 2024-12-18 NOTE — PLAN OF CARE
"PRIMARY DIAGNOSIS: LEFT HAND CELLULITIS   OUTPATIENT/OBSERVATION GOALS TO BE MET BEFORE DISCHARGE:  1. Pain Status: Pain free.    2. Return to near baseline physical activity: Yes    3. Cleared for discharge by consultants (if involved): No    Discharge Planner Nurse   Safe discharge environment identified: Yes  Barriers to discharge: Yes       Entered by: Lynne Boyle RN 12/18/2024 5:55 AM   Patient alert and oriented. Vitally stable on room air. Up independently in room. Voiding within limits. Left hand with ace wraps in place- CMS intact, patient denies numbness/tingling. NPO at midnight for I&D procedure this morning. Scheduled IV antibiotics given per orders. CHG bath done. Denies pain. Calls appropriately. Went down for procedure.   Please review provider order for any additional goals.   Nurse to notify provider when observation goals have been met and patient is ready for discharge.      Goal Outcome Evaluation:      Plan of Care Reviewed With: patient    Overall Patient Progress: improvingOverall Patient Progress: improving       Problem: Adult Inpatient Plan of Care  Goal: Plan of Care Review  Description: The Plan of Care Review/Shift note should be completed every shift.  The Outcome Evaluation is a brief statement about your assessment that the patient is improving, declining, or no change.  This information will be displayed automatically on your shift  note.  Outcome: Progressing  Flowsheets (Taken 12/18/2024 0148)  Plan of Care Reviewed With: patient  Overall Patient Progress: improving  Goal: Patient-Specific Goal (Individualized)  Description: You can add care plan individualizations to a care plan. Examples of Individualization might be:  \"Parent requests to be called daily at 9am for status\", \"I have a hard time hearing out of my right ear\", or \"Do not touch me to wake me up as it startles  me\".  Outcome: Progressing  Goal: Absence of Hospital-Acquired Illness or Injury  Outcome: " Progressing  Intervention: Identify and Manage Fall Risk  Recent Flowsheet Documentation  Taken 12/18/2024 0000 by Lynne Boyle RN  Safety Promotion/Fall Prevention:   activity supervised   clutter free environment maintained   lighting adjusted   mobility aid in reach   nonskid shoes/slippers when out of bed  Intervention: Prevent Skin Injury  Recent Flowsheet Documentation  Taken 12/18/2024 0000 by Lynne Boyle RN  Body Position: position changed independently  Intervention: Prevent and Manage VTE (Venous Thromboembolism) Risk  Recent Flowsheet Documentation  Taken 12/18/2024 0000 by Lynne Boyle RN  VTE Prevention/Management: SCDs off (sequential compression devices)  Intervention: Prevent Infection  Recent Flowsheet Documentation  Taken 12/18/2024 0000 by Lynne Boyle RN  Infection Prevention:   rest/sleep promoted   hand hygiene promoted  Goal: Optimal Comfort and Wellbeing  Outcome: Progressing  Goal: Readiness for Transition of Care  Outcome: Progressing     Problem: Wound  Goal: Optimal Coping  Outcome: Progressing  Goal: Optimal Functional Ability  Outcome: Progressing  Intervention: Optimize Functional Ability  Recent Flowsheet Documentation  Taken 12/18/2024 0000 by Lynne Boyle RN  Activity Management: activity adjusted per tolerance  Activity Assistance Provided: independent  Goal: Absence of Infection Signs and Symptoms  Outcome: Progressing  Goal: Improved Oral Intake  Outcome: Progressing  Goal: Optimal Pain Control and Function  Outcome: Progressing  Goal: Skin Health and Integrity  Outcome: Progressing  Intervention: Optimize Skin Protection  Recent Flowsheet Documentation  Taken 12/18/2024 0000 by Lynne Boyle RN  Activity Management: activity adjusted per tolerance  Head of Bed (HOB) Positioning: HOB at 20-30 degrees  Goal: Optimal Wound Healing  Outcome: Progressing

## 2024-12-18 NOTE — ANESTHESIA PROCEDURE NOTES
Airway       Patient location during procedure: OR  Staff -        CRNA: Yenni Burton APRN CRNA       Performed By: CRNA  Consent for Airway        Urgency: elective  Indications and Patient Condition       Indications for airway management: shakira-procedural       Induction type:intravenous       Mask difficulty assessment: 1 - vent by mask    Final Airway Details       Final airway type: supraglottic airway    Supraglottic Airway Details        Type: LMA       Brand: LMA Unique       LMA size: 5    Post intubation assessment        Placement verified by: capnometry, equal breath sounds and chest rise        Number of attempts at approach: 2       Number of other approaches attempted: 1       Secured with: tape       Ease of procedure: easy       Dentition: Unchanged    Additional Comments       First attempt with igel 5, LMA wouldn't seat. Easy LMA placement with unique, no leak.

## 2024-12-18 NOTE — ANESTHESIA CARE TRANSFER NOTE
Patient: Dustin Lan    Procedure: Procedure(s):  Repeat irrigation and debridement, left hand       Diagnosis: Cellulitis and abscess of hand [L03.119, L02.519]  Diagnosis Additional Information: No value filed.    Anesthesia Type:   General     Note:    Oropharynx: oropharynx clear of all foreign objects  Level of Consciousness: drowsy  Oxygen Supplementation: face mask  Level of Supplemental Oxygen (L/min / FiO2): 6  Independent Airway: airway patency satisfactory and stable  Dentition: dentition unchanged  Vital Signs Stable: post-procedure vital signs reviewed and stable  Report to RN Given: handoff report given  Patient transferred to: PACU    Handoff Report: Identifed the Patient, Identified the Reponsible Provider, Reviewed the pertinent medical history, Discussed the surgical course, Reviewed Intra-OP anesthesia mangement and issues during anesthesia, Set expectations for post-procedure period and Allowed opportunity for questions and acknowledgement of understanding      Vitals:  Vitals Value Taken Time   BP     Temp     Pulse 68 12/18/24 0828   Resp 17 12/18/24 0828   SpO2 96 % 12/18/24 0828   Vitals shown include unfiled device data.    Electronically Signed By: DONY Marcial CRNA  December 18, 2024  8:29 AM

## 2024-12-18 NOTE — ANESTHESIA PREPROCEDURE EVALUATION
Anesthesia Pre-Procedure Evaluation    Patient: Dustin Lan   MRN: [id number] : 1950        Procedure : Procedure(s):  REPEAT IRRIGATION AND DEBRIDEMENT LEFT HAND          Past Medical History:   Diagnosis Date    Hypertension     Hypertension     Personal history of colonic polyps 2007    tubular adenoma, ileocecal valve, repeat     Plantar fascial fibromatosis     Pure hypercholesterolemia     Uncomplicated asthma     Asthma has gotten worse since 2024      Past Surgical History:   Procedure Laterality Date    ARTHROPLASTY KNEE Left 2019    Procedure: ARTHROPLASTY LEFT KNEE;  Surgeon: Miguel Buckley MD;  Location: [facility]    ARTHROPLASTY KNEE Right 7/15/2021    Procedure: RIGHT TOTAL KNEE ARTHROPLASTY;  Surgeon: Miguel Buckley MD;  Location: [facility]    ARTHROSCOPY KNEE RT/LT  2008    right    BACK SURGERY          COLONOSCOPY  2007    ileocecal valve polyp, tubular adenoma, repeat in 3 yrs    INJECT STEROID (LOCATION) Right 2019    Procedure: Inject steroid right knee;  Surgeon: Miguel Buckley MD;  Location: [facility]    IRRIGATION AND DEBRIDEMENT UPPER EXTREMITY, COMBINED Left 12/15/2024    Procedure: Left hand dorsal irrigation and debridement at level of muscle, 3x1cm wound;  Surgeon: Yossi Barba MD;  Location: [facility]      Allergies   Allergen Reactions    Cats       Social History     Tobacco Use    Smoking status: Never     Passive exposure: Never    Smokeless tobacco: Never   Substance Use Topics    Alcohol use: Yes     Alcohol/week: 0.0 - 3.0 standard drinks of alcohol     Comment: 5 - 7 beers a week      Wt Readings from Last 1 Encounters:   24 103.1 kg (227 lb 6.4 oz)        Anesthesia Evaluation            ROS/MED HX  ENT/Pulmonary:     (+)                      asthma                  Neurologic:       Cardiovascular: Comment: H/o suspected SVT 2024  On diltiazem    (+)  hypertension- -   -  - -   Taking blood thinners                            "        METS/Exercise Tolerance:     Hematologic:       Musculoskeletal:       GI/Hepatic:       Renal/Genitourinary:       Endo:     (+)               Obesity,       Psychiatric/Substance Use:       Infectious Disease: Comment: Cellulitis and abscess of hand 2/2 dog bite      Malignancy:       Other:            Physical Exam    Airway        Mallampati: II   TM distance: > 3 FB   Neck ROM: full   Mouth opening: > 3 cm    Respiratory Devices and Support         Dental           Cardiovascular   cardiovascular exam normal          Pulmonary   pulmonary exam normal                OUTSIDE LABS:  CBC:   Lab Results   Component Value Date    WBC 7.4 12/17/2024    WBC 9.1 12/16/2024    HGB 13.6 12/17/2024    HGB 12.5 (L) 12/16/2024    HCT 40.8 12/17/2024    HCT 37.9 (L) 12/16/2024     12/17/2024     12/16/2024     BMP:   Lab Results   Component Value Date     12/17/2024     12/16/2024    POTASSIUM 3.4 12/17/2024    POTASSIUM 3.6 12/16/2024    CHLORIDE 104 12/17/2024    CHLORIDE 103 12/16/2024    CO2 25 12/17/2024    CO2 23 12/16/2024    BUN 13.1 12/17/2024    BUN 17.4 12/16/2024    CR 0.78 12/17/2024    CR 0.80 12/16/2024    GLC 88 12/18/2024     (H) 12/17/2024     COAGS: No results found for: \"PTT\", \"INR\", \"FIBR\"  POC: No results found for: \"BGM\", \"HCG\", \"HCGS\"  HEPATIC:   Lab Results   Component Value Date    ALBUMIN 4.6 09/11/2023    PROTTOTAL 6.5 09/11/2023    ALT 23 09/11/2023    AST 31 09/11/2023    ALKPHOS 79 09/11/2023    BILITOTAL 0.5 09/11/2023     OTHER:   Lab Results   Component Value Date    A1C 5.5 09/24/2024    BRICE 8.9 12/17/2024    TSH 3.74 07/25/2024       Anesthesia Plan    ASA Status:  2    NPO Status:  NPO Appropriate    Anesthesia Type: General.     - Airway: LMA   Induction: Intravenous.   Maintenance: Balanced.        Consents    Anesthesia Plan(s) and associated risks, benefits, and realistic alternatives discussed. Questions answered and patient/representative(s) " expressed understanding.     - Discussed:     - Discussed with:  Patient            Postoperative Care    Pain management: IV analgesics, Oral pain medications, Multi-modal analgesia.   PONV prophylaxis: Ondansetron (or other 5HT-3), Dexamethasone or Solumedrol     Comments:               Desiree Casas MD    I have reviewed the pertinent notes and labs in the chart from the past 30 days and (re)examined the patient.  Any updates or changes from those notes are reflected in this note.                          # Obesity: Estimated body mass index is 30.84 kg/m  as calculated from the following:    Height as of this encounter: 1.829 m (6').    Weight as of this encounter: 103.1 kg (227 lb 6.4 oz)., PRESENT ON ADMISSION     # Asthma: noted on problem list

## 2024-12-18 NOTE — OP NOTE
ORTHOPEDIC OPERATIVE NOTE      PREOPERATIVE DIAGNOSIS:  Left dorsal hand infection    POSTOPERATIVE DIAGNOSIS: Same    PROCEDURE:  Left dorsal hand wound repeat irrigation and excisional debridement, deepest level of debridement fascia    SURGEON:  Niles Melgar MD    ASSISTANT: Jose Rafael Ortega PA-C, whose assistance was required for positioning, prep and drape, exposure, retraction, debridement, and closure    SPECIMENS:    ID Type Source Tests Collected by Time Destination   A : Tissue left hand Tissue Hand, Left GRAM STAIN, AEROBIC BACTERIAL CULTURE ROUTINE Niles Melgar MD 12/18/2024  8:08 AM    B : Left hand tissue Tissue Hand, Left ANAEROBIC BACTERIAL CULTURE ROUTINE Niles Melgar MD 12/18/2024  8:08 AM          COMPLICATIONS:  None    ESTIMATED BLOOD LOSS: 10 mL    TOURNIQUET TIME:  N/A    IMPLANTS: * No implants in log *    INDICATIONS:  The patient is a 74-year-old male who sustained a dog bite to his left hand and underwent an I&D in urgent care.  He continued to demonstrate purulent drainage and underwent a formal I&D in the OR with Dr. Barba.  Purulent drainage has continued from the wound increasing cellulitis around the wound edges.  He does not have pain with range of motion of the wrist.  Given these findings he is an appropriate indicated candidate for repeat I&D.  I discussed the risk benefits and therapeutic alternatives of postprocedure including the risk of persistent infection, need for additional surgery, symptomatic scarring, and the medical risks of anesthesia.  We discussed benefits including improved chance of infection clearance and alternatives including continued medical management which was not recommended.  He agreed with plan to proceed and his informed consent was signed and documented.  I met with the patient preoperatively to cruz the operative extremity.    OPERATIVE COURSE: The patient underwent general anesthesia and was positioned supine.  The left upper extremity  was prepped and draped in sterile orthopedic fashion.  The surgical team scrubbed in.  A procedural pause was conducted.  Following generalized agreement the pause his small dorsal incision was extended both proximally and distally.  Subcutaneous flaps were elevated circumferentially.  An excisional debridement of the wound was performed using rongeurs and curettes.  Some necrotic fat and subcutaneous tissue was identified and removed with rongeurs and curettes.  Deepest level debridement was the fascia.  There is a small amount of purulence present.  Once a thorough debridement was complete we thoroughly irrigated the wound with copious amounts of normal saline.  The wound was partially closed with 4-0 Prolene horizontal mattress sutures.  Iodoform quadrant packing was placed and the remainder of the wound through the central small portion of the dog bite which was left open.  Sterile dressings were applied and the patient was transferred to the recovery room in stable condition, sustaining no complications.     POST OP PLAN:    Activity: As tolerated  ABX: per ID recs  DVT prophylaxis: SCDs  Dressings: Recommend that his packing be removed tomorrow and  daily warm soapy water soaks of the hand  followed by packing changes begin on postoperative day 1  Dispo: discharge planning for home as cellulitis improves    Niles Melgar MD  [facility]

## 2024-12-19 LAB
ANION GAP SERPL CALCULATED.3IONS-SCNC: 11 MMOL/L (ref 7–15)
BACTERIA ABSC ANAEROBE+AEROBE CULT: NORMAL
BACTERIA BLD CULT: NO GROWTH
BACTERIA BLD CULT: NO GROWTH
BACTERIA TISS BX CULT: NORMAL
BUN SERPL-MCNC: 15 MG/DL (ref 8–23)
CALCIUM SERPL-MCNC: 8.6 MG/DL (ref 8.8–10.4)
CHLORIDE SERPL-SCNC: 103 MMOL/L (ref 98–107)
CREAT SERPL-MCNC: 0.86 MG/DL (ref 0.67–1.17)
CRP SERPL-MCNC: 13.12 MG/L
EGFRCR SERPLBLD CKD-EPI 2021: >90 ML/MIN/1.73M2
ERYTHROCYTE [DISTWIDTH] IN BLOOD BY AUTOMATED COUNT: 12.5 % (ref 10–15)
GLUCOSE SERPL-MCNC: 117 MG/DL (ref 70–99)
HCO3 SERPL-SCNC: 25 MMOL/L (ref 22–29)
HCT VFR BLD AUTO: 36.3 % (ref 40–53)
HGB BLD-MCNC: 12.3 G/DL (ref 13.3–17.7)
MCH RBC QN AUTO: 31.1 PG (ref 26.5–33)
MCHC RBC AUTO-ENTMCNC: 33.9 G/DL (ref 31.5–36.5)
MCV RBC AUTO: 92 FL (ref 78–100)
PLATELET # BLD AUTO: 185 10E3/UL (ref 150–450)
POTASSIUM SERPL-SCNC: 3.9 MMOL/L (ref 3.4–5.3)
RBC # BLD AUTO: 3.95 10E6/UL (ref 4.4–5.9)
SODIUM SERPL-SCNC: 139 MMOL/L (ref 135–145)
WBC # BLD AUTO: 10.1 10E3/UL (ref 4–11)

## 2024-12-19 PROCEDURE — 120N000001 HC R&B MED SURG/OB

## 2024-12-19 PROCEDURE — 85027 COMPLETE CBC AUTOMATED: CPT | Performed by: PHYSICIAN ASSISTANT

## 2024-12-19 PROCEDURE — 250N000013 HC RX MED GY IP 250 OP 250 PS 637: Performed by: STUDENT IN AN ORGANIZED HEALTH CARE EDUCATION/TRAINING PROGRAM

## 2024-12-19 PROCEDURE — 36415 COLL VENOUS BLD VENIPUNCTURE: CPT | Performed by: PHYSICIAN ASSISTANT

## 2024-12-19 PROCEDURE — 99232 SBSQ HOSP IP/OBS MODERATE 35: CPT | Performed by: PHYSICIAN ASSISTANT

## 2024-12-19 PROCEDURE — 250N000011 HC RX IP 250 OP 636: Performed by: PHYSICIAN ASSISTANT

## 2024-12-19 PROCEDURE — 86140 C-REACTIVE PROTEIN: CPT | Performed by: PHYSICIAN ASSISTANT

## 2024-12-19 PROCEDURE — 99222 1ST HOSP IP/OBS MODERATE 55: CPT | Performed by: INTERNAL MEDICINE

## 2024-12-19 PROCEDURE — 80048 BASIC METABOLIC PNL TOTAL CA: CPT | Performed by: PHYSICIAN ASSISTANT

## 2024-12-19 PROCEDURE — 250N000013 HC RX MED GY IP 250 OP 250 PS 637: Performed by: PHYSICIAN ASSISTANT

## 2024-12-19 RX ORDER — AMPICILLIN AND SULBACTAM 2; 1 G/1; G/1
3 INJECTION, POWDER, FOR SOLUTION INTRAMUSCULAR; INTRAVENOUS EVERY 6 HOURS
Status: COMPLETED | OUTPATIENT
Start: 2024-12-19 | End: 2024-12-19

## 2024-12-19 RX ORDER — ACETAMINOPHEN 325 MG/1
650 TABLET ORAL EVERY 4 HOURS PRN
Qty: 20 TABLET | Refills: 0 | Status: SHIPPED | OUTPATIENT
Start: 2024-12-19

## 2024-12-19 RX ORDER — AMOXICILLIN 250 MG
1 CAPSULE ORAL 2 TIMES DAILY PRN
Qty: 15 TABLET | Refills: 0 | Status: SHIPPED | OUTPATIENT
Start: 2024-12-19

## 2024-12-19 RX ORDER — OXYCODONE HYDROCHLORIDE 5 MG/1
2.5-5 TABLET ORAL EVERY 4 HOURS PRN
Qty: 15 TABLET | Refills: 0 | Status: SHIPPED | OUTPATIENT
Start: 2024-12-19

## 2024-12-19 RX ADMIN — AMPICILLIN SODIUM AND SULBACTAM SODIUM 3 G: 2; 1 INJECTION, POWDER, FOR SOLUTION INTRAMUSCULAR; INTRAVENOUS at 01:58

## 2024-12-19 RX ADMIN — AMPICILLIN SODIUM AND SULBACTAM SODIUM 3 G: 2; 1 INJECTION, POWDER, FOR SOLUTION INTRAMUSCULAR; INTRAVENOUS at 20:13

## 2024-12-19 RX ADMIN — Medication 1 CAPSULE: at 20:13

## 2024-12-19 RX ADMIN — Medication 1 CAPSULE: at 08:16

## 2024-12-19 RX ADMIN — ACETAMINOPHEN 650 MG: 325 TABLET, FILM COATED ORAL at 14:21

## 2024-12-19 RX ADMIN — ACETAMINOPHEN 650 MG: 325 TABLET, FILM COATED ORAL at 21:28

## 2024-12-19 RX ADMIN — BUDESONIDE AND FORMOTEROL FUMARATE DIHYDRATE 2 PUFF: 160; 4.5 AEROSOL RESPIRATORY (INHALATION) at 21:28

## 2024-12-19 RX ADMIN — DILTIAZEM HYDROCHLORIDE 240 MG: 120 CAPSULE, COATED, EXTENDED RELEASE ORAL at 08:16

## 2024-12-19 RX ADMIN — ACETAMINOPHEN 650 MG: 325 TABLET, FILM COATED ORAL at 08:16

## 2024-12-19 RX ADMIN — BUDESONIDE AND FORMOTEROL FUMARATE DIHYDRATE 2 PUFF: 160; 4.5 AEROSOL RESPIRATORY (INHALATION) at 02:01

## 2024-12-19 RX ADMIN — AMPICILLIN SODIUM AND SULBACTAM SODIUM 3 G: 2; 1 INJECTION, POWDER, FOR SOLUTION INTRAMUSCULAR; INTRAVENOUS at 14:11

## 2024-12-19 RX ADMIN — AMPICILLIN SODIUM AND SULBACTAM SODIUM 3 G: 2; 1 INJECTION, POWDER, FOR SOLUTION INTRAMUSCULAR; INTRAVENOUS at 08:13

## 2024-12-19 ASSESSMENT — ACTIVITIES OF DAILY LIVING (ADL)
ADLS_ACUITY_SCORE: 27
ADLS_ACUITY_SCORE: 27
ADLS_ACUITY_SCORE: 35
ADLS_ACUITY_SCORE: 27
ADLS_ACUITY_SCORE: 35
ADLS_ACUITY_SCORE: 27
ADLS_ACUITY_SCORE: 35
ADLS_ACUITY_SCORE: 27
ADLS_ACUITY_SCORE: 35
ADLS_ACUITY_SCORE: 27

## 2024-12-19 NOTE — CONSULTS
[facility]    Infectious Disease Consultation     Date of Admission:  12/14/2024  Date of Consult (When I saw the patient): 12/19/24    Assessment & Plan   Dustin Lan is a 74 year old male who was admitted on 12/14/2024.     Impression: 1 74-year-old male with acute left hand infection, abscess but no tenosynovitis, dog exposure as part of it but not a true bite, clinically improved after drainage and on IV antibiotics, culture is growing beta strep which is probably the pathogen here rather than a dog related organism    REC 1 clinically improved no tenosynovitis, assuming looks okay from orthopedic standpoint should build to treat this orally as soon as now I would do Augmentin 875 twice daily for 1 more week (did not really fail oral Augmentin simply needed drainage).        Dustin Carmona MD    Reason for Consult   Reason for consult: I was asked to evaluate this patient for hand abscess.    Primary Care Physician   Alessandro Blanco    Chief Complaint   Hand pain    History is obtained from the patient and medical records    History of Present Illness   Dustin Lan is a 74 year old male who presents with acute left hand infection.  His son's dog leapt to his hand on Monday with a scratch or cut like event occurring without a true bite.  He washed the area out it worsened within about 48 hours sought medical attention was started on Augmentin it further worsened on that sought medical attention and was admitted.  He has had 2 I&D's done did not appear to have deep involvement did not going to the tendon sheaths.  He is never had major clinical sepsis.  Cultures grew a skin contaminant staph in 1 grew beta strep probable true pathogen here    Past Medical History   I have reviewed this patient's medical history and updated it with pertinent information if needed.   Past Medical History:   Diagnosis Date    Hypertension     Hypertension 2016    Personal history of colonic polyps  02/2007    tubular adenoma, ileocecal valve, repeat 2010    Plantar fascial fibromatosis     Pure hypercholesterolemia     Uncomplicated asthma 2002    Asthma has gotten worse since March 2024       Past Surgical History   I have reviewed this patient's surgical history and updated it with pertinent information if needed.  Past Surgical History:   Procedure Laterality Date    ARTHROPLASTY KNEE Left 5/8/2019    Procedure: ARTHROPLASTY LEFT KNEE;  Surgeon: Miguel Buckley MD;  [facility]: [facility]    ARTHROPLASTY KNEE Right 7/15/2021    Procedure: RIGHT TOTAL KNEE ARTHROPLASTY;  Surgeon: Miguel Buckley MD;  Location: [facility]    ARTHROSCOPY KNEE RT/LT  12/2008    right    BACK SURGERY      1988    COLONOSCOPY  2/2007    ileocecal valve polyp, tubular adenoma, repeat in 3 yrs    INJECT STEROID (LOCATION) Right 5/8/2019    Procedure: Inject steroid right knee;  Surgeon: Miguel Buckley MD;  Location: [facility]    IRRIGATION AND DEBRIDEMENT HAND, COMBINED Left 12/18/2024    Procedure: Left dorsal hand wound repeat irrigation and excisional debridement, deepest level of debridement fascia;  Surgeon: Niles Melgar MD;  Location: [facility]    IRRIGATION AND DEBRIDEMENT UPPER EXTREMITY, COMBINED Left 12/15/2024    Procedure: Left hand dorsal irrigation and debridement at level of muscle, 3x1cm wound;  Surgeon: Yossi Barba MD;  Location: [facility]       Prior to Admission Medications   Prior to Admission Medications   Prescriptions Last Dose Informant Patient Reported? Taking?   AIRSUPRA 90-80 MCG/ACT AERO   Yes Yes   Sig: Inhale 1 puff into the lungs as needed.   Ascorbic Acid (VITAMIN C PO) 12/13/2024 Morning Self Yes Yes   Sig: Take 1 tablet by mouth every morning   amoxicillin-clavulanate (AUGMENTIN) 875-125 MG tablet 12/14/2024 Morning  Yes Yes   Sig: Take 1 tablet by mouth 2 times daily.   atorvastatin (LIPITOR) 10 MG tablet 12/13/2024 Bedtime  Yes Yes   Sig: Take 10 mg by mouth at bedtime.   budesonide-formoterol (SYMBICORT)  160-4.5 MCG/ACT Inhaler 12/14/2024 Morning  Yes Yes   Sig: Inhale 1 puff into the lungs every morning.   budesonide-formoterol (SYMBICORT) 160-4.5 MCG/ACT Inhaler 12/13/2024 Bedtime  Yes Yes   Sig: Inhale 2 puffs into the lungs at bedtime.   diltiazem ER (TIADYLT ER) 240 MG 24 hr ER beaded capsule 12/14/2024 Morning  No Yes   Sig: Take 1 capsule (240 mg) by mouth daily.   glucosamine-chondroitin 500-400 MG CAPS per capsule 12/14/2024 Morning Self Yes Yes   Sig: Take 1 capsule by mouth daily.   multivitamin, therapeutic (THERA-VIT) TABS tablet 12/14/2024 Self Yes Yes   Sig: Take 1 tablet by mouth daily      Facility-Administered Medications: None     Allergies   Allergies   Allergen Reactions    Cats        Immunization History   Immunization History   Administered Date(s) Administered    COVID-19 12+ (MODERNA) 09/05/2024    COVID-19 12+ (Pfizer) 09/28/2023    COVID-19 Bivalent 12+ (Pfizer) 09/13/2022    COVID-19 Monovalent 18+ (Moderna) 02/25/2021, 03/25/2021, 10/24/2021, 04/15/2022    Influenza (H1N1) 01/27/2010    Influenza (High Dose) Trivalent,PF (Fluzone) 11/17/2017, 09/14/2018, 11/01/2019, 10/17/2022    Influenza (IIV3) PF 09/25/2010    Influenza Vaccine 65+ (FLUAD) 10/17/2022, 09/28/2023    Influenza Vaccine 65+ (Fluzone HD) 10/12/2020, 10/05/2021    Influenza Vaccine >6 months,quad, PF 09/27/2013, 10/22/2015, 11/08/2016, 10/17/2022    Pneumo Conj 13-V (2010&after) 11/17/2017    Pneumococcal 23 valent 12/13/2019    RSV Vaccine (Arexvy) 11/07/2023    TD,PF 7+ (Tenivac) 10/26/1999    TDAP Vaccine (Adacel) 04/01/2008, 09/14/2018    Zoster recombinant adjuvanted (SHINGRIX) 11/19/2018, 01/25/2019    Zoster vaccine, live 09/29/2014       Social History   I have reviewed this patient's social history and updated it with pertinent information if needed. Dustin JAVID Lan  reports that he has never smoked. He has never been exposed to tobacco smoke. He has never used smokeless tobacco. He reports current alcohol use.  "He reports that he does not use drugs.    Family History   I have reviewed this patient's family history and updated it with pertinent information if needed.   Family History   Problem Relation Age of Onset    Cerebrovascular Disease Mother 75         of a stroke    C.A.D. Father 67         suddenly when using the  at age 67. He was a smoker. On autopsy, told to have multiple heart attacks and scar tissue but he had never veronika a clinical heart attack.    Lipids Sister        Review of Systems   The 10 point Review of Systems is negative    Physical Exam   Temp: 97.9  F (36.6  C) Temp src: Oral BP: (!) 153/87 Pulse: 68   Resp: 17 SpO2: 98 % O2 Device: None (Room air)    Vital Signs with Ranges  Temp:  [97.9  F (36.6  C)-98.4  F (36.9  C)] 97.9  F (36.6  C)  Pulse:  [68-86] 68  Resp:  [14-17] 17  BP: (121-153)/(71-87) 153/87  SpO2:  [93 %-98 %] 98 %  227 lbs 6.4 oz  Body mass index is 30.84 kg/m .    GENERAL APPEARANCE:  awake  EYES: Eyes grossly normal to inspection  NECK: no adenopathy  RESP: lungs clear   CV: regular rates and rhythm  LYMPHATICS: normal ant/post cervical and supraclavicular nodes  ABDOMEN: soft, nontender  MS: extremities normal  SKIN: no suspicious lesions or rashes  Hand wrapped but pictures look okay      Data   All laboratory and imaging data in the past 24 hours reviewed  No results for input(s): \"CULT\" in the last 168 hours.  No lab results found.    Invalid input(s): \"UC\"       All cultures:  Recent Labs   Lab 24  0808 12/15/24  1206 12/15/24  1205 24  1214 24  1157   CULTURE No anaerobic organisms isolated after 1 day  No growth, less than 1 day  See corresponding culture for results No growth after 3 days  No anaerobic organisms isolated after 3 days  No anaerobic organisms isolated after 2 days  1+ Streptococcus pyogenes (Group A Streptococcus)*  No Growth No anaerobic organisms isolated after 3 days  1+ Staphylococcus capitis* No growth after 4 " days No growth after 4 days      Blood culture:  Results for orders placed or performed during the hospital encounter of 12/14/24   Blood Culture Arm, Left    Collection Time: 12/14/24 12:14 PM    Specimen: Arm, Left; Blood   Result Value Ref Range    Culture No growth after 4 days    Blood Culture Peripheral Blood    Collection Time: 12/14/24 11:57 AM    Specimen: Peripheral Blood   Result Value Ref Range    Culture No growth after 4 days       Urine culture:  No results found for this or any previous visit.

## 2024-12-19 NOTE — PROGRESS NOTES
Orthopedic Surgery  Dustin Lan  12/19/2024     Admit Date:  12/14/2024    1. Assessment/Plan:   Activity as tolerared LUE, continue to work on wrist, hand, finger ROM.    Continue current pain regiment.   Dressings: daily packing changes with warm soapy water soaks. Cover with adaptic, gauze and ACE wrap for swelling.    Follow-up: 2 weeks with Dr. Barba at [facility]    2. Disposition   Anticipate d/c to home when cellulitis has improved.  Jose Rafael Ortega PA-C  [facility]  ______________________________________________________________________________________________________________________________    POD: 1 Day Post-Op   Procedure(s):  Left dorsal hand wound repeat irrigation and excisional debridement, deepest level of debridement fascia    Alert and oriented.   Patient resting comfortably in chair   Pain controlled, hand ROM improved.  Tolerating oral intake.    Denies nausea or vomiting  Denies chest pain or shortness of breath    Temp:  [97.9  F (36.6  C)-98.8  F (37.1  C)] 97.9  F (36.6  C)  Pulse:  [62-86] 68  Resp:  [10-17] 17  BP: (121-154)/(71-92) 153/87  SpO2:  [93 %-98 %] 98 %    Dressing and packing removed.   Erythema remains around incision, but appears to be improved.   Left upper extremity is NVI.  + radial pulse    Warm soapy water soak and packing changed today. New dressing placed with ACE wrap.     Labs:  Recent Labs   Lab Test 12/19/24  0535 12/18/24  0711 12/17/24  0810   WBC 10.1 6.4 7.4   HGB 12.3* 12.6* 13.6    203 191     No lab results found.  No lab results found.

## 2024-12-19 NOTE — PROGRESS NOTES
[facility]    Medicine Progress Note - Hospitalist Service    Date of Admission:  12/14/2024    Assessment & Plan   Dustin Lan is a 74 year old male with PMH of SVT, asthma, HTN and HLD, who was admitted to observation on 12/14/2024 for an infected hand wound after a dog bite.      In the ER he was afebrile with blood pressure 137/98, pulse 99 and breathing comfortably on room air without hypoxia.  Lab work was remarkable for normal BMP, CRP of 155, WBC of 14.5 and hemoglobin 15.6.  Blood cultures x 2 were obtained, hand x-ray showed left hand soft tissue swelling without foreign body or soft tissue gas and ultrasound of the right upper extremity showed diffuse subcutaneous edema of the left dorsal hand.  He was started on IV Unasyn with orthopedic consultation and admission under observation.     Since admission patient has been stable.  Orthopedics consulted and is taking to the OR for cleanout on 12/15. Cultures still not growing anything. CRP levels decreasing and WBC normal. Ortho unwrapped bandage on 12/17 and pus is still coming from wound so will return to OR on 12/18.     # Left hand dog bite with abscess/cellulitis s/p I&D on 12/15 and 12/18  -Dog bite on 12/9 with development of infectious concerns on 12/12  -Presented to emergency room on 12/13 where abscess was drained and Augmentin was started with tetanus shot  -Unfortunately symptoms did not improve and presented again on 12/14  -No evidence of gas or foreign body on imaging  -IV Unasyn, continue (day #5), plan to transition to PO Augmentin in AM  -blood cultures from 12/14 with NGTD  -Orthopedic consult who took to OR for washout on 12/15 and 12/18  -intraoperative wound cultures from 12/15 growing group A strep and staph capitis   -repeat wound culture obtained on 12/18 with NGTD  -CRP downward trending on serial draws   -ID consult for further antibiotic recommendations, felt Unasyn here has been adequate and would  complete additional PO Augmentin on discharge   -Tylenol, ibuprofen, icing and oxycodone for pain. Keep hand elevated  -daily packing changes with warm soapy water soaks, cover with adaptic, gauze and ACE wrap for swelling; plan for orthopedic to change packing prior to discharge on 12/20  -follow-up in 2 weeks with Dr. Barba at [facility]     #HTN  #Hx narrow QRS tachycardia   -Had an episode of abnormal rhythm requiring Valsalva maneuver 7/2024. Has been evaluated by cardiology, suspect SVT. No further episodes since then.   -PTA diltiazem     #HLD   - hold PTA statin, resume on discharge     #Mild persistent asthma   - PTA inhalers           Diet: Advance Diet as Tolerated: Regular Diet Adult    DVT Prophylaxis: Pneumatic Compression Devices and Ambulate every shift  Hall Catheter: Not present  Lines: None     Cardiac Monitoring: None  Code Status: Full Code      Clinically Significant Risk Factors                              # Obesity: Estimated body mass index is 30.84 kg/m  as calculated from the following:    Height as of this encounter: 1.829 m (6').    Weight as of this encounter: 103.1 kg (227 lb 6.4 oz).      # Asthma: noted on problem list        Social Drivers of Health    Social Connections: Unknown (9/21/2024)    Social Connection and Isolation Panel [NHANES]     Frequency of Social Gatherings with Friends and Family: Once a week          Disposition Plan     Medically Ready for Discharge: Anticipated Tomorrow         The patient's care was discussed with the Bedside Nurse and Patient.    Rochelle Ennis PA-C  Hospitalist Service  [facility]  Securely message with [employer] (more info)  Text page via [facility] Paging/Directory   ______________________________________________________________________    Interval History   Patient reports minimal pain in left hand, swelling continues to improve, and able to oppose thumb better with his other digits today. Denies numbness/tingling in left  hand. Denies chest pain, nausea, vomiting, or shortness of breath.     Physical Exam   Vital Signs: Temp: 98.2  F (36.8  C) Temp src: Oral BP: 139/74 Pulse: 63   Resp: 18 SpO2: 98 % O2 Device: None (Room air)    Weight: 227 lbs 6.4 oz    General Appearance:  sitting in chair, pleasant, A&OX3, NAD  Respiratory: CTAB without wheezing or rales  Cardiovascular: RRR without murmur or rub  GI: soft, nontender, nondistended, normoactive bowel sounds   Skin: left hand - wrapped with ACE bandage, finger swelling improving, CMS intact     Medical Decision Making       40 MINUTES SPENT BY ME on the date of service doing chart review, history, exam, documentation & further activities per the note.      Data   ------------------------- PAST 24 HR DATA REVIEWED -----------------------------------------------

## 2024-12-19 NOTE — PLAN OF CARE
"19:00-07:30    Alert and oriented. On Unasyn Q6. Verbalized that left hand pain is better no PRN pain meds given. CMS intact. Left arm/hand elevated on pillows. Ortho following, ID consulted.    Problem: Adult Inpatient Plan of Care  Goal: Plan of Care Review  Description: The Plan of Care Review/Shift note should be completed every shift.  The Outcome Evaluation is a brief statement about your assessment that the patient is improving, declining, or no change.  This information will be displayed automatically on your shift  note.  Outcome: Progressing  Flowsheets (Taken 12/19/2024 0220)  Outcome Evaluation: patient verbalized that able to move his left fingers better  Plan of Care Reviewed With: patient  Overall Patient Progress: improving  Goal: Patient-Specific Goal (Individualized)  Description: You can add care plan individualizations to a care plan. Examples of Individualization might be:  \"Parent requests to be called daily at 9am for status\", \"I have a hard time hearing out of my right ear\", or \"Do not touch me to wake me up as it startles  me\".  Outcome: Progressing  Goal: Absence of Hospital-Acquired Illness or Injury  Outcome: Progressing  Intervention: Identify and Manage Fall Risk  Recent Flowsheet Documentation  Taken 12/18/2024 2036 by Jeanette Esteban RN  Safety Promotion/Fall Prevention: nonskid shoes/slippers when out of bed  Intervention: Prevent Skin Injury  Recent Flowsheet Documentation  Taken 12/18/2024 2036 by Jeanette Esteban RN  Body Position: position changed independently  Intervention: Prevent and Manage VTE (Venous Thromboembolism) Risk  Recent Flowsheet Documentation  Taken 12/18/2024 2036 by Jeanette Esteban RN  VTE Prevention/Management: SCDs off (sequential compression devices)  Intervention: Prevent Infection  Recent Flowsheet Documentation  Taken 12/18/2024 2036 by Jeanette Esteban RN  Infection Prevention: rest/sleep promoted  Goal: Optimal Comfort and Wellbeing  Outcome: " Progressing  Goal: Readiness for Transition of Care  Outcome: Progressing     Problem: Wound  Goal: Optimal Coping  Outcome: Progressing  Goal: Optimal Functional Ability  Outcome: Progressing  Intervention: Optimize Functional Ability  Recent Flowsheet Documentation  Taken 12/18/2024 2036 by Jeanette Esteban, RN  Activity Management: up ad catrachito  Activity Assistance Provided: independent  Goal: Absence of Infection Signs and Symptoms  Outcome: Progressing  Goal: Improved Oral Intake  Outcome: Progressing  Goal: Optimal Pain Control and Function  Outcome: Progressing  Goal: Skin Health and Integrity  Outcome: Progressing  Intervention: Optimize Skin Protection  Recent Flowsheet Documentation  Taken 12/18/2024 2036 by Jeanette Esteban, RN  Activity Management: up ad catrachito  Head of Bed (HOB) Positioning: HOB at 20-30 degrees  Goal: Optimal Wound Healing  Outcome: Progressing     Goal Outcome Evaluation:      Plan of Care Reviewed With: patient    Overall Patient Progress: improvingOverall Patient Progress: improving    Outcome Evaluation: patient verbalized that able to move his left fingers better

## 2024-12-19 NOTE — PLAN OF CARE
"Inpatient: 0700 - 1900     Dx: Dog Bite with Abscess/Cellulitis       Orientation: x4   Pain: 3/10 intermittent throbbing when left hand in dependent position   Activity: Independent   LDA: Right PIV SL   Diet: Regular Diet   Neuro: Intact   Cardio: WDL   Resp: WDL  MS: Pain in left hand related to dog bite and recent procedure - remains wrapped  GI: WDL   : Voiding without difficulty   Derm: Small abrasion to upper lip - Unable to assess left hand   Plan: Ortho, ID, and PT/OT following with care - Continuing IV antibiotics.         Elevating LUE and icing as needed - Given acetaminophen for pain.         BP (!) 153/87 (BP Location: Right arm)   Pulse 68   Temp 97.9  F (36.6  C) (Oral)   Resp 17   Ht 1.829 m (6')   Wt 103.1 kg (227 lb 6.4 oz)   SpO2 98%   BMI 30.84 kg/m              Problem: Adult Inpatient Plan of Care  Goal: Plan of Care Review  Description: The Plan of Care Review/Shift note should be completed every shift.  The Outcome Evaluation is a brief statement about your assessment that the patient is improving, declining, or no change.  This information will be displayed automatically on your shift  note.  Outcome: Progressing  Flowsheets (Taken 12/19/2024 0827)  Outcome Evaluation: Awaiting Ortho  Plan of Care Reviewed With: patient  Overall Patient Progress: no change  Goal: Patient-Specific Goal (Individualized)  Description: You can add care plan individualizations to a care plan. Examples of Individualization might be:  \"Parent requests to be called daily at 9am for status\", \"I have a hard time hearing out of my right ear\", or \"Do not touch me to wake me up as it startles  me\".  Outcome: Progressing  Goal: Absence of Hospital-Acquired Illness or Injury  Outcome: Progressing  Intervention: Identify and Manage Fall Risk  Recent Flowsheet Documentation  Taken 12/19/2024 0819 by Marcial Bonner RN  Safety Promotion/Fall Prevention:   clutter free environment maintained   lighting adjusted   " nonskid shoes/slippers when out of bed   patient and family education   safety round/check completed  Intervention: Prevent Skin Injury  Recent Flowsheet Documentation  Taken 12/19/2024 0819 by Marcial Bonner RN  Body Position: position changed independently  Intervention: Prevent and Manage VTE (Venous Thromboembolism) Risk  Recent Flowsheet Documentation  Taken 12/19/2024 0819 by Marcial Bonner RN  VTE Prevention/Management: SCDs off (sequential compression devices)  Goal: Optimal Comfort and Wellbeing  Outcome: Progressing  Intervention: Monitor Pain and Promote Comfort  Recent Flowsheet Documentation  Taken 12/19/2024 0806 by Marcial Bonner RN  Pain Management Interventions: medication (see MAR)  Goal: Readiness for Transition of Care  Outcome: Progressing     Problem: Wound  Goal: Optimal Coping  Outcome: Progressing  Goal: Optimal Functional Ability  Outcome: Progressing  Intervention: Optimize Functional Ability  Recent Flowsheet Documentation  Taken 12/19/2024 0819 by Marcial Bonner RN  Activity Management: up ad catrachito  Activity Assistance Provided: independent  Goal: Absence of Infection Signs and Symptoms  Outcome: Progressing  Goal: Improved Oral Intake  Outcome: Progressing  Goal: Optimal Pain Control and Function  Outcome: Progressing  Intervention: Prevent or Manage Pain  Recent Flowsheet Documentation  Taken 12/19/2024 0806 by Marcial Bonner RN  Pain Management Interventions: medication (see MAR)  Goal: Skin Health and Integrity  Outcome: Progressing  Intervention: Optimize Skin Protection  Recent Flowsheet Documentation  Taken 12/19/2024 0819 by Marcial Bonner RN  Activity Management: up ad catrachito  Goal: Optimal Wound Healing  Outcome: Progressing   Goal Outcome Evaluation:      Plan of Care Reviewed With: patient    Overall Patient Progress: no changeOverall Patient Progress: no change    Outcome Evaluation: Awaiting Ortho

## 2024-12-19 NOTE — PROGRESS NOTES
SPIRITUAL HEALTH SERVICES - Progress Note  Obs 2    Referral Source/ Reason for Visit: Length of stay visit for emotional support.    Summary and Recommendations -     Compassionately listened to patient engage in life review.  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    This incident is causing him to reflect on the gifts of life and health.    Plan: Patient will reach out to his home [facility] with any spiritual needs.  [facility] remains available for emotional support upon request.    Rev. DORIS Henry.  Staff     [facility] available 24/7 for emergent requests/ referrals, either by paging the on-call  or by entering an ASAP/STAT consult in [employer], which will also page the on-call .      Assessment    Saw pt Dustin Lan regarding length of stay visit for emotional support.    Patient/Family Understanding of Illness and Goals of Care - Patient understands that he suffered an infection after a dog bite.    Distress and Loss - Patient is very surprised that a tiny dog bite from his son's puppy caused such a health issue.    Strengths, Coping and Resources - He named his wife and two sons as supportive people in his life.    Meaning, Beliefs and Spirituality - Patient is Orthodoxy.  He is a member of [employer] in [place].

## 2024-12-20 VITALS
BODY MASS INDEX: 30.8 KG/M2 | DIASTOLIC BLOOD PRESSURE: 79 MMHG | WEIGHT: 227.4 LBS | RESPIRATION RATE: 18 BRPM | HEART RATE: 60 BPM | HEIGHT: 72 IN | OXYGEN SATURATION: 98 % | SYSTOLIC BLOOD PRESSURE: 147 MMHG | TEMPERATURE: 98.2 F

## 2024-12-20 VITALS
BODY MASS INDEX: 30.8 KG/M2 | RESPIRATION RATE: 17 BRPM | HEIGHT: 72 IN | TEMPERATURE: 97.9 F | OXYGEN SATURATION: 96 % | SYSTOLIC BLOOD PRESSURE: 140 MMHG | HEART RATE: 64 BPM | WEIGHT: 227.4 LBS | DIASTOLIC BLOOD PRESSURE: 89 MMHG

## 2024-12-20 LAB — BACTERIA TISS BX CULT: NO GROWTH

## 2024-12-20 PROCEDURE — 99239 HOSP IP/OBS DSCHRG MGMT >30: CPT | Performed by: NURSE PRACTITIONER

## 2024-12-20 PROCEDURE — 250N000013 HC RX MED GY IP 250 OP 250 PS 637: Performed by: STUDENT IN AN ORGANIZED HEALTH CARE EDUCATION/TRAINING PROGRAM

## 2024-12-20 PROCEDURE — 250N000013 HC RX MED GY IP 250 OP 250 PS 637: Performed by: PHYSICIAN ASSISTANT

## 2024-12-20 RX ADMIN — AMOXICILLIN AND CLAVULANATE POTASSIUM 1 TABLET: 875; 125 TABLET, FILM COATED ORAL at 08:58

## 2024-12-20 RX ADMIN — ACETAMINOPHEN 650 MG: 325 TABLET, FILM COATED ORAL at 08:58

## 2024-12-20 RX ADMIN — Medication 1 CAPSULE: at 08:58

## 2024-12-20 RX ADMIN — SENNOSIDES AND DOCUSATE SODIUM 1 TABLET: 8.6; 5 TABLET ORAL at 08:58

## 2024-12-20 RX ADMIN — DILTIAZEM HYDROCHLORIDE 240 MG: 120 CAPSULE, COATED, EXTENDED RELEASE ORAL at 08:58

## 2024-12-20 ASSESSMENT — ACTIVITIES OF DAILY LIVING (ADL)
ADLS_ACUITY_SCORE: 35

## 2024-12-20 NOTE — PROGRESS NOTES
Orthopedic Surgery  Dustin Lan  12/20/2024     Admit Date:  12/14/2024    POD: 2 Days Post-Op   Procedure(s):  Left dorsal hand wound repeat irrigation and excisional debridement, deepest level of debridement fascia     Patient resting comfortably in bed.    Pain improving.   Tolerating oral intake.    Denies nausea or vomiting  Denies chest pain or shortness of breath    Temp:  [97.7  F (36.5  C)-98.2  F (36.8  C)] 97.7  F (36.5  C)  Pulse:  [60-68] 66  Resp:  [17-20] 18  BP: (137-153)/(74-87) 141/84  SpO2:  [96 %-98 %] 97 %    Alert and oriented  Dressing is removed today for exam.   Erythema is improving.  Does continue to have some surrounding erythema but pain and swelling are improving.  Sensation and pulses equal     Labs:  Recent Labs   Lab Test 12/19/24  0535 12/18/24  0711 12/17/24  0810   WBC 10.1 6.4 7.4   HGB 12.3* 12.6* 13.6    203 191     No lab results found.  Recent Labs   Lab Test 12/19/24  0535 12/18/24  0711 12/17/24  0810   CRPI 13.12* 28.09* 53.26*         1. PLAN:   Activity as tolerared LUE, continue to work on wrist, hand, finger ROM.               Continue current pain regiment.   Abx per ID               Dressings: daily packing changes with warm soapy water soaks. Cover with adaptic, gauze and ACE wrap for swelling.               Follow-up: 1-2 weeks with Dr. Barba at [facility]    2. Disposition   Anticipate d/c to home today if medically cleared    Yessenia De Souza PA-C

## 2024-12-20 NOTE — PLAN OF CARE
"  Dog bite with abscess/cellulitis   Orientation: AOx4  Neuro: wnl  Pain status: pain in L hand, prn tylenol, ice and elevate   Activity: independent  Peripheral edema: wnl  Resp: wnl  Cardiac: wnl  GI: wnl  :  wnl  Skin: L hand wound  LDA: PIV  Infusions: SL    Diet: regular  Consults: orthopedics  Discharge Plan: Plan discontinue 12/20 with follow up with outpatient orthopedics        Problem: Adult Inpatient Plan of Care  Goal: Plan of Care Review  Description: The Plan of Care Review/Shift note should be completed every shift.  The Outcome Evaluation is a brief statement about your assessment that the patient is improving, declining, or no change.  This information will be displayed automatically on your shift  note.  Outcome: Progressing  Flowsheets (Taken 12/20/2024 1107)  Overall Patient Progress: improving  Goal: Patient-Specific Goal (Individualized)  Description: You can add care plan individualizations to a care plan. Examples of Individualization might be:  \"Parent requests to be called daily at 9am for status\", \"I have a hard time hearing out of my right ear\", or \"Do not touch me to wake me up as it startles  me\".  Outcome: Progressing  Goal: Absence of Hospital-Acquired Illness or Injury  Outcome: Progressing  Intervention: Identify and Manage Fall Risk  Recent Flowsheet Documentation  Taken 12/20/2024 0858 by Aleisha Candelaria RN  Safety Promotion/Fall Prevention:   clutter free environment maintained   lighting adjusted   nonskid shoes/slippers when out of bed   patient and family education   safety round/check completed  Intervention: Prevent Skin Injury  Recent Flowsheet Documentation  Taken 12/20/2024 0858 by Aleisha Candelaria RN  Body Position: position changed independently  Intervention: Prevent Infection  Recent Flowsheet Documentation  Taken 12/20/2024 0858 by Aleisha Candelaria RN  Infection Prevention: rest/sleep promoted  Goal: Optimal Comfort and Wellbeing  Outcome: Progressing  Intervention: Monitor Pain " and Promote Comfort  Recent Flowsheet Documentation  Taken 12/20/2024 0858 by Aleisha Candelaria RN  Pain Management Interventions: medication (see MAR)  Goal: Readiness for Transition of Care  Outcome: Progressing     Problem: Wound  Goal: Optimal Coping  Outcome: Progressing  Goal: Optimal Functional Ability  Outcome: Progressing  Intervention: Optimize Functional Ability  Recent Flowsheet Documentation  Taken 12/20/2024 0858 by Aleisha Candelaria RN  Activity Management: up ad catrachito  Activity Assistance Provided: independent  Goal: Absence of Infection Signs and Symptoms  Outcome: Progressing  Goal: Improved Oral Intake  Outcome: Progressing  Goal: Optimal Pain Control and Function  Outcome: Progressing  Intervention: Prevent or Manage Pain  Recent Flowsheet Documentation  Taken 12/20/2024 0858 by Aleisha Candelaria RN  Pain Management Interventions: medication (see MAR)  Goal: Skin Health and Integrity  Outcome: Progressing  Intervention: Optimize Skin Protection  Recent Flowsheet Documentation  Taken 12/20/2024 0858 by Aleisha Candelaria RN  Activity Management: up ad catrachito  Goal: Optimal Wound Healing  Outcome: Progressing

## 2024-12-20 NOTE — PLAN OF CARE
Care from [id number]      Inpatient Progress Note:  For complete assessment see flow sheet documentation.       /82 (BP Location: Right arm)   Pulse 62   Temp 98  F (36.7  C) (Oral)   Resp 20   Ht 1.829 m (6')   Wt 103.1 kg (227 lb 6.4 oz)   SpO2 96%   BMI 30.84 kg/m         Neuro: A&O x4.   Vital Signs: Stable   Pain/Comfort: Tylenol given for pain.  Diet/po intake: Regular diet, eating well.   Output: Up to the bathroom for use.   Activity/Ambulation: Up ad catrachito, has been walking the halls.   Pertinent assessments: CMS intact.   Infusions: N/A stopped Q 6 Unasyn.   Major Shift Events: N/A  Plan (Upcoming Events): Transition to oral antibiotics. Ortho is following.   Discharge/Transfer Needs: Likely to discharge home tomorrow. Follow up with ortho outpt.     Will continue with POC.          Will continue to monitor and provide cares.    Goal Outcome Evaluation:      Plan of Care Reviewed With: patient    Overall Patient Progress: no changeOverall Patient Progress: no change    Outcome Evaluation: A&O x4. Up ad catrachito. Ortho and ID are following. Transition to oral antibiotics, Tylenol for pain.

## 2024-12-20 NOTE — DISCHARGE SUMMARY
[facility]  Hospitalist Discharge Summary      Date of Admission:  12/14/2024  Date of Discharge:  12/20/2024  Discharging Provider: DONY Mederos CNP  Discharge Service: Hospitalist Service    Discharge Diagnoses   See below    Clinically Significant Risk Factors     # Obesity: Estimated body mass index is 30.84 kg/m  as calculated from the following:    Height as of this encounter: 1.829 m (6').    Weight as of this encounter: 103.1 kg (227 lb 6.4 oz).       Follow-ups Needed After Discharge   Follow-up Appointments       Follow-up and recommended labs and tests       Follow-up with Dr. Barba at [facility] approximately 1-2 weeks following your 2nd surgery.  Call the care coordinator at [phone] to arrange appointment or if any questions.    [facility] clinic numbers:  Lizz [phone], Lucille Mcnulty [phone]  Walk in clinic hours: 8 am - 8 pm        Follow-up and recommended labs and tests       Follow up with primary care provider, Alessandro Blanco, within 7 days for hospital follow- up.  No follow up labs or test are needed.    Follow up with O -- Dr. Barba in 1-2 weeks.                Unresulted Labs Ordered in the Past 30 Days of this Admission       Date and Time Order Name Status Description    12/18/2024  8:12 AM Anaerobic Bacterial Culture Routine Preliminary     12/18/2024  8:09 AM Tissue Aerobic Bacterial Culture Routine Preliminary     12/15/2024 12:21 PM Anaerobic Bacterial Culture Routine Preliminary     12/15/2024 12:21 PM Anaerobic Bacterial Culture Routine Preliminary     12/15/2024 12:20 PM Anaerobic Bacterial Culture Routine Preliminary         These results will be followed up by HM/Ortho/ID    Discharge Disposition   Discharged to home  Condition at discharge: Stable    Hospital Course     Dustin Lan, a 74-year-old male with a past medical history of supraventricular tachycardia (SVT), asthma, hypertension (HTN), and hyperlipidemia (HLD),  was admitted on 12/14/2024 for management of an infected hand wound following a dog bite sustained on 12/9/2024. The patient initially presented to the emergency room on 12/13/2024 with an abscess that was drained, and treatment with Augmentin and a tetanus booster was initiated. Due to lack of improvement, he returned on 12/14/2024.    Upon admission, the patient was afebrile with stable vital signs. Laboratory findings included a CRP of 155, WBC of 14.5, and normal BMP. Imaging studies showed soft tissue swelling without evidence of a foreign body or gas. Blood cultures were negative. The patient was started on IV Unasyn, and orthopedic surgery was consulted.    Clinical Course:    Left Hand Dog Bite with Abscess/Cellulitis: The patient underwent incision and drainage (I&D) in the operating room on 12/15/2024 and 12/18/2024. Initial intraoperative cultures grew group A Streptococcus and Staphylococcus capitis. Repeat cultures on 12/18/2024 showed no growth. CRP trended downward from a peak of 155.04 to 13.12 by 12/19/2024. The Infectious Disease team recommended continuation of IV Unasyn, with plans to transition to oral Augmentin on discharge for one more week. Pain was managed with Tylenol, ibuprofen, oxycodone, and elevation of the affected hand. Daily wound care included packing changes with warm soapy water soaks, adaptic, gauze, and an ACE wrap for swelling. Orthopedics will perform the final packing change prior to discharge. The patient is to follow up with Dr. Barba at [facility] in 2 weeks.    Hypertension (HTN): Managed with home medications.    History of Narrow QRS Tachycardia (SVT): The patient had a previous episode in July 2024 requiring a Valsalva maneuver. He has been evaluated by cardiology, with no further episodes reported. He remains on diltiazem.    Hyperlipidemia (HLD): The patient s statin was held during hospitalization and will be resumed upon discharge.    Mild Persistent  Asthma: The patient will continue using inhalers as prescribed prior to admission.       Consultations This Hospital Stay   ORTHOPEDIC SURGERY IP CONSULT  OCCUPATIONAL THERAPY ADULT IP CONSULT  INFECTIOUS DISEASES IP CONSULT  PHYSICAL THERAPY ADULT IP CONSULT    Code Status   Full Code    Time Spent on this Encounter   I, DONY Mederos CNP, personally saw the patient today and spent greater than 30 minutes discharging this patient.       DONY Mederos CNP  [facility] OBSERVATION DEPT  [address] [facility] [address]  Phone: [phone]  ______________________________________________________________________    Physical Exam   Vital Signs: Temp: 97.9  F (36.6  C) Temp src: Oral BP: (!) 140/89 Pulse: 64   Resp: 17 SpO2: 96 % O2 Device: None (Room air)    Weight: 227 lbs 6.4 oz  General Appearance: Pleasant 73 yo M in NAD.  Respiratory: BBS. LCTA.    Cardiovascular: RRR. S1S2. No S3.   GI: SNTND.  No HSM.  Skin: L hand wrapped with ACE bandage, finger swelling improving, CMS intact.  Other: AxOx3. FC.  No focal defcits.         Primary Care Physician   Alessandro Blanco    Discharge Orders      Primary Care - Care Coordination Referral      Occupational Therapy  Referral      Reason for your hospital stay    Left hand infection: I&D     Activity    Your activity upon discharge: WBAT, ROMAT, avoid heavy lifting until recheck     When to contact your care team    Call if increasing pain, redness, drainage, cramping, fever >101.     Wound care and dressings    Instructions to care for your wound at home: Site: left hand   Daily dressing changes: remove dressings, soak in 20 min warm water/chlorhexidine (or Vasche) then repack gently to keep wound open     Discharge Instructions    No driving until approved by your surgeon.     Follow-up and recommended labs and tests     Follow-up with Dr. Barba at [facility] approximately 1-2 weeks following your 2nd  surgery.  Call the care coordinator at [phone] to arrange appointment or if any questions.    [facility] clinic numbers:  Lizz [phone], Lucille Mcnulty [phone]  Walk in clinic hours: 8 am - 8 pm     Reason for your hospital stay    Dog bite  I and D L hand -- wash out.     Follow-up and recommended labs and tests     Follow up with primary care provider, Alessandro Blanco, within 7 days for hospital follow- up.  No follow up labs or test are needed.    Follow up with [facility] -- Dr. Barba in 1-2 weeks.     Activity    Your activity upon discharge: activity as tolerated     Wound care and dressings    As per Orthopedic recommendations.     Diet    Follow this diet upon discharge: Orders Placed This Encounter      Advance Diet as Tolerated: Regular Diet Adult       Diet    Follow this diet upon discharge: Current Diet:Orders Placed This Encounter      Advance Diet as Tolerated: Regular Diet Adult      Diet       Significant Results and Procedures   Most Recent 3 CBC's:  Recent Labs   Lab Test 12/19/24  0535 12/18/24  0711 12/17/24  0810   WBC 10.1 6.4 7.4   HGB 12.3* 12.6* 13.6   MCV 92 91 92    203 191     Most Recent 3 BMP's:  Recent Labs   Lab Test 12/19/24  0535 12/18/24  0711 12/18/24  0623 12/17/24  0810    139  --  142   POTASSIUM 3.9 3.4  --  3.4   CHLORIDE 103 104  --  104   CO2 25 24  --  25   BUN 15.0 13.7  --  13.1   CR 0.86 0.74  --  0.78   ANIONGAP 11 11  --  13   BRICE 8.6* 8.7*  --  8.9   * 92 88 102*     Most Recent 2 LFT's:  Recent Labs   Lab Test 09/11/23  0936 04/27/22  1645   AST 31 28   ALT 23 29   ALKPHOS 79 94   BILITOTAL 0.5 0.6     7-Day Micro Results       Collected Updated Procedure Result Status      12/18/2024 0808 12/18/2024 1121 Gram Stain [[id number]]   Tissue from Hand, Left    Final result Component Value   GS Culture See corresponding culture for results   Gram Stain Result No organisms seen   Gram Stain Result 1+ WBC seen            12/18/2024 0808 12/20/2024 0947  Tissue Aerobic Bacterial Culture Routine [[id number]]   Tissue from Hand, Left    Preliminary result Component Value   Culture No growth after 1 day  [P]                12/18/2024 0808 12/19/2024 1105 Anaerobic Bacterial Culture Routine [[id number]]   Tissue from Hand, Left    Preliminary result Component Value   Culture No anaerobic organisms isolated after 1 day  [P]                12/15/2024 1206 12/19/2024 1616 Anaerobic Bacterial Culture Routine [[id number]]   Tissue from Hand, Left    Preliminary result Component Value   Culture No anaerobic organisms isolated after 4 days  [P]                12/15/2024 1206 12/18/2024 0944 Anaerobic Bacterial Culture Routine [[id number]]   (Abnormal)   Abscess from Hand, Left    Preliminary result Component Value   Culture No anaerobic organisms isolated after 2 days  [P]     1+ Streptococcus pyogenes (Group A Streptococcus)  [P]     Not isolated or reported on routine aerobic culture  This organism is susceptible to ampicillin, penicillin, vancomycin and the cephalosporins. If treatment is required and your patient is allergic to penicillin, contact the microbiology lab within 5 days to request susceptibility testing.               12/15/2024 1206 12/17/2024 0823 Abscess Aerobic Bacterial Culture Routine [[id number]]   Abscess from Hand, Left    Final result Component Value   Culture No Growth               12/15/2024 1206 12/20/2024 0654 Tissue Aerobic Bacterial Culture Routine Without Gram Stain [[id number]]   Tissue from Hand, Left    Final result Component Value   Culture No Growth               12/15/2024 1205 12/19/2024 1631 Anaerobic Bacterial Culture Routine [[id number]]   Abscess from Hand, Left    Preliminary result Component Value   Culture No anaerobic organisms isolated after 4 days  [P]                12/15/2024 1205 12/17/2024 1307 Abscess Aerobic Bacterial Culture Routine [[id number]]   (Abnormal)   Abscess from Hand, Left    Final result  Component Value   Culture 1+ Staphylococcus capitis    Susceptibilities not routinely done, refer to antibiogram to view typical susceptibility profiles               12/14/2024 1214 12/19/2024 1701 Blood Culture Arm, Left [[id number]]   Blood from Arm, Left    Final result Component Value   Culture No Growth               12/14/2024 1157 12/19/2024 1701 Blood Culture Peripheral Blood [[id number]]   Peripheral Blood    Final result Component Value   Culture No Growth                     Most Recent 6 glucoses:  Recent Labs   Lab Test 12/19/24  0535 12/18/24  0711 12/18/24  0623 12/17/24  0810 12/16/24  0529 12/14/24  1157   * 92 88 102* 106* 93   ,   Results for orders placed or performed during the hospital encounter of 12/14/24   XR Hand Left G/E 3 Views    Narrative    EXAM: XR HAND LEFT G/E 3 VIEWS  LOCATION: [facility]  DATE: 12/14/2024    INDICATION: Cellulitis, hand.  COMPARISON: None.      Impression    IMPRESSION: Left hand soft tissue swelling. No radiopaque foreign body or soft tissue gas. No evidence of an acute fracture. Scattered arthritic changes which are severe at the thumb CMC joint. Vague lucency projecting over the proximal scaphoid pole is   felt to be projectional as opposed to an acute fracture given the patient's history, although correlation with point tenderness is recommended.       US Upper Extremity Non Vascular Left    Narrative    EXAM: US UPPER EXTREMITY NON VASCULAR LEFT  LOCATION: [facility]  DATE: 12/14/2024    INDICATION: Left dorsal hand swelling and redness.  COMPARISON: None.  TECHNIQUE: Targeted ultrasound of the left dorsal hand at the clinical area of concern. Of note, ultrasound images were incorrectly labeled right. This was confirmed on the updated technologist worksheet.    FINDINGS:     At the clinical area of concern in the right dorsal hand, there is diffuse subcutaneous edema. There is a focal elongated  "hypoechoic area with irregular margins, measuring 1.7 x 0.7 x 0.5 cm.      Impression    IMPRESSION:  1.  Diffuse subcutaneous edema of the left dorsal hand, with irregular 1.7 cm hypoechoic area, favored to represent phlegmon or possibly early developing abscess.       Discharge Medications   Current Discharge Medication List        START taking these medications    Details   acetaminophen (TYLENOL) 325 MG tablet Take 2 tablets (650 mg) by mouth every 4 hours as needed for mild pain or other (and adjunct with moderate or severe pain or per patient request).  Qty: 20 tablet, Refills: 0    Associated Diagnoses: Dog bite, hand, left, initial encounter      oxyCODONE (ROXICODONE) 5 MG tablet Take 0.5-1 tablets (2.5-5 mg) by mouth every 4 hours as needed for moderate to severe pain. 1/2 tab po q 4 hrs prn pain scale \"3-6\"  1 tab po q 4 hrs prn pain scale \"7-10\"  Qty: 15 tablet, Refills: 0    Associated Diagnoses: Dog bite, hand, left, initial encounter      senna-docusate (SENOKOT-S/PERICOLACE) 8.6-50 MG tablet Take 1 tablet by mouth 2 times daily as needed for constipation.  Qty: 15 tablet, Refills: 0    Associated Diagnoses: Dog bite, hand, left, initial encounter           CONTINUE these medications which have CHANGED    Details   amoxicillin-clavulanate (AUGMENTIN) 875-125 MG tablet Take 1 tablet by mouth 2 times daily.  Qty: 5 tablet, Refills: 0    Associated Diagnoses: Dog bite, hand, left, initial encounter           CONTINUE these medications which have NOT CHANGED    Details   AIRSUPRA 90-80 MCG/ACT AERO Inhale 1 puff into the lungs as needed.      Ascorbic Acid (VITAMIN C PO) Take 1 tablet by mouth every morning      atorvastatin (LIPITOR) 10 MG tablet Take 10 mg by mouth at bedtime.      !! budesonide-formoterol (SYMBICORT) 160-4.5 MCG/ACT Inhaler Inhale 2 puffs into the lungs at bedtime.      !! budesonide-formoterol (SYMBICORT) 160-4.5 MCG/ACT Inhaler Inhale 1 puff into the lungs every morning.    "   diltiazem ER (TIADYLT ER) 240 MG 24 hr ER beaded capsule Take 1 capsule (240 mg) by mouth daily.  Qty: 90 capsule, Refills: 3    Associated Diagnoses: Essential hypertension      glucosamine-chondroitin 500-400 MG CAPS per capsule Take 1 capsule by mouth daily.      multivitamin, therapeutic (THERA-VIT) TABS tablet Take 1 tablet by mouth daily       !! - Potential duplicate medications found. Please discuss with provider.        Allergies   Allergies   Allergen Reactions    Cats

## 2024-12-22 LAB
BACTERIA ABSC ANAEROBE+AEROBE CULT: ABNORMAL
BACTERIA ABSC ANAEROBE+AEROBE CULT: ABNORMAL
BACTERIA ABSC ANAEROBE+AEROBE CULT: NORMAL
BACTERIA TISS BX CULT: NORMAL

## 2024-12-23 ENCOUNTER — PATIENT OUTREACH (OUTPATIENT)
Dept: CARE COORDINATION | Facility: CLINIC | Age: 74
End: 2024-12-23
Payer: COMMERCIAL

## 2024-12-23 LAB — BACTERIA TISS BX CULT: NO GROWTH

## 2024-12-23 NOTE — LETTER
[facility] CARE COORDINATION  [address] [place] [address]    December 24, 2024    Dustin Lan  [address]  [place] [address]      Dear Dustin,    I am a clinic care coordinator who works with Alessandro Blanco MD with the [facility]. I wanted to introduce myself and provide you with my contact information for you to be able to call me with any questions or concerns. Below is a description of clinic care coordination and how I can further assist you.       The clinic care coordination team is made up of a registered nurse, , financial resource worker and community health worker who understand the health care system. The goal of clinic care coordination is to help you manage your health and improve access to the health care system. Our team works alongside your provider to assist you in determining your health and social needs. We can help you obtain health care and community resources, providing you with necessary information and education. We can work with you through any barriers and develop a care plan that helps coordinate and strengthen the communication between you and your care team.  Our services are voluntary and are offered without charge to you personally.    Please feel free to contact me with any questions or concerns regarding care coordination and what we can offer.      We are focused on providing you with the highest-quality healthcare experience possible.    Sincerely,     ROCIO Oconnell  [facility]   Primary Care Social Work Care Coordinator  [facility] [place]   Phone: [phone]     On behalf of:   Gwen Brothers RN Care Coordination  [facility] Clinics: [place], [place], [place]    Email: [email or web address]  Phone: [phone]

## 2024-12-23 NOTE — PROGRESS NOTES
Clinic Care Coordination Contact  [facility]/Voicemail    Clinical Data: Care Coordinator Outreach    Outreach Documentation Number of Outreach Attempt   12/23/2024   2:10 PM 1     Left message on patient's voicemail with call back information and requested return call.    Plan: Care Coordinator will try to reach patient again in 1-2 business days.    Yessi Faustin RN, BSN, CPHN, [facility] Ambulatory Care Management  [facility], and [facility]  [email or web address]  Office: [phone]  Employed by [facility]

## 2024-12-24 NOTE — PROGRESS NOTES
"Clinic Care Coordination Contact  Transitions of Care Outreach  Chief Complaint   Patient presents with    Clinic Care Coordination - Initial    Clinic Care Coordination - Post Hospital     Most Recent Admission Date: 12/14/2024   Most Recent Admission Diagnosis: Cellulitis and abscess of hand - L03.119, L02.519  Dog bite, hand, left, initial encounter - S61.452A, W54.0XXA     Most Recent Discharge Date: 12/20/2024   Most Recent Discharge Diagnosis: Cellulitis and abscess of hand - L03.119, L02.519  Dog bite, hand, left, initial encounter - S61.452A, W54.0XXA     Transitions of Care Assessment    Discharge Assessment  How are you doing now that you are home?: \"Currently I'm doing pretty well\" per pt report  How are your symptoms? (Red Flag symptoms escalate to triage hotline per guidelines): Improved  Do you know how to contact your clinic care team if you have future questions or changes to your health status? : Yes  Does the patient have their discharge instructions? : Yes  Does the patient have questions regarding their discharge instructions? : No  Were you started on any new medications or were there changes to any of your previous medications? : Yes  Does the patient have all of their medications?: Yes  Do you have questions regarding any of your medications? : No  Do you have all of your needed medical supplies or equipment (DME)?  (i.e. oxygen tank, CPAP, cane, etc.): Yes  Post-op ([facility] CTA Only)  If the patient had a surgery or procedure, do they have any questions for a nurse?: No  Post-op (Clinicians Only)  Did the patient have surgery or a procedure: Yes  Incision: closed  Drainage: No  Bleeding: none  Fever: No  Chills: No  Redness: No  Warmth: No  Swelling: No  Incision site pain: No  Closure: suture  Eating & Drinking: eating and drinking without complaints/concerns  PO Intake: regular diet  Bowel Function: normal  Date of last BM: 12/23/24  Urinary Status: voiding without complaint/concerns    Pt " returned CC call. Post discharge assessment questions completed. Pt denied questions, concerns or needs. Post discharge assessment questions completed. No further outreaches planned at this time. Enter PCP CC referral if needs arise.     Follow up Plan     Discharge Follow-Up  Discharge follow up appointment scheduled in alignment with recommended follow up timeframe or Transitions of Risk Category? (Low = within 30 days; Moderate= within 14 days; High= within 7 days): Yes  Discharge Follow Up Appointment Date: 12/27/24  Discharge Follow Up Appointment Scheduled with?: Specialty Care Provider  Patient's follow up appointment not scheduled: Patient declined scheduling support. Education on the importance of transitions of care follow up. Provided scheduling phone number.    Future Appointments   Date Time Provider Department Center   10/3/2025  9:00 AM Alessandro Blanco MD [facility]       Outpatient Plan as outlined on AVS reviewed with patient.    For any urgent concerns, please contact our 24 hour nurse triage line: [phone] ([phone])       ROCIO Mahmood

## 2024-12-24 NOTE — PROGRESS NOTES
Clinic Care Coordination Contact  [facility]/Voicemail    Clinical Data: Care Coordinator Outreach    Outreach Documentation Number of Outreach Attempt   12/23/2024   2:10 PM 1   12/24/2024  10:37 AM 2     Left message on patient's voicemail with call back information and requested return call.    Plan: Care Coordinator will send care coordination introduction letter with care coordinator contact information and explanation of care coordination services via [employer]hart. Care Coordinator will do no further outreaches at this time.    Melinda Lan [facility]   Primary Care Social Work Care Coordinator  [facility] [place]   Phone: [phone]

## 2024-12-25 LAB — BACTERIA TISS BX CULT: NORMAL

## 2024-12-27 ENCOUNTER — TRANSFERRED RECORDS (OUTPATIENT)
Dept: HEALTH INFORMATION MANAGEMENT | Facility: CLINIC | Age: 74
End: 2024-12-27
Payer: COMMERCIAL

## (undated) DEVICE — GLOVE BIOGEL PI MICRO INDICATOR UNDERGLOVE SZ 6.5 48965

## (undated) DEVICE — BLADE CLIPPER 4412A

## (undated) DEVICE — SU PROLENE 0 CT-2 30" 8412H

## (undated) DEVICE — Device

## (undated) DEVICE — DRSG KERLIX 4 1/2"X4YDS ROLL 6730

## (undated) DEVICE — GLOVE PROTEXIS W/NEU-THERA 8.5  2D73TE85

## (undated) DEVICE — SU STRATAFIX PDS PLUS 1 CT-1 18" SXPP1A404

## (undated) DEVICE — SOL NACL 0.9% INJ 250ML BAG 2B1322Q

## (undated) DEVICE — DRSG ABDOMINAL 07 1/2X8" 7197D

## (undated) DEVICE — BONE CLEANING TIP INTERPULSE  0210-010-000

## (undated) DEVICE — SUCTION IRR SYSTEM W/O TIP INTERPULSE HANDPIECE 0210-100-000

## (undated) DEVICE — CAST PADDING 6" UNSTERILE 9046

## (undated) DEVICE — SU VICRYL 0 CTX CR 8X18" J764D

## (undated) DEVICE — MANIFOLD NEPTUNE 4 PORT 700-20

## (undated) DEVICE — BONE CEMENT MIXEVAC III HI VAC KIT  0206-015-000

## (undated) DEVICE — SU STRATAFIX PDS PLUS 2-0 SPIRAL CT-1 30CM SXPP1B410

## (undated) DEVICE — GLOVE PROTEXIS POWDER FREE 7.5 ORTHOPEDIC 2D73ET75

## (undated) DEVICE — LINEN FULL SHEET 5511

## (undated) DEVICE — GLOVE BIOGEL PI MICRO INDICATOR UNDERGLOVE SZ 7.5 48975

## (undated) DEVICE — KIT CULTURE ESWAB AEROBE/ANAEROBE WHITE TOP R723480

## (undated) DEVICE — GLOVE BIOGEL PI MICRO SZ 6.5 48565

## (undated) DEVICE — SU ETHIBOND 0 CTX CR  8X18" CX31D

## (undated) DEVICE — PREP CHLORAPREP 26ML TINTED ORANGE  260815

## (undated) DEVICE — PACK TOTAL KNEE SOP15TKFSD

## (undated) DEVICE — BLADE SAW SAGITTAL STRK 19.5X95X1.27MM 2108-109-000S15

## (undated) DEVICE — LINEN TOWEL PACK X5 5464

## (undated) DEVICE — SOL WATER IRRIG 1000ML BOTTLE 2F7114

## (undated) DEVICE — DRAPE IOBAN INCISE 23X17" 6650EZ

## (undated) DEVICE — SOLUTION WOUND CLEANSING 3/4OZ 10% PVP EA-L3011FB-50

## (undated) DEVICE — SOL NACL 0.9% IRRIG 1000ML BOTTLE 2F7124

## (undated) DEVICE — ESU GROUND PAD ADULT W/CORD E7507

## (undated) DEVICE — ESU GROUND PAD UNIVERSAL W/O CORD

## (undated) DEVICE — WRAP EZY KNEE

## (undated) DEVICE — TOURNIQUET SGL BLADDER 18"X4" RED 5921-218-135

## (undated) DEVICE — PACKING NUGAUZE 1/4" PLAIN 7631

## (undated) DEVICE — SYR 50ML LL W/O NDL 309653

## (undated) DEVICE — GLOVE PROTEXIS W/NEU-THERA 8.0  2D73TE80

## (undated) DEVICE — PACK HAND SOP32HARMO

## (undated) DEVICE — GLOVE PROTEXIS MICRO 6.5  2D73PM65

## (undated) DEVICE — SOL NACL 0.9% INJ 1000ML BAG 2B1324X

## (undated) DEVICE — SU MONOCRYL 3-0 PS-2 27" Y427H

## (undated) DEVICE — HOOD FLYTE W/PEELAWAY 408-800-100

## (undated) DEVICE — DRAPE SHEET REV FOLD 3/4 9349

## (undated) DEVICE — SU VICRYL 2-0 CP-1 27" UND J266H

## (undated) DEVICE — APPLICATORS COTTON TIP 6"X2 STERILE LF C15053-006

## (undated) DEVICE — GLOVE PROTEXIS BLUE W/NEU-THERA 7.0  2D73EB70

## (undated) DEVICE — NDL 20GA 1.5"

## (undated) DEVICE — GLOVE PROTEXIS W/NEU-THERA 6.5  2D73TE65

## (undated) DEVICE — DRAPE STERI U 1015

## (undated) DEVICE — LINEN HALF SHEET 5512

## (undated) DEVICE — NDL 21GA 1.5"

## (undated) DEVICE — PREP SCRUB SOL EXIDINE 4% CHG 4OZ 29002-404

## (undated) DEVICE — LINEN ORTHO ACL PACK 5447

## (undated) DEVICE — SPONGE LAP 18X18" X8435

## (undated) DEVICE — SU ETHILON 4-0 PS-2 18" BLACK 1667H

## (undated) DEVICE — GLOVE PROTEXIS POWDER FREE 8.5 ORTHOPEDIC 2D73ET85

## (undated) DEVICE — SU PROLENE 2-0 CT-2 30" 8411H

## (undated) DEVICE — ESU BIPOLAR SEALER AQUAMANTYS 6MM 23-112-1

## (undated) DEVICE — BLADE SAW RECIP STRK 70X12.5X1.2MM 0277-096-281

## (undated) DEVICE — SUCTION TIP YANKAUER STR K87

## (undated) DEVICE — TUBE CULTURE ANAEROBIC PORT-A-CUL 11ML

## (undated) DEVICE — DECANTER BAG 2002S

## (undated) DEVICE — SU STRATAFIX PDS PLUS 0 CT 45CM SXPP1A406

## (undated) DEVICE — SU DERMABOND PRINEO 22CM CLR222US

## (undated) DEVICE — CAST PADDING 4" STERILE 9044S

## (undated) DEVICE — GLOVE BIOGEL PI MICRO SZ 7.5 48575

## (undated) DEVICE — CLOSURE SYS SKIN PREMIERPRO EXOFIN FUSION 4X22CM STRL 3472

## (undated) DEVICE — GLOVE PROTEXIS BLUE W/NEU-THERA 8.0  2D73EB80

## (undated) DEVICE — ESU PENCIL W/SMOKE EVAC NEPTUNE STRYKER 0703-046-000

## (undated) DEVICE — TRAY PREP DRY SKIN SCRUB 067

## (undated) DEVICE — PACKING IODOFORM STRIP 1/4" 7831

## (undated) DEVICE — BAG CLEAR TRASH 1.3M 39X33" P4040C

## (undated) DEVICE — BLADE SAW SAGITTAL STRK 18X90X1.27MM HD SYS 6 6118-127-090

## (undated) RX ORDER — CEFAZOLIN SODIUM 2 G/100ML
INJECTION, SOLUTION INTRAVENOUS
Status: DISPENSED
Start: 2019-05-08

## (undated) RX ORDER — LIDOCAINE HYDROCHLORIDE 10 MG/ML
INJECTION, SOLUTION EPIDURAL; INFILTRATION; INTRACAUDAL; PERINEURAL
Status: DISPENSED
Start: 2024-12-18

## (undated) RX ORDER — SEVOFLURANE 250 ML/250ML
LIQUID RESPIRATORY (INHALATION)
Status: DISPENSED
Start: 2024-12-18

## (undated) RX ORDER — ACYCLOVIR 200 MG/1
CAPSULE ORAL
Status: DISPENSED
Start: 2019-05-08

## (undated) RX ORDER — DEXAMETHASONE SODIUM PHOSPHATE 4 MG/ML
INJECTION, SOLUTION INTRA-ARTICULAR; INTRALESIONAL; INTRAMUSCULAR; INTRAVENOUS; SOFT TISSUE
Status: DISPENSED
Start: 2024-12-18

## (undated) RX ORDER — EPHEDRINE SULFATE 50 MG/ML
INJECTION, SOLUTION INTRAMUSCULAR; INTRAVENOUS; SUBCUTANEOUS
Status: DISPENSED
Start: 2024-12-18

## (undated) RX ORDER — PROPOFOL 10 MG/ML
INJECTION, EMULSION INTRAVENOUS
Status: DISPENSED
Start: 2019-05-08

## (undated) RX ORDER — FENTANYL CITRATE 50 UG/ML
INJECTION, SOLUTION INTRAMUSCULAR; INTRAVENOUS
Status: DISPENSED
Start: 2019-05-08

## (undated) RX ORDER — TRANEXAMIC ACID 650 MG/1
TABLET ORAL
Status: DISPENSED
Start: 2021-07-15

## (undated) RX ORDER — CEFAZOLIN SODIUM 1 G/3ML
INJECTION, POWDER, FOR SOLUTION INTRAMUSCULAR; INTRAVENOUS
Status: DISPENSED
Start: 2024-12-18

## (undated) RX ORDER — TRANEXAMIC ACID 10 MG/ML
INJECTION, SOLUTION INTRAVENOUS
Status: DISPENSED
Start: 2024-12-15

## (undated) RX ORDER — CEFAZOLIN SODIUM 2 G/100ML
INJECTION, SOLUTION INTRAVENOUS
Status: DISPENSED
Start: 2021-07-15

## (undated) RX ORDER — PREGABALIN 150 MG/1
CAPSULE ORAL
Status: DISPENSED
Start: 2019-05-08

## (undated) RX ORDER — ACETAMINOPHEN 500 MG
TABLET ORAL
Status: DISPENSED
Start: 2019-05-08

## (undated) RX ORDER — CELECOXIB 200 MG/1
CAPSULE ORAL
Status: DISPENSED
Start: 2019-05-08

## (undated) RX ORDER — ONDANSETRON 2 MG/ML
INJECTION INTRAMUSCULAR; INTRAVENOUS
Status: DISPENSED
Start: 2019-05-08

## (undated) RX ORDER — CEFAZOLIN SODIUM 1 G/3ML
INJECTION, POWDER, FOR SOLUTION INTRAMUSCULAR; INTRAVENOUS
Status: DISPENSED
Start: 2019-05-08

## (undated) RX ORDER — LIDOCAINE HYDROCHLORIDE AND EPINEPHRINE 10; 10 MG/ML; UG/ML
INJECTION, SOLUTION INFILTRATION; PERINEURAL
Status: DISPENSED
Start: 2024-12-15

## (undated) RX ORDER — KETOROLAC TROMETHAMINE 30 MG/ML
INJECTION, SOLUTION INTRAMUSCULAR; INTRAVENOUS
Status: DISPENSED
Start: 2019-05-08

## (undated) RX ORDER — FENTANYL CITRATE 50 UG/ML
INJECTION, SOLUTION INTRAMUSCULAR; INTRAVENOUS
Status: DISPENSED
Start: 2024-12-15

## (undated) RX ORDER — BUPIVACAINE HYDROCHLORIDE 2.5 MG/ML
INJECTION, SOLUTION EPIDURAL; INFILTRATION; INTRACAUDAL
Status: DISPENSED
Start: 2019-05-08

## (undated) RX ORDER — BUPIVACAINE HYDROCHLORIDE 5 MG/ML
INJECTION, SOLUTION EPIDURAL; INTRACAUDAL
Status: DISPENSED
Start: 2024-12-15

## (undated) RX ORDER — VANCOMYCIN HYDROCHLORIDE 1 G/20ML
INJECTION, POWDER, LYOPHILIZED, FOR SOLUTION INTRAVENOUS
Status: DISPENSED
Start: 2021-07-15

## (undated) RX ORDER — CEFAZOLIN SODIUM/WATER 2 G/20 ML
SYRINGE (ML) INTRAVENOUS
Status: DISPENSED
Start: 2024-12-18

## (undated) RX ORDER — PROPOFOL 10 MG/ML
INJECTION, EMULSION INTRAVENOUS
Status: DISPENSED
Start: 2021-07-15

## (undated) RX ORDER — METHYLPREDNISOLONE ACETATE 40 MG/ML
INJECTION, SUSPENSION INTRA-ARTICULAR; INTRALESIONAL; INTRAMUSCULAR; SOFT TISSUE
Status: DISPENSED
Start: 2019-05-08

## (undated) RX ORDER — HYDROMORPHONE HYDROCHLORIDE 1 MG/ML
INJECTION, SOLUTION INTRAMUSCULAR; INTRAVENOUS; SUBCUTANEOUS
Status: DISPENSED
Start: 2019-05-08

## (undated) RX ORDER — CELECOXIB 200 MG/1
CAPSULE ORAL
Status: DISPENSED
Start: 2021-07-15

## (undated) RX ORDER — PROPOFOL 10 MG/ML
INJECTION, EMULSION INTRAVENOUS
Status: DISPENSED
Start: 2024-12-18

## (undated) RX ORDER — FENTANYL CITRATE 50 UG/ML
INJECTION, SOLUTION INTRAMUSCULAR; INTRAVENOUS
Status: DISPENSED
Start: 2021-07-15

## (undated) RX ORDER — LIDOCAINE HYDROCHLORIDE 20 MG/ML
INJECTION, SOLUTION EPIDURAL; INFILTRATION; INTRACAUDAL; PERINEURAL
Status: DISPENSED
Start: 2019-05-08

## (undated) RX ORDER — PREGABALIN 150 MG/1
CAPSULE ORAL
Status: DISPENSED
Start: 2021-07-15

## (undated) RX ORDER — GLYCOPYRROLATE 0.2 MG/ML
INJECTION, SOLUTION INTRAMUSCULAR; INTRAVENOUS
Status: DISPENSED
Start: 2024-12-18

## (undated) RX ORDER — BUPIVACAINE HYDROCHLORIDE 2.5 MG/ML
INJECTION, SOLUTION EPIDURAL; INFILTRATION; INTRACAUDAL
Status: DISPENSED
Start: 2024-12-18

## (undated) RX ORDER — FENTANYL CITRATE-0.9 % NACL/PF 10 MCG/ML
PLASTIC BAG, INJECTION (ML) INTRAVENOUS
Status: DISPENSED
Start: 2024-12-18

## (undated) RX ORDER — VANCOMYCIN HYDROCHLORIDE 1 G/20ML
INJECTION, POWDER, LYOPHILIZED, FOR SOLUTION INTRAVENOUS
Status: DISPENSED
Start: 2019-05-08

## (undated) RX ORDER — FENTANYL CITRATE 50 UG/ML
INJECTION, SOLUTION INTRAMUSCULAR; INTRAVENOUS
Status: DISPENSED
Start: 2024-12-18

## (undated) RX ORDER — ONDANSETRON 2 MG/ML
INJECTION INTRAMUSCULAR; INTRAVENOUS
Status: DISPENSED
Start: 2024-12-18